# Patient Record
Sex: FEMALE | Race: WHITE | NOT HISPANIC OR LATINO
[De-identification: names, ages, dates, MRNs, and addresses within clinical notes are randomized per-mention and may not be internally consistent; named-entity substitution may affect disease eponyms.]

---

## 2024-03-08 ENCOUNTER — APPOINTMENT (OUTPATIENT)
Age: 64
End: 2024-03-08
Payer: MEDICAID

## 2024-03-08 DIAGNOSIS — K14.8 OTHER DISEASES OF TONGUE: ICD-10-CM

## 2024-03-08 DIAGNOSIS — F20.9 SCHIZOPHRENIA, UNSPECIFIED: ICD-10-CM

## 2024-03-08 PROCEDURE — 99203 OFFICE O/P NEW LOW 30 MIN: CPT

## 2024-03-13 ENCOUNTER — OUTPATIENT (OUTPATIENT)
Dept: OUTPATIENT SERVICES | Facility: HOSPITAL | Age: 64
LOS: 1 days | End: 2024-03-13
Payer: MEDICAID

## 2024-03-13 ENCOUNTER — APPOINTMENT (OUTPATIENT)
Dept: CT IMAGING | Facility: IMAGING CENTER | Age: 64
End: 2024-03-13
Payer: MEDICAID

## 2024-03-13 DIAGNOSIS — K14.8 OTHER DISEASES OF TONGUE: ICD-10-CM

## 2024-03-13 PROCEDURE — 70491 CT SOFT TISSUE NECK W/DYE: CPT | Mod: 26

## 2024-03-13 PROCEDURE — 71260 CT THORAX DX C+: CPT

## 2024-03-13 PROCEDURE — 70491 CT SOFT TISSUE NECK W/DYE: CPT

## 2024-03-13 PROCEDURE — 71260 CT THORAX DX C+: CPT | Mod: 26

## 2024-03-19 ENCOUNTER — OUTPATIENT (OUTPATIENT)
Dept: OUTPATIENT SERVICES | Facility: HOSPITAL | Age: 64
LOS: 1 days | End: 2024-03-19

## 2024-03-19 VITALS
DIASTOLIC BLOOD PRESSURE: 100 MMHG | OXYGEN SATURATION: 98 % | TEMPERATURE: 98 F | HEIGHT: 64 IN | SYSTOLIC BLOOD PRESSURE: 140 MMHG | RESPIRATION RATE: 16 BRPM | WEIGHT: 132.06 LBS | HEART RATE: 106 BPM

## 2024-03-19 DIAGNOSIS — Z98.890 OTHER SPECIFIED POSTPROCEDURAL STATES: Chronic | ICD-10-CM

## 2024-03-19 DIAGNOSIS — C02.3 MALIGNANT NEOPLASM OF ANTERIOR TWO-THIRDS OF TONGUE, PART UNSPECIFIED: ICD-10-CM

## 2024-03-19 DIAGNOSIS — F20.9 SCHIZOPHRENIA, UNSPECIFIED: ICD-10-CM

## 2024-03-19 DIAGNOSIS — C02.9 MALIGNANT NEOPLASM OF TONGUE, UNSPECIFIED: ICD-10-CM

## 2024-03-19 LAB
ADD ON TEST-SPECIMEN IN LAB: SIGNIFICANT CHANGE UP
ALBUMIN SERPL ELPH-MCNC: 4.3 G/DL — SIGNIFICANT CHANGE UP (ref 3.3–5)
ALP SERPL-CCNC: 99 U/L — SIGNIFICANT CHANGE UP (ref 40–120)
ALT FLD-CCNC: 16 U/L — SIGNIFICANT CHANGE UP (ref 4–33)
ANION GAP SERPL CALC-SCNC: 11 MMOL/L — SIGNIFICANT CHANGE UP (ref 7–14)
AST SERPL-CCNC: 17 U/L — SIGNIFICANT CHANGE UP (ref 4–32)
BILIRUB DIRECT SERPL-MCNC: <0.2 MG/DL — SIGNIFICANT CHANGE UP (ref 0–0.3)
BILIRUB INDIRECT FLD-MCNC: >0.1 MG/DL — SIGNIFICANT CHANGE UP (ref 0–1)
BILIRUB SERPL-MCNC: 0.3 MG/DL — SIGNIFICANT CHANGE UP (ref 0.2–1.2)
BLD GP AB SCN SERPL QL: NEGATIVE — SIGNIFICANT CHANGE UP
BUN SERPL-MCNC: 14 MG/DL — SIGNIFICANT CHANGE UP (ref 7–23)
CALCIUM SERPL-MCNC: 9.4 MG/DL — SIGNIFICANT CHANGE UP (ref 8.4–10.5)
CHLORIDE SERPL-SCNC: 106 MMOL/L — SIGNIFICANT CHANGE UP (ref 98–107)
CO2 SERPL-SCNC: 24 MMOL/L — SIGNIFICANT CHANGE UP (ref 22–31)
CREAT SERPL-MCNC: 0.77 MG/DL — SIGNIFICANT CHANGE UP (ref 0.5–1.3)
EGFR: 86 ML/MIN/1.73M2 — SIGNIFICANT CHANGE UP
GLUCOSE SERPL-MCNC: 96 MG/DL — SIGNIFICANT CHANGE UP (ref 70–99)
HCT VFR BLD CALC: 36.5 % — SIGNIFICANT CHANGE UP (ref 34.5–45)
HGB BLD-MCNC: 12.3 G/DL — SIGNIFICANT CHANGE UP (ref 11.5–15.5)
MCHC RBC-ENTMCNC: 30.1 PG — SIGNIFICANT CHANGE UP (ref 27–34)
MCHC RBC-ENTMCNC: 33.7 GM/DL — SIGNIFICANT CHANGE UP (ref 32–36)
MCV RBC AUTO: 89.5 FL — SIGNIFICANT CHANGE UP (ref 80–100)
NRBC # BLD: 0 /100 WBCS — SIGNIFICANT CHANGE UP (ref 0–0)
NRBC # FLD: 0 K/UL — SIGNIFICANT CHANGE UP (ref 0–0)
PLATELET # BLD AUTO: 245 K/UL — SIGNIFICANT CHANGE UP (ref 150–400)
POTASSIUM SERPL-MCNC: 3.9 MMOL/L — SIGNIFICANT CHANGE UP (ref 3.5–5.3)
POTASSIUM SERPL-SCNC: 3.9 MMOL/L — SIGNIFICANT CHANGE UP (ref 3.5–5.3)
PROT SERPL-MCNC: 7.8 G/DL — SIGNIFICANT CHANGE UP (ref 6–8.3)
RBC # BLD: 4.08 M/UL — SIGNIFICANT CHANGE UP (ref 3.8–5.2)
RBC # FLD: 13.2 % — SIGNIFICANT CHANGE UP (ref 10.3–14.5)
RH IG SCN BLD-IMP: POSITIVE — SIGNIFICANT CHANGE UP
RH IG SCN BLD-IMP: POSITIVE — SIGNIFICANT CHANGE UP
SODIUM SERPL-SCNC: 141 MMOL/L — SIGNIFICANT CHANGE UP (ref 135–145)
WBC # BLD: 4.19 K/UL — SIGNIFICANT CHANGE UP (ref 3.8–10.5)
WBC # FLD AUTO: 4.19 K/UL — SIGNIFICANT CHANGE UP (ref 3.8–10.5)

## 2024-03-19 RX ORDER — SODIUM CHLORIDE 9 MG/ML
1000 INJECTION, SOLUTION INTRAVENOUS
Refills: 0 | Status: DISCONTINUED | OUTPATIENT
Start: 2024-03-21 | End: 2024-04-04

## 2024-03-19 NOTE — H&P PST ADULT - PSYCHIATRIC COMMENTS
hx depression / anxiety - schizophrenia - lives with brother - stable on meds Pt able to answer most questions but brother gave MH

## 2024-03-19 NOTE — H&P PST ADULT - HISTORY OF PRESENT ILLNESS
Pt is a 64 yr old female scheduled for Excision of Lesion of Tongue Right Ventrolateral , Endoscopy Procedures on the Larynx with Dr Lockett tentatively 3/21/24 - Pt lives with brother and has hx schizophrenia , depression and was lost to family for 5 yrs up until 2022 - pt c/o of pain 8/10 of tongue with eating / swallowing - but unsure when started - dental picked up on lesion during exam for dentures 11/23 - biopsy needed now - brother states patient does not always follow instructions - poor historian

## 2024-03-19 NOTE — H&P PST ADULT - FUNCTIONAL STATUS
METs DASI/4-10 METS METs DASI 6.6 - pt walks daily, stairs, light house chores, shopping and carrying bags/4-10 METS

## 2024-03-19 NOTE — H&P PST ADULT - NSICDXPASTMEDICALHX_GEN_ALL_CORE_FT
PAST MEDICAL HISTORY:  Tongue cancer      PAST MEDICAL HISTORY:  Anxiety and depression     Schizophrenia     Tongue cancer

## 2024-03-19 NOTE — H&P PST ADULT - COMMENTS
Pt denies loose teeth   Malllampati 4 Pt seen by PCP yesterday with slight tachycardia and elevated BP - brother to start to  take BP and report to PCP

## 2024-03-19 NOTE — H&P PST ADULT - PROBLEM SELECTOR PLAN 1
Pt scheduled for surgery and preop instructions including instructions for taking Famotidine on the day of surgery, given verbally and with use of  written materials, and patient confirming understanding of such instructions using  teach back method.  PCP evaluation and EKG in chart

## 2024-03-19 NOTE — H&P PST ADULT - ENMT COMMENTS
Pt c/o of pain 8/10 of right side of tongue for unknown time - pt lost to brother until 2022 and dental work noted lesion 11/23 - biopsy needed at this time Unable to visualize lesion right lateral tongue - pt has some reluctance to open mouth fully r/t pain

## 2024-03-20 ENCOUNTER — TRANSCRIPTION ENCOUNTER (OUTPATIENT)
Age: 64
End: 2024-03-20

## 2024-03-20 NOTE — ASU PATIENT PROFILE, ADULT - FALL HARM RISK - UNIVERSAL INTERVENTIONS
Bed in lowest position, wheels locked, appropriate side rails in place/Call bell, personal items and telephone in reach/Instruct patient to call for assistance before getting out of bed or chair/Non-slip footwear when patient is out of bed/[place] to call system/Physically safe environment - no spills, clutter or unnecessary equipment/Purposeful Proactive Rounding/Room/bathroom lighting operational, light cord in reach

## 2024-03-21 ENCOUNTER — TRANSCRIPTION ENCOUNTER (OUTPATIENT)
Age: 64
End: 2024-03-21

## 2024-03-21 ENCOUNTER — APPOINTMENT (OUTPATIENT)
Age: 64
End: 2024-03-21
Payer: MEDICAID

## 2024-03-21 ENCOUNTER — RESULT REVIEW (OUTPATIENT)
Age: 64
End: 2024-03-21

## 2024-03-21 ENCOUNTER — OUTPATIENT (OUTPATIENT)
Dept: OUTPATIENT SERVICES | Facility: HOSPITAL | Age: 64
LOS: 1 days | Discharge: HOME | End: 2024-03-21
Payer: MEDICAID

## 2024-03-21 VITALS
SYSTOLIC BLOOD PRESSURE: 126 MMHG | TEMPERATURE: 99 F | HEART RATE: 108 BPM | HEIGHT: 64 IN | DIASTOLIC BLOOD PRESSURE: 85 MMHG | WEIGHT: 134.04 LBS | RESPIRATION RATE: 17 BRPM | OXYGEN SATURATION: 98 %

## 2024-03-21 VITALS
OXYGEN SATURATION: 98 % | DIASTOLIC BLOOD PRESSURE: 79 MMHG | HEART RATE: 87 BPM | RESPIRATION RATE: 16 BRPM | SYSTOLIC BLOOD PRESSURE: 123 MMHG

## 2024-03-21 DIAGNOSIS — C02.3 MALIGNANT NEOPLASM OF ANTERIOR TWO-THIRDS OF TONGUE, PART UNSPECIFIED: ICD-10-CM

## 2024-03-21 DIAGNOSIS — Z98.890 OTHER SPECIFIED POSTPROCEDURAL STATES: Chronic | ICD-10-CM

## 2024-03-21 PROCEDURE — 99255 IP/OBS CONSLTJ NEW/EST HI 80: CPT

## 2024-03-21 PROCEDURE — 41105 BIOPSY OF TONGUE: CPT

## 2024-03-21 PROCEDURE — 88305 TISSUE EXAM BY PATHOLOGIST: CPT | Mod: 26

## 2024-03-21 RX ORDER — IBUPROFEN 200 MG
1 TABLET ORAL
Qty: 28 | Refills: 0
Start: 2024-03-21 | End: 2024-03-27

## 2024-03-21 RX ORDER — CHLORHEXIDINE GLUCONATE 213 G/1000ML
15 SOLUTION TOPICAL
Qty: 1 | Refills: 0
Start: 2024-03-21 | End: 2024-04-03

## 2024-03-21 RX ORDER — ACETAMINOPHEN 500 MG
1 TABLET ORAL
Qty: 28 | Refills: 0
Start: 2024-03-21 | End: 2024-03-27

## 2024-03-21 RX ORDER — APREPITANT 80 MG/1
40 CAPSULE ORAL ONCE
Refills: 0 | Status: COMPLETED | OUTPATIENT
Start: 2024-03-21 | End: 2024-03-21

## 2024-03-21 RX ORDER — OXYCODONE HYDROCHLORIDE 5 MG/1
5 TABLET ORAL
Qty: 60 | Refills: 0
Start: 2024-03-21 | End: 2024-03-23

## 2024-03-21 RX ORDER — CHLORHEXIDINE GLUCONATE 213 G/1000ML
15 SOLUTION TOPICAL ONCE
Refills: 0 | Status: COMPLETED | OUTPATIENT
Start: 2024-03-21 | End: 2024-03-21

## 2024-03-21 RX ORDER — ACETAMINOPHEN 500 MG
975 TABLET ORAL ONCE
Refills: 0 | Status: COMPLETED | OUTPATIENT
Start: 2024-03-21 | End: 2024-03-21

## 2024-03-21 RX ORDER — GABAPENTIN 400 MG/1
600 CAPSULE ORAL ONCE
Refills: 0 | Status: COMPLETED | OUTPATIENT
Start: 2024-03-21 | End: 2024-03-21

## 2024-03-21 RX ADMIN — CHLORHEXIDINE GLUCONATE 15 MILLILITER(S): 213 SOLUTION TOPICAL at 10:47

## 2024-03-21 RX ADMIN — Medication 975 MILLIGRAM(S): at 10:47

## 2024-03-21 RX ADMIN — APREPITANT 40 MILLIGRAM(S): 80 CAPSULE ORAL at 11:19

## 2024-03-21 RX ADMIN — GABAPENTIN 600 MILLIGRAM(S): 400 CAPSULE ORAL at 10:47

## 2024-03-21 NOTE — ASU DISCHARGE PLAN (ADULT/PEDIATRIC) - NURSING INSTRUCTIONS
Anesthesia precautions: For the next 12hrs or if taking pain medication,  DO NOT:  a)Drive a car, operate power tools or machinery, b)Drink alcohol, beer or wine , c)Make important personal or business decisions.

## 2024-03-21 NOTE — CONSULT NOTE ADULT - ASSESSMENT
pateint will require free flap reconstruction of hemiglossectomy defect. this was an intraoperative consult. will contact patient when she is discharged later today and will plan for her to return to the office for formal evaluation. case d/w tisha graf directly in the OR

## 2024-03-21 NOTE — ASU DISCHARGE PLAN (ADULT/PEDIATRIC) - SPECIFY DIET AND FLUID
Please follow a liquid diet followed by a soft diet as tolerated
Patient presents with left-sided chest pain that resolved prior to arrival.  Labs EKG x-ray done.  Troponin stable.  No provide recent cardiac evaluation.  Patient placed in obs for further cardiac testing.

## 2024-03-21 NOTE — ASU DISCHARGE PLAN (ADULT/PEDIATRIC) - FOLLOW UP APPOINTMENTS
may also call Recovery Room (PACU) 24/7 @ [phone]/[facility]

## 2024-03-21 NOTE — CONSULT NOTE ADULT - SUBJECTIVE AND OBJECTIVE BOX
called by dr. graf for intraoperative consultation. patinet with ulcerated mass on the ventral tongue lesion. he was planning on biopsy. looks suspicious for malignancy. patient will undergo staging with dr. graf and then likely require hemiglossectomy and neck dissection. plan for reconstruction would include free flap.    on exam: no abdominal or arm incisions. ulcerated several cm lesion on the tongue. no other neck incisions. patient

## 2024-03-21 NOTE — ASU DISCHARGE PLAN (ADULT/PEDIATRIC) - ASU DC SPECIAL INSTRUCTIONSFT
Please follow up with your surgeon for a follow up appointment next week. If you are not able to make your appointment, call [phone] to reschedule.

## 2024-03-21 NOTE — ASU DISCHARGE PLAN (ADULT/PEDIATRIC) - CARE PROVIDER_API CALL
Lux Lockett  Oral/Maxillofacial Surgery  [id number] [place] [id number]  Phone: [phone]  Fax: [phone]  Follow Up Time:

## 2024-03-25 LAB — SURGICAL PATHOLOGY STUDY: SIGNIFICANT CHANGE UP

## 2024-03-29 ENCOUNTER — APPOINTMENT (OUTPATIENT)
Age: 64
End: 2024-03-29
Payer: MEDICAID

## 2024-03-29 PROBLEM — F41.9 ANXIETY DISORDER, UNSPECIFIED: Chronic | Status: ACTIVE | Noted: 2024-03-19

## 2024-03-29 PROBLEM — C02.9 MALIGNANT NEOPLASM OF TONGUE, UNSPECIFIED: Chronic | Status: ACTIVE | Noted: 2024-03-19

## 2024-03-29 PROBLEM — F20.9 SCHIZOPHRENIA, UNSPECIFIED: Chronic | Status: ACTIVE | Noted: 2024-03-19

## 2024-03-29 PROCEDURE — 99024 POSTOP FOLLOW-UP VISIT: CPT

## 2024-03-29 NOTE — ASSESSMENT
[FreeTextEntry1] : OMFS FU  Patient name: Zamzam Burris YOB: 1960   The patient presents today accompanied by her brother who appears to be her caregiver for evaluation of a painful lesion that is present by patient report since November 2023.  The patient's cognitive state at today's visit is described by her brother as "not a good day" for the patient who has underlying psychiatric issues/schizophrenia   Medical history: Aside from the current chief complaint there is a longstanding history of schizophrenia with multiple hospitalizations for psychiatric treatment.  Otherwise medical history is unremarkable and other underlying medical conditions are denied. Medications: Bupropion, risperidone, Seroquel, vitamin B12 Allergies: Penicillin   3/29/24: sp EUA and biopsy tongue, squamous cell carcinoma with perineural invasion.  CT NECK/CHEST  Evaluation of the oral cavity and oropharynx is limited by dental streak artifact. No discrete enhancing mass can be identified in the oral cavity. Nonspecific mildly prominent left level 1, 2, and 3 nodes, cannot rule out metastatic disease.  Please see concurrent CT chest regarding partially visualized prominent upper mediastinal and axillary nodes and the right lung apex groundglass opacity.    Numerous bilateral axillary and subpectoral lymph nodes, some of which are mildly enlarged. Differential diagnostic considerations include metastatic disease given the history of cancer. Lymph nodes are amenable to percutaneous ultrasound-guided biopsy.  Nonspecific dominant right upper lobe apical groundglass opacity. Differential diagnostic considerations include but is not limited to focus of infection/inflammation or primary lung neoplasm. A 3 month follow-up noncontrast chest CT is recommended for complete evaluation.   Exam  neck soft, no cervical lymphadenopathy JEROME 30mm  ulceartive mass right tongue 3.5 by 2.0 cm, no extension into the FOM freely mobile tongue no leukoplakia/erythroplakia no additional hard/soft tissue lesion in the oral cavity/oropharynx  AP: 63 yo with [id number] squamous cell carcinoma of the right mobile tongue.  I have reviewed the etipathogenesis of oral cavity squamous cell carcinoma including traditional risk factors. I have discussed the NCCN guidelines for evaluation including clinical exam, radiographic staging. I have reviewed the biopsy report and outlined an evaluation strategy   The surgical approach will entail r The following elements of informed consent were reviewed during this visit.   Every surgical procedure involves a certain amount of risk and it is important that you understand these risks and the possible complications associated with them. In addition, every procedure has limitations. An individual's choice to undergo a surgical procedure is based on the comparison of the risk to potential benefit. Although the majority of patients do not experience these complications, you should discuss each of them with your surgeon to make sure you understand all possible consequences of the procedure.  Bleeding It is possible, though unusual, to experience a bleeding episode during or after surgery. Should post-operative bleeding occur, it may require emergency treatment to drain accumulated blood or blood transfusion. Intra-operative blood transfusion may also be required. Do not take any aspirin or anti-inflammatory medications for ten days before or after surgery, as this may increase the risk of bleeding. Non-prescription "herbs" and dietary supplements can increase the risk of surgical bleeding. If blood transfusions are necessary to treat blood loss, there is the risk of blood-related infections such as hepatitis and HIV (AIDS). Heparin medications that are used to prevent blood clots in veins can produce bleeding and decreased blood platelets.  Infection Infection is unusual after surgery. Should an infection occur, additional treatment including antibiotics, hospitalization, or additional surgery may be necessary.  Nerve injury Surgery in the oral cavity, face and neck is often close to sensory and motor nerves, It is uncommon to have long term sensory and or motor nerve deficits after surgery. In some cases post-operative nerve dysfunction is predictable, especially when removing teeth and or tumors of the mandible (lower jaw) Some of the effects of operating near nerves are numbness, altered sensation and in rare cases painful numbness (dysesthesia).   Scarring All surgery leaves scars, some more visible than others. Although good wound healing after a surgical procedure is expected, abnormal scars may occur within the skin and deeper tissues. Scars may be unattractive and of different color than the surrounding skin tone. Scar appearance may also vary within the same scar. Scars may be asymmetrical (appear different on the right and left side of the body). There is the possibility of visible marks in the skin from sutures. In some cases scars may require surgical revision or treatment.  Damage to Deeper Structures There is the potential for injury to deeper structures including, nerves, blood vessels, muscles during any surgical procedure. The potential for this to occur varies according to the type of procedure being performed. Injury to deeper structures may be temporary or permanent.  Delayed Healing Wound disruption or delayed wound healing is possible. Some areas of the skin may not heal normally and may take a long time to heal. Areas of skin tissue may die. This may require frequent dressing changes or further surgery to remove the non-healed tissue. Individuals who have decreased blood supply to tissue from past surgery or radiation therapy may be at increased risk for wound healing and poor surgical outcome. Smokers have a greater risk of skin loss and wound healing complications.  Cardiac and Pulmonary Complications Pulmonary complications may occur secondarily to both blood clots (pulmonary emboli), fat deposits (fat emboli) or partial collapse of the lungs after general anesthesia. Pulmonary emboli can be life-threatening or fatal in some circumstances. Inactivity and other conditions may increase the incidence of blood clots traveling to the lungs causing a major blood clot that may result in death. It is important to discuss with your physician any past history of swelling in your legs or blood clots that may contribute to this condition. Cardiac complications are a risk with any surgery and anesthesia, even in patients without symptoms. If you experience shortness of breath, chest pain, or unusual heart beats, seek medical attention immediately. Should any of these complications occur, you may require hospitalization and additional treatment.   Pain You will experience pain after your surgery. Pain of varying intensity and duration may occur and persist after surgery. Chronic pain may occur very infrequently from nerves becoming trapped in scar tissue or due to tissue stretching.    Summary of risks for major head and neck surgery The most common complications encountered during and after surgery. This list is intended to include most of the common complications, on occasion rare complications arise that may not be including in this list.  Bleeding, swelling, infection, failure to remove all diseased tissue, recurrence of disease, inability to diagnose disease, facial weakness, connection between nose and mouth, tooth loss, numbness, scarring, blood or fluid collection, asymmetric facial appearance, arm and shoulder weakness, diaphragm weakness, lymphatic fistula formation, difficulty with speech, swallowing or taste

## 2024-04-16 ENCOUNTER — NON-APPOINTMENT (OUTPATIENT)
Age: 64
End: 2024-04-16

## 2024-04-17 ENCOUNTER — NON-APPOINTMENT (OUTPATIENT)
Age: 64
End: 2024-04-17

## 2024-04-17 ENCOUNTER — APPOINTMENT (OUTPATIENT)
Dept: PLASTIC SURGERY | Facility: CLINIC | Age: 64
End: 2024-04-17
Payer: MEDICAID

## 2024-04-17 VITALS
HEART RATE: 81 BPM | TEMPERATURE: 98.2 F | OXYGEN SATURATION: 99 % | SYSTOLIC BLOOD PRESSURE: 153 MMHG | DIASTOLIC BLOOD PRESSURE: 99 MMHG

## 2024-04-17 PROCEDURE — 99244 OFF/OP CNSLTJ NEW/EST MOD 40: CPT

## 2024-04-22 NOTE — PHYSICAL EXAM
[de-identified] : ulcerated lesion on the right tounge extending to floor of mouth, no incisions on neck or arms. patient is right handed. unremarkable allens test.

## 2024-04-22 NOTE — ASSESSMENT
[FreeTextEntry1] : patient planned to undergo resection of orgal SCC. i spoke at substantial legnth with the brother and mother of this patinet. she is very schizophrenic and heavily medicated. she does not make any decisions on her own. they understand what will be involved. we will need likely trach as part of this procedure and will needpsych and medical clearance and optimization before OR. I will discuss case with dr. graf. Radial forearm flap wound be the first choice option.

## 2024-04-22 NOTE — HISTORY OF PRESENT ILLNESS
[FreeTextEntry1] : Zamzam Burris is a 64 year old female here today for a surgical consultation of recently diagnosed squamous cell carcinoma of the right tongue. The patient is accompanied to her appt by brother (Koffi) and mother (Cathie). The pt's brother states that Zamzam has been diagnosed with schizophrenia and is currently on Buproprion and Risparidone. The patient states she was getting a dental procedure (dentures placed) in Dec 2023 when her dentist noticed a tongue lesion. Her dentist then advised her to follow up with an oral surgeon which she did (Dr. Lockett) and he referred her here. The patient does not yet have a surgery date scheduled yet. The patient does report being allergic to Penicillin (reaction- major hives). The patient states that the tongue lesion is extremely painful.   per OMFS team - patient needs to have hemiglossectomy and possible floor of mouth resection, will require RFFF or other soft tissue free flap for coverage

## 2024-04-24 ENCOUNTER — TRANSCRIPTION ENCOUNTER (OUTPATIENT)
Age: 64
End: 2024-04-24

## 2024-04-29 ENCOUNTER — APPOINTMENT (OUTPATIENT)
Age: 64
End: 2024-04-29

## 2024-04-30 ENCOUNTER — APPOINTMENT (OUTPATIENT)
Age: 64
End: 2024-04-30

## 2024-05-01 ENCOUNTER — APPOINTMENT (OUTPATIENT)
Age: 64
End: 2024-05-01
Payer: MEDICAID

## 2024-05-01 PROCEDURE — 99213 OFFICE O/P EST LOW 20 MIN: CPT

## 2024-05-30 ENCOUNTER — OUTPATIENT (OUTPATIENT)
Dept: OUTPATIENT SERVICES | Facility: HOSPITAL | Age: 64
LOS: 1 days | End: 2024-05-30

## 2024-05-30 VITALS
SYSTOLIC BLOOD PRESSURE: 139 MMHG | WEIGHT: 130.07 LBS | RESPIRATION RATE: 16 BRPM | OXYGEN SATURATION: 97 % | TEMPERATURE: 98 F | HEIGHT: 64 IN | DIASTOLIC BLOOD PRESSURE: 98 MMHG | HEART RATE: 102 BPM

## 2024-05-30 DIAGNOSIS — Z98.890 OTHER SPECIFIED POSTPROCEDURAL STATES: Chronic | ICD-10-CM

## 2024-05-30 DIAGNOSIS — C02.9 MALIGNANT NEOPLASM OF TONGUE, UNSPECIFIED: ICD-10-CM

## 2024-05-30 DIAGNOSIS — C02.3 MALIGNANT NEOPLASM OF ANTERIOR TWO-THIRDS OF TONGUE, PART UNSPECIFIED: ICD-10-CM

## 2024-05-30 DIAGNOSIS — C02.9 MALIGNANT NEOPLASM OF TONGUE, UNSPECIFIED: Chronic | ICD-10-CM

## 2024-05-30 DIAGNOSIS — Z98.49 CATARACT EXTRACTION STATUS, UNSPECIFIED EYE: Chronic | ICD-10-CM

## 2024-05-30 LAB
ANION GAP SERPL CALC-SCNC: 15 MMOL/L — HIGH (ref 7–14)
BLD GP AB SCN SERPL QL: NEGATIVE — SIGNIFICANT CHANGE UP
BUN SERPL-MCNC: 16 MG/DL — SIGNIFICANT CHANGE UP (ref 7–23)
CALCIUM SERPL-MCNC: 9.4 MG/DL — SIGNIFICANT CHANGE UP (ref 8.4–10.5)
CHLORIDE SERPL-SCNC: 105 MMOL/L — SIGNIFICANT CHANGE UP (ref 98–107)
CO2 SERPL-SCNC: 22 MMOL/L — SIGNIFICANT CHANGE UP (ref 22–31)
CREAT SERPL-MCNC: 0.9 MG/DL — SIGNIFICANT CHANGE UP (ref 0.5–1.3)
EGFR: 71 ML/MIN/1.73M2 — SIGNIFICANT CHANGE UP
GLUCOSE SERPL-MCNC: 105 MG/DL — HIGH (ref 70–99)
HCT VFR BLD CALC: 37.2 % — SIGNIFICANT CHANGE UP (ref 34.5–45)
HGB BLD-MCNC: 12.3 G/DL — SIGNIFICANT CHANGE UP (ref 11.5–15.5)
MCHC RBC-ENTMCNC: 30.5 PG — SIGNIFICANT CHANGE UP (ref 27–34)
MCHC RBC-ENTMCNC: 33.1 GM/DL — SIGNIFICANT CHANGE UP (ref 32–36)
MCV RBC AUTO: 92.3 FL — SIGNIFICANT CHANGE UP (ref 80–100)
NRBC # BLD: 0 /100 WBCS — SIGNIFICANT CHANGE UP (ref 0–0)
NRBC # FLD: 0 K/UL — SIGNIFICANT CHANGE UP (ref 0–0)
PLATELET # BLD AUTO: 256 K/UL — SIGNIFICANT CHANGE UP (ref 150–400)
POTASSIUM SERPL-MCNC: 4.3 MMOL/L — SIGNIFICANT CHANGE UP (ref 3.5–5.3)
POTASSIUM SERPL-SCNC: 4.3 MMOL/L — SIGNIFICANT CHANGE UP (ref 3.5–5.3)
RBC # BLD: 4.03 M/UL — SIGNIFICANT CHANGE UP (ref 3.8–5.2)
RBC # FLD: 13.2 % — SIGNIFICANT CHANGE UP (ref 10.3–14.5)
RH IG SCN BLD-IMP: POSITIVE — SIGNIFICANT CHANGE UP
SODIUM SERPL-SCNC: 142 MMOL/L — SIGNIFICANT CHANGE UP (ref 135–145)
WBC # BLD: 5.66 K/UL — SIGNIFICANT CHANGE UP (ref 3.8–10.5)
WBC # FLD AUTO: 5.66 K/UL — SIGNIFICANT CHANGE UP (ref 3.8–10.5)

## 2024-05-30 RX ORDER — BUPROPION HYDROCHLORIDE 150 MG/1
1 TABLET, EXTENDED RELEASE ORAL
Refills: 0 | DISCHARGE

## 2024-05-30 RX ORDER — RISPERIDONE 4 MG/1
1 TABLET ORAL
Refills: 0 | DISCHARGE

## 2024-05-30 RX ORDER — SODIUM CHLORIDE 0.9 % (FLUSH) 0.9 %
3 SYRINGE (ML) INJECTION EVERY 8 HOURS
Refills: 0 | Status: DISCONTINUED | OUTPATIENT
Start: 2024-06-06 | End: 2024-07-10

## 2024-05-30 RX ORDER — BUPROPION HYDROCHLORIDE 150 MG/1
2 TABLET, EXTENDED RELEASE ORAL
Refills: 0 | DISCHARGE

## 2024-05-30 RX ORDER — DEXTROSE MONOHYDRATE AND SODIUM CHLORIDE 5; .3 G/100ML; G/100ML
1000 INJECTION, SOLUTION INTRAVENOUS
Refills: 0 | Status: DISCONTINUED | OUTPATIENT
Start: 2024-06-06 | End: 2024-06-07

## 2024-05-30 RX ORDER — QUETIAPINE FUMARATE 200 MG/1
1 TABLET, FILM COATED ORAL
Refills: 0 | DISCHARGE

## 2024-05-30 NOTE — H&P PST ADULT - NSICDXFAMILYHX_GEN_ALL_CORE_FT
FAMILY HISTORY:  Father  Still living? Unknown  FH: chronic renal disease, Age at diagnosis: Age Unknown    Mother  Still living? Unknown  FH: uterine cancer, Age at diagnosis: Age Unknown    Sibling  Still living? Unknown  FH: prostate cancer, Age at diagnosis: Age Unknown  FH: suicide, Age at diagnosis: Age Unknown

## 2024-05-30 NOTE — H&P PST ADULT - NSICDXPASTMEDICALHX_GEN_ALL_CORE_FT
PAST MEDICAL HISTORY:  Anxiety and depression     Other diseases of tongue     Schizophrenia     Tongue cancer

## 2024-05-30 NOTE — H&P PST ADULT - NSICDXPASTSURGICALHX_GEN_ALL_CORE_FT
PAST SURGICAL HISTORY:  Cancer determined by biopsy of tongue     H/O colonoscopy     H/O hand surgery     S/P cataract extraction

## 2024-05-30 NOTE — H&P PST ADULT - HISTORY OF PRESENT ILLNESS
65 y/o female with schizophrenia and depression presents to [facility] preop for glossectomy, selective neck dissection, tracheostomy, forearm free flap; Dr. Alvarez: reconstruction mouth and tongue with free skin or muscle flap from right or left forearm or leg, possible wound vac. As per brother, pt lost contact with her family from 2017 to 2022 due to her cognitive state related to schizophrenia. She underwent dental extractions in December 2023 when her dentist noted a tongue lesion. s/p tongue biopsy in March 2024 which confirmed squamous cell carcinoma of tongue.

## 2024-05-30 NOTE — H&P PST ADULT - ASSESSMENT
63 y/o female with schizophrenia and depression presents to [facility] preop for glossectomy, selective neck dissection, tracheostomy, forearm free flap; Dr. Alvarez: reconstruction mouth and tongue with free skin or muscle flap from right or left forearm or leg, possible wound vac. As per brother, pt lost contact with her family from 2017 to 2022 due to her cognitive state related to schizophrenia. She underwent dental extractions in December 2023 when her dentist noted a tongue lesion. s/p tongue biopsy in March 2024 which confirmed squamous cell carcinoma of tongue.

## 2024-05-30 NOTE — H&P PST ADULT - ENMT COMMENTS
preop dx of malignant neoplasm of anterior 2/3 tongue and other diseases of tongue. see HPI Unable to visualize tongue due to pain when opening mouth wide.

## 2024-05-30 NOTE — H&P PST ADULT - PROBLEM SELECTOR PLAN 1
preop for glossectomy, selective neck dissection, tracheostomy, forearm free flap; Dr. Alvarez: reconstruction mouth and tongue with free skin or muscle flap from right or left forearm or leg, possible wound vac on 6/6/24  preop instructions given, pt and brother Koffi verbalized understanding  GI prophylaxis and chlorhexidine wash provided  cbc, bmp, type pending   Pt instructed to take psych meds AM of surgery as prescribed

## 2024-06-05 NOTE — ASU PATIENT PROFILE, ADULT - FALL HARM RISK - FACTORS NURSING JUDGEMENT
[] : Resident [>50% of Time Spent on Counseling for ____] : Greater than 50% of the encounter time was spent on counseling for [unfilled] [Time Spent: ___ minutes] : I have spent [unfilled] minutes of face to face time with the patient No

## 2024-06-06 ENCOUNTER — RESULT REVIEW (OUTPATIENT)
Age: 64
End: 2024-06-06

## 2024-06-06 ENCOUNTER — APPOINTMENT (OUTPATIENT)
Age: 64
End: 2024-06-06
Payer: MEDICAID

## 2024-06-06 ENCOUNTER — APPOINTMENT (OUTPATIENT)
Dept: PLASTIC SURGERY | Facility: HOSPITAL | Age: 64
End: 2024-06-06
Payer: MEDICAID

## 2024-06-06 ENCOUNTER — INPATIENT (INPATIENT)
Facility: HOSPITAL | Age: 64
LOS: 33 days | Discharge: HOME | End: 2024-07-10
Attending: ORAL & MAXILLOFACIAL SURGERY | Admitting: ORAL & MAXILLOFACIAL SURGERY
Payer: MEDICAID

## 2024-06-06 VITALS
SYSTOLIC BLOOD PRESSURE: 127 MMHG | WEIGHT: 130.07 LBS | HEART RATE: 98 BPM | DIASTOLIC BLOOD PRESSURE: 92 MMHG | OXYGEN SATURATION: 100 % | RESPIRATION RATE: 18 BRPM | HEIGHT: 64 IN | TEMPERATURE: 98 F

## 2024-06-06 DIAGNOSIS — Z98.890 OTHER SPECIFIED POSTPROCEDURAL STATES: Chronic | ICD-10-CM

## 2024-06-06 DIAGNOSIS — C02.3 MALIGNANT NEOPLASM OF ANTERIOR TWO-THIRDS OF TONGUE, PART UNSPECIFIED: ICD-10-CM

## 2024-06-06 DIAGNOSIS — Z98.49 CATARACT EXTRACTION STATUS, UNSPECIFIED EYE: Chronic | ICD-10-CM

## 2024-06-06 DIAGNOSIS — C02.9 MALIGNANT NEOPLASM OF TONGUE, UNSPECIFIED: Chronic | ICD-10-CM

## 2024-06-06 LAB
A1C WITH ESTIMATED AVERAGE GLUCOSE RESULT: 4.9 % — SIGNIFICANT CHANGE UP (ref 4–5.6)
ANION GAP SERPL CALC-SCNC: 11 MMOL/L — SIGNIFICANT CHANGE UP (ref 7–14)
APTT BLD: 31.2 SEC — SIGNIFICANT CHANGE UP (ref 24.5–35.6)
BLOOD GAS ARTERIAL - LYTES,HGB,ICA,LACT RESULT: SIGNIFICANT CHANGE UP
BUN SERPL-MCNC: 12 MG/DL — SIGNIFICANT CHANGE UP (ref 7–23)
CALCIUM SERPL-MCNC: 8.3 MG/DL — LOW (ref 8.4–10.5)
CHLORIDE SERPL-SCNC: 105 MMOL/L — SIGNIFICANT CHANGE UP (ref 98–107)
CO2 SERPL-SCNC: 21 MMOL/L — LOW (ref 22–31)
CREAT SERPL-MCNC: 0.71 MG/DL — SIGNIFICANT CHANGE UP (ref 0.5–1.3)
EGFR: 95 ML/MIN/1.73M2 — SIGNIFICANT CHANGE UP
ESTIMATED AVERAGE GLUCOSE: 94 — SIGNIFICANT CHANGE UP
GLUCOSE SERPL-MCNC: 140 MG/DL — HIGH (ref 70–99)
HCT VFR BLD CALC: 30.6 % — LOW (ref 34.5–45)
HGB BLD-MCNC: 10.7 G/DL — LOW (ref 11.5–15.5)
INR BLD: 1.03 RATIO — SIGNIFICANT CHANGE UP (ref 0.85–1.18)
MAGNESIUM SERPL-MCNC: 1.5 MG/DL — LOW (ref 1.6–2.6)
MCHC RBC-ENTMCNC: 31.8 PG — SIGNIFICANT CHANGE UP (ref 27–34)
MCHC RBC-ENTMCNC: 35 GM/DL — SIGNIFICANT CHANGE UP (ref 32–36)
MCV RBC AUTO: 91.1 FL — SIGNIFICANT CHANGE UP (ref 80–100)
NRBC # BLD: 0 /100 WBCS — SIGNIFICANT CHANGE UP (ref 0–0)
NRBC # FLD: 0 K/UL — SIGNIFICANT CHANGE UP (ref 0–0)
PHOSPHATE SERPL-MCNC: 3.6 MG/DL — SIGNIFICANT CHANGE UP (ref 2.5–4.5)
PLATELET # BLD AUTO: 243 K/UL — SIGNIFICANT CHANGE UP (ref 150–400)
POTASSIUM SERPL-MCNC: 4.6 MMOL/L — SIGNIFICANT CHANGE UP (ref 3.5–5.3)
POTASSIUM SERPL-SCNC: 4.6 MMOL/L — SIGNIFICANT CHANGE UP (ref 3.5–5.3)
PROTHROM AB SERPL-ACNC: 11.6 SEC — SIGNIFICANT CHANGE UP (ref 9.5–13)
RBC # BLD: 3.36 M/UL — LOW (ref 3.8–5.2)
RBC # FLD: 13 % — SIGNIFICANT CHANGE UP (ref 10.3–14.5)
SODIUM SERPL-SCNC: 137 MMOL/L — SIGNIFICANT CHANGE UP (ref 135–145)
TSH SERPL-MCNC: 2.49 UIU/ML — SIGNIFICANT CHANGE UP (ref 0.27–4.2)
WBC # BLD: 14.55 K/UL — HIGH (ref 3.8–10.5)
WBC # FLD AUTO: 14.55 K/UL — HIGH (ref 3.8–10.5)

## 2024-06-06 PROCEDURE — 99254 IP/OBS CNSLTJ NEW/EST MOD 60: CPT

## 2024-06-06 PROCEDURE — 14301 TIS TRNFR ANY 30.1-60 SQ CM: CPT

## 2024-06-06 PROCEDURE — 88305 TISSUE EXAM BY PATHOLOGIST: CPT | Mod: 26

## 2024-06-06 PROCEDURE — 97606 NEG PRS WND THER DME>50 SQCM: CPT | Mod: 59

## 2024-06-06 PROCEDURE — 31610 TRACHEOSTOMY FENEST SKIN FLP: CPT

## 2024-06-06 PROCEDURE — 15005 WND PREP F/N/HF/G ADDL CM: CPT

## 2024-06-06 PROCEDURE — 14302 TIS TRNFR ADDL 30 SQ CM: CPT

## 2024-06-06 PROCEDURE — 40842 RECONSTRUCTION OF MOUTH: CPT

## 2024-06-06 PROCEDURE — 15004 WOUND PREP F/N/HF/G: CPT

## 2024-06-06 PROCEDURE — 29125 APPL SHORT ARM SPLINT STATIC: CPT | Mod: RT

## 2024-06-06 PROCEDURE — 15757 FREE SKIN FLAP MICROVASC: CPT

## 2024-06-06 PROCEDURE — 15100 SPLT AGRFT T/A/L 1ST 100SQCM: CPT

## 2024-06-06 PROCEDURE — ZZZZZ: CPT | Mod: 1L

## 2024-06-06 PROCEDURE — 38724 REMOVAL OF LYMPH NODES NECK: CPT

## 2024-06-06 PROCEDURE — 88307 TISSUE EXAM BY PATHOLOGIST: CPT | Mod: 26

## 2024-06-06 PROCEDURE — 88309 TISSUE EXAM BY PATHOLOGIST: CPT | Mod: 26

## 2024-06-06 PROCEDURE — 70360 X-RAY EXAM OF NECK: CPT | Mod: 26

## 2024-06-06 PROCEDURE — 73090 X-RAY EXAM OF FOREARM: CPT | Mod: 26,LT

## 2024-06-06 PROCEDURE — 41130 PARTIAL REMOVAL OF TONGUE: CPT

## 2024-06-06 PROCEDURE — 88331 PATH CONSLTJ SURG 1 BLK 1SPC: CPT | Mod: 26

## 2024-06-06 PROCEDURE — 71045 X-RAY EXAM CHEST 1 VIEW: CPT | Mod: 26

## 2024-06-06 DEVICE — CANNULA IMA 1MM BLUNT TIP: Type: IMPLANTABLE DEVICE | Site: RIGHT | Status: FUNCTIONAL

## 2024-06-06 DEVICE — COUPLER VESSEL ANASTOMOTIC 3MM: Type: IMPLANTABLE DEVICE | Site: RIGHT | Status: FUNCTIONAL

## 2024-06-06 DEVICE — CARTRIDGE MICROCLIP 30: Type: IMPLANTABLE DEVICE | Site: RIGHT | Status: FUNCTIONAL

## 2024-06-06 DEVICE — TUBE TRACH SZ 6 CUFF FLEX REUSE: Type: IMPLANTABLE DEVICE | Site: RIGHT | Status: FUNCTIONAL

## 2024-06-06 DEVICE — DOPPLER PROBE DISPOSABLE: Type: IMPLANTABLE DEVICE | Site: RIGHT | Status: FUNCTIONAL

## 2024-06-06 RX ORDER — BUPROPION HYDROCHLORIDE 150 MG/1
2 TABLET, EXTENDED RELEASE ORAL
Refills: 0 | DISCHARGE

## 2024-06-06 RX ORDER — PROPOFOL 10 MG/ML
40.11 INJECTION, EMULSION INTRAVENOUS
Qty: 1000 | Refills: 0 | Status: DISCONTINUED | OUTPATIENT
Start: 2024-06-06 | End: 2024-06-06

## 2024-06-06 RX ORDER — QUETIAPINE FUMARATE 200 MG/1
1 TABLET, FILM COATED ORAL
Refills: 0 | DISCHARGE

## 2024-06-06 RX ORDER — HYDROMORPHONE HCL 0.2 MG/ML
0.5 INJECTION, SOLUTION INTRAVENOUS EVERY 4 HOURS
Refills: 0 | Status: DISCONTINUED | OUTPATIENT
Start: 2024-06-06 | End: 2024-06-07

## 2024-06-06 RX ORDER — RISPERIDONE 4 MG/1
1 TABLET ORAL
Refills: 0 | DISCHARGE

## 2024-06-06 RX ORDER — DEXMEDETOMIDINE HYDROCHLORIDE 4 UG/ML
0.2 INJECTION, SOLUTION INTRAVENOUS
Qty: 400 | Refills: 0 | Status: DISCONTINUED | OUTPATIENT
Start: 2024-06-06 | End: 2024-06-06

## 2024-06-06 RX ORDER — IBUPROFEN 200 MG
2 TABLET ORAL
Refills: 0 | DISCHARGE

## 2024-06-06 RX ORDER — BUPROPION HYDROCHLORIDE 150 MG/1
300 TABLET, EXTENDED RELEASE ORAL
Refills: 0 | Status: DISCONTINUED | OUTPATIENT
Start: 2024-06-06 | End: 2024-06-26

## 2024-06-06 RX ORDER — BUPROPION HYDROCHLORIDE 150 MG/1
300 TABLET, EXTENDED RELEASE ORAL
Refills: 0 | Status: DISCONTINUED | OUTPATIENT
Start: 2024-06-06 | End: 2024-06-06

## 2024-06-06 RX ORDER — LABETALOL HYDROCHLORIDE 300 MG/1
10 TABLET ORAL ONCE
Refills: 0 | Status: COMPLETED | OUTPATIENT
Start: 2024-06-06 | End: 2024-06-06

## 2024-06-06 RX ORDER — RISPERIDONE 0.5 MG/1
1 TABLET ORAL
Refills: 0 | Status: DISCONTINUED | OUTPATIENT
Start: 2024-06-06 | End: 2024-06-26

## 2024-06-06 RX ORDER — ACETAMINOPHEN 325 MG
1000 TABLET ORAL EVERY 6 HOURS
Refills: 0 | Status: DISCONTINUED | OUTPATIENT
Start: 2024-06-07 | End: 2024-06-07

## 2024-06-06 RX ORDER — ENOXAPARIN SODIUM 100 MG/ML
40 INJECTION SUBCUTANEOUS EVERY 24 HOURS
Refills: 0 | Status: DISCONTINUED | OUTPATIENT
Start: 2024-06-07 | End: 2024-06-11

## 2024-06-06 RX ORDER — LANOLIN ALCOHOL/MO/W.PET/CERES
1 CREAM (GRAM) TOPICAL
Refills: 0 | DISCHARGE

## 2024-06-06 RX ORDER — RISPERIDONE 0.5 MG/1
3 TABLET ORAL AT BEDTIME
Refills: 0 | Status: DISCONTINUED | OUTPATIENT
Start: 2024-06-06 | End: 2024-06-26

## 2024-06-06 RX ORDER — HYDROMORPHONE HCL 0.2 MG/ML
0.5 INJECTION, SOLUTION INTRAVENOUS ONCE
Refills: 0 | Status: DISCONTINUED | OUTPATIENT
Start: 2024-06-06 | End: 2024-06-06

## 2024-06-06 RX ORDER — BUPROPION HYDROCHLORIDE 150 MG/1
150 TABLET, EXTENDED RELEASE ORAL AT BEDTIME
Refills: 0 | Status: DISCONTINUED | OUTPATIENT
Start: 2024-06-06 | End: 2024-06-26

## 2024-06-06 RX ORDER — PROPOFOL 10 MG/ML
40 INJECTION, EMULSION INTRAVENOUS
Qty: 1000 | Refills: 0 | Status: DISCONTINUED | OUTPATIENT
Start: 2024-06-06 | End: 2024-06-06

## 2024-06-06 RX ORDER — HYDROMORPHONE HCL 0.2 MG/ML
0.2 INJECTION, SOLUTION INTRAVENOUS EVERY 4 HOURS
Refills: 0 | Status: DISCONTINUED | OUTPATIENT
Start: 2024-06-06 | End: 2024-06-07

## 2024-06-06 RX ORDER — CLINDAMYCIN PHOSPHATE 150 MG/ML
900 INJECTION, SOLUTION INTRAVENOUS EVERY 8 HOURS
Refills: 0 | Status: DISCONTINUED | OUTPATIENT
Start: 2024-06-06 | End: 2024-06-07

## 2024-06-06 RX ORDER — BUPROPION HYDROCHLORIDE 150 MG/1
1 TABLET, EXTENDED RELEASE ORAL
Refills: 0 | DISCHARGE

## 2024-06-06 RX ORDER — HYDRALAZINE HYDROCHLORIDE 50 MG/1
10 TABLET ORAL ONCE
Refills: 0 | Status: COMPLETED | OUTPATIENT
Start: 2024-06-06 | End: 2024-06-06

## 2024-06-06 RX ORDER — POLYETHYLENE GLYCOL 3350 1 G/G
17 POWDER ORAL DAILY
Refills: 0 | Status: DISCONTINUED | OUTPATIENT
Start: 2024-06-06 | End: 2024-06-21

## 2024-06-06 RX ORDER — DEXMEDETOMIDINE HYDROCHLORIDE 4 UG/ML
0.2 INJECTION, SOLUTION INTRAVENOUS
Qty: 400 | Refills: 0 | Status: DISCONTINUED | OUTPATIENT
Start: 2024-06-06 | End: 2024-06-07

## 2024-06-06 RX ORDER — MAGNESIUM SULFATE 100 %
2 POWDER (GRAM) MISCELLANEOUS ONCE
Refills: 0 | Status: COMPLETED | OUTPATIENT
Start: 2024-06-06 | End: 2024-06-06

## 2024-06-06 RX ORDER — AMLODIPINE BESYLATE 2.5 MG/1
0 TABLET ORAL
Refills: 0 | DISCHARGE

## 2024-06-06 RX ORDER — ACETAMINOPHEN 500 MG
1 TABLET ORAL
Refills: 0 | DISCHARGE

## 2024-06-06 RX ORDER — SENNOSIDES 8.6 MG
10 TABLET ORAL DAILY
Refills: 0 | Status: DISCONTINUED | OUTPATIENT
Start: 2024-06-06 | End: 2024-06-21

## 2024-06-06 RX ORDER — PANTOPRAZOLE SODIUM 40 MG/10ML
40 INJECTION, POWDER, FOR SOLUTION INTRAVENOUS DAILY
Refills: 0 | Status: DISCONTINUED | OUTPATIENT
Start: 2024-06-06 | End: 2024-06-15

## 2024-06-06 RX ORDER — PREGABALIN 225 MG/1
1 CAPSULE ORAL
Refills: 0 | DISCHARGE

## 2024-06-06 RX ORDER — BUPROPION HYDROCHLORIDE 150 MG/1
150 TABLET, EXTENDED RELEASE ORAL AT BEDTIME
Refills: 0 | Status: DISCONTINUED | OUTPATIENT
Start: 2024-06-06 | End: 2024-06-06

## 2024-06-06 RX ADMIN — HYDROMORPHONE HCL 0.5 MILLIGRAM(S): 0.2 INJECTION, SOLUTION INTRAVENOUS at 23:30

## 2024-06-06 RX ADMIN — BUPROPION HYDROCHLORIDE 150 MILLIGRAM(S): 150 TABLET, EXTENDED RELEASE ORAL at 22:44

## 2024-06-06 RX ADMIN — Medication 25 GRAM(S): at 20:20

## 2024-06-06 RX ADMIN — Medication 15 MILLILITER(S): at 18:45

## 2024-06-06 RX ADMIN — RISPERIDONE 3 MILLIGRAM(S): 0.5 TABLET ORAL at 22:45

## 2024-06-06 RX ADMIN — HYDROMORPHONE HCL 0.5 MILLIGRAM(S): 0.2 INJECTION, SOLUTION INTRAVENOUS at 20:30

## 2024-06-06 RX ADMIN — DEXMEDETOMIDINE HYDROCHLORIDE 2.95 MICROGRAM(S)/KG/HR: 4 INJECTION, SOLUTION INTRAVENOUS at 18:14

## 2024-06-06 RX ADMIN — HYDRALAZINE HYDROCHLORIDE 10 MILLIGRAM(S): 50 TABLET ORAL at 21:20

## 2024-06-06 RX ADMIN — DEXTROSE MONOHYDRATE AND SODIUM CHLORIDE 75 MILLILITER(S): 5; .3 INJECTION, SOLUTION INTRAVENOUS at 23:34

## 2024-06-06 RX ADMIN — Medication 400 MILLIGRAM(S): at 23:24

## 2024-06-06 RX ADMIN — CLINDAMYCIN PHOSPHATE 100 MILLIGRAM(S): 150 INJECTION, SOLUTION INTRAVENOUS at 22:47

## 2024-06-06 RX ADMIN — Medication 100 MILLIGRAM(S): at 22:44

## 2024-06-06 RX ADMIN — DEXMEDETOMIDINE HYDROCHLORIDE 2.95 MICROGRAM(S)/KG/HR: 4 INJECTION, SOLUTION INTRAVENOUS at 20:20

## 2024-06-06 RX ADMIN — Medication 3 MILLILITER(S): at 22:00

## 2024-06-06 RX ADMIN — LABETALOL HYDROCHLORIDE 10 MILLIGRAM(S): 300 TABLET ORAL at 23:30

## 2024-06-06 RX ADMIN — DEXTROSE MONOHYDRATE AND SODIUM CHLORIDE 30 MILLILITER(S): 5; .3 INJECTION, SOLUTION INTRAVENOUS at 20:33

## 2024-06-06 RX ADMIN — PROPOFOL 14.2 MICROGRAM(S)/KG/MIN: 10 INJECTION, EMULSION INTRAVENOUS at 18:34

## 2024-06-06 RX ADMIN — HYDROMORPHONE HCL 0.5 MILLIGRAM(S): 0.2 INJECTION, SOLUTION INTRAVENOUS at 20:00

## 2024-06-06 RX ADMIN — HYDROMORPHONE HCL 0.5 MILLIGRAM(S): 0.2 INJECTION, SOLUTION INTRAVENOUS at 23:15

## 2024-06-06 RX ADMIN — DEXTROSE MONOHYDRATE AND SODIUM CHLORIDE 30 MILLILITER(S): 5; .3 INJECTION, SOLUTION INTRAVENOUS at 18:14

## 2024-06-06 NOTE — PATIENT PROFILE ADULT - NSPROEXTENSIONSOFSELF_GEN_A_NUR
unable to obtain information, patient trached full upper denture was left at home not a the bedside, reading glasses @ home/walker

## 2024-06-06 NOTE — ASU PREOP CHECKLIST - COMMENTS
Wellbutrin, Resperadone, Pepcid with sips of water Wellbutrin, Risperidone Amlodipine , Pepcid with sips of water Wellbutrin, Risperidone Amlodipine , Pepcid this am

## 2024-06-06 NOTE — PATIENT PROFILE ADULT - STATED REASON FOR ADMISSION
elective surgey on tongue elective surgery on tongue elective surgery on tongue  the tongue pain and swelling was persisting for months, but the dentist was the person who recognized the tongue lesion: as per Koffi Artis

## 2024-06-06 NOTE — PATIENT PROFILE ADULT - FALL HARM RISK - HARM RISK INTERVENTIONS
Assistance with ambulation/Assistance OOB with selected safe patient handling equipment/Communicate Risk of Fall with Harm to all staff/Discuss with provider need for PT consult/Monitor gait and stability/Provide patient with walking aids - walker, cane, crutches/Reinforce activity limits and safety measures with patient and family/Review medications for side effects contributing to fall risk/Sit up slowly, dangle for a short time, stand at bedside before walking/Tailored Fall Risk Interventions/Toileting schedule using arm’s reach rule for commode and bathroom/Use of alarms - bed, chair and/or voice tab/Visual Cue: Yellow wristband and red socks/Bed in lowest position, wheels locked, appropriate side rails in place/Call bell, personal items and telephone in reach/Instruct patient to call for assistance before getting out of bed or chair/Non-slip footwear when patient is out of bed/[place] to call system/Physically safe environment - no spills, clutter or unnecessary equipment/Purposeful Proactive Rounding/Room/bathroom lighting operational, light cord in reach

## 2024-06-06 NOTE — BH CONSULTATION LIAISON ASSESSMENT NOTE - SUMMARY
Patient is a 65 y/o female with schizophrenia and depression presents to [facility] preop for glossectomy, selective neck dissection, tracheostomy, forearm free flap.  Patient comes from home, lives with brother Osmany. Spoke with Osmany for collateral.  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 Began to threaten self harm. Went to inpatient psych on  at [facility] - stayed for about 2 weeks, given medication and discharged home.  Again she was hospitalized in 2017 at [facility], sent home. Within 1 month of discharge, patient went missing - was living in [place] as family was able to see credit card bills for the first few months.  Family now knows patient was psychiatrically hospitalized in 2018 at [place] and released without their knowledge ( was stopped [place] train tracks threatening to jump).  She was missing until 2022 when she again went to [place] when her "street friends" call 911 out of concern. Family was notified and since discharge, has now been living with brother.  Patient has an outpatient psychiatrist dimitry Lockett who she sees regularly. Brother is unaware of patient symptoms when off medication as he states he has not seen it. Uncertain if patient has hallucinations or paranoia. Is aware of her hx of depression and threats of self harm. Brother does note patient has never actually self harmed, states she is "too chicken". Osmany does report patient has been doing well and with mostly good days while on her current medication regime.    PLAN  -      Patient is a 63 y/o female with schizophrenia and depression presents to [facility] preop for glossectomy, selective neck dissection, tracheostomy, forearm free flap.  Patient comes from home, lives with brother Osmany. Spoke with Osmany for collateral.  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 Began to threaten self harm. Went to inpatient psych on  at [facility] - stayed for about 2 weeks, given medication and discharged home.  Again she was hospitalized in 2017 at [facility], sent home. Within 1 month of discharge, patient went missing - was living in [place] as family was able to see credit card bills for the first few months.  Family now knows patient was psychiatrically hospitalized in 2018 at [place] and released without their knowledge ( was stopped at train tracks threatening to jump).  She was missing until 2022 when she again went to [place] when her "street friends" call 911 out of concern. Family was notified and since discharge, has now been living with brother.  Patient has an outpatient psychiatrist dimitry Lockett who she sees regularly. Brother is unaware of patient symptoms when off medication as he states he has not seen it. Uncertain if patient has hallucinations or paranoia. Is aware of her hx of depression and threats of self harm. Brother does note patient has never actually self harmed, states she is "too chicken". Osmany does report patient has been doing well and with mostly good days while on her current medication regime.    PLAN   - currently sedated in ICU, defer level of observation to primary team  - EKG  -- antipsychotics can only be given if qtc < 500   -- if qtc < 500, can resume home psychotropic medications via NGT  -- Seroquel 100mg qhs , risperidone 1mg qam, 3mg qhs , Wellbutrin ( recommend confirming with pharmacy due to route of administration)  - Patient currently on propofol - CL will follow and can recommend PRNs as sedation reduced   - Ongoing involvement of collateral as needed     Patient is a 65 y/o female with schizophrenia and depression presents to [facility] preop for glossectomy, selective neck dissection, tracheostomy, forearm free flap.  Patient comes from home, lives with brother Osmany. Spoke with Osmany for collateral.  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 Began to threaten self harm. Went to inpatient psych on  at [facility] - stayed for about 2 weeks, given medication and discharged home.  Again she was hospitalized in 2017 at [facility], sent home. Within 1 month of discharge, patient went missing - was living in [place] as family was able to see credit card bills for the first few months.  Family now knows patient was psychiatrically hospitalized in 2018 at [place] and released without their knowledge ( was stopped at train tracks threatening to jump).  She was missing until 2022 when she again went to [place] when her "street friends" call 911 out of concern. Family was notified and since discharge, has now been living with brother.  Patient has an outpatient psychiatrist dimitry Lockett who she sees regularly. Brother is unaware of patient symptoms when off medication as he states he has not seen it. Uncertain if patient has hallucinations or paranoia. Is aware of her hx of depression and threats of self harm. Brother does note patient has never actually self harmed, states she is "too chicken". Osmany does report patient has been doing well and with mostly good days while on her current medication regime.    PLAN   - currently sedated in ICU, defer level of observation to primary team, ****monitor closely and increase level of observation if needed   - EKG  -- antipsychotics can only be given if qtc < 500   -- if qtc < 500, can resume home psychotropic medications via NGT  -- Seroquel 100mg qhs , risperidone 1mg qam, 3mg qhs , Wellbutrin ( recommend confirming with pharmacy due to route of administration)  - Patient currently on propofol - CL will follow and can recommend PRNs as sedation reduced   - Ongoing involvement of collateral as needed  - CL will continue to follow, do not hesitate to call #[phone] for any acute concerns or questions

## 2024-06-06 NOTE — CONSULT NOTE ADULT - SUBJECTIVE AND OBJECTIVE BOX
SICU Consultation Note  =====================================================  HPI: 64y female with PMHx of schizophrenia, depression, SCC of the tougue now s/p glossectomy, selective neck dissection, tracheostomy and forearm free flap. SICU consulted for q1 hour flap checks and hemodynamic monitoring.    Surgery Information  ***  Case Duration: 	EBL: 	IV Fluids: 	Blood Products: 	Urine Output:   Complications:          (30 May 2024 17:43)    Allergies: penicillins (Unknown)    PAST MEDICAL & SURGICAL HISTORY:  Tongue cancer      Schizophrenia      Anxiety and depression      Other diseases of tongue      H/O hand surgery      Cancer determined by biopsy of tongue      S/P cataract extraction      H/O colonoscopy        FAMILY HISTORY:  FH: uterine cancer (Mother)    FH: prostate cancer (Sibling)    FH: suicide (Sibling)    FH: chronic renal disease (Father)        SOCIAL HISTORY:     ADVANCE DIRECTIVES: Full Code       HOME MEDICATIONS:   --------------------------------------------------------------------------------------  Home Medications:  acetaminophen 500 mg oral tablet: 1 tab(s) orally 2 times a day (06 Jun 2024 06:47)  amLODIPine 5 mg oral tablet: 1 tab(s) orally (06 Jun 2024 06:47)  Balance of Nature Fruits - 3 cap(s) orally once a day AM:  (06 Jun 2024 06:47)  BuPROPion (Eqv-Wellbutrin SR) 150 mg/12 hours oral tablet, extended release: 2 tab(s) orally once a day AM (06 Jun 2024 06:47)  BuPROPion (Eqv-Wellbutrin SR) 150 mg/12 hours oral tablet, extended release: 1 tab(s) orally once a day PM (06 Jun 2024 06:47)  ibuprofen 200 mg oral tablet: 2 tab(s) orally once a day MIDDAY (06 Jun 2024 06:47)  melatonin 5 mg oral tablet: 1 tab(s) orally once a day PM (06 Jun 2024 06:47)  QUEtiapine 100 mg oral tablet: 1 tab(s) orally once a day PM (06 Jun 2024 06:47)  risperiDONE 1 mg oral tablet: 1 tab(s) orally once a day AM (06 Jun 2024 06:47)  risperiDONE 3 mg oral tablet: 1 tab(s) orally once a day PM (06 Jun 2024 06:47)  Ritual Essential Multivitamin for Women 50 + - 2 cap(s) orally once a day AM:  (06 Jun 2024 06:47)  Vitamin B12 1000 mcg oral tablet: 1 tab(s) orally once a day AM (06 Jun 2024 06:47)    --------------------------------------------------------------------------------------    CURRENT MEDICATIONS:   --------------------------------------------------------------------------------------  Neurologic Medications    Respiratory Medications    Cardiovascular Medications    Gastrointestinal Medications  lactated ringers. 1000 milliLiter(s) IV Continuous <Continuous>  sodium chloride 0.9% lock flush 3 milliLiter(s) IV Push every 8 hours    Genitourinary Medications    Hematologic/Oncologic Medications    Antimicrobial/Immunologic Medications    Endocrine/Metabolic Medications    Topical/Other Medications    --------------------------------------------------------------------------------------    VITAL SIGNS, INS/OUTS (last 24 hours):  --------------------------------------------------------------------------------------  ICU Vital Signs Last 24 Hrs  T(C): 36.6 (06 Jun 2024 06:18), Max: 36.6 (06 Jun 2024 06:18)  T(F): 97.9 (06 Jun 2024 06:18), Max: 97.9 (06 Jun 2024 06:18)  HR: 98 (06 Jun 2024 06:18) (98 - 98)  BP: 127/92 (06 Jun 2024 06:18) (127/92 - 127/92)  BP(mean): --  ABP: --  ABP(mean): --  RR: 18 (06 Jun 2024 06:18) (18 - 18)  SpO2: 100% (06 Jun 2024 06:18) (100% - 100%)      --------------------------------------------------------------------------------------    EXAM  NEUROLOGY  RASS:   	GCS:    Exam: Normal, NAD, alert, oriented x 3, no focal deficits.    HEENT  Exam: Normocephalic, atraumatic.  EOMI    RESPIRATORY  Exam: Lungs clear to auscultation, Normal expansion/effort.  ***  Mechanical Ventilation:     CARDIOVASCULAR  Exam: S1, S2.  Regular rate and rhythm.  Peripheral edema  ***    GI/NUTRITION  Exam: Abdomen soft, Non-tender, Non-distended.  ***  Wound:   ***  Current Diet:  NPO ***    VASCULAR  Exam: Extremities warm, pink, well-perfused.  ***    MUSCULOSKELETAL  Exam: All extremities moving spontaneously without limitations.  ***    SKIN:  Exam: Good skin turgor, no skin breakdown.  ***    METABOLIC/FLUIDS/ELECTROLYTES  lactated ringers. 1000 milliLiter(s) IV Continuous <Continuous>  sodium chloride 0.9% lock flush 3 milliLiter(s) IV Push every 8 hours      HEMATOLOGIC  [x] DVT Prophylaxis:   Transfusions:	[] PRBC	[] Platelets		[] FFP	[] Cryoprecipitate    INFECTIOUS DISEASE  Antimicrobials/Immunologic Medications:    Day #  	of    ***    Tubes/Lines/Drains  ***  [x] Peripheral IV  [] Central Venous Line     	[] R	[] L	[] IJ	[] Fem	[] SC	Date Placed:   [] Arterial Line		[] R	[] L	[] Fem	[] Rad	[] Ax	Date Placed:   [] PICC:         	[] Midline		[] Mediport  [] Urinary Catheter		Date Placed:     LABS  --------------------------------------------------------------------------------------  ((Insert SICU Labs here))***  --------------------------------------------------------------------------------------       SICU Consultation Note  ====================================================65 y/o female with schizophrenia and depression presents to [facility] preop for glossectomy, selective neck dissection, tracheostomy, forearm free flap; Dr. Alvarez: reconstruction mouth and tongue with free skin or muscle flap from right or left forearm or leg, possible wound vac. As per brother, pt lost contact with her family from 2017 to 2022 due to her cognitive state related to schizophrenia. She underwent dental extractions in December 2023 when her dentist noted a tongue lesion. s/p tongue biopsy in March 2024 which confirmed squamous cell carcinoma of tongue.     Surgery Information    Case Duration: 	EBL: 300	IV Fluids: 4.5L 	Blood Products: NA	Urine Output: 600CC       (30 May 2024 17:43)    Allergies: penicillins (Unknown)    PAST MEDICAL & SURGICAL HISTORY:  Tongue cancer      Schizophrenia      Anxiety and depression      Other diseases of tongue      H/O hand surgery      Cancer determined by biopsy of tongue      S/P cataract extraction      H/O colonoscopy        FAMILY HISTORY:  FH: uterine cancer (Mother)    FH: prostate cancer (Sibling)    FH: suicide (Sibling)    FH: chronic renal disease (Father)        SOCIAL HISTORY:     ADVANCE DIRECTIVES: Full Code       HOME MEDICATIONS:   --------------------------------------------------------------------------------------  Home Medications:  acetaminophen 500 mg oral tablet: 1 tab(s) orally 2 times a day (06 Jun 2024 06:47)  amLODIPine 5 mg oral tablet: 1 tab(s) orally (06 Jun 2024 06:47)  Balance of Nature Fruits - 3 cap(s) orally once a day AM:  (06 Jun 2024 06:47)  BuPROPion (Eqv-Wellbutrin SR) 150 mg/12 hours oral tablet, extended release: 2 tab(s) orally once a day AM (06 Jun 2024 06:47)  BuPROPion (Eqv-Wellbutrin SR) 150 mg/12 hours oral tablet, extended release: 1 tab(s) orally once a day PM (06 Jun 2024 06:47)  ibuprofen 200 mg oral tablet: 2 tab(s) orally once a day MIDDAY (06 Jun 2024 06:47)  melatonin 5 mg oral tablet: 1 tab(s) orally once a day PM (06 Jun 2024 06:47)  QUEtiapine 100 mg oral tablet: 1 tab(s) orally once a day PM (06 Jun 2024 06:47)  risperiDONE 1 mg oral tablet: 1 tab(s) orally once a day AM (06 Jun 2024 06:47)  risperiDONE 3 mg oral tablet: 1 tab(s) orally once a day PM (06 Jun 2024 06:47)  Ritual Essential Multivitamin for Women 50 + - 2 cap(s) orally once a day AM:  (06 Jun 2024 06:47)  Vitamin B12 1000 mcg oral tablet: 1 tab(s) orally once a day AM (06 Jun 2024 06:47)    --------------------------------------------------------------------------------------    CURRENT MEDICATIONS:   --------------------------------------------------------------------------------------  Neurologic Medications    Respiratory Medications    Cardiovascular Medications    Gastrointestinal Medications  lactated ringers. 1000 milliLiter(s) IV Continuous <Continuous>  sodium chloride 0.9% lock flush 3 milliLiter(s) IV Push every 8 hours    Genitourinary Medications    Hematologic/Oncologic Medications    Antimicrobial/Immunologic Medications    Endocrine/Metabolic Medications    Topical/Other Medications    --------------------------------------------------------------------------------------    VITAL SIGNS, INS/OUTS (last 24 hours):  --------------------------------------------------------------------------------------  ICU Vital Signs Last 24 Hrs  T(C): 36.6 (06 Jun 2024 06:18), Max: 36.6 (06 Jun 2024 06:18)  T(F): 97.9 (06 Jun 2024 06:18), Max: 97.9 (06 Jun 2024 06:18)  HR: 98 (06 Jun 2024 06:18) (98 - 98)  BP: 127/92 (06 Jun 2024 06:18) (127/92 - 127/92)  BP(mean): --  ABP: --  ABP(mean): --  RR: 18 (06 Jun 2024 06:18) (18 - 18)  SpO2: 100% (06 Jun 2024 06:18) (100% - 100%)      --------------------------------------------------------------------------------------    EXAM  NEUROLOGY  Exam: Normal, NAD, alert, oriented x 3, no focal deficits. Patient s/p Sebastián non-reversed.    HEENT  Exam: Normocephalic, atraumatic.  EOMI    RESPIRATORY  Exam: Lungs clear to auscultation, Normal expansion/effort.   Mechanical Ventilation:  15/450/5/50%    CARDIOVASCULAR  Exam: S1, S2.  Regular rate and rhythm.     GI/NUTRITION  Exam: Abdomen soft, Non-tender, Non-distended.   Current Diet:  NPO     VASCULAR  Exam: Extremities warm, pink, well-perfused. Rt. radial forearm flap    SKIN:  Exam: Good skin turgor, no skin breakdown.    METABOLIC/FLUIDS/ELECTROLYTES  lactated ringers. 1000 milliLiter(s) IV Continuous <Continuous>  sodium chloride 0.9% lock flush 3 milliLiter(s) IV Push every 8 hours    INFECTIOUS DISEASE  Antimicrobials/Immunologic Medications: Clindamycin     Tubes/Lines/Drains  ***  [x] Peripheral IV  [] Central Venous Line     	[] R	[] L	[] IJ	[] Fem	[] SC	Date Placed:   [] Arterial Line		[] R	[] L	[] Fem	[] Rad	[] Ax	Date Placed:   [] PICC:         	[] Midline		[] Mediport  [] Urinary Catheter		Date Placed:     LABS  --------------------------------------------------------------------------------------

## 2024-06-06 NOTE — CONSULT NOTE ADULT - ASSESSMENT
ASSESSMENT:  64y Female     PLAN:  ***  Neurologic:   Respiratory:   Cardiovascular:   Gastrointestinal/Nutrition:   Renal/Genitourinary:   Hematologic:   Infectious Disease:   Tubes/Lines/Drains:   Endocrine:   Disposition:  65 y/o female with schizophrenia and depression presents to [facility] T preop for glossectomy, selective neck dissection, tracheostomy, forearm free flap; Dr. Alvarez: reconstruction mouth and tongue with free skin or muscle flap from right or left forearm or leg, possible wound vac. As per brother, pt lost contact with her family from 2017 to 2022 due to her cognitive state related to schizophrenia. She underwent dental extractions in December 2023 when her dentist noted a tongue lesion. s/p tongue biopsy in March 2024 which confirmed squamous cell carcinoma of tongue.       PLAN:     Neurologic:   - AOx4 at baseline   - Patient received Sebastián at 4:30 PM not reversed   - Pain management IV Tylenol and Dilaudid  - Sedation: Precedex  - Hx of Schizophrenia  - Psych consult pending      Respiratory:   - SaO2 goal > 92%  - 14/450/5/50%    Cardiovascular:   - MAP goal > 65  - Trend lactate levels    Gastrointestinal/Nutrition:   - NPO  - NGT     Renal/Genitourinary:   - Monitor UOP  - Baptiste  - IVF LR @ 30cc/Hr    Hematologic:   - Monitor H&H  - Chemical DVT PPx: Lovenox   - Mechanical DVT PPx    Infectious Disease:   - Monitor for signs of infection  - Clindamycin (allergy to penicillin)    Tubes/Lines/Drains:   - PIVs  - Baptiste    Endocrine:   - Monitor blood glucose levels   - No Steroids for psych history    Disposition: SICU

## 2024-06-06 NOTE — PATIENT PROFILE ADULT - NSPRESCRALCFREQ_GEN_A_NUR
unable to obtain information, patient trached heavy drinker prior to 2017 (1 bottle of wine a night)/Never

## 2024-06-06 NOTE — BH CONSULTATION LIAISON ASSESSMENT NOTE - CURRENT MEDICATION
MEDICATIONS  (STANDING):  buPROPion . 150 milliGRAM(s) Oral at bedtime  buPROPion . 300 milliGRAM(s) Oral <User Schedule>  chlorhexidine 0.12% Liquid 15 milliLiter(s) Swish and Spit two times a day  chlorhexidine 0.12% Liquid 15 milliLiter(s) Oral Mucosa every 12 hours  chlorhexidine 2% Cloths 1 Application(s) Topical daily  clindamycin IVPB 900 milliGRAM(s) IV Intermittent every 8 hours  dexMEDEtomidine Infusion 0.2 MICROgram(s)/kG/Hr (2.95 mL/Hr) IV Continuous <Continuous>  lactated ringers. 1000 milliLiter(s) (30 mL/Hr) IV Continuous <Continuous>  pantoprazole   Suspension 40 milliGRAM(s) Oral daily  polyethylene glycol 3350 17 Gram(s) Oral daily  QUEtiapine 100 milliGRAM(s) Oral at bedtime  risperiDONE   Solution 3 milliGRAM(s) Oral at bedtime  risperiDONE   Solution 1 milliGRAM(s) Oral <User Schedule>  senna Syrup 10 milliLiter(s) Oral daily  sodium chloride 0.9% lock flush 3 milliLiter(s) IV Push every 8 hours    MEDICATIONS  (PRN):  HYDROmorphone  Injectable 0.5 milliGRAM(s) IV Push every 4 hours PRN Severe Pain (7 - 10)  HYDROmorphone  Injectable 0.2 milliGRAM(s) IV Push every 4 hours PRN Moderate Pain (4 - 6)

## 2024-06-06 NOTE — BH CONSULTATION LIAISON ASSESSMENT NOTE - NSICDXPASTMEDICALHX_GEN_ALL_CORE_FT
Soft, nontender PAST MEDICAL HISTORY:  Anxiety and depression     Other diseases of tongue     Schizophrenia     Tongue cancer

## 2024-06-06 NOTE — PATIENT PROFILE ADULT - FUNCTIONAL ASSESSMENT - BASIC MOBILITY 6.
3-calculated by average/Not able to assess (calculate score using [facility] averaging method)  3-calculated by average/Not able to assess (calculate score using [facility] averaging method)

## 2024-06-06 NOTE — BH CONSULTATION LIAISON ASSESSMENT NOTE - RISK ASSESSMENT
risk: hx threatening self harm, brother + SA, schizophrenia  protective: living with family, in treatment, on medications, help seeking

## 2024-06-06 NOTE — BH CONSULTATION LIAISON ASSESSMENT NOTE - ATTENDING COMMENTS
Chart reviewed, pt. seen/evaluated on 6/7, I agree with above assessment/plan, pt.'s brother and mother present at bedside (pt. gave consent for them to be present during an interview), pt. somewhat drowsy but able to engage briefly, answered some questions via nodding, grossly oriented, denies avh, denies si and hi. Interview overall limited at this time. Care coordinated with pt.'s family at bedside. Plan as above, will follow Chart reviewed, pt. seen/evaluated on 6/7, I agree with above assessment/plan, pt.'s brother and mother present at bedside (pt. gave consent for them to be present during an interview), pt. somewhat drowsy but able to engage briefly, answered some questions via nodding, grossly oriented, denies avh, denies si and hi, able to follow simple commands (squeeze my fingers).  Interview overall limited at this time. Care coordinated with pt.'s family at bedside. Plan as above, will follow

## 2024-06-06 NOTE — PATIENT PROFILE ADULT - NSPROGENPREVTRANSF_GEN_A_NUR
unable to obtain information, patient trached unable to obtain information, patient trached/no history of blood product transfusion

## 2024-06-06 NOTE — BH CONSULTATION LIAISON ASSESSMENT NOTE - NSBHCHARTREVIEWVS_PSY_A_CORE FT
Vital Signs Last 24 Hrs  T(C): 37.3 (06 Jun 2024 17:40), Max: 37.3 (06 Jun 2024 17:40)  T(F): 99.1 (06 Jun 2024 17:40), Max: 99.1 (06 Jun 2024 17:40)  HR: 68 (06 Jun 2024 18:15) (64 - 98)  BP: 127/92 (06 Jun 2024 06:18) (127/92 - 127/92)  BP(mean): --  RR: 14 (06 Jun 2024 18:15) (14 - 18)  SpO2: 100% (06 Jun 2024 18:15) (100% - 100%)    Parameters below as of 06 Jun 2024 17:40  Patient On (Oxygen Delivery Method): ventilator    O2 Concentration (%): 50

## 2024-06-06 NOTE — BH CONSULTATION LIAISON ASSESSMENT NOTE - HPI (INCLUDE ILLNESS QUALITY, SEVERITY, DURATION, TIMING, CONTEXT, MODIFYING FACTORS, ASSOCIATED SIGNS AND SYMPTOMS)
Patient is a 65 y/o female with schizophrenia and depression presents to [facility] preop for glossectomy, selective neck dissection, tracheostomy, forearm free flap.  Patient comes from home, lives with brother Osmany. Spoke with Osmany for collateral.  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 Began to threaten self harm. Went to inpatient psych on  at [facility] - stayed for about 2 weeks, given medication and discharged home.  Again she was hospitalized in 2017 at [facility], sent home. Within 1 month of discharge, patient went missing - was living in [place] as family was able to see credit card bills for the first few months.  Family now knows patient was psychiatrically hospitalized in 2018 at [place] and released without their knowledge ( was stopped at train tracks threatening to jump).  She was missing until 2022 when she again went to [place] when her "street friends" call 911 out of concern. Family was notified and since discharge, has now been living with brother.  Patient has an outpatient psychiatrist dimitry Lockett who she sees regularly. Brother is unaware of patient symptoms when off medication as he states he has not seen it. Uncertain if patient has hallucinations or paranoia. Is aware of her hx of depression and threats of self harm. Brother does note patient has never actually self harmed, states she is "too chicken". Osmany does report patient has been doing well and with mostly good days while on her current medication regime.    Patient was seen and assessed at bedside. Trached, lethargic, s/p operation. unable to be interviewed at this time

## 2024-06-07 LAB
ANION GAP SERPL CALC-SCNC: 12 MMOL/L — SIGNIFICANT CHANGE UP (ref 7–14)
BUN SERPL-MCNC: 13 MG/DL — SIGNIFICANT CHANGE UP (ref 7–23)
CALCIUM SERPL-MCNC: 8.3 MG/DL — LOW (ref 8.4–10.5)
CHLORIDE SERPL-SCNC: 102 MMOL/L — SIGNIFICANT CHANGE UP (ref 98–107)
CO2 SERPL-SCNC: 21 MMOL/L — LOW (ref 22–31)
CREAT SERPL-MCNC: 0.67 MG/DL — SIGNIFICANT CHANGE UP (ref 0.5–1.3)
EGFR: 98 ML/MIN/1.73M2 — SIGNIFICANT CHANGE UP
GLUCOSE SERPL-MCNC: 114 MG/DL — HIGH (ref 70–99)
HCT VFR BLD CALC: 31.8 % — LOW (ref 34.5–45)
HGB BLD-MCNC: 10.9 G/DL — LOW (ref 11.5–15.5)
MAGNESIUM SERPL-MCNC: 2.3 MG/DL — SIGNIFICANT CHANGE UP (ref 1.6–2.6)
MCHC RBC-ENTMCNC: 31.5 PG — SIGNIFICANT CHANGE UP (ref 27–34)
MCHC RBC-ENTMCNC: 34.3 GM/DL — SIGNIFICANT CHANGE UP (ref 32–36)
MCV RBC AUTO: 91.9 FL — SIGNIFICANT CHANGE UP (ref 80–100)
MRSA PCR RESULT.: SIGNIFICANT CHANGE UP
NRBC # BLD: 0 /100 WBCS — SIGNIFICANT CHANGE UP (ref 0–0)
NRBC # FLD: 0 K/UL — SIGNIFICANT CHANGE UP (ref 0–0)
PHOSPHATE SERPL-MCNC: 3.6 MG/DL — SIGNIFICANT CHANGE UP (ref 2.5–4.5)
PLATELET # BLD AUTO: 237 K/UL — SIGNIFICANT CHANGE UP (ref 150–400)
POTASSIUM SERPL-MCNC: 4 MMOL/L — SIGNIFICANT CHANGE UP (ref 3.5–5.3)
POTASSIUM SERPL-SCNC: 4 MMOL/L — SIGNIFICANT CHANGE UP (ref 3.5–5.3)
RBC # BLD: 3.46 M/UL — LOW (ref 3.8–5.2)
RBC # FLD: 13.2 % — SIGNIFICANT CHANGE UP (ref 10.3–14.5)
S AUREUS DNA NOSE QL NAA+PROBE: SIGNIFICANT CHANGE UP
SODIUM SERPL-SCNC: 135 MMOL/L — SIGNIFICANT CHANGE UP (ref 135–145)
WBC # BLD: 14.56 K/UL — HIGH (ref 3.8–10.5)
WBC # FLD AUTO: 14.56 K/UL — HIGH (ref 3.8–10.5)

## 2024-06-07 PROCEDURE — 93010 ELECTROCARDIOGRAM REPORT: CPT

## 2024-06-07 PROCEDURE — 99233 SBSQ HOSP IP/OBS HIGH 50: CPT

## 2024-06-07 PROCEDURE — 90792 PSYCH DIAG EVAL W/MED SRVCS: CPT

## 2024-06-07 RX ORDER — METRONIDAZOLE 500 MG/1
TABLET ORAL
Refills: 0 | Status: DISCONTINUED | OUTPATIENT
Start: 2024-06-07 | End: 2024-06-09

## 2024-06-07 RX ORDER — METRONIDAZOLE 500 MG/1
500 TABLET ORAL ONCE
Refills: 0 | Status: COMPLETED | OUTPATIENT
Start: 2024-06-07 | End: 2024-06-07

## 2024-06-07 RX ORDER — VANCOMYCIN HYDROCHLORIDE 50 MG/ML
750 KIT ORAL ONCE
Refills: 0 | Status: COMPLETED | OUTPATIENT
Start: 2024-06-07 | End: 2024-06-08

## 2024-06-07 RX ORDER — SODIUM CHLORIDE 0.9 % (FLUSH) 0.9 %
4 SYRINGE (ML) INJECTION EVERY 12 HOURS
Refills: 0 | Status: DISCONTINUED | OUTPATIENT
Start: 2024-06-07 | End: 2024-06-12

## 2024-06-07 RX ORDER — METRONIDAZOLE 500 MG/1
500 TABLET ORAL EVERY 8 HOURS
Refills: 0 | Status: DISCONTINUED | OUTPATIENT
Start: 2024-06-07 | End: 2024-06-09

## 2024-06-07 RX ORDER — GENTAMICIN SULFATE 40 MG/ML
320 VIAL (ML) INJECTION ONCE
Refills: 0 | Status: COMPLETED | OUTPATIENT
Start: 2024-06-07 | End: 2024-06-07

## 2024-06-07 RX ORDER — ACETAMINOPHEN 325 MG
975 TABLET ORAL EVERY 6 HOURS
Refills: 0 | Status: DISCONTINUED | OUTPATIENT
Start: 2024-06-07 | End: 2024-06-26

## 2024-06-07 RX ORDER — HYDRALAZINE HYDROCHLORIDE 50 MG/1
5 TABLET ORAL ONCE
Refills: 0 | Status: COMPLETED | OUTPATIENT
Start: 2024-06-07 | End: 2024-06-07

## 2024-06-07 RX ORDER — OXYCODONE HYDROCHLORIDE 100 MG/5ML
2.5 SOLUTION ORAL EVERY 4 HOURS
Refills: 0 | Status: DISCONTINUED | OUTPATIENT
Start: 2024-06-07 | End: 2024-06-07

## 2024-06-07 RX ORDER — ALBUTEROL 90 MCG
1.25 AEROSOL REFILL (GRAM) INHALATION EVERY 6 HOURS
Refills: 0 | Status: DISCONTINUED | OUTPATIENT
Start: 2024-06-07 | End: 2024-06-07

## 2024-06-07 RX ORDER — LABETALOL HYDROCHLORIDE 300 MG/1
10 TABLET ORAL ONCE
Refills: 0 | Status: COMPLETED | OUTPATIENT
Start: 2024-06-07 | End: 2024-06-07

## 2024-06-07 RX ORDER — OXYCODONE HYDROCHLORIDE 100 MG/5ML
5 SOLUTION ORAL EVERY 4 HOURS
Refills: 0 | Status: DISCONTINUED | OUTPATIENT
Start: 2024-06-07 | End: 2024-06-12

## 2024-06-07 RX ORDER — HYDROMORPHONE HCL 0.2 MG/ML
0.5 INJECTION, SOLUTION INTRAVENOUS EVERY 4 HOURS
Refills: 0 | Status: DISCONTINUED | OUTPATIENT
Start: 2024-06-07 | End: 2024-06-07

## 2024-06-07 RX ORDER — AMLODIPINE BESYLATE 2.5 MG/1
5 TABLET ORAL DAILY
Refills: 0 | Status: DISCONTINUED | OUTPATIENT
Start: 2024-06-07 | End: 2024-06-08

## 2024-06-07 RX ORDER — HYDROMORPHONE HCL 0.2 MG/ML
1 INJECTION, SOLUTION INTRAVENOUS EVERY 4 HOURS
Refills: 0 | Status: DISCONTINUED | OUTPATIENT
Start: 2024-06-07 | End: 2024-06-07

## 2024-06-07 RX ORDER — VANCOMYCIN HYDROCHLORIDE 50 MG/ML
750 KIT ORAL ONCE
Refills: 0 | Status: COMPLETED | OUTPATIENT
Start: 2024-06-07 | End: 2024-06-07

## 2024-06-07 RX ORDER — VANCOMYCIN HYDROCHLORIDE 50 MG/ML
KIT ORAL
Refills: 0 | Status: COMPLETED | OUTPATIENT
Start: 2024-06-08 | End: 2024-06-08

## 2024-06-07 RX ORDER — IPRATROPIUM BROMIDE AND ALBUTEROL SULFATE .5; 3 MG/3ML; MG/3ML
3 SOLUTION RESPIRATORY (INHALATION) EVERY 6 HOURS
Refills: 0 | Status: DISCONTINUED | OUTPATIENT
Start: 2024-06-07 | End: 2024-06-10

## 2024-06-07 RX ADMIN — HYDROMORPHONE HCL 1 MILLIGRAM(S): 0.2 INJECTION, SOLUTION INTRAVENOUS at 10:38

## 2024-06-07 RX ADMIN — HYDROMORPHONE HCL 1 MILLIGRAM(S): 0.2 INJECTION, SOLUTION INTRAVENOUS at 04:30

## 2024-06-07 RX ADMIN — BUPROPION HYDROCHLORIDE 300 MILLIGRAM(S): 150 TABLET, EXTENDED RELEASE ORAL at 06:42

## 2024-06-07 RX ADMIN — Medication 1000 MILLIGRAM(S): at 13:00

## 2024-06-07 RX ADMIN — Medication 3 MILLILITER(S): at 13:31

## 2024-06-07 RX ADMIN — BUPROPION HYDROCHLORIDE 150 MILLIGRAM(S): 150 TABLET, EXTENDED RELEASE ORAL at 22:30

## 2024-06-07 RX ADMIN — IPRATROPIUM BROMIDE AND ALBUTEROL SULFATE 3 MILLILITER(S): .5; 3 SOLUTION RESPIRATORY (INHALATION) at 11:10

## 2024-06-07 RX ADMIN — HYDRALAZINE HYDROCHLORIDE 5 MILLIGRAM(S): 50 TABLET ORAL at 04:15

## 2024-06-07 RX ADMIN — ENOXAPARIN SODIUM 40 MILLIGRAM(S): 100 INJECTION SUBCUTANEOUS at 10:38

## 2024-06-07 RX ADMIN — OXYCODONE HYDROCHLORIDE 5 MILLIGRAM(S): 100 SOLUTION ORAL at 14:36

## 2024-06-07 RX ADMIN — Medication 975 MILLIGRAM(S): at 17:30

## 2024-06-07 RX ADMIN — Medication 500 MILLIGRAM(S): at 12:27

## 2024-06-07 RX ADMIN — VANCOMYCIN HYDROCHLORIDE 750 MILLIGRAM(S): KIT at 12:29

## 2024-06-07 RX ADMIN — METRONIDAZOLE 100 MILLIGRAM(S): 500 TABLET ORAL at 22:25

## 2024-06-07 RX ADMIN — LABETALOL HYDROCHLORIDE 10 MILLIGRAM(S): 300 TABLET ORAL at 19:31

## 2024-06-07 RX ADMIN — METRONIDAZOLE 100 MILLIGRAM(S): 500 TABLET ORAL at 10:37

## 2024-06-07 RX ADMIN — CLINDAMYCIN PHOSPHATE 100 MILLIGRAM(S): 150 INJECTION, SOLUTION INTRAVENOUS at 06:41

## 2024-06-07 RX ADMIN — HYDRALAZINE HYDROCHLORIDE 5 MILLIGRAM(S): 50 TABLET ORAL at 22:20

## 2024-06-07 RX ADMIN — Medication 3 MILLILITER(S): at 06:29

## 2024-06-07 RX ADMIN — Medication 1 APPLICATION(S): at 12:28

## 2024-06-07 RX ADMIN — Medication 15 MILLILITER(S): at 18:16

## 2024-06-07 RX ADMIN — IPRATROPIUM BROMIDE AND ALBUTEROL SULFATE 3 MILLILITER(S): .5; 3 SOLUTION RESPIRATORY (INHALATION) at 22:27

## 2024-06-07 RX ADMIN — HYDROMORPHONE HCL 1 MILLIGRAM(S): 0.2 INJECTION, SOLUTION INTRAVENOUS at 05:00

## 2024-06-07 RX ADMIN — Medication 4 MILLILITER(S): at 11:10

## 2024-06-07 RX ADMIN — Medication 4 MILLILITER(S): at 22:27

## 2024-06-07 RX ADMIN — AMLODIPINE BESYLATE 5 MILLIGRAM(S): 2.5 TABLET ORAL at 06:42

## 2024-06-07 RX ADMIN — Medication 3 MILLILITER(S): at 22:21

## 2024-06-07 RX ADMIN — Medication 15 MILLILITER(S): at 06:41

## 2024-06-07 RX ADMIN — POLYETHYLENE GLYCOL 3350 17 GRAM(S): 1 POWDER ORAL at 12:27

## 2024-06-07 RX ADMIN — RISPERIDONE 1 MILLIGRAM(S): 0.5 TABLET ORAL at 07:00

## 2024-06-07 RX ADMIN — Medication 400 MILLIGRAM(S): at 06:41

## 2024-06-07 RX ADMIN — Medication 100 MILLIGRAM(S): at 22:27

## 2024-06-07 RX ADMIN — Medication 975 MILLIGRAM(S): at 18:00

## 2024-06-07 RX ADMIN — Medication 400 MILLIGRAM(S): at 12:10

## 2024-06-07 RX ADMIN — DEXTROSE MONOHYDRATE AND SODIUM CHLORIDE 75 MILLILITER(S): 5; .3 INJECTION, SOLUTION INTRAVENOUS at 08:13

## 2024-06-07 RX ADMIN — RISPERIDONE 3 MILLIGRAM(S): 0.5 TABLET ORAL at 22:48

## 2024-06-07 RX ADMIN — OXYCODONE HYDROCHLORIDE 5 MILLIGRAM(S): 100 SOLUTION ORAL at 15:06

## 2024-06-07 RX ADMIN — IPRATROPIUM BROMIDE AND ALBUTEROL SULFATE 3 MILLILITER(S): .5; 3 SOLUTION RESPIRATORY (INHALATION) at 16:06

## 2024-06-07 RX ADMIN — HYDROMORPHONE HCL 1 MILLIGRAM(S): 0.2 INJECTION, SOLUTION INTRAVENOUS at 11:00

## 2024-06-07 RX ADMIN — PANTOPRAZOLE SODIUM 40 MILLIGRAM(S): 40 INJECTION, POWDER, FOR SOLUTION INTRAVENOUS at 12:29

## 2024-06-07 RX ADMIN — Medication 10 MILLILITER(S): at 12:27

## 2024-06-07 NOTE — PHYSICAL THERAPY INITIAL EVALUATION ADULT - PERTINENT HX OF CURRENT PROBLEM, REHAB EVAL
64 year old female with schizophrenia and depression presents to PST preop for glossectomy, selective neck dissection, tracheostomy, forearm free flap; Dr. Alvarez: reconstruction mouth and tongue with free skin or muscle flap from right or left forearm or leg, possible wound vac. As per brother, pt lost contact with her family from 2017 to 2022 due to her cognitive state related to schizophrenia. She underwent dental extractions in December 2023 when her dentist noted a tongue lesion. status post tongue biopsy in March 2024 which confirmed squamous cell carcinoma of tongue.

## 2024-06-07 NOTE — PROGRESS NOTE ADULT - SUBJECTIVE AND OBJECTIVE BOX
SICU Daily Progress Note  =====================================================  INTERVAL EVENTS:   - venous oozing from trach, minimal blood seen during inline suctioning, OMFS aware  - weaned to trach collar  - 1x push hydral 10mg, 1x push labetalol 10mg for hypertension       Allergies: penicillins (Unknown)      MEDICATIONS:   --------------------------------------------------------------------------------------  Neurologic Medications  acetaminophen   IVPB .. 1000 milliGRAM(s) IV Intermittent every 6 hours  buPROPion . 150 milliGRAM(s) Oral at bedtime  buPROPion . 300 milliGRAM(s) Oral <User Schedule>  dexMEDEtomidine Infusion 0.2 MICROgram(s)/kG/Hr IV Continuous <Continuous>  HYDROmorphone  Injectable 0.2 milliGRAM(s) IV Push every 4 hours PRN Moderate Pain (4 - 6)  HYDROmorphone  Injectable 0.5 milliGRAM(s) IV Push every 4 hours PRN Severe Pain (7 - 10)  QUEtiapine 100 milliGRAM(s) Oral at bedtime  risperiDONE   Solution 3 milliGRAM(s) Oral at bedtime  risperiDONE   Solution 1 milliGRAM(s) Oral <User Schedule>    Respiratory Medications    Cardiovascular Medications    Gastrointestinal Medications  lactated ringers. 1000 milliLiter(s) IV Continuous <Continuous>  pantoprazole   Suspension 40 milliGRAM(s) Oral daily  polyethylene glycol 3350 17 Gram(s) Oral daily  senna Syrup 10 milliLiter(s) Oral daily  sodium chloride 0.9% lock flush 3 milliLiter(s) IV Push every 8 hours    Genitourinary Medications    Hematologic/Oncologic Medications  enoxaparin Injectable 40 milliGRAM(s) SubCutaneous every 24 hours    Antimicrobial/Immunologic Medications  clindamycin IVPB 900 milliGRAM(s) IV Intermittent every 8 hours    Endocrine/Metabolic Medications    Topical/Other Medications  chlorhexidine 0.12% Liquid 15 milliLiter(s) Swish and Spit two times a day  chlorhexidine 0.12% Liquid 15 milliLiter(s) Oral Mucosa every 12 hours  chlorhexidine 2% Cloths 1 Application(s) Topical daily    --------------------------------------------------------------------------------------    VITAL SIGNS, INS/OUTS (last 24 hours):  --------------------------------------------------------------------------------------    ICU Vital Signs Last 24 Hrs  penicillins (Unknown)      penicillins (Unknown)      I/Os    06-06-24 @ 07:01  -  06-07-24 @ 01:39  --------------------------------------------------------  IN: 254.8 mL / OUT: 270 mL / NET: -15.2 mL      --------------------------------------------------------------------------------------    EXAM  NEUROLOGY  Exam: Flat effect, mental status improved    HEENT  Exam: Normocephalic, atraumatic, EOMI.     RESPIRATORY  Exam: Lungs clear to auscultation, Normal expansion/effort on trach collar. Oozing from trach site noted    CARDIOVASCULAR  Exam: S1, S2.  Regular rate and rhythm.     GI/NUTRITION  Exam: Abdomen soft, Non-tender, Non-distended.     VASCULAR  Exam: Extremities warm, pink, well-perfused.    MUSCULOSKELETAL  Exam: All extremities moving spontaneously without limitations.    SKIN  Exam: Good skin turgor, no skin breakdown.       Tubes/Lines/Drains    [x] Peripheral IV  [ ] Central Venous Line     	[] R	[] L	[] IJ	[] Fem	[] SC	  [] Arterial Line		[] R	[] L	[] Fem	[] Rad	[] Ax	  [] PICC		[] Midline		[] Mediport  [] Urinary Catheter		  [x] Necessity of urinary, arterial, and venous catheters discussed    LABS  --------------------------------------------------------------------------------------                        10.7   14.55 )-----------( 243      ( 06 Jun 2024 18:30 )             30.6     06-06    137  |  105  |  12  ----------------------------<  140<H>  4.6   |  21<L>  |  0.71    Ca    8.3<L>      06 Jun 2024 18:30  Phos  3.6     06-06  Mg     1.50     06-06      PT/INR - ( 06 Jun 2024 18:30 )   PT: 11.6 sec;   INR: 1.03 ratio         PTT - ( 06 Jun 2024 18:30 )  PTT:31.2 sec  Urinalysis Basic - ( 06 Jun 2024 18:30 )    Color: x / Appearance: x / SG: x / pH: x  Gluc: 140 mg/dL / Ketone: x  / Bili: x / Urobili: x   Blood: x / Protein: x / Nitrite: x   Leuk Esterase: x / RBC: x / WBC x   Sq Epi: x / Non Sq Epi: x / Bacteria: x      penicillins (Unknown)    penicillins (Unknown)    --------------------------------------------------------------------------------------    IMAGING STUDIES:

## 2024-06-07 NOTE — PROGRESS NOTE ADULT - SUBJECTIVE AND OBJECTIVE BOX
64F POD1 s/p R partial glossectomy in the setting of SCCA, R SND I-IV, L RFFF, L thigh STSG, tracheostomy.       Interval Events:    - venous oozing from trach  - 1x push hydral 10mg, 1x push labetalol 10mg for hypertension       Vital Signs Last 24 Hrs  T(C): 37.1 (07 Jun 2024 04:00), Max: 37.7 (07 Jun 2024 00:00)  T(F): 98.7 (07 Jun 2024 04:00), Max: 99.9 (07 Jun 2024 00:00)  HR: 110 (07 Jun 2024 06:00) (55 - 110)  BP: 154/74 (07 Jun 2024 06:00) (120/80 - 154/74)  BP(mean): 98 (07 Jun 2024 06:00) (92 - 122)  RR: 17 (07 Jun 2024 06:00) (10 - 18)  SpO2: 95% (07 Jun 2024 06:00) (95% - 100%)    Parameters below as of 07 Jun 2024 04:00  Patient On (Oxygen Delivery Method): tracheostomy collar  O2 Flow (L/min): 6  O2 Concentration (%): 28      I&O's Detail    06 Jun 2024 07:01  -  07 Jun 2024 07:00  --------------------------------------------------------  IN:    Dexmedetomidine: 13.2 mL    IV PiggyBack: 350 mL    Lactated Ringers: 750 mL    Propofol: 14.2 mL    Propofol: 31.8 mL  Total IN: 1159.2 mL    OUT:    Bulb (mL): 22 mL    Indwelling Catheter - Urethral (mL): 975 mL    VAC (Vacuum Assisted Closure) System (mL): 25 mL  Total OUT: 1022 mL    Total NET: 137.2 mL            Medications:    MEDICATIONS  (STANDING):  acetaminophen   IVPB .. 1000 milliGRAM(s) IV Intermittent every 6 hours  albuterol/ipratropium for Nebulization 3 milliLiter(s) Nebulizer every 6 hours  amLODIPine   Tablet 5 milliGRAM(s) Oral daily  buPROPion . 300 milliGRAM(s) Oral <User Schedule>  buPROPion . 150 milliGRAM(s) Oral at bedtime  chlorhexidine 0.12% Liquid 15 milliLiter(s) Oral Mucosa every 12 hours  chlorhexidine 0.12% Liquid 15 milliLiter(s) Swish and Spit two times a day  chlorhexidine 2% Cloths 1 Application(s) Topical daily  clindamycin IVPB 900 milliGRAM(s) IV Intermittent every 8 hours  enoxaparin Injectable 40 milliGRAM(s) SubCutaneous every 24 hours  lactated ringers. 1000 milliLiter(s) (75 mL/Hr) IV Continuous <Continuous>  pantoprazole   Suspension 40 milliGRAM(s) Oral daily  polyethylene glycol 3350 17 Gram(s) Oral daily  QUEtiapine 100 milliGRAM(s) Oral at bedtime  risperiDONE   Solution 1 milliGRAM(s) Oral <User Schedule>  risperiDONE   Solution 3 milliGRAM(s) Oral at bedtime  senna Syrup 10 milliLiter(s) Oral daily  sodium chloride 0.9% lock flush 3 milliLiter(s) IV Push every 8 hours  sodium chloride 3%  Inhalation 4 milliLiter(s) Inhalation every 12 hours    MEDICATIONS  (PRN):  HYDROmorphone  Injectable 0.5 milliGRAM(s) IV Push every 4 hours PRN Moderate Pain (4 - 6)  HYDROmorphone  Injectable 1 milliGRAM(s) IV Push every 4 hours PRN Severe Pain (7 - 10)        Labs:                          10.9   14.56 )-----------( 237      ( 07 Jun 2024 00:45 )             31.8       06-07    135  |  102  |  13  ----------------------------<  114<H>  4.0   |  21<L>  |  0.67    Ca    8.3<L>      07 Jun 2024 00:45  Phos  3.6     06-07  Mg     2.30     06-07      PHYSICAL EXAM:  GEN: NAD  NEURO: alert & oriented x 4/4  HEENT: face symmetric, oropharynx moist without exudates, CN VII/XI/XII intact, neck incision c/d/i, SUZANNE in place, flap good color, turgor, warm  CVS: regular rate and rhythm  Pulm: normal respiratory excursions, not tachypneic, no labored breathing  Abd: non-distended  Ext: moving all four extremities, no peripheral edema noted

## 2024-06-07 NOTE — PHYSICAL THERAPY INITIAL EVALUATION ADULT - DID THE PATIENT HAVE SURGERY?
Creation, free flap, forearm, radial aspect, following right partial glossectomy and right neck dissection, reconstruction with left radial forearm free flap, donor site reconstruction with split thickness skin graft./yes

## 2024-06-07 NOTE — PROGRESS NOTE ADULT - SUBJECTIVE AND OBJECTIVE BOX
OTOLARYNGOLOGY (ENT) PROGRESS NOTE    PATIENT: LELA LOWERY  MRN: [id number]  : 60  [id number]  DATE OF SERVICE:  24  			         ID:LELA LOWERY is a 64yFemale s/p R partial gloss, R SND I-IV, L RFFF, L thigh STSG, trach . Pt doing well, trach cuff deflated this am.      ALLERGIES:  penicillins (Unknown)      MEDICATIONS:  Antiinfectives:   clindamycin IVPB 900 milliGRAM(s) IV Intermittent every 8 hours    IV fluids:  lactated ringers. 1000 milliLiter(s) IV Continuous <Continuous>  sodium chloride 0.9% lock flush 3 milliLiter(s) IV Push every 8 hours    Hematologic/Anticoagulation:  enoxaparin Injectable 40 milliGRAM(s) SubCutaneous every 24 hours    Pain medications/Neuro:  acetaminophen   IVPB .. 1000 milliGRAM(s) IV Intermittent every 6 hours  buPROPion . 300 milliGRAM(s) Oral <User Schedule>  buPROPion . 150 milliGRAM(s) Oral at bedtime  HYDROmorphone  Injectable 0.5 milliGRAM(s) IV Push every 4 hours PRN  HYDROmorphone  Injectable 1 milliGRAM(s) IV Push every 4 hours PRN  QUEtiapine 100 milliGRAM(s) Oral at bedtime  risperiDONE   Solution 3 milliGRAM(s) Oral at bedtime  risperiDONE   Solution 1 milliGRAM(s) Oral <User Schedule>    Endocrine Medications:     All other standing medications:   albuterol/ipratropium for Nebulization 3 milliLiter(s) Nebulizer every 6 hours  amLODIPine   Tablet 5 milliGRAM(s) Oral daily  chlorhexidine 0.12% Liquid 15 milliLiter(s) Oral Mucosa every 12 hours  chlorhexidine 0.12% Liquid 15 milliLiter(s) Swish and Spit two times a day  chlorhexidine 2% Cloths 1 Application(s) Topical daily  pantoprazole   Suspension 40 milliGRAM(s) Oral daily  polyethylene glycol 3350 17 Gram(s) Oral daily  senna Syrup 10 milliLiter(s) Oral daily  sodium chloride 3%  Inhalation 4 milliLiter(s) Inhalation every 12 hours    All other PRN medications:    Vital Signs Last 24 Hrs  T(C): 37.1 (2024 04:00), Max: 37.7 (2024 00:00)  T(F): 98.7 (2024 04:00), Max: 99.9 (2024 00:00)  HR: 110 (2024 06:00) (55 - 110)  BP: 154/74 (2024 06:00) (120/80 - 154/74)  BP(mean): 98 (2024 06:00) (92 - 122)  RR: 17 (2024 06:00) (10 - 18)  SpO2: 95% (2024 06:00) (95% - 100%)    Parameters below as of 2024 04:00  Patient On (Oxygen Delivery Method): tracheostomy collar  O2 Flow (L/min): 6  O2 Concentration (%): 28       @ 07:01  -   @ 07:00  --------------------------------------------------------  IN:    Dexmedetomidine: 13.2 mL    IV PiggyBack: 350 mL    Lactated Ringers: 750 mL    Propofol: 14.2 mL    Propofol: 31.8 mL  Total IN: 1159.2 mL    OUT:    Bulb (mL): 22 mL    Indwelling Catheter - Urethral (mL): 975 mL    VAC (Vacuum Assisted Closure) System (mL): 25 mL  Total OUT: 1022 mL    Total NET: 137.2 mL          24 @ 07:01  -  24 @ 07:00  --------------------------------------------------------  IN:  Total IN: 0 mL    OUT:    Bulb (mL): 22 mL    VAC (Vacuum Assisted Closure) System (mL): 25 mL  Total OUT: 47 mL    Total NET: -47 mL          Mode: standby    PHYSICAL EXAM:  GEN: appears stated age, NAD  NEURO: alert & oriented x 4/4  HEENT: face symmetric, oropharynx moist without exudates, CN VII/XI/XII intact, neck incision c/d/i, SUZANNE in place, flap good color, turgor, warm  CVS: regular rate and rhythm  Pulm: normal respiratory excursions, not tachypneic, no labored breathing  Abd: non-distended  Ext: moving all four extremities, no peripheral edema noted       LABS                       10.9   14.56 )-----------( 237      ( 2024 00:45 )             31.8        135  |  102  |  13  ----------------------------<  114<H>  4.0   |  21<L>  |  0.67    Ca    8.3<L>      2024 00:45  Phos  3.6       Mg     2.30                Coagulation Studies-   PT/INR - ( 2024 18:30 )   PT: 11.6 sec;   INR: 1.03 ratio         PTT - ( 2024 18:30 )  PTT:31.2 sec  Urinalysis Basic - ( 2024 00:45 )    Color: x / Appearance: x / SG: x / pH: x  Gluc: 114 mg/dL / Ketone: x  / Bili: x / Urobili: x   Blood: x / Protein: x / Nitrite: x   Leuk Esterase: x / RBC: x / WBC x   Sq Epi: x / Non Sq Epi: x / Bacteria: x      Endocrine Panel-  Calcium: 8.3 mg/dL ( @ 00:45)  Calcium: 8.3 mg/dL ( @ 18:30)        Assessment and Plan:  LELA LOWERY is a 64yFemale s/p R partial gloss, R SND, L RFFF, L thigh STSG and trach on  doing well this am.    PLAN:  - ERAS  - OOBTC  - Remainder per OMFS/PRS

## 2024-06-07 NOTE — PROGRESS NOTE ADULT - ASSESSMENT
ASSESSMENT: 63 y/o female with schizophrenia and depression presents to [facility] T preop for glossectomy, selective neck dissection, tracheostomy, forearm free flap; Dr. Alvarez: reconstruction mouth and tongue with free skin or muscle flap from right or left forearm or leg, possible wound vac. As per brother, pt lost contact with her family from 2017 to 2022 due to her cognitive state related to schizophrenia. She underwent dental extractions in December 2023 when her dentist noted a tongue lesion. s/p tongue biopsy in March 2024 which confirmed squamous cell carcinoma of tongue.       PLAN:   Neurologic:   - AOx4 at baseline  - flat effect noted  - weaned off propofol, precedex  - Pain management IV Tylenol and Dilaudid  - Hx of Schizophrenia, psych consult pending      Respiratory:   - SaO2 goal > 92%  - trach collar  - monitor oozing from trach    Cardiovascular:   - MAP goal > 65  - Trend lactate levels    Gastrointestinal/Nutrition:   - NPO  - NGT     Renal/Genitourinary:   - Monitor UOP  - Baptiste  - IVF LR @ 75cc/Hr    Hematologic:   - Monitor H&H  - Chemical DVT PPx: Lovenox   - Mechanical DVT PPx    Infectious Disease:   - Monitor for signs of infection  - Clindamycin for 24h post-op (allergy to penicillin)    Tubes/Lines/Drains:   - PIVs  - Baptiste    Endocrine:   - Monitor blood glucose levels   - No Steroids for psych history    Disposition: SICU

## 2024-06-07 NOTE — PROVIDER CONTACT NOTE (OTHER) - ASSESSMENT
Patient A&Ox3, confusion with time, trached but able to read lips/responses. Patient is febrile 101.4 rectally  and tachycardic 116 on post op day 1. Denies chest pain or SOB.

## 2024-06-07 NOTE — PROGRESS NOTE ADULT - ASSESSMENT
A/P: 64y F s/p right hemiglossectomy with right neck dissection and reconstruction with L RFFF and skin graft reconstruction of flap donor site.     - recovering appropriately   - appreciate psychiatry evaluation and recommendations   - continue to follow ERAS protocol  - continue q1 flap checks   - please avoid pressure over right side of neck  - BP systolic goal = under 140    Rayo Partida MD, PhD  Plastic Surgery Resident, PGY5  Available on [employer] Teams    PRS Team Pagers:  [facility]: [phone]  [facility]: [phone]  [facility]: [id number]  [facility]: [phone]

## 2024-06-07 NOTE — PROGRESS NOTE ADULT - ASSESSMENT
A/P:  64F POD1 s/p R partial glossectomy in the setting of SCCA, R SND I-IV, L RFFF, L thigh STSG, tracheostomy. Pt is progressing well.     Plan:  -Per ID and ERAS Protocol, if penicillin allergy, pt should be placed on Vanc, Gentomycin, Flagyl  -Nutrition consult --> Start TFs  -OOBTC  -Remove Baptiste, A-line  -Monitor SUZANNE output        Lokesh Cunningham, NILESH  Oral and Maxillofacial Surgery  [facility] Pager [phone]  Available on Microsoft Teams A/P:  64F POD1 s/p R partial glossectomy in the setting of SCCA, R SND I-IV, L RFFF, L thigh STSG, tracheostomy. Pt is progressing well.     Plan:  -Per ERAS Protocol, if penicillin allergy, pt should be placed on Vanc, Gentomycin, Flagyl  -Nutrition consult --> Start TFs  -OOBTC  -Remove Baptiste, A-line  -Monitor SUZANNE output        Lokesh Cunningham, NILESH  Oral and Maxillofacial Surgery  [facility] Pager [phone]  Available on Microsoft Teams

## 2024-06-07 NOTE — PHYSICAL THERAPY INITIAL EVALUATION ADULT - MD ORDER
Primary Surgeon	Rayo Alvarez (Attending)  First Assist	Resident/Fellow  First Assist Name	Rayo Partida (Resident)

## 2024-06-07 NOTE — PROGRESS NOTE ADULT - SUBJECTIVE AND OBJECTIVE BOX
ANESTHESIA POSTOP CHECK    64y Female POSTOP DAY 1    Vital Signs Last 24 Hrs  T(C): 37.9 (07 Jun 2024 12:00), Max: 38.6 (07 Jun 2024 08:00)  T(F): 100.3 (07 Jun 2024 12:00), Max: 101.4 (07 Jun 2024 08:00)  HR: 101 (07 Jun 2024 15:00) (55 - 127)  BP: 141/86 (07 Jun 2024 14:00) (120/80 - 154/74)  BP(mean): 102 (07 Jun 2024 14:00) (92 - 122)  RR: 16 (07 Jun 2024 15:00) (10 - 19)  SpO2: 99% (07 Jun 2024 15:00) (95% - 100%)    Parameters below as of 07 Jun 2024 12:00  Patient On (Oxygen Delivery Method): tracheostomy collar  O2 Flow (L/min): 6  O2 Concentration (%): 28  I&O's Summary    06 Jun 2024 07:01  -  07 Jun 2024 07:00  --------------------------------------------------------  IN: 1159.2 mL / OUT: 1022 mL / NET: 137.2 mL    07 Jun 2024 07:01  -  07 Jun 2024 16:03  --------------------------------------------------------  IN: 1500 mL / OUT: 1305 mL / NET: 195 mL        [X ] NO APPARENT ANESTHESIA COMPLICATIONS      Comments:

## 2024-06-07 NOTE — PROVIDER CONTACT NOTE (OTHER) - BACKGROUND
64F pmh schizophrenia, anxiety, depression, malignant neoplasm of tongue, s/p glossectomy, SND, L forearm free flap, reconstruction of mouth and tongue from L forearm muscle and thigh skin graft, trac

## 2024-06-07 NOTE — PROGRESS NOTE ADULT - SUBJECTIVE AND OBJECTIVE BOX
Plastic Surgery Progress Note    Subjective:     Patient seen and examined at bedside. Patient remains on trach. No acute overnight events.     OBJECTIVE:     T(C): 37.1 (06-07-24 @ 04:00), Max: 37.7 (06-07-24 @ 00:00)  HR: 110 (06-07-24 @ 06:00) (55 - 110)  BP: 154/74 (06-07-24 @ 06:00) (120/80 - 154/74)  RR: 17 (06-07-24 @ 06:00) (10 - 18)  SpO2: 95% (06-07-24 @ 06:00) (95% - 100%)  Wt(kg): --    I&O's Detail    06 Jun 2024 07:01  -  07 Jun 2024 07:00  --------------------------------------------------------  IN:    Dexmedetomidine: 13.2 mL    IV PiggyBack: 350 mL    Lactated Ringers: 750 mL    Propofol: 14.2 mL    Propofol: 31.8 mL  Total IN: 1159.2 mL    OUT:    Bulb (mL): 22 mL    Indwelling Catheter - Urethral (mL): 975 mL    VAC (Vacuum Assisted Closure) System (mL): 25 mL  Total OUT: 1022 mL    Total NET: 137.2 mL          PHYSICAL EXAM:    GENERAL: NAD, lying in bed comfortably  HEAD:  Atraumatic, Normocephalic  ENT: Moist mucous membranes, right tongue flap WWP, appropriate capillary refill, strong arterial cook doppler signal, no evidence of collections or venous congestion in flap, neck is soft without collections, SUZANNE SS and low output.   CHEST/LUNG: Unlabored respirations  NERVOUS SYSTEM:  Alert, awake, cooperative, follows commands   Ext: L forearm skin graft vac holding suction, forearm soft with dressing in place. L thigh skin graft donor site appropriate.     MEDICATIONS  (STANDING):  acetaminophen   IVPB .. 1000 milliGRAM(s) IV Intermittent every 6 hours  albuterol/ipratropium for Nebulization 3 milliLiter(s) Nebulizer every 6 hours  amLODIPine   Tablet 5 milliGRAM(s) Oral daily  buPROPion . 150 milliGRAM(s) Oral at bedtime  buPROPion . 300 milliGRAM(s) Oral <User Schedule>  chlorhexidine 0.12% Liquid 15 milliLiter(s) Oral Mucosa every 12 hours  chlorhexidine 0.12% Liquid 15 milliLiter(s) Swish and Spit two times a day  chlorhexidine 2% Cloths 1 Application(s) Topical daily  clindamycin IVPB 900 milliGRAM(s) IV Intermittent every 8 hours  enoxaparin Injectable 40 milliGRAM(s) SubCutaneous every 24 hours  lactated ringers. 1000 milliLiter(s) (75 mL/Hr) IV Continuous <Continuous>  pantoprazole   Suspension 40 milliGRAM(s) Oral daily  polyethylene glycol 3350 17 Gram(s) Oral daily  QUEtiapine 100 milliGRAM(s) Oral at bedtime  risperiDONE   Solution 3 milliGRAM(s) Oral at bedtime  risperiDONE   Solution 1 milliGRAM(s) Oral <User Schedule>  senna Syrup 10 milliLiter(s) Oral daily  sodium chloride 0.9% lock flush 3 milliLiter(s) IV Push every 8 hours  sodium chloride 3%  Inhalation 4 milliLiter(s) Inhalation every 12 hours    MEDICATIONS  (PRN):  HYDROmorphone  Injectable 0.5 milliGRAM(s) IV Push every 4 hours PRN Moderate Pain (4 - 6)  HYDROmorphone  Injectable 1 milliGRAM(s) IV Push every 4 hours PRN Severe Pain (7 - 10)      LABS:                          10.9   14.56 )-----------( 237      ( 07 Jun 2024 00:45 )             31.8     06-07    135  |  102  |  13  ----------------------------<  114<H>  4.0   |  21<L>  |  0.67    Ca    8.3<L>      07 Jun 2024 00:45  Phos  3.6     06-07  Mg     2.30     06-07      PT/INR - ( 06 Jun 2024 18:30 )   PT: 11.6 sec;   INR: 1.03 ratio         PTT - ( 06 Jun 2024 18:30 )  PTT:31.2 sec

## 2024-06-07 NOTE — PHYSICAL THERAPY INITIAL EVALUATION ADULT - GAIT DEVIATIONS NOTED, PT EVAL
+ episode of right leg buckling when transferring to bedside chair/decreased makayla/decreased step length/decreased weight-shifting ability

## 2024-06-07 NOTE — PHYSICAL THERAPY INITIAL EVALUATION ADULT - ADDITIONAL COMMENTS
Patient lives with brother in apartment, + elevator access. Patient was independent in all ADL prior to admission. Patient was not using an assistive device prior to admission.

## 2024-06-08 LAB
ANION GAP SERPL CALC-SCNC: 10 MMOL/L — SIGNIFICANT CHANGE UP (ref 7–14)
BUN SERPL-MCNC: 11 MG/DL — SIGNIFICANT CHANGE UP (ref 7–23)
CALCIUM SERPL-MCNC: 8.2 MG/DL — LOW (ref 8.4–10.5)
CHLORIDE SERPL-SCNC: 105 MMOL/L — SIGNIFICANT CHANGE UP (ref 98–107)
CO2 SERPL-SCNC: 22 MMOL/L — SIGNIFICANT CHANGE UP (ref 22–31)
CREAT SERPL-MCNC: 0.58 MG/DL — SIGNIFICANT CHANGE UP (ref 0.5–1.3)
EGFR: 101 ML/MIN/1.73M2 — SIGNIFICANT CHANGE UP
GLUCOSE SERPL-MCNC: 109 MG/DL — HIGH (ref 70–99)
HCT VFR BLD CALC: 31.9 % — LOW (ref 34.5–45)
HGB BLD-MCNC: 10.7 G/DL — LOW (ref 11.5–15.5)
MAGNESIUM SERPL-MCNC: 1.8 MG/DL — SIGNIFICANT CHANGE UP (ref 1.6–2.6)
MCHC RBC-ENTMCNC: 30.4 PG — SIGNIFICANT CHANGE UP (ref 27–34)
MCHC RBC-ENTMCNC: 33.5 GM/DL — SIGNIFICANT CHANGE UP (ref 32–36)
MCV RBC AUTO: 90.6 FL — SIGNIFICANT CHANGE UP (ref 80–100)
NRBC # BLD: 0 /100 WBCS — SIGNIFICANT CHANGE UP (ref 0–0)
NRBC # FLD: 0 K/UL — SIGNIFICANT CHANGE UP (ref 0–0)
PHOSPHATE SERPL-MCNC: 2.1 MG/DL — LOW (ref 2.5–4.5)
PLATELET # BLD AUTO: 223 K/UL — SIGNIFICANT CHANGE UP (ref 150–400)
POTASSIUM SERPL-MCNC: 3.5 MMOL/L — SIGNIFICANT CHANGE UP (ref 3.5–5.3)
POTASSIUM SERPL-SCNC: 3.5 MMOL/L — SIGNIFICANT CHANGE UP (ref 3.5–5.3)
RBC # BLD: 3.52 M/UL — LOW (ref 3.8–5.2)
RBC # FLD: 13.1 % — SIGNIFICANT CHANGE UP (ref 10.3–14.5)
SODIUM SERPL-SCNC: 137 MMOL/L — SIGNIFICANT CHANGE UP (ref 135–145)
WBC # BLD: 11.63 K/UL — HIGH (ref 3.8–10.5)
WBC # FLD AUTO: 11.63 K/UL — HIGH (ref 3.8–10.5)

## 2024-06-08 PROCEDURE — 99233 SBSQ HOSP IP/OBS HIGH 50: CPT

## 2024-06-08 PROCEDURE — 71045 X-RAY EXAM CHEST 1 VIEW: CPT | Mod: 26

## 2024-06-08 RX ORDER — MAGNESIUM SULFATE 100 %
2 POWDER (GRAM) MISCELLANEOUS ONCE
Refills: 0 | Status: COMPLETED | OUTPATIENT
Start: 2024-06-08 | End: 2024-06-08

## 2024-06-08 RX ORDER — METOPROLOL TARTRATE 50 MG
5 TABLET ORAL ONCE
Refills: 0 | Status: COMPLETED | OUTPATIENT
Start: 2024-06-08 | End: 2024-06-08

## 2024-06-08 RX ORDER — SOD PHOS DI, MONO/K PHOS MONO 250 MG
1 TABLET ORAL ONCE
Refills: 0 | Status: COMPLETED | OUTPATIENT
Start: 2024-06-08 | End: 2024-06-08

## 2024-06-08 RX ORDER — POTASSIUM CHLORIDE 600 MG/1
40 TABLET, FILM COATED, EXTENDED RELEASE ORAL ONCE
Refills: 0 | Status: COMPLETED | OUTPATIENT
Start: 2024-06-08 | End: 2024-06-08

## 2024-06-08 RX ORDER — AMLODIPINE BESYLATE 2.5 MG/1
10 TABLET ORAL DAILY
Refills: 0 | Status: DISCONTINUED | OUTPATIENT
Start: 2024-06-08 | End: 2024-06-26

## 2024-06-08 RX ADMIN — METRONIDAZOLE 100 MILLIGRAM(S): 500 TABLET ORAL at 06:02

## 2024-06-08 RX ADMIN — IPRATROPIUM BROMIDE AND ALBUTEROL SULFATE 3 MILLILITER(S): .5; 3 SOLUTION RESPIRATORY (INHALATION) at 07:23

## 2024-06-08 RX ADMIN — VANCOMYCIN HYDROCHLORIDE 250 MILLIGRAM(S): KIT at 00:43

## 2024-06-08 RX ADMIN — Medication 1 APPLICATION(S): at 12:13

## 2024-06-08 RX ADMIN — Medication 5 MILLIGRAM(S): at 12:09

## 2024-06-08 RX ADMIN — Medication 975 MILLIGRAM(S): at 18:31

## 2024-06-08 RX ADMIN — BUPROPION HYDROCHLORIDE 150 MILLIGRAM(S): 150 TABLET, EXTENDED RELEASE ORAL at 21:39

## 2024-06-08 RX ADMIN — Medication 4 MILLILITER(S): at 21:39

## 2024-06-08 RX ADMIN — ENOXAPARIN SODIUM 40 MILLIGRAM(S): 100 INJECTION SUBCUTANEOUS at 12:11

## 2024-06-08 RX ADMIN — OXYCODONE HYDROCHLORIDE 5 MILLIGRAM(S): 100 SOLUTION ORAL at 14:00

## 2024-06-08 RX ADMIN — OXYCODONE HYDROCHLORIDE 5 MILLIGRAM(S): 100 SOLUTION ORAL at 13:12

## 2024-06-08 RX ADMIN — Medication 15 MILLILITER(S): at 18:31

## 2024-06-08 RX ADMIN — PANTOPRAZOLE SODIUM 40 MILLIGRAM(S): 40 INJECTION, POWDER, FOR SOLUTION INTRAVENOUS at 12:12

## 2024-06-08 RX ADMIN — POTASSIUM CHLORIDE 40 MILLIEQUIVALENT(S): 600 TABLET, FILM COATED, EXTENDED RELEASE ORAL at 03:55

## 2024-06-08 RX ADMIN — Medication 975 MILLIGRAM(S): at 12:12

## 2024-06-08 RX ADMIN — BUPROPION HYDROCHLORIDE 300 MILLIGRAM(S): 150 TABLET, EXTENDED RELEASE ORAL at 06:03

## 2024-06-08 RX ADMIN — Medication 975 MILLIGRAM(S): at 00:41

## 2024-06-08 RX ADMIN — Medication 4 MILLILITER(S): at 07:23

## 2024-06-08 RX ADMIN — Medication 100 MILLIGRAM(S): at 21:39

## 2024-06-08 RX ADMIN — OXYCODONE HYDROCHLORIDE 5 MILLIGRAM(S): 100 SOLUTION ORAL at 08:30

## 2024-06-08 RX ADMIN — OXYCODONE HYDROCHLORIDE 5 MILLIGRAM(S): 100 SOLUTION ORAL at 09:00

## 2024-06-08 RX ADMIN — Medication 975 MILLIGRAM(S): at 19:00

## 2024-06-08 RX ADMIN — AMLODIPINE BESYLATE 10 MILLIGRAM(S): 2.5 TABLET ORAL at 06:06

## 2024-06-08 RX ADMIN — Medication 25 GRAM(S): at 03:59

## 2024-06-08 RX ADMIN — Medication 975 MILLIGRAM(S): at 13:00

## 2024-06-08 RX ADMIN — IPRATROPIUM BROMIDE AND ALBUTEROL SULFATE 3 MILLILITER(S): .5; 3 SOLUTION RESPIRATORY (INHALATION) at 21:39

## 2024-06-08 RX ADMIN — METRONIDAZOLE 100 MILLIGRAM(S): 500 TABLET ORAL at 21:38

## 2024-06-08 RX ADMIN — OXYCODONE HYDROCHLORIDE 5 MILLIGRAM(S): 100 SOLUTION ORAL at 19:01

## 2024-06-08 RX ADMIN — Medication 3 MILLILITER(S): at 05:25

## 2024-06-08 RX ADMIN — Medication 975 MILLIGRAM(S): at 06:03

## 2024-06-08 RX ADMIN — POLYETHYLENE GLYCOL 3350 17 GRAM(S): 1 POWDER ORAL at 12:12

## 2024-06-08 RX ADMIN — Medication 975 MILLIGRAM(S): at 23:39

## 2024-06-08 RX ADMIN — Medication 15 MILLILITER(S): at 06:02

## 2024-06-08 RX ADMIN — METRONIDAZOLE 100 MILLIGRAM(S): 500 TABLET ORAL at 13:12

## 2024-06-08 RX ADMIN — IPRATROPIUM BROMIDE AND ALBUTEROL SULFATE 3 MILLILITER(S): .5; 3 SOLUTION RESPIRATORY (INHALATION) at 04:55

## 2024-06-08 RX ADMIN — Medication 975 MILLIGRAM(S): at 23:54

## 2024-06-08 RX ADMIN — Medication 3 MILLILITER(S): at 13:45

## 2024-06-08 RX ADMIN — Medication 3 MILLILITER(S): at 21:35

## 2024-06-08 RX ADMIN — IPRATROPIUM BROMIDE AND ALBUTEROL SULFATE 3 MILLILITER(S): .5; 3 SOLUTION RESPIRATORY (INHALATION) at 15:11

## 2024-06-08 RX ADMIN — Medication 10 MILLILITER(S): at 12:11

## 2024-06-08 RX ADMIN — Medication 1 PACKET(S): at 03:55

## 2024-06-08 RX ADMIN — RISPERIDONE 3 MILLIGRAM(S): 0.5 TABLET ORAL at 21:38

## 2024-06-08 RX ADMIN — RISPERIDONE 1 MILLIGRAM(S): 0.5 TABLET ORAL at 06:02

## 2024-06-08 RX ADMIN — OXYCODONE HYDROCHLORIDE 5 MILLIGRAM(S): 100 SOLUTION ORAL at 18:30

## 2024-06-08 NOTE — DIETITIAN INITIAL EVALUATION ADULT - OTHER CALCULATIONS
Dosing weight (6/6) 130 lbs / 59 kg.  Weight history, per HIE: (4/12) 132 lbs, (3/19) 132 lbs.  Ideal Body Weight: 120 lbs / 54.5 kg +/-10%

## 2024-06-08 NOTE — PROGRESS NOTE ADULT - SUBJECTIVE AND OBJECTIVE BOX
64F w/ PMH of depression and schizophrenia s/p R partial glossectomy in the setting of SCCA, R SND I-IV, L RFFF, L thigh STSG, tracheostomy (6/6/24).     INTERVAL EVENTS:  -cuff down, tolerating trach collar at 30%  -HTN s/p hydralazine 5mg, 1x labetalol 10mg, resolved  -D/C paez, A-line, TOV passed  -Diet: TF Jevity at goal rate 38cc/hr  -afebrile, NAEON,   -PT: rehab facility    ICU Vital Signs Last 24 Hrs  T(C): 37.3 (08 Jun 2024 00:00), Max: 37.9 (07 Jun 2024 12:00)  T(F): 99.1 (08 Jun 2024 00:00), Max: 100.3 (07 Jun 2024 12:00)  HR: 117 (08 Jun 2024 07:23) (78 - 129)  BP: 147/95 (08 Jun 2024 06:00) (123/77 - 152/92)  BP(mean): 110 (08 Jun 2024 06:00) (90 - 114)  ABP: 134/64 (08 Jun 2024 02:00) (126/69 - 167/90)  ABP(mean): 64 (08 Jun 2024 02:00) (48 - 124)  RR: 19 (08 Jun 2024 06:00) (12 - 24)  SpO2: 98% (08 Jun 2024 07:23) (97% - 100%)    O2 Parameters below as of 08 Jun 2024 07:23  Patient On (Oxygen Delivery Method): tracheostomy collar        I&O's Detail    07 Jun 2024 07:01  -  08 Jun 2024 07:00  --------------------------------------------------------  IN:    Enteral Tube Flush: 550 mL    IV PiggyBack: 1200 mL    Jevity 1.5: 594 mL    Lactated Ringers: 600 mL  Total IN: 2944 mL    OUT:    Bulb (mL): 11.5 mL    Indwelling Catheter - Urethral (mL): 1300 mL    VAC (Vacuum Assisted Closure) System (mL): 0 mL    Voided (mL): 1800 mL  Total OUT: 3111.5 mL    Total NET: -167.5 mL           MEDICATIONS  (STANDING):  acetaminophen   Oral Liquid .. 975 milliGRAM(s) Oral every 6 hours  albuterol/ipratropium for Nebulization 3 milliLiter(s) Nebulizer every 6 hours  amLODIPine   Tablet 10 milliGRAM(s) Oral daily  buPROPion . 300 milliGRAM(s) Oral <User Schedule>  buPROPion . 150 milliGRAM(s) Oral at bedtime  chlorhexidine 0.12% Liquid 15 milliLiter(s) Swish and Spit two times a day  chlorhexidine 2% Cloths 1 Application(s) Topical daily  enoxaparin Injectable 40 milliGRAM(s) SubCutaneous every 24 hours  metroNIDAZOLE  IVPB      metroNIDAZOLE  IVPB 500 milliGRAM(s) IV Intermittent every 8 hours  pantoprazole   Suspension 40 milliGRAM(s) Oral daily  polyethylene glycol 3350 17 Gram(s) Oral daily  QUEtiapine 100 milliGRAM(s) Oral at bedtime  risperiDONE   Solution 1 milliGRAM(s) Oral <User Schedule>  risperiDONE   Solution 3 milliGRAM(s) Oral at bedtime  senna Syrup 10 milliLiter(s) Oral daily  sodium chloride 0.9% lock flush 3 milliLiter(s) IV Push every 8 hours  sodium chloride 3%  Inhalation 4 milliLiter(s) Inhalation every 12 hours    MEDICATIONS  (PRN):  oxyCODONE    Solution 2.5 milliGRAM(s) Oral every 4 hours PRN Moderate Pain (4 - 6)  oxyCODONE    Solution 5 milliGRAM(s) Oral every 4 hours PRN Severe Pain (7 - 10)    PHYSICAL EXAM:  GEN: NAD  NEURO: alert & oriented x 4/4  HEENT: face symmetric, oropharynx moist without exudates, CN VII/XI/XII intact, neck incision c/d/i, R neck SUZANNE in place w/ sso, flap good color, turgor, warm, cook sound strong regular, trach cuff down   CVS: regular rate and rhythm  Pulm: normal respiratory excursions, not tachypneic, no labored breathing  Abd: soft, non-distended  Ext: 1x L arm SUZANNE w/ sso moving all four extremities, no peripheral edema noted                            10.7   11.63 )-----------( 223      ( 08 Jun 2024 00:40 )             31.9   06-08    137  |  105  |  11  ----------------------------<  109<H>  3.5   |  22  |  0.58    Ca    8.2<L>      08 Jun 2024 00:40  Phos  2.1     06-08  Mg     1.80     06-08

## 2024-06-08 NOTE — PROGRESS NOTE ADULT - ASSESSMENT
ASSESSMENT: 65 y/o female with schizophrenia and depression presents to [facility] T preop for glossectomy, selective neck dissection, tracheostomy, forearm free flap; Dr. Alvarez: reconstruction mouth and tongue with free skin or muscle flap from right or left forearm or leg, possible wound vac. As per brother, pt lost contact with her family from 2017 to 2022 due to her cognitive state related to schizophrenia. She underwent dental extractions in December 2023 when her dentist noted a tongue lesion. s/p tongue biopsy in March 2024 which confirmed squamous cell carcinoma of tongue.       PLAN:   Neurologic:   - AOx4 at baseline  - flat effect noted  - weaned off propofol, precedex  - Pain management IV Tylenol and Dilaudid  - Hx of Schizophrenia, psych recs appreciated      Respiratory:   - SaO2 goal > 92%  - trach collar  - monitor trach site for bleeding     Cardiovascular:   - MAP goal > 65  - Trend lactate levels    Gastrointestinal/Nutrition:   - NPO with tube feeds     Renal/Genitourinary:   - Monitor UOP  - Baptiste out, passed ToV  - IVL    Hematologic:   - Monitor H&H  - Chemical DVT PPx: Lovenox   - Mechanical DVT PPx    Infectious Disease:   - Monitor for signs of infection  - Clindamycin for 24h post-op (allergy to penicillin)    Tubes/Lines/Drains:   - PIVs  - Baptiste    Endocrine:   - Monitor blood glucose levels   - No Steroids for psych history    Disposition: SICU

## 2024-06-08 NOTE — DIETITIAN INITIAL EVALUATION ADULT - ADD RECOMMEND
1) Recommend Jevity 1.5 via NGT @ goal rate 47 mL/hr x24 hrs to provide 1128 mL formula, 1692 kcal, 72 gm protein, 857 mL free water daily (meets 28.7 kcal/kg, 1.2 gm protein/kg @ dosing weight 59 kg). Additional provision of free water per medical discretion.  2) Obtain weekly weights.   3) Monitor electrolytes (K+, Mg, P) and replete to within desired limits as clinically indicated.

## 2024-06-08 NOTE — DIETITIAN INITIAL EVALUATION ADULT - OTHER INFO
Per chart, pt is 64 year old female PMH schizophrenia, depression, squamous cell carcinoma of tongues s/p R partial glossectomy now s/p R SND I-IV, L RFFF, L thigh STSG, tracheostomy (6/6). OMFS, Plastic Surgery and Psychiatry following. Full code.    Pt visibly resting in bed, unable to participate in discussion; comprehensive chart review completed. NKFA. Pt was initiated on EN yesterday, Jevity 1.5 visibly running via NGT at goal rate 38 mL/hr during time of visit. Current nutrition prescription provides 912 mL formula, 1368 kcal, 58.2 gm protein, 693 mL free water daily. No BM or flatus per flowsheets, confirmed with RN. Ordered for Miralax 17 gm qD and senna syrup 10 mL qD. Labs notable for hypophosphatemia, repleted.

## 2024-06-08 NOTE — PROGRESS NOTE ADULT - ASSESSMENT
64y F s/p right hemiglossectomy with right neck dissection and reconstruction with L RFFF and skin graft reconstruction of flap donor site.     - recovering appropriately   - appreciate psychiatry evaluation and recommendations   - continue to follow ERAS protocol  -  q4 flap checks   - please avoid pressure over right side of neck  - BP systolic goal = under 140  - fever curve/abx

## 2024-06-08 NOTE — PROGRESS NOTE ADULT - SUBJECTIVE AND OBJECTIVE BOX
Plastic Surgery Progress Note (pg [facility]: [id number], [facility]: [phone])    SUBJECTIVE  The patient was seen and examined. No acute events overnight.    OBJECTIVE  ___________________________________________________  VITAL SIGNS / I&O's   Vital Signs Last 24 Hrs  T(C): 37.3 (08 Jun 2024 00:00), Max: 37.9 (07 Jun 2024 12:00)  T(F): 99.1 (08 Jun 2024 00:00), Max: 100.3 (07 Jun 2024 12:00)  HR: 117 (08 Jun 2024 07:23) (78 - 129)  BP: 147/95 (08 Jun 2024 06:00) (123/77 - 152/92)  BP(mean): 110 (08 Jun 2024 06:00) (90 - 114)  RR: 19 (08 Jun 2024 06:00) (12 - 24)  SpO2: 98% (08 Jun 2024 07:23) (97% - 100%)    Parameters below as of 08 Jun 2024 07:23  Patient On (Oxygen Delivery Method): tracheostomy collar          07 Jun 2024 07:01  -  08 Jun 2024 07:00  --------------------------------------------------------  IN:    Enteral Tube Flush: 550 mL    IV PiggyBack: 1200 mL    Jevity 1.5: 594 mL    Lactated Ringers: 600 mL  Total IN: 2944 mL    OUT:    Bulb (mL): 11.5 mL    Indwelling Catheter - Urethral (mL): 1300 mL    VAC (Vacuum Assisted Closure) System (mL): 0 mL    Voided (mL): 1800 mL  Total OUT: 3111.5 mL    Total NET: -167.5 mL        ___________________________________________________  PHYSICAL EXAM    -- CONSTITUTIONAL: NAD, lying in bed  -- NEURO: Awake, alert  -- HEENT:  s, right tongue flap WWP, appropriate capillary refill, strong arterial cook doppler signal, no evidence of collections or venous congestion in flap, neck is soft without collections, SUZANNE SS and low output.   LUE: vac in place, holding suction, arm soft, no collections    ___________________________________________________  LABS                        10.7   11.63 )-----------( 223      ( 08 Jun 2024 00:40 )             31.9     08 Jun 2024 00:40    137    |  105    |  11     ----------------------------<  109    3.5     |  22     |  0.58     Ca    8.2        08 Jun 2024 00:40  Phos  2.1       08 Jun 2024 00:40  Mg     1.80      08 Jun 2024 00:40      PT/INR - ( 06 Jun 2024 18:30 )   PT: 11.6 sec;   INR: 1.03 ratio         PTT - ( 06 Jun 2024 18:30 )  PTT:31.2 sec  CAPILLARY BLOOD GLUCOSE            Urinalysis Basic - ( 08 Jun 2024 00:40 )    Color: x / Appearance: x / SG: x / pH: x  Gluc: 109 mg/dL / Ketone: x  / Bili: x / Urobili: x   Blood: x / Protein: x / Nitrite: x   Leuk Esterase: x / RBC: x / WBC x   Sq Epi: x / Non Sq Epi: x / Bacteria: x      ___________________________________________________  MICRO  Recent Cultures:    ___________________________________________________  MEDICATIONS  (STANDING):  acetaminophen   Oral Liquid .. 975 milliGRAM(s) Oral every 6 hours  albuterol/ipratropium for Nebulization 3 milliLiter(s) Nebulizer every 6 hours  amLODIPine   Tablet 10 milliGRAM(s) Oral daily  buPROPion . 300 milliGRAM(s) Oral <User Schedule>  buPROPion . 150 milliGRAM(s) Oral at bedtime  chlorhexidine 0.12% Liquid 15 milliLiter(s) Swish and Spit two times a day  chlorhexidine 2% Cloths 1 Application(s) Topical daily  enoxaparin Injectable 40 milliGRAM(s) SubCutaneous every 24 hours  metroNIDAZOLE  IVPB      metroNIDAZOLE  IVPB 500 milliGRAM(s) IV Intermittent every 8 hours  pantoprazole   Suspension 40 milliGRAM(s) Oral daily  polyethylene glycol 3350 17 Gram(s) Oral daily  QUEtiapine 100 milliGRAM(s) Oral at bedtime  risperiDONE   Solution 1 milliGRAM(s) Oral <User Schedule>  risperiDONE   Solution 3 milliGRAM(s) Oral at bedtime  senna Syrup 10 milliLiter(s) Oral daily  sodium chloride 0.9% lock flush 3 milliLiter(s) IV Push every 8 hours  sodium chloride 3%  Inhalation 4 milliLiter(s) Inhalation every 12 hours    MEDICATIONS  (PRN):  oxyCODONE    Solution 2.5 milliGRAM(s) Oral every 4 hours PRN Moderate Pain (4 - 6)  oxyCODONE    Solution 5 milliGRAM(s) Oral every 4 hours PRN Severe Pain (7 - 10)

## 2024-06-08 NOTE — PROGRESS NOTE ADULT - SUBJECTIVE AND OBJECTIVE BOX
SICU Daily Progress Note  =====================================================  INTERVAL EVENTS:  - tolerating trach collar 30%  - Multiple pushes hydralazine 5mg, 1x push labetalol 10mg for hypertension       HPI: 63 y/o female with schizophrenia and depression presents to [facility] T preop for glossectomy, selective neck dissection, tracheostomy, forearm free flap; Dr. Alvarez: reconstruction mouth and tongue with free skin or muscle flap from right or left forearm or leg, possible wound vac. As per brother, pt lost contact with her family from 2017 to 2022 due to her cognitive state related to schizophrenia. She underwent dental extractions in December 2023 when her dentist noted a tongue lesion. s/p tongue biopsy in March 2024 which confirmed squamous cell carcinoma of tongue.     Surgery Information    Case Duration: 	EBL: 300	IV Fluids: 4.5L 	Blood Products: NA	Urine Output: 600CC      PAST MEDICAL & SURGICAL HISTORY:  Tongue cancer  Schizophrenia  Anxiety and depression  H/O hand surgery  Cancer determined by biopsy of tongue  S/P cataract extraction  H/O colonoscopy    FAMILY HISTORY:  FH: uterine cancer (Mother)  FH: prostate cancer (Sibling)  FH: suicide (Sibling)  FH: chronic renal disease (Father)    SOCIAL HISTORY:     ADVANCE DIRECTIVES: Full Code       MEDICATIONS:   Neurologic Medications:  acetaminophen   Oral Liquid .. 975 milliGRAM(s) Oral every 6 hours  buPROPion . 300 milliGRAM(s) Oral <User Schedule>  buPROPion . 150 milliGRAM(s) Oral at bedtime  oxyCODONE    Solution 5 milliGRAM(s) Oral every 4 hours PRN  oxyCODONE    Solution 2.5 milliGRAM(s) Oral every 4 hours PRN  QUEtiapine 100 milliGRAM(s) Oral at bedtime  risperiDONE   Solution 1 milliGRAM(s) Oral <User Schedule>  risperiDONE   Solution 3 milliGRAM(s) Oral at bedtime    Respiratory Medications:  albuterol/ipratropium for Nebulization 3 milliLiter(s) Nebulizer every 6 hours  sodium chloride 3%  Inhalation 4 milliLiter(s) Inhalation every 12 hours    Cardiovascular Medications:  amLODIPine   Tablet 5 milliGRAM(s) Oral daily    Gastrointestinal Medications:  pantoprazole   Suspension 40 milliGRAM(s) Oral daily  polyethylene glycol 3350 17 Gram(s) Oral daily  senna Syrup 10 milliLiter(s) Oral daily  sodium chloride 0.9% lock flush 3 milliLiter(s) IV Push every 8 hours    Genitourinary Medications:    Hematologic/Oncologic Medications:  enoxaparin Injectable 40 milliGRAM(s) SubCutaneous every 24 hours    Antimicrobials/Immunologic Medications:  metroNIDAZOLE  IVPB      metroNIDAZOLE  IVPB 500 milliGRAM(s) IV Intermittent every 8 hours    Endocrine/Metabolic Medications:    Topical/Other Medications:  chlorhexidine 0.12% Liquid 15 milliLiter(s) Swish and Spit two times a day  chlorhexidine 2% Cloths 1 Application(s) Topical daily      Allergies: penicillins (Unknown)    --------------------------------------------------------------------------------------    VITAL SIGNS, INS/OUTS (last 24 hours):      --------------------------------------------------------------------------------------    I/Os        --------------------------------------------------------------------------------------    EXAM  NEUROLOGY  Exam: Flat effect, mental status improved    HEENT  Exam: Normocephalic, atraumatic, EOMI.     RESPIRATORY  Exam: Lungs clear to auscultation, Normal expansion/effort on trach collar. Oozing from trach site noted    CARDIOVASCULAR  Exam: S1, S2.  Regular rate and rhythm.     GI/NUTRITION  Exam: Abdomen soft, Non-tender, Non-distended.     VASCULAR  Exam: Extremities warm, pink, well-perfused.    MUSCULOSKELETAL  Exam: All extremities moving spontaneously without limitations.    SKIN  Exam: Good skin turgor, no skin breakdown.       Tubes/Lines/Drains    [x] Peripheral IV  [ ] Central Venous Line     	[] R	[] L	[] IJ	[] Fem	[] SC	  [] Arterial Line		[] R	[] L	[] Fem	[] Rad	[] Ax	  [] PICC		[] Midline		[] Mediport  [] Urinary Catheter		  [x] Necessity of urinary, arterial, and venous catheters discussed    LABS  --------------------------------------------------------------------------------------                     --------------------------------------------------------------------------------------    IMAGING STUDIES:        SICU Daily Progress Note  =====================================================  INTERVAL EVENTS:  - tolerating trach collar 30%  - Multiple pushes hydralazine 5mg, 1x push labetalol 10mg for hypertension   - Q4 checks at 17:00      HPI: 65 y/o female with schizophrenia and depression presents to [facility] T preop for glossectomy, selective neck dissection, tracheostomy, forearm free flap; Dr. Alvarez: reconstruction mouth and tongue with free skin or muscle flap from right or left forearm or leg, possible wound vac. As per brother, pt lost contact with her family from 2017 to 2022 due to her cognitive state related to schizophrenia. She underwent dental extractions in December 2023 when her dentist noted a tongue lesion. s/p tongue biopsy in March 2024 which confirmed squamous cell carcinoma of tongue.     Surgery Information    Case Duration: 	EBL: 300	IV Fluids: 4.5L 	Blood Products: NA	Urine Output: 600CC      PAST MEDICAL & SURGICAL HISTORY:  Tongue cancer  Schizophrenia  Anxiety and depression  H/O hand surgery  Cancer determined by biopsy of tongue  S/P cataract extraction  H/O colonoscopy    FAMILY HISTORY:  FH: uterine cancer (Mother)  FH: prostate cancer (Sibling)  FH: suicide (Sibling)  FH: chronic renal disease (Father)    SOCIAL HISTORY:     ADVANCE DIRECTIVES: Full Code       MEDICATIONS:   Neurologic Medications:  acetaminophen   Oral Liquid .. 975 milliGRAM(s) Oral every 6 hours  buPROPion . 300 milliGRAM(s) Oral <User Schedule>  buPROPion . 150 milliGRAM(s) Oral at bedtime  oxyCODONE    Solution 5 milliGRAM(s) Oral every 4 hours PRN  oxyCODONE    Solution 2.5 milliGRAM(s) Oral every 4 hours PRN  QUEtiapine 100 milliGRAM(s) Oral at bedtime  risperiDONE   Solution 1 milliGRAM(s) Oral <User Schedule>  risperiDONE   Solution 3 milliGRAM(s) Oral at bedtime    Respiratory Medications:  albuterol/ipratropium for Nebulization 3 milliLiter(s) Nebulizer every 6 hours  sodium chloride 3%  Inhalation 4 milliLiter(s) Inhalation every 12 hours    Cardiovascular Medications:  amLODIPine   Tablet 5 milliGRAM(s) Oral daily    Gastrointestinal Medications:  pantoprazole   Suspension 40 milliGRAM(s) Oral daily  polyethylene glycol 3350 17 Gram(s) Oral daily  senna Syrup 10 milliLiter(s) Oral daily  sodium chloride 0.9% lock flush 3 milliLiter(s) IV Push every 8 hours    Genitourinary Medications:    Hematologic/Oncologic Medications:  enoxaparin Injectable 40 milliGRAM(s) SubCutaneous every 24 hours    Antimicrobials/Immunologic Medications:  metroNIDAZOLE  IVPB      metroNIDAZOLE  IVPB 500 milliGRAM(s) IV Intermittent every 8 hours    Endocrine/Metabolic Medications:    Topical/Other Medications:  chlorhexidine 0.12% Liquid 15 milliLiter(s) Swish and Spit two times a day  chlorhexidine 2% Cloths 1 Application(s) Topical daily      Allergies: penicillins (Unknown)    --------------------------------------------------------------------------------------    VITAL SIGNS, INS/OUTS (last 24 hours):      --------------------------------------------------------------------------------------    I/Os        --------------------------------------------------------------------------------------    EXAM  NEUROLOGY  Exam: Flat effect, mental status improved    HEENT  Exam: Normocephalic, atraumatic, EOMI.     RESPIRATORY  Exam: Lungs clear to auscultation, Normal expansion/effort on trach collar. Oozing from trach site noted    CARDIOVASCULAR  Exam: S1, S2.  Regular rate and rhythm.     GI/NUTRITION  Exam: Abdomen soft, Non-tender, Non-distended.     VASCULAR  Exam: Extremities warm, pink, well-perfused.    MUSCULOSKELETAL  Exam: All extremities moving spontaneously without limitations.    SKIN  Exam: Good skin turgor, no skin breakdown.       Tubes/Lines/Drains    [x] Peripheral IV  [ ] Central Venous Line     	[] R	[] L	[] IJ	[] Fem	[] SC	  [] Arterial Line		[] R	[] L	[] Fem	[] Rad	[] Ax	  [] PICC		[] Midline		[] Mediport  [] Urinary Catheter		  [x] Necessity of urinary, arterial, and venous catheters discussed    LABS  --------------------------------------------------------------------------------------                     --------------------------------------------------------------------------------------    IMAGING STUDIES:

## 2024-06-08 NOTE — PROGRESS NOTE ADULT - ASSESSMENT
64F w/ PMH of depression and schizophrenia s/p R partial glossectomy in the setting of SCCA, R SND I-IV, L RFFF, L thigh STSG, tracheostomy (6/6/24). D/C Baptiste, A-line, TOV passed, cuff down, tolerating trach collar at 30%, on TF diet, PT rehab facility (6/7/24). Pt progressing well w/o acute events.    Plan:  -ERAS Day 2  -Q2h flap check   -finish IV Abx today (per ERAS, 24hr Abx; 72h for ORN, infected hardware, poor diabetic control, OR duration >12 hours, grossly contaminated/infected)  -multimodal pain control  -PT: rehab facility  -CM/SW consult for dispo planning  -OOBTC  -monitor SUZANNE output  -rest of care per SICU    OMFS  #[id number]  Available on Teams

## 2024-06-09 LAB
ANION GAP SERPL CALC-SCNC: 8 MMOL/L — SIGNIFICANT CHANGE UP (ref 7–14)
APPEARANCE UR: CLEAR — SIGNIFICANT CHANGE UP
BACTERIA # UR AUTO: ABNORMAL /HPF
BILIRUB UR-MCNC: NEGATIVE — SIGNIFICANT CHANGE UP
BUN SERPL-MCNC: 10 MG/DL — SIGNIFICANT CHANGE UP (ref 7–23)
CALCIUM SERPL-MCNC: 8.3 MG/DL — LOW (ref 8.4–10.5)
CAST: 0 /LPF — SIGNIFICANT CHANGE UP (ref 0–4)
CHLORIDE SERPL-SCNC: 104 MMOL/L — SIGNIFICANT CHANGE UP (ref 98–107)
CO2 SERPL-SCNC: 24 MMOL/L — SIGNIFICANT CHANGE UP (ref 22–31)
COLOR SPEC: YELLOW — SIGNIFICANT CHANGE UP
CREAT SERPL-MCNC: 0.56 MG/DL — SIGNIFICANT CHANGE UP (ref 0.5–1.3)
DIFF PNL FLD: ABNORMAL
EGFR: 102 ML/MIN/1.73M2 — SIGNIFICANT CHANGE UP
GLUCOSE BLDC GLUCOMTR-MCNC: 91 MG/DL — SIGNIFICANT CHANGE UP (ref 70–99)
GLUCOSE SERPL-MCNC: 114 MG/DL — HIGH (ref 70–99)
GLUCOSE UR QL: NEGATIVE MG/DL — SIGNIFICANT CHANGE UP
GRAM STN FLD: ABNORMAL
HCT VFR BLD CALC: 28.6 % — LOW (ref 34.5–45)
HGB BLD-MCNC: 9.9 G/DL — LOW (ref 11.5–15.5)
KETONES UR-MCNC: NEGATIVE MG/DL — SIGNIFICANT CHANGE UP
LEUKOCYTE ESTERASE UR-ACNC: ABNORMAL
MAGNESIUM SERPL-MCNC: 1.9 MG/DL — SIGNIFICANT CHANGE UP (ref 1.6–2.6)
MCHC RBC-ENTMCNC: 30.8 PG — SIGNIFICANT CHANGE UP (ref 27–34)
MCHC RBC-ENTMCNC: 34.6 GM/DL — SIGNIFICANT CHANGE UP (ref 32–36)
MCV RBC AUTO: 89.1 FL — SIGNIFICANT CHANGE UP (ref 80–100)
NITRITE UR-MCNC: NEGATIVE — SIGNIFICANT CHANGE UP
NRBC # BLD: 0 /100 WBCS — SIGNIFICANT CHANGE UP (ref 0–0)
NRBC # FLD: 0 K/UL — SIGNIFICANT CHANGE UP (ref 0–0)
PH UR: 7 — SIGNIFICANT CHANGE UP (ref 5–8)
PHOSPHATE SERPL-MCNC: 1.9 MG/DL — LOW (ref 2.5–4.5)
PLATELET # BLD AUTO: 215 K/UL — SIGNIFICANT CHANGE UP (ref 150–400)
POTASSIUM SERPL-MCNC: 3.3 MMOL/L — LOW (ref 3.5–5.3)
POTASSIUM SERPL-SCNC: 3.3 MMOL/L — LOW (ref 3.5–5.3)
PROT UR-MCNC: 30 MG/DL
RBC # BLD: 3.21 M/UL — LOW (ref 3.8–5.2)
RBC # FLD: 13.4 % — SIGNIFICANT CHANGE UP (ref 10.3–14.5)
RBC CASTS # UR COMP ASSIST: 20 /HPF — HIGH (ref 0–4)
SODIUM SERPL-SCNC: 136 MMOL/L — SIGNIFICANT CHANGE UP (ref 135–145)
SP GR SPEC: 1.03 — SIGNIFICANT CHANGE UP (ref 1–1.03)
SPECIMEN SOURCE: SIGNIFICANT CHANGE UP
SQUAMOUS # UR AUTO: 2 /HPF — SIGNIFICANT CHANGE UP (ref 0–5)
UROBILINOGEN FLD QL: 1 MG/DL — SIGNIFICANT CHANGE UP (ref 0.2–1)
WBC # BLD: 10.95 K/UL — HIGH (ref 3.8–10.5)
WBC # FLD AUTO: 10.95 K/UL — HIGH (ref 3.8–10.5)
WBC UR QL: 16 /HPF — HIGH (ref 0–5)

## 2024-06-09 PROCEDURE — 93010 ELECTROCARDIOGRAM REPORT: CPT

## 2024-06-09 RX ORDER — MAGNESIUM SULFATE 100 %
1 POWDER (GRAM) MISCELLANEOUS ONCE
Refills: 0 | Status: COMPLETED | OUTPATIENT
Start: 2024-06-09 | End: 2024-06-09

## 2024-06-09 RX ORDER — POTASSIUM CHLORIDE 600 MG/1
40 TABLET, FILM COATED, EXTENDED RELEASE ORAL ONCE
Refills: 0 | Status: COMPLETED | OUTPATIENT
Start: 2024-06-09 | End: 2024-06-09

## 2024-06-09 RX ORDER — SULFAMETHOXAZOLE AND TRIMETHOPRIM 800; 160 MG/1; MG/1
1 TABLET ORAL
Refills: 0 | Status: COMPLETED | OUTPATIENT
Start: 2024-06-09 | End: 2024-06-12

## 2024-06-09 RX ORDER — SOD PHOS DI, MONO/K PHOS MONO 250 MG
2 TABLET ORAL ONCE
Refills: 0 | Status: COMPLETED | OUTPATIENT
Start: 2024-06-09 | End: 2024-06-09

## 2024-06-09 RX ORDER — DEXTROSE MONOHYDRATE AND SODIUM CHLORIDE 5; .3 G/100ML; G/100ML
1000 INJECTION, SOLUTION INTRAVENOUS ONCE
Refills: 0 | Status: COMPLETED | OUTPATIENT
Start: 2024-06-09 | End: 2024-06-09

## 2024-06-09 RX ADMIN — Medication 4 MILLILITER(S): at 08:01

## 2024-06-09 RX ADMIN — POLYETHYLENE GLYCOL 3350 17 GRAM(S): 1 POWDER ORAL at 12:33

## 2024-06-09 RX ADMIN — OXYCODONE HYDROCHLORIDE 5 MILLIGRAM(S): 100 SOLUTION ORAL at 03:30

## 2024-06-09 RX ADMIN — Medication 15 MILLILITER(S): at 18:05

## 2024-06-09 RX ADMIN — BUPROPION HYDROCHLORIDE 300 MILLIGRAM(S): 150 TABLET, EXTENDED RELEASE ORAL at 06:42

## 2024-06-09 RX ADMIN — Medication 15 MILLILITER(S): at 05:29

## 2024-06-09 RX ADMIN — Medication 975 MILLIGRAM(S): at 18:32

## 2024-06-09 RX ADMIN — SULFAMETHOXAZOLE AND TRIMETHOPRIM 1 TABLET(S): 800; 160 TABLET ORAL at 18:06

## 2024-06-09 RX ADMIN — RISPERIDONE 3 MILLIGRAM(S): 0.5 TABLET ORAL at 22:28

## 2024-06-09 RX ADMIN — Medication 1 APPLICATION(S): at 12:34

## 2024-06-09 RX ADMIN — Medication 100 GRAM(S): at 03:15

## 2024-06-09 RX ADMIN — PANTOPRAZOLE SODIUM 40 MILLIGRAM(S): 40 INJECTION, POWDER, FOR SOLUTION INTRAVENOUS at 12:32

## 2024-06-09 RX ADMIN — BUPROPION HYDROCHLORIDE 150 MILLIGRAM(S): 150 TABLET, EXTENDED RELEASE ORAL at 22:29

## 2024-06-09 RX ADMIN — IPRATROPIUM BROMIDE AND ALBUTEROL SULFATE 3 MILLILITER(S): .5; 3 SOLUTION RESPIRATORY (INHALATION) at 03:52

## 2024-06-09 RX ADMIN — IPRATROPIUM BROMIDE AND ALBUTEROL SULFATE 3 MILLILITER(S): .5; 3 SOLUTION RESPIRATORY (INHALATION) at 08:01

## 2024-06-09 RX ADMIN — OXYCODONE HYDROCHLORIDE 5 MILLIGRAM(S): 100 SOLUTION ORAL at 08:15

## 2024-06-09 RX ADMIN — Medication 4 MILLILITER(S): at 21:51

## 2024-06-09 RX ADMIN — ENOXAPARIN SODIUM 40 MILLIGRAM(S): 100 INJECTION SUBCUTANEOUS at 12:32

## 2024-06-09 RX ADMIN — Medication 3 MILLILITER(S): at 22:28

## 2024-06-09 RX ADMIN — DEXTROSE MONOHYDRATE AND SODIUM CHLORIDE 1000 MILLILITER(S): 5; .3 INJECTION, SOLUTION INTRAVENOUS at 19:09

## 2024-06-09 RX ADMIN — OXYCODONE HYDROCHLORIDE 5 MILLIGRAM(S): 100 SOLUTION ORAL at 03:15

## 2024-06-09 RX ADMIN — METRONIDAZOLE 100 MILLIGRAM(S): 500 TABLET ORAL at 05:28

## 2024-06-09 RX ADMIN — Medication 100 MILLIGRAM(S): at 22:29

## 2024-06-09 RX ADMIN — IPRATROPIUM BROMIDE AND ALBUTEROL SULFATE 3 MILLILITER(S): .5; 3 SOLUTION RESPIRATORY (INHALATION) at 21:50

## 2024-06-09 RX ADMIN — Medication 975 MILLIGRAM(S): at 23:49

## 2024-06-09 RX ADMIN — Medication 975 MILLIGRAM(S): at 18:05

## 2024-06-09 RX ADMIN — Medication 2 PACKET(S): at 03:15

## 2024-06-09 RX ADMIN — Medication 975 MILLIGRAM(S): at 05:44

## 2024-06-09 RX ADMIN — IPRATROPIUM BROMIDE AND ALBUTEROL SULFATE 3 MILLILITER(S): .5; 3 SOLUTION RESPIRATORY (INHALATION) at 15:41

## 2024-06-09 RX ADMIN — Medication 975 MILLIGRAM(S): at 05:29

## 2024-06-09 RX ADMIN — POTASSIUM CHLORIDE 40 MILLIEQUIVALENT(S): 600 TABLET, FILM COATED, EXTENDED RELEASE ORAL at 06:41

## 2024-06-09 RX ADMIN — AMLODIPINE BESYLATE 10 MILLIGRAM(S): 2.5 TABLET ORAL at 05:29

## 2024-06-09 RX ADMIN — Medication 975 MILLIGRAM(S): at 12:33

## 2024-06-09 RX ADMIN — OXYCODONE HYDROCHLORIDE 5 MILLIGRAM(S): 100 SOLUTION ORAL at 07:16

## 2024-06-09 RX ADMIN — RISPERIDONE 1 MILLIGRAM(S): 0.5 TABLET ORAL at 06:42

## 2024-06-09 RX ADMIN — Medication 3 MILLILITER(S): at 14:03

## 2024-06-09 RX ADMIN — Medication 975 MILLIGRAM(S): at 12:50

## 2024-06-09 NOTE — PROGRESS NOTE ADULT - SUBJECTIVE AND OBJECTIVE BOX
SICU Daily Progress Note  =====================================================  Interval/Overnight Events:    - Lopressor 5 ivp x1   - febrile 100.5 -- cultures sent  - q4 checks -- listed      Allergies: penicillins (Unknown)      MEDICATIONS:   --------------------------------------------------------------------------------------  Neurologic Medications  acetaminophen   Oral Liquid .. 975 milliGRAM(s) Oral every 6 hours  buPROPion . 300 milliGRAM(s) Oral <User Schedule>  buPROPion . 150 milliGRAM(s) Oral at bedtime  oxyCODONE    Solution 2.5 milliGRAM(s) Oral every 4 hours PRN Moderate Pain (4 - 6)  oxyCODONE    Solution 5 milliGRAM(s) Oral every 4 hours PRN Severe Pain (7 - 10)  QUEtiapine 100 milliGRAM(s) Oral at bedtime  risperiDONE   Solution 1 milliGRAM(s) Oral <User Schedule>  risperiDONE   Solution 3 milliGRAM(s) Oral at bedtime    Respiratory Medications  albuterol/ipratropium for Nebulization 3 milliLiter(s) Nebulizer every 6 hours  sodium chloride 3%  Inhalation 4 milliLiter(s) Inhalation every 12 hours    Cardiovascular Medications  amLODIPine   Tablet 10 milliGRAM(s) Oral daily    Gastrointestinal Medications  pantoprazole   Suspension 40 milliGRAM(s) Oral daily  polyethylene glycol 3350 17 Gram(s) Oral daily  senna Syrup 10 milliLiter(s) Oral daily  sodium chloride 0.9% lock flush 3 milliLiter(s) IV Push every 8 hours    Genitourinary Medications    Hematologic/Oncologic Medications  enoxaparin Injectable 40 milliGRAM(s) SubCutaneous every 24 hours    Antimicrobial/Immunologic Medications  metroNIDAZOLE  IVPB 500 milliGRAM(s) IV Intermittent every 8 hours  metroNIDAZOLE  IVPB        Endocrine/Metabolic Medications    Topical/Other Medications  chlorhexidine 0.12% Liquid 15 milliLiter(s) Swish and Spit two times a day  chlorhexidine 2% Cloths 1 Application(s) Topical daily    --------------------------------------------------------------------------------------    VITAL SIGNS, INS/OUTS (last 24 hours):  --------------------------------------------------------------------------------------  T(C): 37.1 (06-08-24 @ 20:00), Max: 38.1 (06-08-24 @ 16:00)  HR: 110 (06-08-24 @ 21:39) (92 - 130)  BP: 153/79 (06-08-24 @ 20:00) (111/83 - 176/76)  BP(mean): 97 (06-08-24 @ 20:00) (88 - 116)  ABP: 134/64 (06-08-24 @ 02:00) (134/64 - 135/64)  ABP(mean): 64 (06-08-24 @ 02:00) (64 - 87)  RR: 18 (06-08-24 @ 20:00) (12 - 25)  SpO2: 99% (06-08-24 @ 21:39) (97% - 100%)  CVP(mm Hg): --  CI: --  CAPILLARY BLOOD GLUCOSE       N/A    06-07 @ 07:01  -  06-08 @ 07:00  --------------------------------------------------------  IN:    Enteral Tube Flush: 550 mL    IV PiggyBack: 1200 mL    Jevity 1.5: 594 mL    Lactated Ringers: 600 mL  Total IN: 2944 mL    OUT:    Bulb (mL): 11.5 mL    Indwelling Catheter - Urethral (mL): 1300 mL    VAC (Vacuum Assisted Closure) System (mL): 0 mL    Voided (mL): 1800 mL  Total OUT: 3111.5 mL    Total NET: -167.5 mL      06-08 @ 07:01  -  06-09 @ 00:06  --------------------------------------------------------  IN:    Enteral Tube Flush: 380 mL    IV PiggyBack: 200 mL    Jevity 1.5: 570 mL  Total IN: 1150 mL    OUT:    Bulb (mL): 4 mL    VAC (Vacuum Assisted Closure) System (mL): 0 mL    Voided (mL): 700 mL  Total OUT: 704 mL    Total NET: 446 mL    --------------------------------------------------------------------------------------    EXAM  NEUROLOGY  Exam: Flat effect, mental status improved  HEENT  Exam: Normocephalic, atraumatic, EOMI.   RESPIRATORY  Exam: Lungs clear to auscultation, Normal expansion/effort on trach collar.   CARDIOVASCULAR  Exam: S1, S2.  Regular rate and rhythm.   GI/NUTRITION  Exam: Abdomen soft, Non-tender, Non-distended.   VASCULAR  Exam: Extremities warm, pink, well-perfused.  MUSCULOSKELETAL  Exam: All extremities moving spontaneously without limitations.  SKIN  Exam: Good skin turgor, no skin breakdown.         METABOLIC/FLUIDS/ELECTROLYTES  sodium chloride 0.9% lock flush 3 milliLiter(s) IV Push every 8 hours      HEMATOLOGIC  [x] VTE Prophylaxis: enoxaparin Injectable 40 milliGRAM(s) SubCutaneous every 24 hours    Transfusions:	[] PRBC	[] Platelets		[] FFP	[] Cryoprecipitate    INFECTIOUS DISEASE  Antimicrobials/Immunologic Medications:  metroNIDAZOLE  IVPB 500 milliGRAM(s) IV Intermittent every 8 hours  metroNIDAZOLE  IVPB          LABS  --------------------------------------------------------------------------------------  CBC (06-08 @ 00:40)                              10.7<L>                         11.63<H>  )----------------(  223        --    % Neutrophils, --    % Lymphocytes, ANC: --                                  31.9<L>                BMP (06-08 @ 00:40)             137     |  105     |  11    		Ca++ --      Ca 8.2<L>             ---------------------------------( 109<H>		Mg 1.80               3.5     |  22      |  0.58  			Ph 2.1<L>  --------------------------------------------------------------------------------------    OTHER LABORATORY:     IMAGING STUDIES:   CXR:

## 2024-06-09 NOTE — PROGRESS NOTE ADULT - ASSESSMENT
64F w/ PMH of depression and schizophrenia s/p R partial glossectomy in the setting of SCCA, R SND I-IV, L RFFF, L thigh STSG, tracheostomy (6/6/24). D/C Baptiste, A-line, TOV passed, cuff down, tolerating trach collar at 30%, on TF diet, PT rehab facility (6/7/24). Pt progressing well w/o acute events.    Plan:  -ERAS Day 3  - bedside dysphagia swallow study today, trial clears  -multimodal pain control  -PT: rehab facility  -CM/SW consult for dispo planning  -OOBTC  -monitor SUZANNE output  -rest of care per SICU    OMFS  #[id number]  Available on Teams  64F w/ PMH of depression and schizophrenia s/p R partial glossectomy in the setting of SCCA, R SND I-IV, L RFFF, L thigh STSG, tracheostomy (6/6/24). D/C Baptiste, A-line, TOV passed, cuff down, tolerating trach collar at 30%, on TF diet, PT rehab facility (6/7/24). Pt progressing well w/o acute events.    Plan:  -ERAS Day 3  - bedside dysphagia swallow study today, trial clears  - stop flagyl  -multimodal pain control  -PT: rehab facility  -CM/SW consult for dispo planning  -OOBTC  -monitor SUZANNE output  -rest of care per SICU    OMFS  #[id number]  Available on Teams

## 2024-06-09 NOTE — PROGRESS NOTE ADULT - ASSESSMENT
64y F s/p right hemiglossectomy with right neck dissection and reconstruction with L RFFF and skin graft reconstruction of flap donor site.     - recovering appropriately   - monitor intraoral suture line  - appreciate psychiatry evaluation and recommendations   - continue to follow ERAS protocol  -  q4 flap checks   - please avoid pressure over right side of neck  - BP systolic goal = under 140  - fever curve/abx

## 2024-06-09 NOTE — PROGRESS NOTE ADULT - SUBJECTIVE AND OBJECTIVE BOX
Plastic Surgery Progress Note (pg [facility]: [id number], [facility]: [phone])    SUBJECTIVE  The patient was seen and examined. one time fever, tachy    OBJECTIVE  ___________________________________________________  VITAL SIGNS / I&O's   Vital Signs Last 24 Hrs  T(C): 36.3 (2024 07:15), Max: 38.1 (2024 16:00)  T(F): 97.3 (2024 07:15), Max: 100.5 (2024 16:00)  HR: 92 (2024 08:15) (92 - 130)  BP: 141/70 (2024 08:15) (122/77 - 176/76)  BP(mean): 89 (2024 08:15) (88 - 116)  RR: 15 (:15) (12 - 25)  SpO2: 98% (2024 08:15) (96% - 100%)    Parameters below as of 2024 08:15  Patient On (Oxygen Delivery Method): tracheostomy collar  O2 Flow (L/min): 6  O2 Concentration (%): 28      2024 07:01  -  2024 07:00  --------------------------------------------------------  IN:    Enteral Tube Flush: 530 mL    IV PiggyBack: 300 mL    Jevity 1.5: 912 mL  Total IN: 1742 mL    OUT:    Bulb (mL): 9 mL    VAC (Vacuum Assisted Closure) System (mL): 0 mL    Voided (mL): 1050 mL  Total OUT: 1059 mL    Total NET: 683 mL      2024 07:01  -  2024 09:44  --------------------------------------------------------  IN:    Enteral Tube Flush: 60 mL    Jevity 1.5: 38 mL  Total IN: 98 mL    OUT:    Bulb (mL): 0 mL    VAC (Vacuum Assisted Closure) System (mL): 0 mL    Voided (mL): 100 mL  Total OUT: 100 mL    Total NET: -2 mL        ___________________________________________________  PHYSICAL EXAM    -- CONSTITUTIONAL: NAD, lying in bed  -- NEURO: Awake, alert  -- HEENT:  right tongue flap WWP, appropriate capillary refill, strong arterial cook doppler signal, anterior suture line mucosa dusky mildly,   no evidence of collections or venous congestion in flap, neck is soft without collections, SUZANNE SS and low output.   LUE: vac in place, holding suction, arm soft, no collections    ___________________________________________________  LABS                        9.9    10.95 )-----------( 215      ( 2024 00:55 )             28.6     2024 00:55    136    |  104    |  10     ----------------------------<  114    3.3     |  24     |  0.56     Ca    8.3        2024 00:55  Phos  1.9       2024 00:55  Mg     1.90      2024 00:55        CAPILLARY BLOOD GLUCOSE      POCT Blood Glucose.: 91 mg/dL (2024 08:35)        Urinalysis Basic - ( 2024 04:53 )    Color: Yellow / Appearance: Clear / S.028 / pH: x  Gluc: x / Ketone: Negative mg/dL  / Bili: Negative / Urobili: 1.0 mg/dL   Blood: x / Protein: 30 mg/dL / Nitrite: Negative   Leuk Esterase: Small / RBC: 20 /HPF / WBC 16 /HPF   Sq Epi: x / Non Sq Epi: 2 /HPF / Bacteria: Occasional /HPF      ___________________________________________________  MICRO  Recent Cultures:  Specimen Source: .Sputum Sputum,  @ 18:55; Results --; Gram Stain:   Moderate polymorphonuclear leukocytes per low power field  No Squamous epithelial cells per low power field  Rare Gram Positive Rods seen per oil power field  Rare Gram Negative Rods seen per oil power field  Rare Gram positive cocci in pairs seen per oil power field<!>; Organism: --    ___________________________________________________  MEDICATIONS  (STANDING):  acetaminophen   Oral Liquid .. 975 milliGRAM(s) Oral every 6 hours  albuterol/ipratropium for Nebulization 3 milliLiter(s) Nebulizer every 6 hours  amLODIPine   Tablet 10 milliGRAM(s) Oral daily  buPROPion . 300 milliGRAM(s) Oral <User Schedule>  buPROPion . 150 milliGRAM(s) Oral at bedtime  chlorhexidine 0.12% Liquid 15 milliLiter(s) Swish and Spit two times a day  chlorhexidine 2% Cloths 1 Application(s) Topical daily  enoxaparin Injectable 40 milliGRAM(s) SubCutaneous every 24 hours  metroNIDAZOLE  IVPB 500 milliGRAM(s) IV Intermittent every 8 hours  metroNIDAZOLE  IVPB      pantoprazole   Suspension 40 milliGRAM(s) Oral daily  polyethylene glycol 3350 17 Gram(s) Oral daily  QUEtiapine 100 milliGRAM(s) Oral at bedtime  risperiDONE   Solution 1 milliGRAM(s) Oral <User Schedule>  risperiDONE   Solution 3 milliGRAM(s) Oral at bedtime  senna Syrup 10 milliLiter(s) Oral daily  sodium chloride 0.9% lock flush 3 milliLiter(s) IV Push every 8 hours  sodium chloride 3%  Inhalation 4 milliLiter(s) Inhalation every 12 hours  trimethoprim  160 mG/sulfamethoxazole 800 mG 1 Tablet(s) Oral two times a day    MEDICATIONS  (PRN):  oxyCODONE    Solution 2.5 milliGRAM(s) Oral every 4 hours PRN Moderate Pain (4 - 6)  oxyCODONE    Solution 5 milliGRAM(s) Oral every 4 hours PRN Severe Pain (7 - 10)

## 2024-06-09 NOTE — PROGRESS NOTE ADULT - SUBJECTIVE AND OBJECTIVE BOX
LELA LOWERY  MRN-[id number]  [facility]    24  64F w/ PMH of depression and schizophrenia s/p R partial glossectomy in the setting of SCCA, R SND I-IV, L RFFF, L thigh STSG, tracheostomy (24).     INTERVAL EVENTS:  -cuff down, tolerating trach collar at 30%  -HTN resolved  - fever workup started, now afebrile   -Diet: TF Jevity at goal rate 38cc/hr  -PT: rehab facility    PHYSICAL EXAM:  GEN: NAD  NEURO: alert & oriented x   HEENT: face symmetric, oropharynx moist without exudates, CN VII/XI/XII intact, neck incision c/d/i, R neck SUZANNE in place w/ sso, flap good color, turgor, warm, cook sound strong regular, trach cuff down   CVS: regular rate and rhythm  Pulm: normal respiratory excursions, not tachypneic, no labored breathing  Abd: soft, non-distended  Ext: 1x L arm SUZANNE w/ sso moving all four extremities, no peripheral edema noted      Vitals:    Vital Signs Last 24 Hrs  T(C): 36.8 (2024 04:00), Max: 38.1 (2024 16:00)  T(F): 98.3 (2024 04:00), Max: 100.5 (2024 16:00)  HR: 101 (2024 08:01) (94 - 130)  BP: 138/74 (2024 04:00) (122/77 - 176/76)  BP(mean): 91 (2024 04:00) (88 - 116)  RR: 14 (2024 04:00) (12 - 25)  SpO2: 97% (2024 08:01) (96% - 100%)    Parameters below as of 2024 08:01  Patient On (Oxygen Delivery Method): tracheostomy collar        I&O's Detail    2024 07:01  -  2024 07:00  --------------------------------------------------------  IN:    Enteral Tube Flush: 530 mL    IV PiggyBack: 300 mL    Jevity 1.5: 874 mL  Total IN: 1704 mL    OUT:    Bulb (mL): 9 mL    VAC (Vacuum Assisted Closure) System (mL): 0 mL    Voided (mL): 1050 mL  Total OUT: 1059 mL    Total NET: 645 mL            Medications:    MEDICATIONS  (STANDING):  acetaminophen   Oral Liquid .. 975 milliGRAM(s) Oral every 6 hours  albuterol/ipratropium for Nebulization 3 milliLiter(s) Nebulizer every 6 hours  amLODIPine   Tablet 10 milliGRAM(s) Oral daily  buPROPion . 300 milliGRAM(s) Oral <User Schedule>  buPROPion . 150 milliGRAM(s) Oral at bedtime  chlorhexidine 0.12% Liquid 15 milliLiter(s) Swish and Spit two times a day  chlorhexidine 2% Cloths 1 Application(s) Topical daily  enoxaparin Injectable 40 milliGRAM(s) SubCutaneous every 24 hours  metroNIDAZOLE  IVPB 500 milliGRAM(s) IV Intermittent every 8 hours  metroNIDAZOLE  IVPB      pantoprazole   Suspension 40 milliGRAM(s) Oral daily  polyethylene glycol 3350 17 Gram(s) Oral daily  QUEtiapine 100 milliGRAM(s) Oral at bedtime  risperiDONE   Solution 1 milliGRAM(s) Oral <User Schedule>  risperiDONE   Solution 3 milliGRAM(s) Oral at bedtime  senna Syrup 10 milliLiter(s) Oral daily  sodium chloride 0.9% lock flush 3 milliLiter(s) IV Push every 8 hours  sodium chloride 3%  Inhalation 4 milliLiter(s) Inhalation every 12 hours  trimethoprim  160 mG/sulfamethoxazole 800 mG 1 Tablet(s) Oral two times a day    MEDICATIONS  (PRN):  oxyCODONE    Solution 2.5 milliGRAM(s) Oral every 4 hours PRN Moderate Pain (4 - 6)  oxyCODONE    Solution 5 milliGRAM(s) Oral every 4 hours PRN Severe Pain (7 - 10)        Labs:                          9.9    10.95 )-----------( 215      ( 2024 00:55 )             28.6       -    136  |  104  |  10  ----------------------------<  114<H>  3.3<L>   |  24  |  0.56    Ca    8.3<L>      2024 00:55  Phos  1.9       Mg     1.90             BMI: BMI (kg/m2): 22.3 (24 @ 06:18)  HbA1c: A1C with Estimated Average Glucose Result: 4.9 % (24 @ 18:30)    Glucose: POCT Blood Glucose.: 91 mg/dL (24 @ 08:35)    BP: 138/74 (24 @ 04:00) (111/83 - 176/76)Vital Signs Last 24 Hrs  T(C): 36.8 (24 @ 04:00), Max: 38.1 (24 @ 16:00)  T(F): 98.3 (24 @ 04:00), Max: 100.5 (24 @ 16:00)  HR: 101 (24 @ 08:01) (94 - 130)  BP: 138/74 (24 @ 04:00) (122/77 - 176/76)  BP(mean): 91 (24 @ 04:00) (88 - 116)  RR: 14 (24 @ 04:00) (12 - 25)  SpO2: 97% (24 @ 08:01) (96% - 100%)      Lipid Panel:         CAPILLARY BLOOD GLUCOSE      POCT Blood Glucose.: 91 mg/dL (2024 08:35)        Culture - Sputum (collected 2024 18:55)  Source: .Sputum Sputum  Gram Stain (2024 03:14):    Moderate polymorphonuclear leukocytes per low power field    No Squamous epithelial cells per low power field    Rare Gram Positive Rods seen per oil power field    Rare Gram Negative Rods seen per oil power field    Rare Gram positive cocci in pairs seen per oil power field        Urinalysis Basic - ( 2024 04:53 )    Color: Yellow / Appearance: Clear / S.028 / pH: x  Gluc: x / Ketone: Negative mg/dL  / Bili: Negative / Urobili: 1.0 mg/dL   Blood: x / Protein: 30 mg/dL / Nitrite: Negative   Leuk Esterase: Small / RBC: 20 /HPF / WBC 16 /HPF   Sq Epi: x / Non Sq Epi: 2 /HPF / Bacteria: Occasional /HPF

## 2024-06-09 NOTE — PROGRESS NOTE ADULT - ASSESSMENT
65 y/o female with schizophrenia and depression presents to [facility] preop for glossectomy, selective neck dissection, tracheostomy, forearm free flap; Dr. Alvarez: reconstruction mouth and tongue with free skin or muscle flap from right or left forearm or leg, possible wound vac. As per brother, pt lost contact with her family from 2017 to 2022 due to her cognitive state related to schizophrenia. She underwent dental extractions in December 2023 when her dentist noted a tongue lesion. s/p tongue biopsy in March 2024 which confirmed squamous cell carcinoma of tongue.     PLAN:   Neurologic:   - AOx4 at baseline  - flat effect   - no sedation at this time  - Pain control: Tylenol, oxy prn  - psych meds started: Risperidone, Seroquel Wellbutrin     Respiratory:   - SaO2 goal > 92%  - trach collar 7.5     Cardiovascular:   - MAP goal > 65  - No pressors at this time    Gastrointestinal/Nutrition:   - NGT w TFs -- Jevity @38    Renal/Genitourinary:   - Monitor UOP    Hematologic:   - Monitor H&H  - DVT ppx: Lovenox, SCDs    Infectious Disease:   - Monitor WBC/fever  - ABX: Flagyl  - f/u cx's sent on 6/8    Tubes/Lines/Drains:   - PIVs    Endocrine:   - Monitor blood glucose levels     Disposition: SICU/Listed

## 2024-06-10 ENCOUNTER — TRANSCRIPTION ENCOUNTER (OUTPATIENT)
Age: 64
End: 2024-06-10

## 2024-06-10 DIAGNOSIS — R00.0 TACHYCARDIA, UNSPECIFIED: ICD-10-CM

## 2024-06-10 DIAGNOSIS — J98.11 ATELECTASIS: ICD-10-CM

## 2024-06-10 DIAGNOSIS — C02.9 MALIGNANT NEOPLASM OF TONGUE, UNSPECIFIED: ICD-10-CM

## 2024-06-10 DIAGNOSIS — F20.9 SCHIZOPHRENIA, UNSPECIFIED: ICD-10-CM

## 2024-06-10 DIAGNOSIS — D62 ACUTE POSTHEMORRHAGIC ANEMIA: ICD-10-CM

## 2024-06-10 LAB
ADD ON TEST-SPECIMEN IN LAB: SIGNIFICANT CHANGE UP
ANION GAP SERPL CALC-SCNC: 12 MMOL/L — SIGNIFICANT CHANGE UP (ref 7–14)
BASOPHILS # BLD AUTO: 0 K/UL — SIGNIFICANT CHANGE UP (ref 0–0.2)
BASOPHILS NFR BLD AUTO: 0 % — SIGNIFICANT CHANGE UP (ref 0–2)
BUN SERPL-MCNC: 13 MG/DL — SIGNIFICANT CHANGE UP (ref 7–23)
CALCIUM SERPL-MCNC: 8.4 MG/DL — SIGNIFICANT CHANGE UP (ref 8.4–10.5)
CHLORIDE SERPL-SCNC: 105 MMOL/L — SIGNIFICANT CHANGE UP (ref 98–107)
CO2 SERPL-SCNC: 22 MMOL/L — SIGNIFICANT CHANGE UP (ref 22–31)
CREAT SERPL-MCNC: 0.58 MG/DL — SIGNIFICANT CHANGE UP (ref 0.5–1.3)
CULTURE RESULTS: ABNORMAL
EGFR: 101 ML/MIN/1.73M2 — SIGNIFICANT CHANGE UP
EOSINOPHIL # BLD AUTO: 0.01 K/UL — SIGNIFICANT CHANGE UP (ref 0–0.5)
EOSINOPHIL NFR BLD AUTO: 0.1 % — SIGNIFICANT CHANGE UP (ref 0–6)
GLUCOSE SERPL-MCNC: 89 MG/DL — SIGNIFICANT CHANGE UP (ref 70–99)
HCT VFR BLD CALC: 28.8 % — LOW (ref 34.5–45)
HGB BLD-MCNC: 9.9 G/DL — LOW (ref 11.5–15.5)
IANC: 8.22 K/UL — HIGH (ref 1.8–7.4)
IMM GRANULOCYTES NFR BLD AUTO: 0.5 % — SIGNIFICANT CHANGE UP (ref 0–0.9)
LYMPHOCYTES # BLD AUTO: 0.71 K/UL — LOW (ref 1–3.3)
LYMPHOCYTES # BLD AUTO: 7.1 % — LOW (ref 13–44)
MAGNESIUM SERPL-MCNC: 1.9 MG/DL — SIGNIFICANT CHANGE UP (ref 1.6–2.6)
MCHC RBC-ENTMCNC: 31.2 PG — SIGNIFICANT CHANGE UP (ref 27–34)
MCHC RBC-ENTMCNC: 34.4 GM/DL — SIGNIFICANT CHANGE UP (ref 32–36)
MCV RBC AUTO: 90.9 FL — SIGNIFICANT CHANGE UP (ref 80–100)
MONOCYTES # BLD AUTO: 0.98 K/UL — HIGH (ref 0–0.9)
MONOCYTES NFR BLD AUTO: 9.8 % — SIGNIFICANT CHANGE UP (ref 2–14)
NEUTROPHILS # BLD AUTO: 8.22 K/UL — HIGH (ref 1.8–7.4)
NEUTROPHILS NFR BLD AUTO: 82.5 % — HIGH (ref 43–77)
NRBC # BLD: 0 /100 WBCS — SIGNIFICANT CHANGE UP (ref 0–0)
NRBC # FLD: 0 K/UL — SIGNIFICANT CHANGE UP (ref 0–0)
PHOSPHATE SERPL-MCNC: 2.7 MG/DL — SIGNIFICANT CHANGE UP (ref 2.5–4.5)
PLATELET # BLD AUTO: 285 K/UL — SIGNIFICANT CHANGE UP (ref 150–400)
POTASSIUM SERPL-MCNC: 4 MMOL/L — SIGNIFICANT CHANGE UP (ref 3.5–5.3)
POTASSIUM SERPL-SCNC: 4 MMOL/L — SIGNIFICANT CHANGE UP (ref 3.5–5.3)
RBC # BLD: 3.17 M/UL — LOW (ref 3.8–5.2)
RBC # FLD: 13.2 % — SIGNIFICANT CHANGE UP (ref 10.3–14.5)
SODIUM SERPL-SCNC: 139 MMOL/L — SIGNIFICANT CHANGE UP (ref 135–145)
SPECIMEN SOURCE: SIGNIFICANT CHANGE UP
TSH SERPL-MCNC: 2.06 UIU/ML — SIGNIFICANT CHANGE UP (ref 0.27–4.2)
TSH SERPL-MCNC: 2.53 UIU/ML — SIGNIFICANT CHANGE UP (ref 0.27–4.2)
WBC # BLD: 9.97 K/UL — SIGNIFICANT CHANGE UP (ref 3.8–10.5)
WBC # FLD AUTO: 9.97 K/UL — SIGNIFICANT CHANGE UP (ref 3.8–10.5)

## 2024-06-10 PROCEDURE — 99223 1ST HOSP IP/OBS HIGH 75: CPT

## 2024-06-10 PROCEDURE — 93970 EXTREMITY STUDY: CPT | Mod: 26

## 2024-06-10 PROCEDURE — 71275 CT ANGIOGRAPHY CHEST: CPT | Mod: 26

## 2024-06-10 PROCEDURE — 99231 SBSQ HOSP IP/OBS SF/LOW 25: CPT

## 2024-06-10 RX ORDER — LEVALBUTEROL HYDROCHLORIDE 1.25 MG/3ML
0.63 SOLUTION RESPIRATORY (INHALATION) EVERY 6 HOURS
Refills: 0 | Status: DISCONTINUED | OUTPATIENT
Start: 2024-06-10 | End: 2024-06-12

## 2024-06-10 RX ORDER — METOPROLOL TARTRATE 50 MG
5 TABLET ORAL ONCE
Refills: 0 | Status: COMPLETED | OUTPATIENT
Start: 2024-06-10 | End: 2024-06-10

## 2024-06-10 RX ADMIN — Medication 975 MILLIGRAM(S): at 17:19

## 2024-06-10 RX ADMIN — Medication 15 MILLILITER(S): at 06:58

## 2024-06-10 RX ADMIN — Medication 4 MILLILITER(S): at 22:01

## 2024-06-10 RX ADMIN — Medication 975 MILLIGRAM(S): at 06:46

## 2024-06-10 RX ADMIN — Medication 975 MILLIGRAM(S): at 11:59

## 2024-06-10 RX ADMIN — Medication 1 APPLICATION(S): at 12:02

## 2024-06-10 RX ADMIN — Medication 3 MILLILITER(S): at 13:42

## 2024-06-10 RX ADMIN — Medication 15 MILLILITER(S): at 12:01

## 2024-06-10 RX ADMIN — Medication 3 MILLILITER(S): at 21:50

## 2024-06-10 RX ADMIN — IPRATROPIUM BROMIDE AND ALBUTEROL SULFATE 3 MILLILITER(S): .5; 3 SOLUTION RESPIRATORY (INHALATION) at 16:22

## 2024-06-10 RX ADMIN — POLYETHYLENE GLYCOL 3350 17 GRAM(S): 1 POWDER ORAL at 12:00

## 2024-06-10 RX ADMIN — BUPROPION HYDROCHLORIDE 150 MILLIGRAM(S): 150 TABLET, EXTENDED RELEASE ORAL at 21:52

## 2024-06-10 RX ADMIN — Medication 975 MILLIGRAM(S): at 17:49

## 2024-06-10 RX ADMIN — Medication 5 MILLIGRAM(S): at 00:27

## 2024-06-10 RX ADMIN — IPRATROPIUM BROMIDE AND ALBUTEROL SULFATE 3 MILLILITER(S): .5; 3 SOLUTION RESPIRATORY (INHALATION) at 03:51

## 2024-06-10 RX ADMIN — Medication 975 MILLIGRAM(S): at 12:30

## 2024-06-10 RX ADMIN — OXYCODONE HYDROCHLORIDE 5 MILLIGRAM(S): 100 SOLUTION ORAL at 23:05

## 2024-06-10 RX ADMIN — Medication 100 MILLIGRAM(S): at 21:51

## 2024-06-10 RX ADMIN — ENOXAPARIN SODIUM 40 MILLIGRAM(S): 100 INJECTION SUBCUTANEOUS at 11:59

## 2024-06-10 RX ADMIN — SULFAMETHOXAZOLE AND TRIMETHOPRIM 1 TABLET(S): 800; 160 TABLET ORAL at 17:19

## 2024-06-10 RX ADMIN — Medication 975 MILLIGRAM(S): at 22:36

## 2024-06-10 RX ADMIN — BUPROPION HYDROCHLORIDE 300 MILLIGRAM(S): 150 TABLET, EXTENDED RELEASE ORAL at 06:46

## 2024-06-10 RX ADMIN — RISPERIDONE 3 MILLIGRAM(S): 0.5 TABLET ORAL at 21:50

## 2024-06-10 RX ADMIN — PANTOPRAZOLE SODIUM 40 MILLIGRAM(S): 40 INJECTION, POWDER, FOR SOLUTION INTRAVENOUS at 12:01

## 2024-06-10 RX ADMIN — SULFAMETHOXAZOLE AND TRIMETHOPRIM 1 TABLET(S): 800; 160 TABLET ORAL at 06:46

## 2024-06-10 RX ADMIN — Medication 10 MILLILITER(S): at 12:01

## 2024-06-10 RX ADMIN — Medication 15 MILLILITER(S): at 17:18

## 2024-06-10 RX ADMIN — OXYCODONE HYDROCHLORIDE 5 MILLIGRAM(S): 100 SOLUTION ORAL at 04:33

## 2024-06-10 RX ADMIN — Medication 3 MILLILITER(S): at 06:47

## 2024-06-10 RX ADMIN — RISPERIDONE 1 MILLIGRAM(S): 0.5 TABLET ORAL at 06:47

## 2024-06-10 RX ADMIN — OXYCODONE HYDROCHLORIDE 5 MILLIGRAM(S): 100 SOLUTION ORAL at 04:03

## 2024-06-10 RX ADMIN — OXYCODONE HYDROCHLORIDE 5 MILLIGRAM(S): 100 SOLUTION ORAL at 22:35

## 2024-06-10 RX ADMIN — AMLODIPINE BESYLATE 10 MILLIGRAM(S): 2.5 TABLET ORAL at 06:46

## 2024-06-10 NOTE — BH CONSULTATION LIAISON PROGRESS NOTE - NSBHCHARTREVIEWVS_PSY_A_CORE FT
Vital Signs Last 24 Hrs  T(C): 37.6 (08 Jun 2024 09:00), Max: 37.6 (08 Jun 2024 09:00)  T(F): 99.7 (08 Jun 2024 09:00), Max: 99.7 (08 Jun 2024 09:00)  HR: 107 (08 Jun 2024 10:00) (78 - 129)  BP: 124/80 (08 Jun 2024 10:00) (111/83 - 152/92)  BP(mean): 92 (08 Jun 2024 10:00) (88 - 114)  RR: 16 (08 Jun 2024 10:00) (12 - 25)  SpO2: 99% (08 Jun 2024 10:00) (97% - 100%)    Parameters below as of 08 Jun 2024 10:00    O2 Flow (L/min): 6  O2 Concentration (%): 28
Vital Signs Last 24 Hrs  T(C): 36.8 (10 José Miguel 2024 10:00), Max: 37.2 (10 José Miguel 2024 06:45)  T(F): 98.2 (10 José Miguel 2024 10:00), Max: 99 (10 José Miguel 2024 06:45)  HR: 132 (10 José Miguel 2024 10:00) (101 - 135)  BP: 134/81 (10 José Miguel 2024 10:00) (112/70 - 144/90)  BP(mean): --  RR: 18 (10 José Miguel 2024 10:00) (16 - 18)  SpO2: 96% (10 José Miguel 2024 10:00) (95% - 98%)    Parameters below as of 10 José Miguel 2024 10:00  Patient On (Oxygen Delivery Method): tracheostomy collar

## 2024-06-10 NOTE — PROGRESS NOTE ADULT - PROBLEM SELECTOR PLAN 1
Patient has had persistent sinus tachycardia to the 130s range since surgery on 6/6  - CT angio chest 6/10 negative for PE  - Patient currently denies having pain  - Can check TTE  - Could also be due to albuterol component of Duonebs, recommend trying Xopenex instead if pt needs to remain on bronchodilators

## 2024-06-10 NOTE — BH CONSULTATION LIAISON PROGRESS NOTE - NSBHFUPINTERVALHXFT_PSY_A_CORE
Chart reviewed, pt seen. On interview, pt seen in bed, awake, trached. Pt is calm. Pt attempts to make eye contact by moving head and tracking movements. Pt able to communicate through blinking. 
patient was seen and assessed. No prns required. staff reporting no behavioral issues.  patient remains unable to speak, but is communicating by shaking yes and no  Patient aware she is back on medication, in agreement. no Si or HI. denies Ah and Vh. Appreciates support.  Mild cogwheeling noted on physical exam.

## 2024-06-10 NOTE — BH CONSULTATION LIAISON PROGRESS NOTE - NSBHCONSULTFOLLOWAFTERCARE_PSY_A_CORE FT
dispo pending  can f/u with providers vs [facility] [phone] (M-F 9am-7pm)

## 2024-06-10 NOTE — PROGRESS NOTE ADULT - PROBLEM SELECTOR PLAN 4
Behavioral health team following  - C/w Seroquel, Wellbutrin, risperidone  - In good behavioral control

## 2024-06-10 NOTE — PROGRESS NOTE ADULT - PROBLEM SELECTOR PLAN 3
CT chest with findings of "Complete atelectasis of the right middle lobe and near complete atelectasis of the right lower lobe"  - c/w inhaled NaCl, Duonebs/Xopenex PRN, chest PT, incentive spirometer  - OOB to chair CT chest with findings of "Complete atelectasis of the right middle lobe and near complete atelectasis of the right lower lobe"  - c/w inhaled NaCl, Duonebs/Xopenex PRN, chest PT, Aerobika for airway clearance  - OOB to chair

## 2024-06-10 NOTE — PROGRESS NOTE ADULT - SUBJECTIVE AND OBJECTIVE BOX
64F w/ PMH of depression and schizophrenia s/p R partial glossectomy in the setting of SCCA, R SND I-IV, L RFFF, L thigh STSG, tracheostomy (6/6/24).       Interval Events:   -Downgraded to floor  -Started on Bactrim for UTI  -Tachy to 120-130s o/n, pt asymptomatic, EKG ordered showing sinus tachycardia, 1L bolus given without resolution. 1 AM pt persistently tachy, medicine consulted, administered 1x dose of Lopressor and CTA/ LE duplex ordered      Vital Signs Last 24 Hrs  T(C): 37.1 (10 José Miguel 2024 02:08), Max: 37.1 (09 Jun 2024 22:00)  T(F): 98.8 (10 José Miguel 2024 02:08), Max: 98.8 (09 Jun 2024 22:00)  HR: 101 (10 José Miguel 2024 02:08) (92 - 135)  BP: 112/70 (10 José Miguel 2024 02:08) (112/70 - 162/87)  BP(mean): 89 (09 Jun 2024 08:15) (89 - 106)  RR: 17 (10 José Miguel 2024 02:08) (15 - 19)  SpO2: 98% (10 José Miguel 2024 02:08) (95% - 99%)    Parameters below as of 10 José Miguel 2024 02:08  Patient On (Oxygen Delivery Method): room air        I&O's Detail    08 Jun 2024 07:01  -  09 Jun 2024 07:00  --------------------------------------------------------  IN:    Enteral Tube Flush: 530 mL    IV PiggyBack: 300 mL    Jevity 1.5: 912 mL  Total IN: 1742 mL    OUT:    Bulb (mL): 9 mL    VAC (Vacuum Assisted Closure) System (mL): 0 mL    Voided (mL): 1050 mL  Total OUT: 1059 mL    Total NET: 683 mL      09 Jun 2024 07:01  -  10 José Miguel 2024 06:21  --------------------------------------------------------  IN:    Enteral Tube Flush: 810 mL    Jevity 1.5: 608 mL    Lactated Ringers Bolus: 1000 mL  Total IN: 2418 mL    OUT:    Bulb (mL): 7.5 mL    Oral Fluid: 0 mL    VAC (Vacuum Assisted Closure) System (mL): 0 mL    Voided (mL): 2200 mL  Total OUT: 2207.5 mL    Total NET: 210.5 mL      Medications:    MEDICATIONS  (STANDING):  acetaminophen   Oral Liquid .. 975 milliGRAM(s) Oral every 6 hours  albuterol/ipratropium for Nebulization 3 milliLiter(s) Nebulizer every 6 hours  amLODIPine   Tablet 10 milliGRAM(s) Oral daily  buPROPion . 300 milliGRAM(s) Oral <User Schedule>  buPROPion . 150 milliGRAM(s) Oral at bedtime  chlorhexidine 0.12% Liquid 15 milliLiter(s) Swish and Spit two times a day  chlorhexidine 2% Cloths 1 Application(s) Topical daily  enoxaparin Injectable 40 milliGRAM(s) SubCutaneous every 24 hours  pantoprazole   Suspension 40 milliGRAM(s) Oral daily  polyethylene glycol 3350 17 Gram(s) Oral daily  QUEtiapine 100 milliGRAM(s) Oral at bedtime  risperiDONE   Solution 1 milliGRAM(s) Oral <User Schedule>  risperiDONE   Solution 3 milliGRAM(s) Oral at bedtime  senna Syrup 10 milliLiter(s) Oral daily  sodium chloride 0.9% lock flush 3 milliLiter(s) IV Push every 8 hours  sodium chloride 3%  Inhalation 4 milliLiter(s) Inhalation every 12 hours  trimethoprim  160 mG/sulfamethoxazole 800 mG 1 Tablet(s) Oral two times a day      MEDICATIONS  (PRN):  oxyCODONE    Solution 2.5 milliGRAM(s) Oral every 4 hours PRN Moderate Pain (4 - 6)  oxyCODONE    Solution 5 milliGRAM(s) Oral every 4 hours PRN Severe Pain (7 - 10)        Labs:                          9.9    10.95 )-----------( 215      ( 09 Jun 2024 00:55 )             28.6       06-09    136  |  104  |  10  ----------------------------<  114<H>  3.3<L>   |  24  |  0.56    Ca    8.3<L>      09 Jun 2024 00:55  Phos  1.9     06-09  Mg     1.90     06-09      PHYSICAL EXAM:  GEN: NAD  NEURO: alert & oriented x 4/4  HEENT: face symmetric, oropharynx moist without exudates, CN VII/XI/XII intact, neck incision c/d/i,  flap good color, turgor, warm, cook sound strong regular, trach cuff down   CVS: regular rate and rhythm  Pulm: normal respiratory excursions, not tachypneic, no labored breathing  Abd: soft, non-distended  Ext: 1x L arm SUZANNE w/ sso moving all four extremities, no peripheral edema noted

## 2024-06-10 NOTE — PROGRESS NOTE ADULT - PROBLEM SELECTOR PLAN 2
S/p glossectomy, neck dissection, tracheostomy, forearm free flap, L thigh split thickness skin graft on 6/6  - wound care and drain management per OMFS/PRS  - pain control with oxycodone PRN

## 2024-06-10 NOTE — CONSULT NOTE ADULT - SUBJECTIVE AND OBJECTIVE BOX
63 y/o female with schizophrenia and depression presents s/p glossectomy, selective neck dissection, tracheostomy, forearm free flap; s/p reconstruction mouth and tongue with  L Radial Forearm Free Flap and skin graft reconstruction of flap donor site. Developed sudden  onset sinus tachycardia 133 PO day #4. Pt afebrile currently, normotensive, normal wbc on prior morning labs, on bactrim for UTI. Denies cp, sob. Is not toxic appearing, motions to NGT inserting  into nose when asked if she has pain    Review of Systems:   CONSTITUTIONAL: No fever  EYES: No eye pain, visual disturbances, or discharge  ENMT:  No difficulty hearing  RESPIRATORY:  No shortness of breath  CARDIOVASCULAR: No chest pain  GASTROINTESTINAL: No abdominal pain  NEUROLOGICAL: No headaches  MUSCULOSKELETAL: No back pain     MEDICATIONS  (STANDING):  acetaminophen   Oral Liquid .. 975 milliGRAM(s) Oral every 6 hours  albuterol/ipratropium for Nebulization 3 milliLiter(s) Nebulizer every 6 hours  amLODIPine   Tablet 10 milliGRAM(s) Oral daily  buPROPion . 300 milliGRAM(s) Oral <User Schedule>  buPROPion . 150 milliGRAM(s) Oral at bedtime                         chlorhexidine 0.12% Liquid 15 milliLiter(s) Swish and Spit two times a day  chlorhexidine 2% Cloths 1 Application(s) Topical daily  enoxaparin Injectable 40 milliGRAM(s) SubCutaneous every 24 hours  pantoprazole   Suspension 40 milliGRAM(s) Oral daily  polyethylene glycol 3350 17 Gram(s) Oral daily  QUEtiapine 100 milliGRAM(s) Oral at bedtime  risperiDONE   Solution 1 milliGRAM(s) Oral <User Schedule>  risperiDONE   Solution 3 milliGRAM(s) Oral at bedtime  senna Syrup 10 milliLiter(s) Oral daily  sodium chloride 0.9% lock flush 3 milliLiter(s) IV Push every 8 hours  sodium chloride 3%  Inhalation 4 milliLiter(s) Inhalation every 12 hours  trimethoprim  160 mG/sulfamethoxazole 800 mG 1 Tablet(s) Oral two times a day    MEDICATIONS  (PRN):  oxyCODONE    Solution 2.5 milliGRAM(s) Oral every 4 hours PRN Moderate Pain (4 - 6)  oxyCODONE    Solution 5 milliGRAM(s) Oral every 4 hours PRN Severe Pain (7 - 10)      PHYSICAL EXAM:      Constitutional: NAD, well-groomed, well-developed  HEENT:  EOMI, Normal Hearing  Neck: No LAD, No JVD  Back: Normal spine flexure, No CVA tenderness  Respiratory: CTAB  Cardiovascular: tachy  Gastrointestinal: BS+, soft, NT/ND  Extremities: No peripheral edema  Vascular: 2+ peripheral pulses  Neurological: alert and oriented          9.9    10.95 )-----------( 215      ( 2024 00:55 )             28.6     06-09    136  |  104  |  10  ----------------------------<  114<H>  3.3<L>   |  24  |  0.56    Ca    8.3<L>      2024 00:55  Phos  1.9       Mg     1.90           CAPILLARY BLOOD GLUCOSE      POCT Blood Glucose.: 91 mg/dL (2024 08:35)      Urinalysis Basic - ( 2024 04:53 )    Color: Yellow / Appearance: Clear / S.028 / pH: x  Gluc: x / Ketone: Negative mg/dL  / Bili: Negative / Urobili: 1.0 mg/dL   Blood: x / Protein: 30 mg/dL / Nitrite: Negative   Leuk Esterase: Small / RBC: 20 /HPF / WBC 16 /HPF   Sq Epi: x / Non Sq Epi: 2 /HPF / Bacteria: Occasional /HPF      ICU Vital Signs Last 24 Hrs  T(C): 37.1 (10 José Miguel 2024 02:08), Max: 37.1 (2024 22:00)  T(F): 98.8 (10 José Miguel 2024 02:08), Max: 98.8 (2024 22:00)  HR: 101 (10 José Miguel 2024 02:08) (92 - 135)  BP: 112/70 (10 José Miguel 2024 02:08) (112/70 - 162/87)  BP(mean): 89 (2024 08:15) (89 - 106)  ABP: --  ABP(mean): --  RR: 17 (10 José Miguel 2024 02:08) (15 - 19)  SpO2: 98% (10 José Miguel 2024 02:08) (95% - 99%)    O2 Parameters below as of 10 José Miguel 2024 02:08  Patient On (Oxygen Delivery Method): room air

## 2024-06-10 NOTE — PROGRESS NOTE ADULT - SUBJECTIVE AND OBJECTIVE BOX
Plastic Surgery Progress Note (pg [facility]: [id number], [facility]: [phone])    SUBJECTIVE  The patient was seen and examined. Tachycardic overnight.     OBJECTIVE  ___________________________________________________  VITAL SIGNS / I&O's   Vital Signs Last 24 Hrs  T(C): 37.1 (10 José Miguel 2024 02:08), Max: 37.1 (09 Jun 2024 22:00)  T(F): 98.8 (10 José Miguel 2024 02:08), Max: 98.8 (09 Jun 2024 22:00)  HR: 101 (10 José Miguel 2024 02:08) (92 - 135)  BP: 112/70 (10 José Miguel 2024 02:08) (112/70 - 144/90)  BP(mean): 89 (09 Jun 2024 08:15) (89 - 89)  ABP: --  ABP(mean): --  RR: 17 (10 José Miguel 2024 02:08) (15 - 18)  SpO2: 98% (10 José Miguel 2024 02:08) (95% - 98%)    O2 Parameters below as of 10 José Miguel 2024 02:08  Patient On (Oxygen Delivery Method): room air      I&O's Detail    09 Jun 2024 07:01  -  10 José Miguel 2024 07:00  --------------------------------------------------------  IN:    Enteral Tube Flush: 810 mL    Jevity 1.5: 608 mL    Lactated Ringers Bolus: 1000 mL  Total IN: 2418 mL    OUT:    Bulb (mL): 7.5 mL    Oral Fluid: 0 mL    VAC (Vacuum Assisted Closure) System (mL): 0 mL    Voided (mL): 2200 mL  Total OUT: 2207.5 mL    Total NET: 210.5 mL      ___________________________________________________  PHYSICAL EXAM    -- CONSTITUTIONAL: NAD, lying in bed  -- NEURO: Awake, alert  -- HEENT:  right tongue flap WWP, appropriate capillary refill, strong arterial cook doppler signal, anterior suture line mucosa mucosalized;     no evidence of collections or venous congestion in flap, neck is soft without collections, SUZANNE SS and low output.   LUE: vac in place, holding suction, arm soft, no collections  LLE: STSG donor site with dressing in place and cdi    ___________________________________________________  LABS                                   9.9    9.97  )-----------( 285      ( 10 José Miguel 2024 04:53 )             28.8     06-09    136  |  104  |  10  ----------------------------<  114<H>  3.3<L>   |  24  |  0.56    Ca    8.3<L>      09 Jun 2024 00:55  Phos  1.9     06-09  Mg     1.90     06-09      ___________________________________________________  MICRO  Recent Cultures:  Specimen Source: .Sputum Sputum, 06-08 @ 18:55; Results --; Gram Stain:   Moderate polymorphonuclear leukocytes per low power field  No Squamous epithelial cells per low power field  Rare Gram Positive Rods seen per oil power field  Rare Gram Negative Rods seen per oil power field  Rare Gram positive cocci in pairs seen per oil power field<!>; Organism: --    ___________________________________________________  MEDICATIONS  (STANDING):  acetaminophen   Oral Liquid .. 975 milliGRAM(s) Oral every 6 hours  albuterol/ipratropium for Nebulization 3 milliLiter(s) Nebulizer every 6 hours  amLODIPine   Tablet 10 milliGRAM(s) Oral daily  buPROPion . 300 milliGRAM(s) Oral <User Schedule>  buPROPion . 150 milliGRAM(s) Oral at bedtime  chlorhexidine 0.12% Liquid 15 milliLiter(s) Swish and Spit two times a day  chlorhexidine 2% Cloths 1 Application(s) Topical daily  enoxaparin Injectable 40 milliGRAM(s) SubCutaneous every 24 hours  metroNIDAZOLE  IVPB 500 milliGRAM(s) IV Intermittent every 8 hours  metroNIDAZOLE  IVPB      pantoprazole   Suspension 40 milliGRAM(s) Oral daily  polyethylene glycol 3350 17 Gram(s) Oral daily  QUEtiapine 100 milliGRAM(s) Oral at bedtime  risperiDONE   Solution 1 milliGRAM(s) Oral <User Schedule>  risperiDONE   Solution 3 milliGRAM(s) Oral at bedtime  senna Syrup 10 milliLiter(s) Oral daily  sodium chloride 0.9% lock flush 3 milliLiter(s) IV Push every 8 hours  sodium chloride 3%  Inhalation 4 milliLiter(s) Inhalation every 12 hours  trimethoprim  160 mG/sulfamethoxazole 800 mG 1 Tablet(s) Oral two times a day    MEDICATIONS  (PRN):  oxyCODONE    Solution 2.5 milliGRAM(s) Oral every 4 hours PRN Moderate Pain (4 - 6)  oxyCODONE    Solution 5 milliGRAM(s) Oral every 4 hours PRN Severe Pain (7 - 10)   Plastic Surgery Progress Note (pg [facility]: [id number], [facility]: [phone])    SUBJECTIVE  The patient was seen and examined. Tachycardic overnight.     OBJECTIVE  ___________________________________________________  VITAL SIGNS / I&O's   Vital Signs Last 24 Hrs  T(C): 37.1 (10 José Miguel 2024 02:08), Max: 37.1 (09 Jun 2024 22:00)  T(F): 98.8 (10 José Miguel 2024 02:08), Max: 98.8 (09 Jun 2024 22:00)  HR: 101 (10 José Miguel 2024 02:08) (92 - 135)  BP: 112/70 (10 José Miguel 2024 02:08) (112/70 - 144/90)  BP(mean): 89 (09 Jun 2024 08:15) (89 - 89)  ABP: --  ABP(mean): --  RR: 17 (10 José Miguel 2024 02:08) (15 - 18)  SpO2: 98% (10 José Miguel 2024 02:08) (95% - 98%)    O2 Parameters below as of 10 José Miguel 2024 02:08  Patient On (Oxygen Delivery Method): room air      I&O's Detail    09 Jun 2024 07:01  -  10 José Miguel 2024 07:00  --------------------------------------------------------  IN:    Enteral Tube Flush: 810 mL    Jevity 1.5: 608 mL    Lactated Ringers Bolus: 1000 mL  Total IN: 2418 mL    OUT:    Bulb (mL): 7.5 mL    Oral Fluid: 0 mL    VAC (Vacuum Assisted Closure) System (mL): 0 mL    Voided (mL): 2200 mL  Total OUT: 2207.5 mL    Total NET: 210.5 mL      ___________________________________________________  PHYSICAL EXAM    -- CONSTITUTIONAL: NAD, lying in bed  -- NEURO: Awake, alert  -- HEENT:  right tongue flap WWP, appropriate capillary refill, strong arterial cook doppler signal, anterior suture line mucosa mucosalized;     no evidence of collections or venous congestion in flap, neck is soft without collections, slight blanching erythema. SUZANNE SS and low output.   LUE: vac in place, holding suction, arm soft, no collections  LLE: STSG donor site with dressing in place and cdi    ___________________________________________________  LABS                                   9.9    9.97  )-----------( 285      ( 10 José Miguel 2024 04:53 )             28.8     06-09    136  |  104  |  10  ----------------------------<  114<H>  3.3<L>   |  24  |  0.56    Ca    8.3<L>      09 Jun 2024 00:55  Phos  1.9     06-09  Mg     1.90     06-09      ___________________________________________________  MICRO  Recent Cultures:  Specimen Source: .Sputum Sputum, 06-08 @ 18:55; Results --; Gram Stain:   Moderate polymorphonuclear leukocytes per low power field  No Squamous epithelial cells per low power field  Rare Gram Positive Rods seen per oil power field  Rare Gram Negative Rods seen per oil power field  Rare Gram positive cocci in pairs seen per oil power field<!>; Organism: --    ___________________________________________________  MEDICATIONS  (STANDING):  acetaminophen   Oral Liquid .. 975 milliGRAM(s) Oral every 6 hours  albuterol/ipratropium for Nebulization 3 milliLiter(s) Nebulizer every 6 hours  amLODIPine   Tablet 10 milliGRAM(s) Oral daily  buPROPion . 300 milliGRAM(s) Oral <User Schedule>  buPROPion . 150 milliGRAM(s) Oral at bedtime  chlorhexidine 0.12% Liquid 15 milliLiter(s) Swish and Spit two times a day  chlorhexidine 2% Cloths 1 Application(s) Topical daily  enoxaparin Injectable 40 milliGRAM(s) SubCutaneous every 24 hours  metroNIDAZOLE  IVPB 500 milliGRAM(s) IV Intermittent every 8 hours  metroNIDAZOLE  IVPB      pantoprazole   Suspension 40 milliGRAM(s) Oral daily  polyethylene glycol 3350 17 Gram(s) Oral daily  QUEtiapine 100 milliGRAM(s) Oral at bedtime  risperiDONE   Solution 1 milliGRAM(s) Oral <User Schedule>  risperiDONE   Solution 3 milliGRAM(s) Oral at bedtime  senna Syrup 10 milliLiter(s) Oral daily  sodium chloride 0.9% lock flush 3 milliLiter(s) IV Push every 8 hours  sodium chloride 3%  Inhalation 4 milliLiter(s) Inhalation every 12 hours  trimethoprim  160 mG/sulfamethoxazole 800 mG 1 Tablet(s) Oral two times a day    MEDICATIONS  (PRN):  oxyCODONE    Solution 2.5 milliGRAM(s) Oral every 4 hours PRN Moderate Pain (4 - 6)  oxyCODONE    Solution 5 milliGRAM(s) Oral every 4 hours PRN Severe Pain (7 - 10)

## 2024-06-10 NOTE — BH CONSULTATION LIAISON PROGRESS NOTE - CURRENT MEDICATION
MEDICATIONS  (STANDING):  acetaminophen   Oral Liquid .. 975 milliGRAM(s) Oral every 6 hours  albuterol/ipratropium for Nebulization 3 milliLiter(s) Nebulizer every 6 hours  amLODIPine   Tablet 10 milliGRAM(s) Oral daily  buPROPion . 300 milliGRAM(s) Oral <User Schedule>  buPROPion . 150 milliGRAM(s) Oral at bedtime  chlorhexidine 0.12% Liquid 15 milliLiter(s) Swish and Spit two times a day  chlorhexidine 2% Cloths 1 Application(s) Topical daily  enoxaparin Injectable 40 milliGRAM(s) SubCutaneous every 24 hours  metroNIDAZOLE  IVPB      metroNIDAZOLE  IVPB 500 milliGRAM(s) IV Intermittent every 8 hours  pantoprazole   Suspension 40 milliGRAM(s) Oral daily  polyethylene glycol 3350 17 Gram(s) Oral daily  QUEtiapine 100 milliGRAM(s) Oral at bedtime  risperiDONE   Solution 1 milliGRAM(s) Oral <User Schedule>  risperiDONE   Solution 3 milliGRAM(s) Oral at bedtime  senna Syrup 10 milliLiter(s) Oral daily  sodium chloride 0.9% lock flush 3 milliLiter(s) IV Push every 8 hours  sodium chloride 3%  Inhalation 4 milliLiter(s) Inhalation every 12 hours    MEDICATIONS  (PRN):  oxyCODONE    Solution 2.5 milliGRAM(s) Oral every 4 hours PRN Moderate Pain (4 - 6)  oxyCODONE    Solution 5 milliGRAM(s) Oral every 4 hours PRN Severe Pain (7 - 10)  
MEDICATIONS  (STANDING):  acetaminophen   Oral Liquid .. 975 milliGRAM(s) Oral every 6 hours  albuterol/ipratropium for Nebulization 3 milliLiter(s) Nebulizer every 6 hours  amLODIPine   Tablet 10 milliGRAM(s) Oral daily  buPROPion . 300 milliGRAM(s) Oral <User Schedule>  buPROPion . 150 milliGRAM(s) Oral at bedtime  chlorhexidine 0.12% Liquid 15 milliLiter(s) Swish and Spit <User Schedule>  chlorhexidine 2% Cloths 1 Application(s) Topical daily  enoxaparin Injectable 40 milliGRAM(s) SubCutaneous every 24 hours  pantoprazole   Suspension 40 milliGRAM(s) Oral daily  polyethylene glycol 3350 17 Gram(s) Oral daily  QUEtiapine 100 milliGRAM(s) Oral at bedtime  risperiDONE   Solution 1 milliGRAM(s) Oral <User Schedule>  risperiDONE   Solution 3 milliGRAM(s) Oral at bedtime  senna Syrup 10 milliLiter(s) Oral daily  sodium chloride 0.9% lock flush 3 milliLiter(s) IV Push every 8 hours  sodium chloride 3%  Inhalation 4 milliLiter(s) Inhalation every 12 hours  trimethoprim  160 mG/sulfamethoxazole 800 mG 1 Tablet(s) Oral two times a day    MEDICATIONS  (PRN):  oxyCODONE    Solution 2.5 milliGRAM(s) Oral every 4 hours PRN Moderate Pain (4 - 6)  oxyCODONE    Solution 5 milliGRAM(s) Oral every 4 hours PRN Severe Pain (7 - 10)

## 2024-06-10 NOTE — BH CONSULTATION LIAISON PROGRESS NOTE - NSBHCHARTREVIEWLAB_PSY_A_CORE FT
10.7   11.63 )-----------( 223      ( 08 Jun 2024 00:40 )             31.9   06-08    137  |  105  |  11  ----------------------------<  109<H>  3.5   |  22  |  0.58    Ca    8.2<L>      08 Jun 2024 00:40  Phos  2.1     06-08  Mg     1.80     06-08    
                      9.9    9.97  )-----------( 285      ( 10 José Miguel 2024 04:53 )             28.8   06-10    139  |  105  |  13  ----------------------------<  89  4.0   |  22  |  0.58    Ca    8.4      10 José Miguel 2024 04:53  Phos  2.7     06-10  Mg     1.90     06-10

## 2024-06-10 NOTE — BH CONSULTATION LIAISON PROGRESS NOTE - NSBHASSESSMENTFT_PSY_ALL_CORE
Patient is a 63 y/o female with schizophrenia and depression presents to PST preop for glossectomy, selective neck dissection, tracheostomy, forearm free flap.  Patient comes from home, lives with brother Osmany. Spoke with Osmany for collateral.  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 Began to threaten self harm. Went to inpatient psych on  at [facility] - stayed for about 2 weeks, given medication and discharged home.  Again she was hospitalized in 2017 at [facility], sent home. Within 1 month of discharge, patient went missing - was living in [place] as family was able to see credit card bills for the first few months.  Family now knows patient was psychiatrically hospitalized in 2018 at [place] and released without their knowledge ( was stopped at train tracks threatening to jump).  She was missing until 2022 when she again went to [place] when her "street friends" call 911 out of concern. Family was notified and since discharge, has now been living with brother.  Patient has an outpatient psychiatrist dimitry Lockett who she sees regularly. Brother is unaware of patient symptoms when off medication as he states he has not seen it. Uncertain if patient has hallucinations or paranoia. Is aware of her hx of depression and threats of self harm. Brother does note patient has never actually self harmed, states she is "too chicken". Osmany does report patient has been doing well and with mostly good days while on her current medication regime.    6/8: Pt trached, non-verbal, calm. Will continue to observe.     PLAN   - currently in ICU, defer level of observation to primary team, ****monitor closely and increase level of observation if needed   - EKG  -- antipsychotics can only be given if qtc < 500   -- if qtc < 500, can resume home psychotropic medications via NGT  -- Seroquel 100mg qhs , risperidone 1mg qam, 3mg qhs , Wellbutrin ( recommend confirming with pharmacy due to route of administration)  - Patient currently on propofol - CL will follow and can recommend PRNs as sedation reduced   - Ongoing involvement of collateral as needed  - CL will continue to follow, do not hesitate to call
Patient is a 63 y/o female with schizophrenia and depression presents to PST preop for glossectomy, selective neck dissection, tracheostomy, forearm free flap.  Patient comes from home, lives with brother Osmany. Spoke with Osmany for collateral.  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 Began to threaten self harm. Went to inpatient psych on  at [facility] - stayed for about 2 weeks, given medication and discharged home.  Again she was hospitalized in 2017 at [facility], sent home. Within 1 month of discharge, patient went missing - was living in [place] as family was able to see credit card bills for the first few months.  Family now knows patient was psychiatrically hospitalized in 2018 at [place] and released without their knowledge ( was stopped at train tracks threatening to jump).  She was missing until 2022 when she again went to [place] when her "street friends" call 911 out of concern. Family was notified and since discharge, has now been living with brother.  Patient has an outpatient psychiatrist dimitry Lockett who she sees regularly. Brother is unaware of patient symptoms when off medication as he states he has not seen it. Uncertain if patient has hallucinations or paranoia. Is aware of her hx of depression and threats of self harm. Brother does note patient has never actually self harmed, states she is "too chicken". Osmany does report patient has been doing well and with mostly good days while on her current medication regime.    6/8: Pt trached, non-verbal, calm. Will continue to observe.   6/10: shakes head yes and no to answers, calm, cooperative - denies SI and HI, denies psychosis.    PLAN   -  defer level of observation to primary team, *monitor closely and increase level of observation if needed   - EKG  -- antipsychotics can only be given if qtc < 500   -- if qtc < 500, can resume home psychotropic medications   -- Seroquel 100mg qhs , risperidone 1mg qam, 3mg qhs , Wellbutrin ( recommend confirming with pharmacy due to route of administration)  - PRN seroquel 25mg q6hrs for anxiety or agitation   - Ongoing involvement of collateral as needed  - CL will continue to follow, do not hesitate to call

## 2024-06-10 NOTE — CHART NOTE - NSCHARTNOTEFT_GEN_A_CORE
Source: [X] Patient       [x] EMR        [X] RN        [X] Family at bedside       [] Other:      Nutrition Follow Up Note. 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     Patient transferred from ICU floor, RD note on 6/8 reviewed. Patient was previously on NGT feeds of Jevity1.5cal at goal rate of 38cc/hr x 24hours + clear liquids 6/9-6/10. Diet advanced to full liquids today d/c'ed TF order by OMFS team. As per RN, patient unable to swallow full liquids as she assisted her during feeding time. Holding liquid in mouth. +Difficulty swallowing, pending swallow evaluation as per RN. Otherwise, no nausea/vomiting/diarrhea/constipation reported. RD paged OMFS but unable to reach at this time.       Diet Prescription: Diet, Full Liquid (06-10-24 @ 06:48)    Food Allergy: NKFA.    Pertinent Medications: MEDICATIONS  (STANDING):  acetaminophen   Oral Liquid .. 975 milliGRAM(s) Oral every 6 hours  albuterol/ipratropium for Nebulization 3 milliLiter(s) Nebulizer every 6 hours  amLODIPine   Tablet 10 milliGRAM(s) Oral daily  buPROPion . 150 milliGRAM(s) Oral at bedtime  buPROPion . 300 milliGRAM(s) Oral <User Schedule>  chlorhexidine 0.12% Liquid 15 milliLiter(s) Swish and Spit <User Schedule>  chlorhexidine 2% Cloths 1 Application(s) Topical daily  enoxaparin Injectable 40 milliGRAM(s) SubCutaneous every 24 hours  pantoprazole   Suspension 40 milliGRAM(s) Oral daily  polyethylene glycol 3350 17 Gram(s) Oral daily  QUEtiapine 100 milliGRAM(s) Oral at bedtime  risperiDONE   Solution 1 milliGRAM(s) Oral <User Schedule>  risperiDONE   Solution 3 milliGRAM(s) Oral at bedtime  senna Syrup 10 milliLiter(s) Oral daily  sodium chloride 0.9% lock flush 3 milliLiter(s) IV Push every 8 hours  sodium chloride 3%  Inhalation 4 milliLiter(s) Inhalation every 12 hours  trimethoprim  160 mG/sulfamethoxazole 800 mG 1 Tablet(s) Oral two times a day    MEDICATIONS  (PRN):  oxyCODONE    Solution 2.5 milliGRAM(s) Oral every 4 hours PRN Moderate Pain (4 - 6)  oxyCODONE    Solution 5 milliGRAM(s) Oral every 4 hours PRN Severe Pain (7 - 10)    Pertinent Labs: 06-10 Na139 mmol/L Glu 89 mg/dL K+ 4.0 mmol/L Cr  0.58 mg/dL BUN 13 mg/dL 06-10 Phos 2.7 mg/dL     CAPILLARY BLOOD GLUCOSE            Weight: Weight (kg):  65.2 kg 6/9  dosing- 59kg 6/6  Weight Assessment: ?Question accuracy of weight, RN to obtain new weight with zeroed bedscale for better accuracy.  Height: 162.6 cm  IBW: 120lbs+/-10%  BMI: 22.3 kg/m^2 (based on dosing)    Edema: no edema per nursing flowsheets  Pressure Injury: No pressure ulcers/DTI noted in flowsheets.       Estimated Needs:   [] No change since previous assessment, based on weight  lbs / kg    [X ] recalculated, with consideration for, based on weight dosing weight- 59kg  Estimated Energy Needs (kcal/kg):   30-35 = 1770-2065kcal/d  Estimated Protein Needs(g/kg):      1.2-1.5 = 71-89g pro/d      Previous Nutrition Diagnosis:    [X ] Increased Nutrient Needs   Nutrition Diagnosis is [X ] ongoing  [ ] resolved [ ] not applicable     New Nutrition Diagnosis: Inadequate protein energy intake, related to unable to tolerate po intake As evidenced by dysphagia    Nutrition Interventions:     1) Recommend bedside swallow evaluation by SLP to determine the appropriate food and beverage consistency.   2) If unable to pass swallow evaluation, consider alternate means of nutrition. Recommend Jevity1.5cal at goal rate of 57cc/hr [id number] provides total volume of 1368mL, 2052kcal, 87g pro and 1040mL free water. Additional fluid per MD discretion.  3) Monitor weights, labs, BM's, skin integrity, po intake.     RD remains available.       Helio Wright RD, MS, CDN pager [phone]  Also available on [employer] Teams

## 2024-06-10 NOTE — PROGRESS NOTE ADULT - ASSESSMENT
64y F s/p right hemiglossectomy with right neck dissection and reconstruction with L RFFF and skin graft reconstruction of flap donor site.     Plan:  - recovering appropriately   - monitor intraoral suture line  - appreciate psychiatry evaluation and recommendations   - continue to follow ERAS protocol  -  q4 flap checks   - please avoid pressure over right side of neck  - BP systolic goal = under 140  - fever curve/abx    [place]  Plastic Surgery  #[phone] [facility] pager  [phone] [facility] pager  Available on teams

## 2024-06-10 NOTE — BH CONSULTATION LIAISON PROGRESS NOTE - NSBHTIMEACTIVITIESPERFORMED_PSY_A_CORE
25 minutes spent on total encounter. The necessity of the time spent during the encounter on this date of service was due to:     I had a face to face encounter with this patient. I spent 25 total minutes on the bedside interview and examination, coordination of care, counseling, chart review, and documentation for this patient.

## 2024-06-10 NOTE — BH CONSULTATION LIAISON PROGRESS NOTE - MSE UNSTRUCTURED FT
s/p surgical procedure, trached, alert/awake, able to communicate with head and eye movements. 
s/p surgical procedure, trached, alert/awake, shaking yes and no to answers  mild cogwheeling  no SI or HI  no psychosis reported - denies AH and VH

## 2024-06-10 NOTE — CONSULT NOTE ADULT - PROBLEM SELECTOR RECOMMENDATION 9
-etiology includes suboptimally controlled pain, less likely sepsis given otherwise normal vitals and relatively well-appearing pt. given recent surgery, would consider  PE, and reasonable to order CTA chest. TSH added on to prior lab  -would ensure SICU aware given pt downgraded yesterday   d/w ENT overnight

## 2024-06-10 NOTE — PROGRESS NOTE ADULT - ASSESSMENT
A/P:  64F w/ PMH of depression and schizophrenia s/p R partial glossectomy in the setting of SCCA, R SND I-IV, L RFFF, L thigh STSG, tracheostomy (6/6/24). D/C Baptiste, A-line, TOV passed, cuff down, tolerating trach collar at 30%, on TF diet, PT rehab facility (6/7/24). Pt progressing well w/o acute events.    Plan:  - ERAS Day 4  - CLD w/ TFs  - Bactrim for UTI  - Multimodal pain control  - Monitor SUZANNE output  - Encourage ambulation/ PO intake   - CM/SW consult for dispo planning  - PT: rehab facility        OMFS  #[phone]  Available on Teams

## 2024-06-10 NOTE — PROGRESS NOTE ADULT - SUBJECTIVE AND OBJECTIVE BOX
Bee Del Castillo  [facility]  Pager #[phone]  Available on Microsoft Teams    Patient is a 64y old  Female who presents with a chief complaint of glossectomy, selective neck dissection, tracheostomy, forearm free flap (10 José Miguel 2024 03:44)      SUBJECTIVE / OVERNIGHT EVENTS: Patient seen and examined at bedside. Pt's brother and mother at bedside. Pt calm and cooperative with interview, remains nonverbal s/p trach. Denies palpitations or chest pain. Pt's mother thinks pt's tachycardia is due to "nerves."    ADDITIONAL REVIEW OF SYSTEMS:    MEDICATIONS  (STANDING):  acetaminophen   Oral Liquid .. 975 milliGRAM(s) Oral every 6 hours  albuterol/ipratropium for Nebulization 3 milliLiter(s) Nebulizer every 6 hours  amLODIPine   Tablet 10 milliGRAM(s) Oral daily  buPROPion . 150 milliGRAM(s) Oral at bedtime  buPROPion . 300 milliGRAM(s) Oral <User Schedule>  chlorhexidine 0.12% Liquid 15 milliLiter(s) Swish and Spit <User Schedule>  chlorhexidine 2% Cloths 1 Application(s) Topical daily  enoxaparin Injectable 40 milliGRAM(s) SubCutaneous every 24 hours  pantoprazole   Suspension 40 milliGRAM(s) Oral daily  polyethylene glycol 3350 17 Gram(s) Oral daily  QUEtiapine 100 milliGRAM(s) Oral at bedtime  risperiDONE   Solution 1 milliGRAM(s) Oral <User Schedule>  risperiDONE   Solution 3 milliGRAM(s) Oral at bedtime  senna Syrup 10 milliLiter(s) Oral daily  sodium chloride 0.9% lock flush 3 milliLiter(s) IV Push every 8 hours  sodium chloride 3%  Inhalation 4 milliLiter(s) Inhalation every 12 hours  trimethoprim  160 mG/sulfamethoxazole 800 mG 1 Tablet(s) Oral two times a day    MEDICATIONS  (PRN):  oxyCODONE    Solution 2.5 milliGRAM(s) Oral every 4 hours PRN Moderate Pain (4 - 6)  oxyCODONE    Solution 5 milliGRAM(s) Oral every 4 hours PRN Severe Pain (7 - 10)      CAPILLARY BLOOD GLUCOSE        I&O's Summary    09 Jun 2024 07:01  -  10 José Miguel 2024 07:00  --------------------------------------------------------  IN: 2720 mL / OUT: 2210 mL / NET: 510 mL    10 José Miguel 2024 07:01  -  10 José Miguel 2024 14:10  --------------------------------------------------------  IN: 100 mL / OUT: 302 mL / NET: -202 mL        PHYSICAL EXAM:    Vital Signs Last 24 Hrs  T(C): 36.7 (10 José Miguel 2024 13:43), Max: 37.2 (10 José Miguel 2024 06:45)  T(F): 98.1 (10 José Miguel 2024 13:43), Max: 99 (10 José Miguel 2024 06:45)  HR: 122 (10 José Miguel 2024 13:43) (101 - 135)  BP: 133/80 (10 José Miguel 2024 13:43) (112/70 - 144/90)  BP(mean): --  RR: 17 (10 José Miguel 2024 13:43) (16 - 18)  SpO2: 94% (10 José Miguel 2024 13:43) (94% - 98%)    Parameters below as of 10 José Miguel 2024 13:43  Patient On (Oxygen Delivery Method): tracheostomy collar    CONSTITUTIONAL: NAD, well-developed  EYES: Conjunctiva and sclera clear  NECK: On trach collar, NGT in place  RESPIRATORY: Normal respiratory effort; upper lung fields CTA  CARDIOVASCULAR: Tachycardic, regular rhythm, normal S1 and S2, no murmur/rub/gallop; No lower extremity edema  ABDOMEN: Soft, nontender to palpation, normoactive bowel sounds  PSYCH: Calm, nonverbal   SKIN: L forearm wrapped in ACE bandage; wound vac in place    LABS:                        9.9    9.97  )-----------( 285      ( 10 José Miguel 2024 04:53 )             28.8     06-10    139  |  105  |  13  ----------------------------<  89  4.0   |  22  |  0.58    Ca    8.4      10 José Miguel 2024 04:53  Phos  2.7     06-10  Mg     1.90     06-10      Urinalysis Basic - ( 10 José Miguel 2024 04:53 )    Color: x / Appearance: x / SG: x / pH: x  Gluc: 89 mg/dL / Ketone: x  / Bili: x / Urobili: x   Blood: x / Protein: x / Nitrite: x   Leuk Esterase: x / RBC: x / WBC x   Sq Epi: x / Non Sq Epi: x / Bacteria: x      Culture - Blood (collected 08 Jun 2024 19:41)  Source: .Blood Blood-Peripheral  Preliminary Report (10 José Miguel 2024 01:02):    No growth at 24 hours    Culture - Sputum (collected 08 Jun 2024 18:55)  Source: .Sputum Sputum  Gram Stain (09 Jun 2024 03:14):    Moderate polymorphonuclear leukocytes per low power field    No Squamous epithelial cells per low power field    Rare Gram Positive Rods seen per oil power field    Rare Gram Negative Rods seen per oil power field    Rare Gram positive cocci in pairs seen per oil power field  Preliminary Report (09 Jun 2024 17:38):    Normal Respiratory Jesika present    Culture - Blood (collected 08 Jun 2024 18:55)  Source: .Blood Blood-Venous  Preliminary Report (10 José Miguel 2024 01:02):    No growth at 24 hours      RADIOLOGY & ADDITIONAL TESTS:     < from: CT Angio Chest PE Protocol w/ IV Cont (06.10.24 @ 10:48) >  IMPRESSION:    No evidence of pulmonary embolism to the level of the mainstem, lobar and   segmental branches. Limited evaluation of the subsegmental branches   secondary to motion artifact.    Complete atelectasis of the right middle lobe and near complete   atelectasis of the right lower lobe new since March 13, 2024. Small   bilateral pleural effusions. Secretions in the bronchus intermedius with   near complete opacification of the distal bronchus intermedius with   subsequent    Peripherally oriented cluster of tree-in-bud/nodular opacities in the   dependent portions of the right upper lobe representing impacted distal   airways which may be due to mucus or may be of infectious etiology.    Previously described right upper lobe 1.4 cm groundglass opacity is   unchanged unchanged since 3/13/2024. No internal solid component.   Continued follow-up is recommended as differential diagnosis include but   is not limited to primary lung neoplasm.    Results Reviewed: Yes  Imaging Personally Reviewed:  Electrocardiogram Personally Reviewed:    COORDINATION OF CARE:  Care Discussed with Consultants/Other Providers [Y/N]:  Prior or Outpatient Records Reviewed [Y/N]:

## 2024-06-10 NOTE — PROGRESS NOTE ADULT - ASSESSMENT
64 year old female with a history of schizophrenia, depression, SCC of tongue s/p glossectomy, neck dissection, tracheostomy, forearm free flap, L thigh split thickness skin graft on 6/6. Medicine consulted for co-management.

## 2024-06-10 NOTE — BH CONSULTATION LIAISON PROGRESS NOTE - NSICDXBHPRIMARYDX_PSY_ALL_CORE
Schizoaffective disorder, depressive type   F25.1  
Schizoaffective disorder, depressive type   F25.1

## 2024-06-11 ENCOUNTER — RESULT REVIEW (OUTPATIENT)
Age: 64
End: 2024-06-11

## 2024-06-11 DIAGNOSIS — Z29.9 ENCOUNTER FOR PROPHYLACTIC MEASURES, UNSPECIFIED: ICD-10-CM

## 2024-06-11 PROBLEM — K14.8 OTHER DISEASES OF TONGUE: Chronic | Status: ACTIVE | Noted: 2024-05-30

## 2024-06-11 LAB
ANION GAP SERPL CALC-SCNC: 13 MMOL/L — SIGNIFICANT CHANGE UP (ref 7–14)
APTT BLD: 32.3 SEC — SIGNIFICANT CHANGE UP (ref 24.5–35.6)
BASE EXCESS BLDV CALC-SCNC: 0 MMOL/L — SIGNIFICANT CHANGE UP (ref -2–3)
BUN SERPL-MCNC: 16 MG/DL — SIGNIFICANT CHANGE UP (ref 7–23)
CA-I SERPL-SCNC: 1.23 MMOL/L — SIGNIFICANT CHANGE UP (ref 1.15–1.33)
CALCIUM SERPL-MCNC: 8.4 MG/DL — SIGNIFICANT CHANGE UP (ref 8.4–10.5)
CHLORIDE BLDV-SCNC: 105 MMOL/L — SIGNIFICANT CHANGE UP (ref 96–108)
CHLORIDE SERPL-SCNC: 103 MMOL/L — SIGNIFICANT CHANGE UP (ref 98–107)
CO2 BLDV-SCNC: 24.5 MMOL/L — SIGNIFICANT CHANGE UP (ref 22–26)
CO2 SERPL-SCNC: 22 MMOL/L — SIGNIFICANT CHANGE UP (ref 22–31)
CREAT SERPL-MCNC: 0.71 MG/DL — SIGNIFICANT CHANGE UP (ref 0.5–1.3)
EGFR: 95 ML/MIN/1.73M2 — SIGNIFICANT CHANGE UP
GAS PNL BLDV: 135 MMOL/L — LOW (ref 136–145)
GAS PNL BLDV: SIGNIFICANT CHANGE UP
GLUCOSE BLDV-MCNC: 101 MG/DL — HIGH (ref 70–99)
GLUCOSE SERPL-MCNC: 97 MG/DL — SIGNIFICANT CHANGE UP (ref 70–99)
HCO3 BLDV-SCNC: 24 MMOL/L — SIGNIFICANT CHANGE UP (ref 22–29)
HCT VFR BLD CALC: 24.9 % — LOW (ref 34.5–45)
HCT VFR BLDA CALC: 30 % — LOW (ref 34.5–46.5)
HGB BLD CALC-MCNC: 9.9 G/DL — LOW (ref 11.7–16.1)
HGB BLD-MCNC: 8.5 G/DL — LOW (ref 11.5–15.5)
LACTATE BLDV-MCNC: 0.7 MMOL/L — SIGNIFICANT CHANGE UP (ref 0.5–2)
MCHC RBC-ENTMCNC: 30.8 PG — SIGNIFICANT CHANGE UP (ref 27–34)
MCHC RBC-ENTMCNC: 34.1 GM/DL — SIGNIFICANT CHANGE UP (ref 32–36)
MCV RBC AUTO: 90.2 FL — SIGNIFICANT CHANGE UP (ref 80–100)
NRBC # BLD: 0 /100 WBCS — SIGNIFICANT CHANGE UP (ref 0–0)
NRBC # FLD: 0 K/UL — SIGNIFICANT CHANGE UP (ref 0–0)
PCO2 BLDV: 33 MMHG — LOW (ref 39–52)
PH BLDV: 7.46 — HIGH (ref 7.32–7.43)
PLATELET # BLD AUTO: 256 K/UL — SIGNIFICANT CHANGE UP (ref 150–400)
PO2 BLDV: 60 MMHG — HIGH (ref 25–45)
POTASSIUM BLDV-SCNC: 3.6 MMOL/L — SIGNIFICANT CHANGE UP (ref 3.5–5.1)
POTASSIUM SERPL-MCNC: 4.1 MMOL/L — SIGNIFICANT CHANGE UP (ref 3.5–5.3)
POTASSIUM SERPL-SCNC: 4.1 MMOL/L — SIGNIFICANT CHANGE UP (ref 3.5–5.3)
RBC # BLD: 2.76 M/UL — LOW (ref 3.8–5.2)
RBC # FLD: 13.6 % — SIGNIFICANT CHANGE UP (ref 10.3–14.5)
SAO2 % BLDV: 93.3 % — HIGH (ref 67–88)
SODIUM SERPL-SCNC: 138 MMOL/L — SIGNIFICANT CHANGE UP (ref 135–145)
UFH PPP CHRO-ACNC: 0.08 IU/ML — LOW (ref 0.3–0.7)
WBC # BLD: 5.89 K/UL — SIGNIFICANT CHANGE UP (ref 3.8–10.5)
WBC # FLD AUTO: 5.89 K/UL — SIGNIFICANT CHANGE UP (ref 3.8–10.5)

## 2024-06-11 PROCEDURE — 35231 REPAIR BLVSL VN GRF NECK: CPT | Mod: 78

## 2024-06-11 PROCEDURE — 14302 TIS TRNFR ADDL 30 SQ CM: CPT | Mod: 78

## 2024-06-11 PROCEDURE — 15004 WOUND PREP F/N/HF/G: CPT | Mod: 78

## 2024-06-11 PROCEDURE — 14301 TIS TRNFR ANY 30.1-60 SQ CM: CPT | Mod: 78

## 2024-06-11 PROCEDURE — 99232 SBSQ HOSP IP/OBS MODERATE 35: CPT

## 2024-06-11 PROCEDURE — 10180 I&D COMPLEX PO WOUND INFCTJ: CPT | Mod: 78

## 2024-06-11 PROCEDURE — 15005 WND PREP F/N/HF/G ADDL CM: CPT | Mod: 78

## 2024-06-11 PROCEDURE — 93306 TTE W/DOPPLER COMPLETE: CPT | Mod: 26

## 2024-06-11 PROCEDURE — 71045 X-RAY EXAM CHEST 1 VIEW: CPT | Mod: 26

## 2024-06-11 DEVICE — LIGATING CLIPS WECK HORIZON SMALL-WIDE (RED) 24: Type: IMPLANTABLE DEVICE | Status: FUNCTIONAL

## 2024-06-11 DEVICE — COUPLER VESSEL MICROVAS 1.5MM: Type: IMPLANTABLE DEVICE | Status: FUNCTIONAL

## 2024-06-11 DEVICE — CATH FOGARTY 2FR X 60CM: Type: IMPLANTABLE DEVICE | Status: FUNCTIONAL

## 2024-06-11 RX ORDER — HEPARIN SODIUM 50 [USP'U]/ML
1300 INJECTION, SOLUTION INTRAVENOUS
Qty: 25000 | Refills: 0 | Status: DISCONTINUED | OUTPATIENT
Start: 2024-06-11 | End: 2024-06-11

## 2024-06-11 RX ORDER — AMPICILLIN AND SULBACTAM 2; 1 G/20ML; G/20ML
1.5 INJECTION, POWDER, FOR SOLUTION INTRAMUSCULAR; INTRAVENOUS EVERY 6 HOURS
Refills: 0 | Status: DISCONTINUED | OUTPATIENT
Start: 2024-06-11 | End: 2024-06-15

## 2024-06-11 RX ORDER — DEXTROSE MONOHYDRATE AND SODIUM CHLORIDE 5; .3 G/100ML; G/100ML
1000 INJECTION, SOLUTION INTRAVENOUS
Refills: 0 | Status: DISCONTINUED | OUTPATIENT
Start: 2024-06-11 | End: 2024-06-12

## 2024-06-11 RX ORDER — HEPARIN SODIUM 50 [USP'U]/ML
1100 INJECTION, SOLUTION INTRAVENOUS
Qty: 25000 | Refills: 0 | Status: DISCONTINUED | OUTPATIENT
Start: 2024-06-11 | End: 2024-06-11

## 2024-06-11 RX ORDER — ONDANSETRON HYDROCHLORIDE 2 MG/ML
4 INJECTION INTRAMUSCULAR; INTRAVENOUS ONCE
Refills: 0 | Status: DISCONTINUED | OUTPATIENT
Start: 2024-06-11 | End: 2024-06-11

## 2024-06-11 RX ORDER — HYDROMORPHONE HCL 0.2 MG/ML
0.5 INJECTION, SOLUTION INTRAVENOUS
Refills: 0 | Status: DISCONTINUED | OUTPATIENT
Start: 2024-06-11 | End: 2024-06-11

## 2024-06-11 RX ORDER — ENOXAPARIN SODIUM 100 MG/ML
40 INJECTION SUBCUTANEOUS EVERY 24 HOURS
Refills: 0 | Status: DISCONTINUED | OUTPATIENT
Start: 2024-06-11 | End: 2024-06-24

## 2024-06-11 RX ORDER — DEXTROSE MONOHYDRATE AND SODIUM CHLORIDE 5; .3 G/100ML; G/100ML
1000 INJECTION, SOLUTION INTRAVENOUS
Refills: 0 | Status: DISCONTINUED | OUTPATIENT
Start: 2024-06-11 | End: 2024-06-11

## 2024-06-11 RX ADMIN — Medication 100 MILLIGRAM(S): at 22:41

## 2024-06-11 RX ADMIN — POLYETHYLENE GLYCOL 3350 17 GRAM(S): 1 POWDER ORAL at 11:34

## 2024-06-11 RX ADMIN — SULFAMETHOXAZOLE AND TRIMETHOPRIM 1 TABLET(S): 800; 160 TABLET ORAL at 11:33

## 2024-06-11 RX ADMIN — Medication 975 MILLIGRAM(S): at 18:00

## 2024-06-11 RX ADMIN — Medication 15 MILLILITER(S): at 18:59

## 2024-06-11 RX ADMIN — PANTOPRAZOLE SODIUM 40 MILLIGRAM(S): 40 INJECTION, POWDER, FOR SOLUTION INTRAVENOUS at 11:34

## 2024-06-11 RX ADMIN — Medication 10 MILLILITER(S): at 11:35

## 2024-06-11 RX ADMIN — HYDROMORPHONE HCL 0.5 MILLIGRAM(S): 0.2 INJECTION, SOLUTION INTRAVENOUS at 09:15

## 2024-06-11 RX ADMIN — Medication 975 MILLIGRAM(S): at 17:25

## 2024-06-11 RX ADMIN — AMPICILLIN AND SULBACTAM 100 GRAM(S): 2; 1 INJECTION, POWDER, FOR SOLUTION INTRAMUSCULAR; INTRAVENOUS at 19:24

## 2024-06-11 RX ADMIN — AMLODIPINE BESYLATE 10 MILLIGRAM(S): 2.5 TABLET ORAL at 11:33

## 2024-06-11 RX ADMIN — Medication 15 MILLILITER(S): at 11:33

## 2024-06-11 RX ADMIN — HEPARIN SODIUM 13 UNIT(S)/HR: 50 INJECTION, SOLUTION INTRAVENOUS at 17:30

## 2024-06-11 RX ADMIN — Medication 3 MILLILITER(S): at 14:41

## 2024-06-11 RX ADMIN — Medication 975 MILLIGRAM(S): at 12:05

## 2024-06-11 RX ADMIN — OXYCODONE HYDROCHLORIDE 5 MILLIGRAM(S): 100 SOLUTION ORAL at 19:25

## 2024-06-11 RX ADMIN — OXYCODONE HYDROCHLORIDE 5 MILLIGRAM(S): 100 SOLUTION ORAL at 15:15

## 2024-06-11 RX ADMIN — Medication 15 MILLILITER(S): at 22:42

## 2024-06-11 RX ADMIN — RISPERIDONE 1 MILLIGRAM(S): 0.5 TABLET ORAL at 11:33

## 2024-06-11 RX ADMIN — SULFAMETHOXAZOLE AND TRIMETHOPRIM 1 TABLET(S): 800; 160 TABLET ORAL at 17:25

## 2024-06-11 RX ADMIN — Medication 15 MILLILITER(S): at 16:34

## 2024-06-11 RX ADMIN — HYDROMORPHONE HCL 0.5 MILLIGRAM(S): 0.2 INJECTION, SOLUTION INTRAVENOUS at 09:00

## 2024-06-11 RX ADMIN — Medication 1 APPLICATION(S): at 11:34

## 2024-06-11 RX ADMIN — HEPARIN SODIUM 11 UNIT(S)/HR: 50 INJECTION, SOLUTION INTRAVENOUS at 08:52

## 2024-06-11 RX ADMIN — OXYCODONE HYDROCHLORIDE 5 MILLIGRAM(S): 100 SOLUTION ORAL at 14:45

## 2024-06-11 RX ADMIN — Medication 3 MILLILITER(S): at 10:00

## 2024-06-11 RX ADMIN — AMPICILLIN AND SULBACTAM 100 GRAM(S): 2; 1 INJECTION, POWDER, FOR SOLUTION INTRAMUSCULAR; INTRAVENOUS at 14:45

## 2024-06-11 RX ADMIN — OXYCODONE HYDROCHLORIDE 5 MILLIGRAM(S): 100 SOLUTION ORAL at 20:00

## 2024-06-11 RX ADMIN — Medication 975 MILLIGRAM(S): at 11:34

## 2024-06-11 RX ADMIN — BUPROPION HYDROCHLORIDE 300 MILLIGRAM(S): 150 TABLET, EXTENDED RELEASE ORAL at 11:33

## 2024-06-11 RX ADMIN — RISPERIDONE 3 MILLIGRAM(S): 0.5 TABLET ORAL at 22:42

## 2024-06-11 RX ADMIN — LEVALBUTEROL HYDROCHLORIDE 0.63 MILLIGRAM(S): 1.25 SOLUTION RESPIRATORY (INHALATION) at 17:17

## 2024-06-11 RX ADMIN — BUPROPION HYDROCHLORIDE 150 MILLIGRAM(S): 150 TABLET, EXTENDED RELEASE ORAL at 22:41

## 2024-06-11 RX ADMIN — Medication 4 MILLILITER(S): at 22:55

## 2024-06-11 NOTE — PROVIDER CONTACT NOTE (OTHER) - ASSESSMENT
Pt with large amount of trach secretions that are blood tinged, brown color. Pt denies chest pain or SOB. Provider came to bedside.

## 2024-06-11 NOTE — PROGRESS NOTE ADULT - PROBLEM SELECTOR PLAN 2
Exploration, vein 11-Jun-2024   Right neck exploration, significant clot found throughout cephalic vein, mechanical thrombectomy, tPA administration (non-systemic), revision of VC venous anastamosis  Right neck culture  SUZANNE x1 (from prior)  Possibly etiology of hypoxic resp failure , atelectasis  Peripherally oriented small tree-in-bud nodular opacities in the   dependent portions of the right upper lobe representing impacted distal   airways which may be due to mucus or endobronchial spread of infection.  Incentive spirometry, suction fervently   Pulm called, f/w recs

## 2024-06-11 NOTE — PROVIDER CONTACT NOTE (OTHER) - ASSESSMENT
Pt O2 saturation 85% on trach collar 6L 20%. Trach collar increased to 10L 40% with slight increase to O2 88-89%. Pt O2 saturation 85% on trach collar 6L 20%. Trach collar increased to 10L 40% with slight increase to O2 88-89%. Pt with large amount of trach secretions that are blood tinged, brown color. Pt denies chest pain or SOB. Pt O2 saturation 85% on trach collar 6L 28%. Trach collar increased to 10L 40% with slight increase to O2 88-89%. Pt with large amount of trach secretions that are blood tinged, brown color. Pt denies chest pain or SOB. Provider came to bedside. Pt scoped by provider. R tongue noticed to be black in color, other surgical sites clean and dry. Trach sutured in place.

## 2024-06-11 NOTE — PROGRESS NOTE ADULT - ASSESSMENT
63 y/o female with schizophrenia and depression s/p  glossectomy, selective neck dissection, tracheostomy, forearm free flap; Dr. Alvarez: reconstruction mouth and tongue with free skin or muscle flap from right or left forearm or leg, possible wound vac. As per brother, pt lost contact with her family from 2017 to 2022 due to her cognitive state related to schizophrenia. She underwent dental extractions in December 2023 when her dentist noted a tongue lesion. s/p tongue biopsy in March 2024 which confirmed squamous cell carcinoma of tongue.

## 2024-06-11 NOTE — PROGRESS NOTE ADULT - ASSESSMENT
A/P:  64F w/ PMH of depression and schizophrenia s/p R partial glossectomy in the setting of SCCA, R SND I-IV, L RFFF, L thigh STSG, tracheostomy (6/6/24). D/C Baptiste, A-line, TOV passed, cuff down, tolerating trach collar at 30%, on TF diet, PT rehab facility (6/7/24). Pt taken to OR emergently this AM for exploration of R neck.    Plan:  - ERAS Day 5  - Updated plan pending outcome of OR   - FLD w/ TFs  - Bactrim for UTI  - Multimodal pain control  - Encourage ambulation/ PO intake   - CM/SW consult for dispo planning  - PT: rehab facility

## 2024-06-11 NOTE — PROVIDER CONTACT NOTE (OTHER) - ACTION/TREATMENT ORDERED:
Provider notified. Provider stated to continue with heparin infusion since R neck site is not oozing. Provider notified. Provider stated to continue with heparin infusion at 13mL/hr since R neck site is not oozing.

## 2024-06-11 NOTE — PROVIDER CONTACT NOTE (OTHER) - ACTION/TREATMENT ORDERED:
Dr. Lockett at bedside, provider stated to discontinue heparin infusion since R neck site is oozing. Also, start pt on fluids of LR at 75ml/hr. Plastics notified.

## 2024-06-11 NOTE — PROVIDER CONTACT NOTE (OTHER) - BACKGROUND
pt s/p trach 6/6 s/p 6/11 R neck exploration and clot removal, revision of vena cava anastomosis, mechanical thrombectomy.

## 2024-06-11 NOTE — PROVIDER CONTACT NOTE (OTHER) - BACKGROUND
s/p 6/11 R neck exploration and clot removal, revision of vena cava anastomosis, mechanical thrombectomy.

## 2024-06-11 NOTE — CHART NOTE - NSCHARTNOTEFT_GEN_A_CORE
Pulmonary consult called overnight for RML atelectasis. However, patient urgently brought back to OR following this for flap congestion. Discussed with OMFS resident, team to hold off on pulmonary consult for now. Please re-consult as needed.       Kath Flores MD  Pulmonary and Critical Care Fellow

## 2024-06-11 NOTE — PROVIDER CONTACT NOTE (OTHER) - ACTION/TREATMENT ORDERED:
Provider notified. Provider states will come to bedside to assess. Provider notified. Provider states will order chest x-ray and discuss with medicine team.

## 2024-06-11 NOTE — PROGRESS NOTE ADULT - SUBJECTIVE AND OBJECTIVE BOX
64F w/ PMH of depression and schizophrenia s/p R partial glossectomy in the setting of SCCA, R SND I-IV, L RFFF, L thigh STSG, tracheostomy (6/6/24).       Interval Events:   -Per nutrition, started back on TFs w/ FLD, SLP eval ordered.   -Per medicine, transitioned to Xopenex from Duonebs due to this being possible source of tachycardia. JOSSY also ordered  -Duplex negative for DVT  -ENT paged early this AM due to pt desatting to 85. Pt evaluated bedside, noticed significant congestion of flap intraorally. Decision made to bring pt to OR emergently for exploration of neck. Pt is currently in OR.       Vital Signs Last 24 Hrs  T(C): 36.8 (11 Jun 2024 02:08), Max: 37.1 (10 José Miguel 2024 22:00)  T(F): 98.2 (11 Jun 2024 02:08), Max: 98.8 (10 José Miguel 2024 22:00)  HR: 120 (11 Jun 2024 02:08) (20 - 135)  BP: 115/81 (11 Jun 2024 02:08) (115/81 - 147/92)  BP(mean): --  RR: 17 (11 Jun 2024 02:08) (16 - 18)  SpO2: 93% (11 Jun 2024 02:08) (93% - 100%)    Parameters below as of 11 Jun 2024 02:08  Patient On (Oxygen Delivery Method): tracheostomy collar        I&O's Detail    10 José Miguel 2024 07:01  -  11 Jun 2024 07:00  --------------------------------------------------------  IN:    Enteral Tube Flush: 250 mL  Total IN: 250 mL    OUT:    Bulb (mL): 2 mL    VAC (Vacuum Assisted Closure) System (mL): 0 mL    Voided (mL): 750 mL  Total OUT: 752 mL    Total NET: -502 mL        Medications:    MEDICATIONS  (STANDING):  acetaminophen   Oral Liquid .. 975 milliGRAM(s) Oral every 6 hours  amLODIPine   Tablet 10 milliGRAM(s) Oral daily  buPROPion . 300 milliGRAM(s) Oral <User Schedule>  buPROPion . 150 milliGRAM(s) Oral at bedtime  chlorhexidine 0.12% Liquid 15 milliLiter(s) Swish and Spit <User Schedule>  chlorhexidine 2% Cloths 1 Application(s) Topical daily  enoxaparin Injectable 40 milliGRAM(s) SubCutaneous every 24 hours  levalbuterol Inhalation 0.63 milliGRAM(s) Inhalation every 6 hours  pantoprazole   Suspension 40 milliGRAM(s) Oral daily  polyethylene glycol 3350 17 Gram(s) Oral daily  QUEtiapine 100 milliGRAM(s) Oral at bedtime  risperiDONE   Solution 1 milliGRAM(s) Oral <User Schedule>  risperiDONE   Solution 3 milliGRAM(s) Oral at bedtime  senna Syrup 10 milliLiter(s) Oral daily  sodium chloride 0.9% lock flush 3 milliLiter(s) IV Push every 8 hours  sodium chloride 3%  Inhalation 4 milliLiter(s) Inhalation every 12 hours  trimethoprim  160 mG/sulfamethoxazole 800 mG 1 Tablet(s) Oral two times a day    MEDICATIONS  (PRN):  oxyCODONE    Solution 5 milliGRAM(s) Oral every 4 hours PRN Severe Pain (7 - 10)  oxyCODONE    Solution 2.5 milliGRAM(s) Oral every 4 hours PRN Moderate Pain (4 - 6)        Labs:                          9.9    9.97  )-----------( 285      ( 10 José Miguel 2024 04:53 )             28.8       06-10    139  |  105  |  13  ----------------------------<  89  4.0   |  22  |  0.58    Ca    8.4      10 José Miguel 2024 04:53  Phos  2.7     06-10  Mg     1.90     06-10

## 2024-06-11 NOTE — PROGRESS NOTE ADULT - SUBJECTIVE AND OBJECTIVE BOX
Medicine Progress Note    Patient is a 64y old  Female who presents with a chief complaint of glossectomy, selective neck dissection, tracheostomy, forearm free flap (10 José Miguel 2024 03:44)      SUBJECTIVE / OVERNIGHT EVENTS: patient was seen in PACU     ADDITIONAL REVIEW OF SYSTEMS:    MEDICATIONS  (STANDING):  acetaminophen   Oral Liquid .. 975 milliGRAM(s) Oral every 6 hours  amLODIPine   Tablet 10 milliGRAM(s) Oral daily  ampicillin/sulbactam  IVPB 1.5 Gram(s) IV Intermittent every 6 hours  buPROPion . 300 milliGRAM(s) Oral <User Schedule>  buPROPion . 150 milliGRAM(s) Oral at bedtime  chlorhexidine 0.12% Liquid 15 milliLiter(s) Swish and Spit <User Schedule>  chlorhexidine 2% Cloths 1 Application(s) Topical daily  heparin  Infusion 1100 Unit(s)/Hr (11 mL/Hr) IV Continuous <Continuous>  levalbuterol Inhalation 0.63 milliGRAM(s) Inhalation every 6 hours  pantoprazole   Suspension 40 milliGRAM(s) Oral daily  polyethylene glycol 3350 17 Gram(s) Oral daily  QUEtiapine 100 milliGRAM(s) Oral at bedtime  risperiDONE   Solution 1 milliGRAM(s) Oral <User Schedule>  risperiDONE   Solution 3 milliGRAM(s) Oral at bedtime  senna Syrup 10 milliLiter(s) Oral daily  sodium chloride 0.9% lock flush 3 milliLiter(s) IV Push every 8 hours  sodium chloride 3%  Inhalation 4 milliLiter(s) Inhalation every 12 hours  trimethoprim  160 mG/sulfamethoxazole 800 mG 1 Tablet(s) Oral two times a day    MEDICATIONS  (PRN):  oxyCODONE    Solution 5 milliGRAM(s) Oral every 4 hours PRN Severe Pain (7 - 10)  oxyCODONE    Solution 2.5 milliGRAM(s) Oral every 4 hours PRN Moderate Pain (4 - 6)    CAPILLARY BLOOD GLUCOSE        I&O's Summary    10 José Miguel 2024 07:01  -  11 Jun 2024 07:00  --------------------------------------------------------  IN: 250 mL / OUT: 752 mL / NET: -502 mL    11 Jun 2024 07:01  -  11 Jun 2024 12:44  --------------------------------------------------------  IN: 33 mL / OUT: 330 mL / NET: -297 mL        PHYSICAL EXAM:  Vital Signs Last 24 Hrs  T(C): 37.7 (11 Jun 2024 11:23), Max: 37.7 (11 Jun 2024 11:23)  T(F): 99.8 (11 Jun 2024 11:23), Max: 99.8 (11 Jun 2024 11:23)  HR: 118 (11 Jun 2024 11:23) (20 - 130)  BP: 140/87 (11 Jun 2024 11:23) (115/81 - 172/78)  BP(mean): 97 (11 Jun 2024 09:45) (85 - 104)  RR: 18 (11 Jun 2024 11:23) (12 - 20)  SpO2: 92% (11 Jun 2024 11:23) (92% - 100%)    Parameters below as of 11 Jun 2024 11:23  Patient On (Oxygen Delivery Method): tracheostomy collar  O2 Flow (L/min): 7  O2 Concentration (%): 30  CONSTITUTIONAL: NAD,   ENMT: NGT, part of tongue sutures, dark tissue , trach collar    RESPIRATORY: Normal respiratory effort; lungs are dim  to auscultation bilaterally  CARDIOVASCULAR: tachycardic rate and rhythm, normal S1 and S2, ; No lower extremity edema;  ABDOMEN: Nontender to palpation, hypoactive bowel sounds,   PSYCH: A+O non verbal   NEUROLOGY: grossly non focal   SKIN: No rashes;     LABS:                        8.5    5.89  )-----------( 256      ( 11 Jun 2024 09:10 )             24.9     06-11    138  |  103  |  16  ----------------------------<  97  4.1   |  22  |  0.71    Ca    8.4      11 Jun 2024 09:10  Phos  2.7     06-10  Mg     1.90     06-10            Urinalysis Basic - ( 11 Jun 2024 09:10 )    Color: x / Appearance: x / SG: x / pH: x  Gluc: 97 mg/dL / Ketone: x  / Bili: x / Urobili: x   Blood: x / Protein: x / Nitrite: x   Leuk Esterase: x / RBC: x / WBC x   Sq Epi: x / Non Sq Epi: x / Bacteria: x        Culture - Blood (collected 08 Jun 2024 19:41)  Source: .Blood Blood-Peripheral  Preliminary Report (11 Jun 2024 01:02):    No growth at 48 Hours    Culture - Sputum (collected 08 Jun 2024 18:55)  Source: .Sputum Sputum  Gram Stain (09 Jun 2024 03:14):    Moderate polymorphonuclear leukocytes per low power field    No Squamous epithelial cells per low power field    Rare Gram Positive Rods seen per oil power field    Rare Gram Negative Rods seen per oil power field    Rare Gram positive cocci in pairs seen per oil power field  Final Report (10 José Miguel 2024 16:49):    Normal Respiratory Jesika present    Culture - Blood (collected 08 Jun 2024 18:55)  Source: .Blood Blood-Venous  Preliminary Report (11 Jun 2024 01:02):    No growth at 48 Hours          RADIOLOGY & ADDITIONAL TESTS:  Imaging from Last 24 Hours:    Electrocardiogram/QTc Interval:    COORDINATION OF CARE:  Care Discussed with Consultants/Other Providers:

## 2024-06-11 NOTE — PROVIDER CONTACT NOTE (OTHER) - ASSESSMENT
Pt with large amount of trach secretions that are blood tinged, brown color. Pt denies chest pain or SOB. R side of tongue noted to be black, Doppler still positive.

## 2024-06-11 NOTE — PROGRESS NOTE ADULT - PROBLEM SELECTOR PLAN 1
Patient with low grade fever  Possibly pain  Cultures negative so far   Possibly slight dehydration   Monitor fever courbe, if >101 repeat cultures   Pain control   Consider gently hydration as NPO, LR @75ml/h   If persistent tach, f/w TTE  PE ruled out

## 2024-06-11 NOTE — PROVIDER CONTACT NOTE (OTHER) - ACTION/TREATMENT ORDERED:
Provider notified. No new orders/ interventions at this time. Provider states to notify them if HR is >140.

## 2024-06-11 NOTE — SWALLOW BEDSIDE ASSESSMENT ADULT - COMMENTS
OMFS 6/11, "64F w/ PMH of depression and schizophrenia s/p R partial glossectomy in the setting of SCCA, R SND I-IV, L RFFF, L thigh STSG, tracheostomy (6/6/24). Interval Events: -Per nutrition, started back on TFs w/ FLD, SLP eval ordered. -Per medicine, transitioned to Xopenex from Duonebs due to this being possible source of tachycardia. JOSSY also ordered -Duplex negative for DVT -ENT paged early this AM due to pt desatting to 85. Pt evaluated bedside, noticed significant congestion of flap intraorally. Decision made to bring pt to OR emergently for exploration of neck. Pt is currently in OR."    Order received for bedside swallow evaluation. Per discussion with OMFS, patient to be kept NPO for now. Order to be discontinued at this time. Please reconsult this service as patient becomes medically optimized/ as warranted.

## 2024-06-12 LAB
ANION GAP SERPL CALC-SCNC: 12 MMOL/L — SIGNIFICANT CHANGE UP (ref 7–14)
BUN SERPL-MCNC: 14 MG/DL — SIGNIFICANT CHANGE UP (ref 7–23)
CALCIUM SERPL-MCNC: 8.3 MG/DL — LOW (ref 8.4–10.5)
CHLORIDE SERPL-SCNC: 101 MMOL/L — SIGNIFICANT CHANGE UP (ref 98–107)
CO2 SERPL-SCNC: 22 MMOL/L — SIGNIFICANT CHANGE UP (ref 22–31)
CREAT SERPL-MCNC: 0.64 MG/DL — SIGNIFICANT CHANGE UP (ref 0.5–1.3)
EGFR: 99 ML/MIN/1.73M2 — SIGNIFICANT CHANGE UP
GLUCOSE SERPL-MCNC: 77 MG/DL — SIGNIFICANT CHANGE UP (ref 70–99)
HCT VFR BLD CALC: 24.8 % — LOW (ref 34.5–45)
HGB BLD-MCNC: 8.6 G/DL — LOW (ref 11.5–15.5)
MAGNESIUM SERPL-MCNC: 1.9 MG/DL — SIGNIFICANT CHANGE UP (ref 1.6–2.6)
MCHC RBC-ENTMCNC: 31 PG — SIGNIFICANT CHANGE UP (ref 27–34)
MCHC RBC-ENTMCNC: 34.7 GM/DL — SIGNIFICANT CHANGE UP (ref 32–36)
MCV RBC AUTO: 89.5 FL — SIGNIFICANT CHANGE UP (ref 80–100)
NRBC # BLD: 0 /100 WBCS — SIGNIFICANT CHANGE UP (ref 0–0)
NRBC # FLD: 0 K/UL — SIGNIFICANT CHANGE UP (ref 0–0)
PHOSPHATE SERPL-MCNC: 3.4 MG/DL — SIGNIFICANT CHANGE UP (ref 2.5–4.5)
PLATELET # BLD AUTO: 287 K/UL — SIGNIFICANT CHANGE UP (ref 150–400)
POTASSIUM SERPL-MCNC: 3.9 MMOL/L — SIGNIFICANT CHANGE UP (ref 3.5–5.3)
POTASSIUM SERPL-SCNC: 3.9 MMOL/L — SIGNIFICANT CHANGE UP (ref 3.5–5.3)
RBC # BLD: 2.77 M/UL — LOW (ref 3.8–5.2)
RBC # FLD: 13.5 % — SIGNIFICANT CHANGE UP (ref 10.3–14.5)
SODIUM SERPL-SCNC: 135 MMOL/L — SIGNIFICANT CHANGE UP (ref 135–145)
WBC # BLD: 5.59 K/UL — SIGNIFICANT CHANGE UP (ref 3.8–10.5)
WBC # FLD AUTO: 5.59 K/UL — SIGNIFICANT CHANGE UP (ref 3.8–10.5)

## 2024-06-12 PROCEDURE — 93010 ELECTROCARDIOGRAM REPORT: CPT

## 2024-06-12 PROCEDURE — 71045 X-RAY EXAM CHEST 1 VIEW: CPT | Mod: 26

## 2024-06-12 PROCEDURE — 99232 SBSQ HOSP IP/OBS MODERATE 35: CPT

## 2024-06-12 RX ORDER — IPRATROPIUM BROMIDE AND ALBUTEROL SULFATE .5; 3 MG/3ML; MG/3ML
3 SOLUTION RESPIRATORY (INHALATION) EVERY 6 HOURS
Refills: 0 | Status: DISCONTINUED | OUTPATIENT
Start: 2024-06-12 | End: 2024-07-10

## 2024-06-12 RX ORDER — ACETYLCYSTEINE 200 MG/ML
4 SOLUTION ORAL; RESPIRATORY (INHALATION) EVERY 6 HOURS
Refills: 0 | Status: DISCONTINUED | OUTPATIENT
Start: 2024-06-12 | End: 2024-07-10

## 2024-06-12 RX ORDER — METOPROLOL TARTRATE 50 MG
5 TABLET ORAL ONCE
Refills: 0 | Status: COMPLETED | OUTPATIENT
Start: 2024-06-12 | End: 2024-06-12

## 2024-06-12 RX ADMIN — RISPERIDONE 3 MILLIGRAM(S): 0.5 TABLET ORAL at 22:01

## 2024-06-12 RX ADMIN — POLYETHYLENE GLYCOL 3350 17 GRAM(S): 1 POWDER ORAL at 14:01

## 2024-06-12 RX ADMIN — ACETYLCYSTEINE 4 MILLILITER(S): 200 SOLUTION ORAL; RESPIRATORY (INHALATION) at 17:20

## 2024-06-12 RX ADMIN — Medication 975 MILLIGRAM(S): at 05:53

## 2024-06-12 RX ADMIN — Medication 5 MILLIGRAM(S): at 14:41

## 2024-06-12 RX ADMIN — Medication 15 MILLILITER(S): at 22:01

## 2024-06-12 RX ADMIN — OXYCODONE HYDROCHLORIDE 5 MILLIGRAM(S): 100 SOLUTION ORAL at 05:52

## 2024-06-12 RX ADMIN — ENOXAPARIN SODIUM 40 MILLIGRAM(S): 100 INJECTION SUBCUTANEOUS at 14:00

## 2024-06-12 RX ADMIN — BUPROPION HYDROCHLORIDE 300 MILLIGRAM(S): 150 TABLET, EXTENDED RELEASE ORAL at 07:51

## 2024-06-12 RX ADMIN — AMPICILLIN AND SULBACTAM 100 GRAM(S): 2; 1 INJECTION, POWDER, FOR SOLUTION INTRAMUSCULAR; INTRAVENOUS at 00:04

## 2024-06-12 RX ADMIN — Medication 975 MILLIGRAM(S): at 19:19

## 2024-06-12 RX ADMIN — Medication 3 MILLILITER(S): at 05:54

## 2024-06-12 RX ADMIN — Medication 1 APPLICATION(S): at 14:04

## 2024-06-12 RX ADMIN — Medication 975 MILLIGRAM(S): at 14:27

## 2024-06-12 RX ADMIN — AMPICILLIN AND SULBACTAM 100 GRAM(S): 2; 1 INJECTION, POWDER, FOR SOLUTION INTRAMUSCULAR; INTRAVENOUS at 19:18

## 2024-06-12 RX ADMIN — IPRATROPIUM BROMIDE AND ALBUTEROL SULFATE 3 MILLILITER(S): .5; 3 SOLUTION RESPIRATORY (INHALATION) at 21:39

## 2024-06-12 RX ADMIN — Medication 10 MILLILITER(S): at 14:01

## 2024-06-12 RX ADMIN — AMPICILLIN AND SULBACTAM 100 GRAM(S): 2; 1 INJECTION, POWDER, FOR SOLUTION INTRAMUSCULAR; INTRAVENOUS at 14:41

## 2024-06-12 RX ADMIN — Medication 975 MILLIGRAM(S): at 13:57

## 2024-06-12 RX ADMIN — Medication 3 MILLILITER(S): at 22:00

## 2024-06-12 RX ADMIN — SULFAMETHOXAZOLE AND TRIMETHOPRIM 1 TABLET(S): 800; 160 TABLET ORAL at 05:54

## 2024-06-12 RX ADMIN — LEVALBUTEROL HYDROCHLORIDE 0.63 MILLIGRAM(S): 1.25 SOLUTION RESPIRATORY (INHALATION) at 04:10

## 2024-06-12 RX ADMIN — Medication 975 MILLIGRAM(S): at 19:49

## 2024-06-12 RX ADMIN — AMLODIPINE BESYLATE 10 MILLIGRAM(S): 2.5 TABLET ORAL at 05:53

## 2024-06-12 RX ADMIN — BUPROPION HYDROCHLORIDE 150 MILLIGRAM(S): 150 TABLET, EXTENDED RELEASE ORAL at 22:02

## 2024-06-12 RX ADMIN — Medication 15 MILLILITER(S): at 15:46

## 2024-06-12 RX ADMIN — IPRATROPIUM BROMIDE AND ALBUTEROL SULFATE 3 MILLILITER(S): .5; 3 SOLUTION RESPIRATORY (INHALATION) at 17:16

## 2024-06-12 RX ADMIN — RISPERIDONE 1 MILLIGRAM(S): 0.5 TABLET ORAL at 08:48

## 2024-06-12 RX ADMIN — PANTOPRAZOLE SODIUM 40 MILLIGRAM(S): 40 INJECTION, POWDER, FOR SOLUTION INTRAVENOUS at 13:58

## 2024-06-12 RX ADMIN — Medication 15 MILLILITER(S): at 19:17

## 2024-06-12 RX ADMIN — AMPICILLIN AND SULBACTAM 100 GRAM(S): 2; 1 INJECTION, POWDER, FOR SOLUTION INTRAMUSCULAR; INTRAVENOUS at 05:53

## 2024-06-12 RX ADMIN — OXYCODONE HYDROCHLORIDE 5 MILLIGRAM(S): 100 SOLUTION ORAL at 06:50

## 2024-06-12 RX ADMIN — Medication 975 MILLIGRAM(S): at 00:05

## 2024-06-12 RX ADMIN — Medication 100 MILLIGRAM(S): at 22:02

## 2024-06-12 NOTE — PROGRESS NOTE ADULT - ASSESSMENT
64F w/ PMH of depression and schizophrenia s/p R partial glossectomy in the setting of SCCA, R SND I-IV, L RFFF, L thigh STSG, tracheostomy (6/6/24). Pts post-operative course c/b venous congestion of flap w/ RTOR on 6/11 for free flap salvage and revision of venous anastamosis.     Plan:  -Restart TFs  -Duonebs/ Mucomyst  -Chest PT  -OOBTC  -C/w IV Unasyn  -OR Friday w/ PRS for repeat free flap reconstruction  -PT: Rehab facility      Lokesh Cunningham DMD  Oral and Maxillofacial Surgery  [facility] Pager [phone]  Available on [employer] Teams

## 2024-06-12 NOTE — PROGRESS NOTE ADULT - SUBJECTIVE AND OBJECTIVE BOX
OTOLARYNGOLOGY (ENT) PROGRESS NOTE    PATIENT: LELA LOEWRY  MRN: [id number]  : 60  [id number]  DATE OF SERVICE:  24  			         ID:LELA LOWERY is a 64yFemale s/p R partial gloss, R SND I-IV, L RFFF, L thigh STSG, trach . Pt taken to OR yesterday due to congested flap.      ALLERGIES:  penicillins (Unknown)      MEDICATIONS:  Antiinfectives:   clindamycin IVPB 900 milliGRAM(s) IV Intermittent every 8 hours    IV fluids:  lactated ringers. 1000 milliLiter(s) IV Continuous <Continuous>  sodium chloride 0.9% lock flush 3 milliLiter(s) IV Push every 8 hours    Hematologic/Anticoagulation:  enoxaparin Injectable 40 milliGRAM(s) SubCutaneous every 24 hours    Pain medications/Neuro:  acetaminophen   IVPB .. 1000 milliGRAM(s) IV Intermittent every 6 hours  buPROPion . 300 milliGRAM(s) Oral <User Schedule>  buPROPion . 150 milliGRAM(s) Oral at bedtime  HYDROmorphone  Injectable 0.5 milliGRAM(s) IV Push every 4 hours PRN  HYDROmorphone  Injectable 1 milliGRAM(s) IV Push every 4 hours PRN  QUEtiapine 100 milliGRAM(s) Oral at bedtime  risperiDONE   Solution 3 milliGRAM(s) Oral at bedtime  risperiDONE   Solution 1 milliGRAM(s) Oral <User Schedule>    Endocrine Medications:     All other standing medications:   albuterol/ipratropium for Nebulization 3 milliLiter(s) Nebulizer every 6 hours  amLODIPine   Tablet 5 milliGRAM(s) Oral daily  chlorhexidine 0.12% Liquid 15 milliLiter(s) Oral Mucosa every 12 hours  chlorhexidine 0.12% Liquid 15 milliLiter(s) Swish and Spit two times a day  chlorhexidine 2% Cloths 1 Application(s) Topical daily  pantoprazole   Suspension 40 milliGRAM(s) Oral daily  polyethylene glycol 3350 17 Gram(s) Oral daily  senna Syrup 10 milliLiter(s) Oral daily  sodium chloride 3%  Inhalation 4 milliLiter(s) Inhalation every 12 hours    All other PRN medications:    Vital Signs Last 24 Hrs  T(C): 38.5 (2024 02:00), Max: 38.6 (2024 01:30)  T(F): 101.3 (2024 02:00), Max: 101.4 (2024 01:30)  HR: 126 (2024 02:00) (103 - 126)  BP: 128/79 (2024 02:00) (128/79 - 172/78)  BP(mean): 97 (2024 09:45) (85 - 104)  RR: 18 (2024 02:00) (12 - 20)  SpO2: 97% (2024 02:00) (92% - 100%)    Parameters below as of 2024 02:00  Patient On (Oxygen Delivery Method): tracheostomy collar              Mode: standby    PHYSICAL EXAM:  GEN: appears stated age, NAD  NEURO: alert & oriented x   HEENT: face symmetric, oropharynx moist without exudates, CN VII/XI/XII intact, neck incision c/d/i, SUZANNE in place, flap firm, purple, appears congested  CVS: regular rate and rhythm  Pulm: normal respiratory excursions, not tachypneic, no labored breathing  Abd: non-distended  Ext: moving all four extremities, no peripheral edema noted         LABS:                        8.6    5.59  )-----------( 287      ( 2024 03:37 )             24.8     06-12    135  |  101  |  14  ----------------------------<  77  3.9   |  22  |  0.64    Ca    8.3<L>      2024 03:37  Phos  3.4     06-12  Mg     1.90     06-12      PTT - ( 2024 12:25 )  PTT:32.3 sec        Assessment and Plan:  LELA LOWERY is a 64yFemale s/p R partial gloss, R SND, L RFFF, L thigh STSG and trach on  doing well this am.    PLAN:  - ERAS  - Remainder per OMFS/PRS

## 2024-06-12 NOTE — PROGRESS NOTE ADULT - SUBJECTIVE AND OBJECTIVE BOX
Plastic Surgery Progress Note (pg [facility]: [id number], [facility]: [phone])    SUBJECTIVE  NAEO. Febrile to 101.4 overnight. Pt comfortable in bed. Pain well controlled, no complaints.    OBJECTIVE  ___________________________________________________  VITAL SIGNS / I&O's   Vital Signs Last 24 Hrs  T(C): 37.6 (12 Jun 2024 06:00), Max: 38.6 (12 Jun 2024 01:30)  T(F): 99.7 (12 Jun 2024 06:00), Max: 101.4 (12 Jun 2024 01:30)  HR: 124 (12 Jun 2024 06:00) (113 - 126)  BP: 139/87 (12 Jun 2024 06:00) (128/79 - 172/78)  BP(mean): 97 (11 Jun 2024 09:45) (95 - 104)  RR: 18 (12 Jun 2024 06:00) (15 - 20)  SpO2: 94% (12 Jun 2024 06:00) (92% - 100%)    Parameters below as of 12 Jun 2024 06:00  Patient On (Oxygen Delivery Method): tracheostomy collar  O2 Flow (L/min): 7  O2 Concentration (%): 30      11 Jun 2024 07:01  -  12 Jun 2024 07:00  --------------------------------------------------------  IN:    Enteral Tube Flush: 240 mL    Heparin: 77 mL    Heparin: 39 mL    Lactated Ringers: 975 mL  Total IN: 1331 mL    OUT:    Bulb (mL): 15 mL    Indwelling Catheter - Urethral (mL): 1645 mL    Oral Fluid: 0 mL    VAC (Vacuum Assisted Closure) System (mL): 25 mL  Total OUT: 1685 mL    Total NET: -354 mL        ___________________________________________________  PHYSICAL EXAM  -- CONSTITUTIONAL: NAD, lying in bed  -- NEURO: Awake, alert  -- HEENT:  right tongue flap looking nonviable. slow blood return from pin prick. cook doppler with signal. SUAZNNE SS and low output.   LUE: vac in place, holding suction, arm soft, no collections  LLE: STSG donor site with dressing in place and cdi    ___________________________________________________  LABS                        8.6    5.59  )-----------( 287      ( 12 Jun 2024 03:37 )             24.8     12 Jun 2024 03:37    135    |  101    |  14     ----------------------------<  77     3.9     |  22     |  0.64     Ca    8.3        12 Jun 2024 03:37  Phos  3.4       12 Jun 2024 03:37  Mg     1.90      12 Jun 2024 03:37      PTT - ( 11 Jun 2024 12:25 )  PTT:32.3 sec  CAPILLARY BLOOD GLUCOSE            Urinalysis Basic - ( 12 Jun 2024 03:37 )    Color: x / Appearance: x / SG: x / pH: x  Gluc: 77 mg/dL / Ketone: x  / Bili: x / Urobili: x   Blood: x / Protein: x / Nitrite: x   Leuk Esterase: x / RBC: x / WBC x   Sq Epi: x / Non Sq Epi: x / Bacteria: x      ___________________________________________________  MICRO  Recent Cultures:  Specimen Source: .Blood Blood-Peripheral, 06-08 @ 19:41; Results   No growth at 72 Hours; Gram Stain: --; Organism: --  Specimen Source: .Blood Blood-Venous, 06-08 @ 18:55; Results   No growth at 72 Hours; Gram Stain:   Moderate polymorphonuclear leukocytes per low power field  No Squamous epithelial cells per low power field  Rare Gram Positive Rods seen per oil power field  Rare Gram Negative Rods seen per oil power field  Rare Gram positive cocci in pairs seen per oil power field<!>; Organism: --    ___________________________________________________  MEDICATIONS  (STANDING):  acetaminophen   Oral Liquid .. 975 milliGRAM(s) Oral every 6 hours  acetylcysteine 10%  Inhalation 4 milliLiter(s) Inhalation every 6 hours  albuterol/ipratropium for Nebulization 3 milliLiter(s) Nebulizer every 6 hours  amLODIPine   Tablet 10 milliGRAM(s) Oral daily  ampicillin/sulbactam  IVPB 1.5 Gram(s) IV Intermittent every 6 hours  buPROPion . 150 milliGRAM(s) Oral at bedtime  buPROPion . 300 milliGRAM(s) Oral <User Schedule>  chlorhexidine 0.12% Liquid 15 milliLiter(s) Swish and Spit <User Schedule>  chlorhexidine 2% Cloths 1 Application(s) Topical daily  enoxaparin Injectable 40 milliGRAM(s) SubCutaneous every 24 hours  pantoprazole   Suspension 40 milliGRAM(s) Oral daily  polyethylene glycol 3350 17 Gram(s) Oral daily  QUEtiapine 100 milliGRAM(s) Oral at bedtime  risperiDONE   Solution 1 milliGRAM(s) Oral <User Schedule>  risperiDONE   Solution 3 milliGRAM(s) Oral at bedtime  senna Syrup 10 milliLiter(s) Oral daily  sodium chloride 0.9% lock flush 3 milliLiter(s) IV Push every 8 hours    MEDICATIONS  (PRN):  oxyCODONE    Solution 5 milliGRAM(s) Oral every 4 hours PRN Severe Pain (7 - 10)  oxyCODONE    Solution 2.5 milliGRAM(s) Oral every 4 hours PRN Moderate Pain (4 - 6)

## 2024-06-12 NOTE — PROGRESS NOTE ADULT - ASSESSMENT
65 y/o female with schizophrenia and depression s/p  glossectomy, selective neck dissection, tracheostomy, forearm free flap; Dr. Alvarez: reconstruction mouth and tongue with free skin or muscle flap from right or left forearm or leg, possible wound vac. As per brother, pt lost contact with her family from 2017 to 2022 due to her cognitive state related to schizophrenia. She underwent dental extractions in December 2023 when her dentist noted a tongue lesion. s/p tongue biopsy in March 2024 which confirmed squamous cell carcinoma of tongue.

## 2024-06-12 NOTE — PROGRESS NOTE ADULT - ASSESSMENT
64y F s/p right hemiglossectomy with right neck dissection and reconstruction with L RFFF and skin graft reconstruction of flap donor site. Flap down and RTOR 6/11.     Plan:  - Tentative plan to RTOR for further reconstruction on Friday, Dr. Alvarez to speak to patient and family this afternoon  -  q4 flap checks with cook doppler  - please avoid pressure over right side of neck  - BP systolic goal = under 140  - fever curve/abx  - Appreciate care per primary  - Dispo: continue care on floor    Ladonna Catherine MD  Plastic and Reconstructive Surgery, PGY-1  MAGDALENA: [id number]  NS: [phone]

## 2024-06-12 NOTE — PROGRESS NOTE ADULT - SUBJECTIVE AND OBJECTIVE BOX
64F w/ PMH of depression and schizophrenia s/p R partial glossectomy in the setting of SCCA, R SND I-IV, L RFFF, L thigh STSG, tracheostomy (6/6/24). Pts post-operative course c/b venous congestion of flap w/ RTOR on 6/11 for free flap salvage and revision of venous anastamosis.       Interval Events:   -Oozing from neck incision, heparin dc'd  -Febrile to 101.4 --> Blood cultures, CXR, EKG (sinus tachycardia)  -TTE --> 60-65% EF w/ trace mitral/tricuspid regurgitation      Vital Signs Last 24 Hrs  T(C): 37.6 (12 Jun 2024 06:00), Max: 38.6 (12 Jun 2024 01:30)  T(F): 99.7 (12 Jun 2024 06:00), Max: 101.4 (12 Jun 2024 01:30)  HR: 124 (12 Jun 2024 06:00) (105 - 126)  BP: 139/87 (12 Jun 2024 06:00) (128/79 - 172/78)  BP(mean): 97 (11 Jun 2024 09:45) (87 - 104)  RR: 18 (12 Jun 2024 06:00) (12 - 20)  SpO2: 94% (12 Jun 2024 06:00) (92% - 100%)    Parameters below as of 12 Jun 2024 06:00  Patient On (Oxygen Delivery Method): tracheostomy collar  O2 Flow (L/min): 7  O2 Concentration (%): 30      I&O's Detail    11 Jun 2024 07:01  -  12 Jun 2024 07:00  --------------------------------------------------------  IN:    Enteral Tube Flush: 240 mL    Heparin: 77 mL    Heparin: 39 mL    Lactated Ringers: 975 mL  Total IN: 1331 mL    OUT:    Bulb (mL): 15 mL    Indwelling Catheter - Urethral (mL): 1645 mL    Oral Fluid: 0 mL    VAC (Vacuum Assisted Closure) System (mL): 25 mL  Total OUT: 1685 mL    Total NET: -354 mL      Medications:    MEDICATIONS  (STANDING):  acetaminophen   Oral Liquid .. 975 milliGRAM(s) Oral every 6 hours  acetylcysteine 10%  Inhalation 4 milliLiter(s) Inhalation every 6 hours  albuterol/ipratropium for Nebulization 3 milliLiter(s) Nebulizer every 6 hours  amLODIPine   Tablet 10 milliGRAM(s) Oral daily  ampicillin/sulbactam  IVPB 1.5 Gram(s) IV Intermittent every 6 hours  buPROPion . 150 milliGRAM(s) Oral at bedtime  buPROPion . 300 milliGRAM(s) Oral <User Schedule>  chlorhexidine 0.12% Liquid 15 milliLiter(s) Swish and Spit <User Schedule>  chlorhexidine 2% Cloths 1 Application(s) Topical daily  enoxaparin Injectable 40 milliGRAM(s) SubCutaneous every 24 hours  pantoprazole   Suspension 40 milliGRAM(s) Oral daily  polyethylene glycol 3350 17 Gram(s) Oral daily  QUEtiapine 100 milliGRAM(s) Oral at bedtime  risperiDONE   Solution 1 milliGRAM(s) Oral <User Schedule>  risperiDONE   Solution 3 milliGRAM(s) Oral at bedtime  senna Syrup 10 milliLiter(s) Oral daily  sodium chloride 0.9% lock flush 3 milliLiter(s) IV Push every 8 hours    MEDICATIONS  (PRN):  oxyCODONE    Solution 5 milliGRAM(s) Oral every 4 hours PRN Severe Pain (7 - 10)  oxyCODONE    Solution 2.5 milliGRAM(s) Oral every 4 hours PRN Moderate Pain (4 - 6)        Labs:                          8.6    5.59  )-----------( 287      ( 12 Jun 2024 03:37 )             24.8       06-12    135  |  101  |  14  ----------------------------<  77  3.9   |  22  |  0.64    Ca    8.3<L>      12 Jun 2024 03:37  Phos  3.4     06-12  Mg     1.90     06-12        PHYSICAL EXAM:  GEN: NAD  NEURO: alert & oriented x 4/4  HEENT: face symmetric, oropharynx moist without exudates, CN VII/XI/XII intact, neck incision c/d/i,  flap good color, turgor, warm, cook sound strong regular, trach cuff down   CVS: regular rate and rhythm  Pulm: normal respiratory excursions, not tachypneic, no labored breathing  Abd: soft, non-distended  Ext: 1x L arm SUZANNE w/ sso moving all four extremities, no peripheral edema noted

## 2024-06-12 NOTE — PROGRESS NOTE ADULT - PROBLEM SELECTOR PLAN 1
Patient with  fever>101  Possibly pain  Cultures wound E coli, started on Unasyn    Possibly slight dehydration   Monitor fever courbe, if >101 repeat cultures   Pain control   Consider gently hydration as NPO, LR @75ml/h   TTE reviewed, normal EF    PE ruled out

## 2024-06-12 NOTE — PROGRESS NOTE ADULT - SUBJECTIVE AND OBJECTIVE BOX
[facility] Division of Hospital Medicine  Wanda Gillis MD  Pager (M-F, 8A-5P): [phone]      Patient is a 64y old  Female who presents with a chief complaint of ENT /OMFS (11 Jun 2024 12:43)      SUBJECTIVE / OVERNIGHT EVENTS: s/p OR yesterday   ADDITIONAL REVIEW OF SYSTEMS: fever     MEDICATIONS  (STANDING):  acetaminophen   Oral Liquid .. 975 milliGRAM(s) Oral every 6 hours  acetylcysteine 10%  Inhalation 4 milliLiter(s) Inhalation every 6 hours  albuterol/ipratropium for Nebulization 3 milliLiter(s) Nebulizer every 6 hours  amLODIPine   Tablet 10 milliGRAM(s) Oral daily  ampicillin/sulbactam  IVPB 1.5 Gram(s) IV Intermittent every 6 hours  buPROPion . 150 milliGRAM(s) Oral at bedtime  buPROPion . 300 milliGRAM(s) Oral <User Schedule>  chlorhexidine 0.12% Liquid 15 milliLiter(s) Swish and Spit <User Schedule>  chlorhexidine 2% Cloths 1 Application(s) Topical daily  enoxaparin Injectable 40 milliGRAM(s) SubCutaneous every 24 hours  pantoprazole   Suspension 40 milliGRAM(s) Oral daily  polyethylene glycol 3350 17 Gram(s) Oral daily  QUEtiapine 100 milliGRAM(s) Oral at bedtime  risperiDONE   Solution 3 milliGRAM(s) Oral at bedtime  risperiDONE   Solution 1 milliGRAM(s) Oral <User Schedule>  senna Syrup 10 milliLiter(s) Oral daily  sodium chloride 0.9% lock flush 3 milliLiter(s) IV Push every 8 hours    MEDICATIONS  (PRN):  oxyCODONE    Solution 5 milliGRAM(s) Oral every 4 hours PRN Severe Pain (7 - 10)  oxyCODONE    Solution 2.5 milliGRAM(s) Oral every 4 hours PRN Moderate Pain (4 - 6)      CAPILLARY BLOOD GLUCOSE        I&O's Summary    11 Jun 2024 07:01  -  12 Jun 2024 07:00  --------------------------------------------------------  IN: 1331 mL / OUT: 1685 mL / NET: -354 mL    12 Jun 2024 07:01  -  12 Jun 2024 13:16  --------------------------------------------------------  IN: 0 mL / OUT: 460 mL / NET: -460 mL        PHYSICAL EXAM:  Vital Signs Last 24 Hrs  T(C): 37.3 (12 Jun 2024 09:30), Max: 38.6 (12 Jun 2024 01:30)  T(F): 99.1 (12 Jun 2024 09:30), Max: 101.4 (12 Jun 2024 01:30)  HR: 118 (12 Jun 2024 09:30) (113 - 126)  BP: 132/81 (12 Jun 2024 09:30) (128/79 - 151/88)  BP(mean): --  RR: 18 (12 Jun 2024 09:30) (18 - 20)  SpO2: 92% (12 Jun 2024 09:30) (92% - 99%)    Parameters below as of 12 Jun 2024 09:30  Patient On (Oxygen Delivery Method): tracheostomy collar      CONSTITUTIONAL: NAD,   EYES: EOMI; conjunctiva and sclera clear  ENMT: black partial tongue , sutures   NECK: Supple,  track collar, SUZANNE drain , NGT  RESPIRATORY: Normal respiratory effort; lungs are dim to auscultation bilaterally  CARDIOVASCULAR:t achy rate and rhythm, S1 and S2, No lower extremity edema;  ABDOMEN: Nontender to palpation, normoactive bowel sounds, no rebound/guarding;   MUSKULOSKELETAL:  no clubbing or cyanosis of digits; no joint swelling or tenderness to palpation  PSYCH: A, non verbal   NEUROLOGY: grossly non verbal   SKIN: No rashes;     LABS:                        8.6    5.59  )-----------( 287      ( 12 Jun 2024 03:37 )             24.8     06-12    135  |  101  |  14  ----------------------------<  77  3.9   |  22  |  0.64    Ca    8.3<L>      12 Jun 2024 03:37  Phos  3.4     06-12  Mg     1.90     06-12      PTT - ( 11 Jun 2024 12:25 )  PTT:32.3 sec      Urinalysis Basic - ( 12 Jun 2024 03:37 )    Color: x / Appearance: x / SG: x / pH: x  Gluc: 77 mg/dL / Ketone: x  / Bili: x / Urobili: x   Blood: x / Protein: x / Nitrite: x   Leuk Esterase: x / RBC: x / WBC x   Sq Epi: x / Non Sq Epi: x / Bacteria: x        Culture - Surgical Swab (collected 11 Jun 2024 05:30)  Source: .Surgical Swab RIGHT NECK WOUND  Preliminary Report (12 Jun 2024 11:44):    Escherichia coli        RADIOLOGY & ADDITIONAL TESTS:  Results Reviewed: yes  Imaging Personally Reviewed:  CXR:No focal consolidation.  Loss of volume in the right lung secondary to atelectasis.       Electrocardiogram Personally Reviewed: 1. Technically difficult image quality.   2. Left ventricular systolic function is normal with an ejection fraction visually estimated at 60 to 65 %. There are no regional wall motion abnormalities seen.   3. Normal right ventricular cavity size and probably normal systolic function.   4. Structurally normal mitral valve with normal leaflet excursion. There is calcification of the mitral valve annulus. There is trace mitral regurgitation.      COORDINATION OF CARE:  Care Discussed with Consultants/Other Providers [Y/N]:  Prior or Outpatient Records Reviewed [Y/N]:

## 2024-06-12 NOTE — PROVIDER CONTACT NOTE (OTHER) - BACKGROUND
pt s/p R partial glossectomy , r neck dissection w/ left radial forearm free flap 6/6. RTOR 6/11 r neck exploration.

## 2024-06-12 NOTE — PROGRESS NOTE ADULT - SUBJECTIVE AND OBJECTIVE BOX
ANESTHESIA POSTOP NOTE  64y Female POSTOP DAY 1  No complaints  Vital Signs Last 24 Hrs  T(C): 37.3 (12 Jun 2024 09:30), Max: 38.6 (12 Jun 2024 01:30)  T(F): 99.1 (12 Jun 2024 09:30), Max: 101.4 (12 Jun 2024 01:30)  HR: 118 (12 Jun 2024 09:30) (113 - 126)  BP: 132/81 (12 Jun 2024 09:30) (128/79 - 151/88)  BP(mean): --  RR: 18 (12 Jun 2024 09:30) (18 - 20)  SpO2: 92% (12 Jun 2024 09:30) (92% - 99%)    Parameters below as of 12 Jun 2024 09:30  Patient On (Oxygen Delivery Method): tracheostomy collar      I&O's Summary    11 Jun 2024 07:01  -  12 Jun 2024 07:00  --------------------------------------------------------  IN: 1331 mL / OUT: 1685 mL / NET: -354 mL    12 Jun 2024 07:01  -  12 Jun 2024 10:17  --------------------------------------------------------  IN: 0 mL / OUT: 0 mL / NET: 0 mL        [ X ] NO APPARENT ANESTHESIA COMPLICATIONS      Comments:

## 2024-06-12 NOTE — PROVIDER CONTACT NOTE (OTHER) - ASSESSMENT
P routine vitals taken axillary temp 101.3 . Pt denies any chills, or diaphoresis. Tylenol administered 0005. R tongue black, positive pulse on cook doppler. R cheek swollen and slightly hard on palpation. R neck still slowly oozing sanguinous drainage.

## 2024-06-13 ENCOUNTER — TRANSCRIPTION ENCOUNTER (OUTPATIENT)
Age: 64
End: 2024-06-13

## 2024-06-13 LAB
-  AMOXICILLIN/CLAVULANIC ACID: SIGNIFICANT CHANGE UP
-  AMPICILLIN/SULBACTAM: SIGNIFICANT CHANGE UP
-  AMPICILLIN: SIGNIFICANT CHANGE UP
-  AZTREONAM: SIGNIFICANT CHANGE UP
-  CEFAZOLIN: SIGNIFICANT CHANGE UP
-  CEFEPIME: SIGNIFICANT CHANGE UP
-  CEFOXITIN: SIGNIFICANT CHANGE UP
-  CEFTRIAXONE: SIGNIFICANT CHANGE UP
-  CIPROFLOXACIN: SIGNIFICANT CHANGE UP
-  ERTAPENEM: SIGNIFICANT CHANGE UP
-  GENTAMICIN: SIGNIFICANT CHANGE UP
-  IMIPENEM: SIGNIFICANT CHANGE UP
-  LEVOFLOXACIN: SIGNIFICANT CHANGE UP
-  MEROPENEM: SIGNIFICANT CHANGE UP
-  PIPERACILLIN/TAZOBACTAM: SIGNIFICANT CHANGE UP
-  TOBRAMYCIN: SIGNIFICANT CHANGE UP
-  TRIMETHOPRIM/SULFAMETHOXAZOLE: SIGNIFICANT CHANGE UP
ANION GAP SERPL CALC-SCNC: 10 MMOL/L — SIGNIFICANT CHANGE UP (ref 7–14)
BUN SERPL-MCNC: 14 MG/DL — SIGNIFICANT CHANGE UP (ref 7–23)
CALCIUM SERPL-MCNC: 8.3 MG/DL — LOW (ref 8.4–10.5)
CHLORIDE SERPL-SCNC: 105 MMOL/L — SIGNIFICANT CHANGE UP (ref 98–107)
CO2 SERPL-SCNC: 24 MMOL/L — SIGNIFICANT CHANGE UP (ref 22–31)
CREAT SERPL-MCNC: 0.56 MG/DL — SIGNIFICANT CHANGE UP (ref 0.5–1.3)
EGFR: 102 ML/MIN/1.73M2 — SIGNIFICANT CHANGE UP
GLUCOSE SERPL-MCNC: 126 MG/DL — HIGH (ref 70–99)
HCT VFR BLD CALC: 25.9 % — LOW (ref 34.5–45)
HGB BLD-MCNC: 9.1 G/DL — LOW (ref 11.5–15.5)
MAGNESIUM SERPL-MCNC: 1.9 MG/DL — SIGNIFICANT CHANGE UP (ref 1.6–2.6)
MCHC RBC-ENTMCNC: 31.3 PG — SIGNIFICANT CHANGE UP (ref 27–34)
MCHC RBC-ENTMCNC: 35.1 GM/DL — SIGNIFICANT CHANGE UP (ref 32–36)
MCV RBC AUTO: 89 FL — SIGNIFICANT CHANGE UP (ref 80–100)
METHOD TYPE: SIGNIFICANT CHANGE UP
NRBC # BLD: 0 /100 WBCS — SIGNIFICANT CHANGE UP (ref 0–0)
NRBC # FLD: 0 K/UL — SIGNIFICANT CHANGE UP (ref 0–0)
PHOSPHATE SERPL-MCNC: 2.5 MG/DL — SIGNIFICANT CHANGE UP (ref 2.5–4.5)
PLATELET # BLD AUTO: 367 K/UL — SIGNIFICANT CHANGE UP (ref 150–400)
POTASSIUM SERPL-MCNC: 3.4 MMOL/L — LOW (ref 3.5–5.3)
POTASSIUM SERPL-SCNC: 3.4 MMOL/L — LOW (ref 3.5–5.3)
RBC # BLD: 2.91 M/UL — LOW (ref 3.8–5.2)
RBC # FLD: 13.1 % — SIGNIFICANT CHANGE UP (ref 10.3–14.5)
SODIUM SERPL-SCNC: 139 MMOL/L — SIGNIFICANT CHANGE UP (ref 135–145)
WBC # BLD: 7.25 K/UL — SIGNIFICANT CHANGE UP (ref 3.8–10.5)
WBC # FLD AUTO: 7.25 K/UL — SIGNIFICANT CHANGE UP (ref 3.8–10.5)

## 2024-06-13 PROCEDURE — 99232 SBSQ HOSP IP/OBS MODERATE 35: CPT

## 2024-06-13 RX ORDER — POTASSIUM CHLORIDE 600 MG/1
10 TABLET, FILM COATED, EXTENDED RELEASE ORAL
Refills: 0 | Status: COMPLETED | OUTPATIENT
Start: 2024-06-13 | End: 2024-06-13

## 2024-06-13 RX ORDER — DEXTROSE MONOHYDRATE AND SODIUM CHLORIDE 5; .3 G/100ML; G/100ML
1000 INJECTION, SOLUTION INTRAVENOUS
Refills: 0 | Status: DISCONTINUED | OUTPATIENT
Start: 2024-06-13 | End: 2024-06-15

## 2024-06-13 RX ADMIN — POTASSIUM CHLORIDE 100 MILLIEQUIVALENT(S): 600 TABLET, FILM COATED, EXTENDED RELEASE ORAL at 12:15

## 2024-06-13 RX ADMIN — Medication 975 MILLIGRAM(S): at 20:02

## 2024-06-13 RX ADMIN — POLYETHYLENE GLYCOL 3350 17 GRAM(S): 1 POWDER ORAL at 12:16

## 2024-06-13 RX ADMIN — RISPERIDONE 1 MILLIGRAM(S): 0.5 TABLET ORAL at 06:25

## 2024-06-13 RX ADMIN — Medication 3 MILLILITER(S): at 06:25

## 2024-06-13 RX ADMIN — Medication 15 MILLILITER(S): at 06:25

## 2024-06-13 RX ADMIN — Medication 975 MILLIGRAM(S): at 00:02

## 2024-06-13 RX ADMIN — AMPICILLIN AND SULBACTAM 100 GRAM(S): 2; 1 INJECTION, POWDER, FOR SOLUTION INTRAMUSCULAR; INTRAVENOUS at 05:17

## 2024-06-13 RX ADMIN — AMLODIPINE BESYLATE 10 MILLIGRAM(S): 2.5 TABLET ORAL at 05:19

## 2024-06-13 RX ADMIN — BUPROPION HYDROCHLORIDE 300 MILLIGRAM(S): 150 TABLET, EXTENDED RELEASE ORAL at 06:25

## 2024-06-13 RX ADMIN — Medication 1 APPLICATION(S): at 13:42

## 2024-06-13 RX ADMIN — Medication 15 MILLILITER(S): at 15:06

## 2024-06-13 RX ADMIN — IPRATROPIUM BROMIDE AND ALBUTEROL SULFATE 3 MILLILITER(S): .5; 3 SOLUTION RESPIRATORY (INHALATION) at 20:24

## 2024-06-13 RX ADMIN — IPRATROPIUM BROMIDE AND ALBUTEROL SULFATE 3 MILLILITER(S): .5; 3 SOLUTION RESPIRATORY (INHALATION) at 03:07

## 2024-06-13 RX ADMIN — Medication 15 MILLILITER(S): at 12:15

## 2024-06-13 RX ADMIN — PANTOPRAZOLE SODIUM 40 MILLIGRAM(S): 40 INJECTION, POWDER, FOR SOLUTION INTRAVENOUS at 12:16

## 2024-06-13 RX ADMIN — BUPROPION HYDROCHLORIDE 150 MILLIGRAM(S): 150 TABLET, EXTENDED RELEASE ORAL at 22:01

## 2024-06-13 RX ADMIN — ACETYLCYSTEINE 4 MILLILITER(S): 200 SOLUTION ORAL; RESPIRATORY (INHALATION) at 10:21

## 2024-06-13 RX ADMIN — IPRATROPIUM BROMIDE AND ALBUTEROL SULFATE 3 MILLILITER(S): .5; 3 SOLUTION RESPIRATORY (INHALATION) at 10:21

## 2024-06-13 RX ADMIN — AMPICILLIN AND SULBACTAM 100 GRAM(S): 2; 1 INJECTION, POWDER, FOR SOLUTION INTRAMUSCULAR; INTRAVENOUS at 00:02

## 2024-06-13 RX ADMIN — ACETYLCYSTEINE 4 MILLILITER(S): 200 SOLUTION ORAL; RESPIRATORY (INHALATION) at 20:25

## 2024-06-13 RX ADMIN — Medication 15 MILLILITER(S): at 22:19

## 2024-06-13 RX ADMIN — AMPICILLIN AND SULBACTAM 100 GRAM(S): 2; 1 INJECTION, POWDER, FOR SOLUTION INTRAMUSCULAR; INTRAVENOUS at 22:01

## 2024-06-13 RX ADMIN — ACETYLCYSTEINE 4 MILLILITER(S): 200 SOLUTION ORAL; RESPIRATORY (INHALATION) at 15:53

## 2024-06-13 RX ADMIN — Medication 15 MILLILITER(S): at 19:32

## 2024-06-13 RX ADMIN — RISPERIDONE 3 MILLIGRAM(S): 0.5 TABLET ORAL at 22:02

## 2024-06-13 RX ADMIN — Medication 975 MILLIGRAM(S): at 12:15

## 2024-06-13 RX ADMIN — ACETYLCYSTEINE 4 MILLILITER(S): 200 SOLUTION ORAL; RESPIRATORY (INHALATION) at 03:08

## 2024-06-13 RX ADMIN — POTASSIUM CHLORIDE 100 MILLIEQUIVALENT(S): 600 TABLET, FILM COATED, EXTENDED RELEASE ORAL at 15:06

## 2024-06-13 RX ADMIN — Medication 100 MILLIGRAM(S): at 22:02

## 2024-06-13 RX ADMIN — POTASSIUM CHLORIDE 100 MILLIEQUIVALENT(S): 600 TABLET, FILM COATED, EXTENDED RELEASE ORAL at 13:42

## 2024-06-13 RX ADMIN — Medication 975 MILLIGRAM(S): at 05:17

## 2024-06-13 RX ADMIN — Medication 10 MILLILITER(S): at 12:16

## 2024-06-13 RX ADMIN — AMPICILLIN AND SULBACTAM 100 GRAM(S): 2; 1 INJECTION, POWDER, FOR SOLUTION INTRAMUSCULAR; INTRAVENOUS at 16:28

## 2024-06-13 RX ADMIN — Medication 975 MILLIGRAM(S): at 19:32

## 2024-06-13 RX ADMIN — Medication 3 MILLILITER(S): at 14:00

## 2024-06-13 RX ADMIN — ENOXAPARIN SODIUM 40 MILLIGRAM(S): 100 INJECTION SUBCUTANEOUS at 13:42

## 2024-06-13 RX ADMIN — IPRATROPIUM BROMIDE AND ALBUTEROL SULFATE 3 MILLILITER(S): .5; 3 SOLUTION RESPIRATORY (INHALATION) at 15:53

## 2024-06-13 RX ADMIN — Medication 975 MILLIGRAM(S): at 12:45

## 2024-06-13 NOTE — PROGRESS NOTE ADULT - PROBLEM SELECTOR PLAN 1
Patient with  fever>101, now 100  Possibly pain  Cultures wound E coli, started on Unasyn    Possibly slight dehydration   Monitor fever courbe, if >101 repeat cultures   Pain control   TTE reviewed, normal EF    PE ruled out

## 2024-06-13 NOTE — CHART NOTE - NSCHARTNOTEFT_GEN_A_CORE
Source: [X] Patient       [x] EMR        [X] RN        [] Family at bedside       [X] Other:    -If unable to interview patient: [X] Trach  [] Disoriented/confused/inappropriate to interview as per chart     Nutrition Follow Up Note. Per chart review,  64F w/ PMH of depression and schizophrenia s/p R partial glossectomy in the setting of SCCA, R SND I-IV, L RFFF, L thigh STSG, tracheostomy (6/6/24). Pts post-operative course c/b venous congestion of flap w/ RTOR on 6/11 for free flap salvage and revision of venous anastamosis.    S/p swallow evaluation 6/11- NPO for now per OMFS team. Patient was on Jevity1.5cal at goal rate of 38cc/hr [id number]. As per RN, patient is tolerating tube feeds well. No nausea/vomiting/diarrhea/constipation reported. Case discussed with OMFS team, recommended increase to goal rate of Jevity1.5cal at 57cc/hr [id number] provides total volume of 1368mL, 2052kcal, 87g pro and 1040mL free water to better meet estimated needs.           Diet Prescription: Diet, NPO with Tube Feed:   Tube Feeding Modality: Nasogastric  Jevity 1.5 [place]ar (JEVITY1.5RTH)  Total Volume for 24 Hours (mL): 1368  Continuous  Starting Tube Feed Rate {mL per Hour}: 20  Increase Tube Feed Rate by (mL): 20     Every 4 hours  Until Goal Tube Feed Rate (mL per Hour): 57  Tube Feed Duration (in Hours): 24  Tube Feed Start Time: 13:15 (06-13-24 @ 13:16)  Diet, NPO after Midnight:      NPO Start Date: 13-Jun-2024,   NPO Start Time: 23:59  Except Medications (06-13-24 @ 10:32)    Food Allergy: NKFA    Pertinent Medications: MEDICATIONS  (STANDING):  acetaminophen   Oral Liquid .. 975 milliGRAM(s) Oral every 6 hours  acetylcysteine 10%  Inhalation 4 milliLiter(s) Inhalation every 6 hours  albuterol/ipratropium for Nebulization 3 milliLiter(s) Nebulizer every 6 hours  amLODIPine   Tablet 10 milliGRAM(s) Oral daily  ampicillin/sulbactam  IVPB 1.5 Gram(s) IV Intermittent every 6 hours  buPROPion . 300 milliGRAM(s) Oral <User Schedule>  buPROPion . 150 milliGRAM(s) Oral at bedtime  chlorhexidine 0.12% Liquid 15 milliLiter(s) Swish and Spit <User Schedule>  chlorhexidine 2% Cloths 1 Application(s) Topical daily  enoxaparin Injectable 40 milliGRAM(s) SubCutaneous every 24 hours  lactated ringers. 1000 milliLiter(s) (100 mL/Hr) IV Continuous <Continuous>  pantoprazole   Suspension 40 milliGRAM(s) Oral daily  polyethylene glycol 3350 17 Gram(s) Oral daily  potassium chloride  10 mEq/100 mL IVPB 10 milliEquivalent(s) IV Intermittent every 1 hour  QUEtiapine 100 milliGRAM(s) Oral at bedtime  risperiDONE   Solution 1 milliGRAM(s) Oral <User Schedule>  risperiDONE   Solution 3 milliGRAM(s) Oral at bedtime  senna Syrup 10 milliLiter(s) Oral daily  sodium chloride 0.9% lock flush 3 milliLiter(s) IV Push every 8 hours    MEDICATIONS  (PRN):  oxyCODONE    Solution 2.5 milliGRAM(s) Oral every 4 hours PRN Moderate Pain (4 - 6)  oxyCODONE    Solution 5 milliGRAM(s) Oral every 4 hours PRN Severe Pain (7 - 10)    Pertinent Labs: 06-13 Na139 mmol/L Glu 126 mg/dL<H> K+ 3.4 mmol/L<L> Cr  0.56 mg/dL BUN 14 mg/dL 06-13 Phos 2.5 mg/dL            Weight (kg):  65.2 kg 6/9  dosing- 59kg 6/6  Weight Assessment: ?Question accuracy of weight, RN to obtain new weight with zeroed bedscale for better accuracy.  Height: 162.6 cm  IBW: 120lbs+/-10%  BMI: 22.3 kg/m^2 (based on dosing)      Edema: no edema per nursing flowsheets.   Pressure Injury: No pressure ulcers/DTI noted in flowsheets.         Estimated Needs:   [] No change since previous assessment, based on weight  lbs / kg    [X ] recalculated, with consideration for, based on weight dosing weight- 59kg  Estimated Energy Needs (kcal/kg):   30-35 = 1770-2065kcal/d  Estimated Protein Needs(g/kg):      1.2-1.5 = 71-89g pro/d      Previous Nutrition Diagnosis:    [X ] Increased Nutrient Needs   Nutrition Diagnosis is [X ] ongoing  [ ] resolved [ ] not applicable     [X]  Inadequate protein energy intake, related to unable to tolerate po intake As evidenced by dysphagia  Nutrition Diagnosis is [X ] ongoing  [ ] resolved [ ] not applicable       New Nutrition Diagnosis: [ X] not applicable     Education:  [ ] Provided  [ ] Provided on previous assessment by RD  [X ] Not applicable secondary to patient with NGT feeds  [ ] Pt refused     Nutrition Interventions:     1) Recommend Jevity1.5cal at goal rate of 57cc/hr [id number] provides total volume of 1368mL, 2052kcal, 87g pro and 1040mL free water. Additional fluid per MD discretion.- appreciate change of TF order  2) Monitor weights, labs, BM's, skin integrity, tolerance to EN.  3) Further adjustments to rate/volume/duration/free water provision of enteral feeds dependant on long term monitoring of patient's tolerance, weight trends and needs   4) RN to obtain new weight and weekly weights.      RD remains available      Helio Wright RD, MS, CDN pager [phone]  Also available on [employer] Teams

## 2024-06-13 NOTE — PROGRESS NOTE ADULT - SUBJECTIVE AND OBJECTIVE BOX
[facility] Division of Hospital Medicine  Wanda Gillis MD  Pager (M-F, 8A-5P): [phone]      Patient is a 64y old  Female who presents with a chief complaint of ENT (12 Jun 2024 13:13)      SUBJECTIVE / OVERNIGHT EVENTS: no events over night   ADDITIONAL REVIEW OF SYSTEMS: negative     MEDICATIONS  (STANDING):  acetaminophen   Oral Liquid .. 975 milliGRAM(s) Oral every 6 hours  acetylcysteine 10%  Inhalation 4 milliLiter(s) Inhalation every 6 hours  albuterol/ipratropium for Nebulization 3 milliLiter(s) Nebulizer every 6 hours  amLODIPine   Tablet 10 milliGRAM(s) Oral daily  ampicillin/sulbactam  IVPB 1.5 Gram(s) IV Intermittent every 6 hours  buPROPion . 300 milliGRAM(s) Oral <User Schedule>  buPROPion . 150 milliGRAM(s) Oral at bedtime  chlorhexidine 0.12% Liquid 15 milliLiter(s) Swish and Spit <User Schedule>  chlorhexidine 2% Cloths 1 Application(s) Topical daily  enoxaparin Injectable 40 milliGRAM(s) SubCutaneous every 24 hours  lactated ringers. 1000 milliLiter(s) (100 mL/Hr) IV Continuous <Continuous>  pantoprazole   Suspension 40 milliGRAM(s) Oral daily  polyethylene glycol 3350 17 Gram(s) Oral daily  potassium chloride  10 mEq/100 mL IVPB 10 milliEquivalent(s) IV Intermittent every 1 hour  QUEtiapine 100 milliGRAM(s) Oral at bedtime  risperiDONE   Solution 1 milliGRAM(s) Oral <User Schedule>  risperiDONE   Solution 3 milliGRAM(s) Oral at bedtime  senna Syrup 10 milliLiter(s) Oral daily  sodium chloride 0.9% lock flush 3 milliLiter(s) IV Push every 8 hours    MEDICATIONS  (PRN):  oxyCODONE    Solution 5 milliGRAM(s) Oral every 4 hours PRN Severe Pain (7 - 10)  oxyCODONE    Solution 2.5 milliGRAM(s) Oral every 4 hours PRN Moderate Pain (4 - 6)      CAPILLARY BLOOD GLUCOSE        I&O's Summary    12 Jun 2024 07:01  -  13 Jun 2024 07:00  --------------------------------------------------------  IN: 120 mL / OUT: 1216 mL / NET: -1096 mL    13 Jun 2024 07:01  -  13 Jun 2024 14:05  --------------------------------------------------------  IN: 0 mL / OUT: 300 mL / NET: -300 mL        PHYSICAL EXAM:  Vital Signs Last 24 Hrs  T(C): 36.8 (13 Jun 2024 10:00), Max: 38.1 (12 Jun 2024 14:40)  T(F): 98.3 (13 Jun 2024 10:00), Max: 100.6 (12 Jun 2024 14:40)  HR: 85 (13 Jun 2024 10:59) (85 - 128)  BP: 128/74 (13 Jun 2024 10:00) (116/79 - 150/92)  BP(mean): --  RR: 18 (13 Jun 2024 10:56) (16 - 19)  SpO2: 96% (13 Jun 2024 10:59) (94% - 100%)    Parameters below as of 13 Jun 2024 10:59  Patient On (Oxygen Delivery Method): tracheostomy collar      CONSTITUTIONAL: NAD,   EYES: EOMI; conjunctiva and sclera clear  ENMT: Moist oral mucosa,black area tongue , NGT  NECK: Supple, Trach collar, staples , SUZANNE drain    RESPIRATORY: Normal respiratory effort; lungs are dim to auscultation bilaterally  CARDIOVASCULAR: tachy  rate and rhythm, normal S1 and S2,  No lower extremity edema;   ABDOMEN: Nontender to palpation, normoactive bowel sounds, no rebound/guarding;   MUSKULOSKELETAL:  no clubbing or cyanosis of digits; no joint swelling or tenderness to palpation  PSYCH: A+O to person, non verbal , follows commands   NEUROLOGY: grossly non focal   SKIN: surgical wound     LABS:                        9.1    7.25  )-----------( 367      ( 13 Jun 2024 06:04 )             25.9     06-13    139  |  105  |  14  ----------------------------<  126<H>  3.4<L>   |  24  |  0.56    Ca    8.3<L>      13 Jun 2024 06:04  Phos  2.5     06-13  Mg     1.90     06-13            Urinalysis Basic - ( 13 Jun 2024 06:04 )    Color: x / Appearance: x / SG: x / pH: x  Gluc: 126 mg/dL / Ketone: x  / Bili: x / Urobili: x   Blood: x / Protein: x / Nitrite: x   Leuk Esterase: x / RBC: x / WBC x   Sq Epi: x / Non Sq Epi: x / Bacteria: x        Culture - Blood (collected 12 Jun 2024 04:58)  Source: .Blood Blood-Peripheral  Preliminary Report (13 Jun 2024 09:02):    No growth at 24 hours    Culture - Blood (collected 12 Jun 2024 03:37)  Source: .Blood Blood-Peripheral  Preliminary Report (13 Jun 2024 07:02):    No growth at 24 hours    Culture - Surgical Swab (collected 11 Jun 2024 05:30)  Source: .Surgical Swab RIGHT NECK WOUND  Preliminary Report (12 Jun 2024 11:44):    Escherichia coli  Organism: Escherichia coli (13 Jun 2024 08:03)  Organism: Escherichia coli (13 Jun 2024 08:03)        RADIOLOGY & ADDITIONAL TESTS:  Results Reviewed:   Imaging Personally Reviewed:  Electrocardiogram Personally Reviewed:    COORDINATION OF CARE:  Care Discussed with Consultants/Other Providers [Y/N]:  Prior or Outpatient Records Reviewed [Y/N]:

## 2024-06-13 NOTE — PROGRESS NOTE ADULT - ASSESSMENT
64F w/ PMH of depression and schizophrenia s/p R partial glossectomy in the setting of SCCA, R SND I-IV, L RFFF, L thigh STSG, tracheostomy (6/6/24). Pts post-operative course c/b venous congestion of flap w/ RTOR on 6/11 for free flap salvage and revision of venous anastamosis.     Plan:  -C/w TFs  -Duonebs/ Mucomyst  -Chest PT  -OOBTC  -C/w IV Unasyn  -OR Friday w/ PRS for repeat free flap reconstruction  -PT: Rehab facility      Lokesh Cunningham DMD  Oral and Maxillofacial Surgery  [facility] Pager [phone]  Available on Microsoft Teams

## 2024-06-13 NOTE — PROGRESS NOTE ADULT - SUBJECTIVE AND OBJECTIVE BOX
64F w/ PMH of depression and schizophrenia s/p R partial glossectomy in the setting of SCCA, R SND I-IV, L RFFF, L thigh STSG, tracheostomy (6/6/24). Pts post-operative course c/b venous congestion of flap w/ RTOR on 6/11 for free flap salvage and revision of venous anastamosis.       Interval Events: NAEON      Vital Signs Last 24 Hrs  T(C): 36.5 (13 Jun 2024 05:10), Max: 38.1 (12 Jun 2024 13:15)  T(F): 97.7 (13 Jun 2024 05:10), Max: 100.6 (12 Jun 2024 14:40)  HR: 107 (13 Jun 2024 05:10) (99 - 129)  BP: 149/97 (13 Jun 2024 05:10) (116/79 - 150/92)  BP(mean): --  RR: 16 (13 Jun 2024 05:10) (16 - 19)  SpO2: 99% (13 Jun 2024 05:10) (92% - 100%)    Parameters below as of 13 Jun 2024 05:10  Patient On (Oxygen Delivery Method): tracheostomy collar  O2 Flow (L/min): 7        I&O's Detail    11 Jun 2024 07:01  -  12 Jun 2024 07:00  --------------------------------------------------------  IN:    Enteral Tube Flush: 240 mL    Heparin: 77 mL    Heparin: 39 mL    Lactated Ringers: 975 mL  Total IN: 1331 mL    OUT:    Bulb (mL): 15 mL    Indwelling Catheter - Urethral (mL): 1645 mL    Oral Fluid: 0 mL    VAC (Vacuum Assisted Closure) System (mL): 25 mL  Total OUT: 1685 mL    Total NET: -354 mL      12 Jun 2024 07:01  -  13 Jun 2024 06:57  --------------------------------------------------------  IN:    Enteral Tube Flush: 120 mL  Total IN: 120 mL    OUT:    Bulb (mL): 16 mL    Indwelling Catheter - Urethral (mL): 1200 mL    Oral Fluid: 0 mL    VAC (Vacuum Assisted Closure) System (mL): 0 mL  Total OUT: 1216 mL    Total NET: -1096 mL          Medications:    MEDICATIONS  (STANDING):  acetaminophen   Oral Liquid .. 975 milliGRAM(s) Oral every 6 hours  acetylcysteine 10%  Inhalation 4 milliLiter(s) Inhalation every 6 hours  albuterol/ipratropium for Nebulization 3 milliLiter(s) Nebulizer every 6 hours  amLODIPine   Tablet 10 milliGRAM(s) Oral daily  ampicillin/sulbactam  IVPB 1.5 Gram(s) IV Intermittent every 6 hours  buPROPion . 300 milliGRAM(s) Oral <User Schedule>  buPROPion . 150 milliGRAM(s) Oral at bedtime  chlorhexidine 0.12% Liquid 15 milliLiter(s) Swish and Spit <User Schedule>  chlorhexidine 2% Cloths 1 Application(s) Topical daily  enoxaparin Injectable 40 milliGRAM(s) SubCutaneous every 24 hours  pantoprazole   Suspension 40 milliGRAM(s) Oral daily  polyethylene glycol 3350 17 Gram(s) Oral daily  QUEtiapine 100 milliGRAM(s) Oral at bedtime  risperiDONE   Solution 1 milliGRAM(s) Oral <User Schedule>  risperiDONE   Solution 3 milliGRAM(s) Oral at bedtime  senna Syrup 10 milliLiter(s) Oral daily  sodium chloride 0.9% lock flush 3 milliLiter(s) IV Push every 8 hours    MEDICATIONS  (PRN):  oxyCODONE    Solution 2.5 milliGRAM(s) Oral every 4 hours PRN Moderate Pain (4 - 6)  oxyCODONE    Solution 5 milliGRAM(s) Oral every 4 hours PRN Severe Pain (7 - 10)        Labs:                          9.1    7.25  )-----------( 367      ( 13 Jun 2024 06:04 )             25.9       06-12    135  |  101  |  14  ----------------------------<  77  3.9   |  22  |  0.64    Ca    8.3<L>      12 Jun 2024 03:37  Phos  3.4     06-12  Mg     1.90     06-12      PHYSICAL EXAM:  GEN: NAD  NEURO: alert & oriented x 4/4  HEENT: face symmetric, oropharynx moist without exudates, CN VII/XI/XII intact, neck incision c/d/i,  flap purple/black. No bleeding on pinprick  CVS: regular rate and rhythm  Pulm: normal respiratory excursions, not tachypneic, no labored breathing  Abd: soft, non-distended  Ext:  no peripheral edema noted

## 2024-06-13 NOTE — PROGRESS NOTE ADULT - PROBLEM SELECTOR PLAN 2
Exploration, vein 11-Jun-2024   Right neck exploration, significant clot found throughout cephalic vein, mechanical thrombectomy, tPA administration (non-systemic), revision of VC venous anastamosis  Right neck culture  SUZANNE x1 (from prior)  Possibly etiology of hypoxic resp failure , atelectasis  Peripherally oriented small tree-in-bud nodular opacities in the   dependent portions of the right upper lobe representing impacted distal   airways which may be due to mucus or endobronchial spread of infection.  Incentive spirometry, suction fervently ]  Repeat CXR : Marked elevation of the right hemidiaphragm secondary to atelectasis   noted on recent chest CT.  Continue suction and incentive spirometry , chest PT, Mucomyst ihh   Started on Heparin gtt, was stopped sec to bleeding, now on lovenox DVT px

## 2024-06-13 NOTE — PROGRESS NOTE ADULT - SUBJECTIVE AND OBJECTIVE BOX
Plastic Surgery Progress Note (pg [facility]: [id number], [facility]: [phone])    SUBJECTIVE  Pt comfortable in bed. Pain well controlled, no complaints.    OBJECTIVE  ___________________________________________________  VITAL SIGNS / I&O's   Vital Signs Last 24 Hrs  T(C): 36.9 (13 Jun 2024 02:00), Max: 38.1 (12 Jun 2024 13:15)  T(F): 98.4 (13 Jun 2024 02:00), Max: 100.6 (12 Jun 2024 14:40)  HR: 99 (13 Jun 2024 02:00) (99 - 129)  BP: 119/76 (13 Jun 2024 02:00) (116/79 - 150/92)  BP(mean): --  RR: 18 (13 Jun 2024 02:00) (18 - 19)  SpO2: 100% (13 Jun 2024 02:00) (92% - 100%)    Parameters below as of 13 Jun 2024 02:00  Patient On (Oxygen Delivery Method): tracheostomy collar          11 Jun 2024 07:01  -  12 Jun 2024 07:00  --------------------------------------------------------  IN:    Enteral Tube Flush: 240 mL    Heparin: 77 mL    Heparin: 39 mL    Lactated Ringers: 975 mL  Total IN: 1331 mL    OUT:    Bulb (mL): 15 mL    Indwelling Catheter - Urethral (mL): 1645 mL    Oral Fluid: 0 mL    VAC (Vacuum Assisted Closure) System (mL): 25 mL  Total OUT: 1685 mL    Total NET: -354 mL      12 Jun 2024 07:01  -  13 Jun 2024 06:13  --------------------------------------------------------  IN:    Enteral Tube Flush: 80 mL  Total IN: 80 mL    OUT:    Bulb (mL): 15 mL    Indwelling Catheter - Urethral (mL): 1000 mL    Oral Fluid: 0 mL    VAC (Vacuum Assisted Closure) System (mL): 0 mL  Total OUT: 1015 mL    Total NET: -935 mL        ___________________________________________________  PHYSICAL EXAM    General: NAD    ___________________________________________________  LABS                        8.6    5.59  )-----------( 287      ( 12 Jun 2024 03:37 )             24.8     12 Jun 2024 03:37    135    |  101    |  14     ----------------------------<  77     3.9     |  22     |  0.64     Ca    8.3        12 Jun 2024 03:37  Phos  3.4       12 Jun 2024 03:37  Mg     1.90      12 Jun 2024 03:37      PTT - ( 11 Jun 2024 12:25 )  PTT:32.3 sec  CAPILLARY BLOOD GLUCOSE            Urinalysis Basic - ( 12 Jun 2024 03:37 )    Color: x / Appearance: x / SG: x / pH: x  Gluc: 77 mg/dL / Ketone: x  / Bili: x / Urobili: x   Blood: x / Protein: x / Nitrite: x   Leuk Esterase: x / RBC: x / WBC x   Sq Epi: x / Non Sq Epi: x / Bacteria: x      ___________________________________________________  MICRO  Recent Cultures:  Specimen Source: .Surgical Swab RIGHT NECK WOUND, 06-11 @ 05:30; Results   Escherichia coli<!>; Gram Stain: --; Organism: --  Specimen Source: .Blood Blood-Peripheral, 06-08 @ 19:41; Results   No growth at 4 days; Gram Stain: --; Organism: --  Specimen Source: .Blood Blood-Venous, 06-08 @ 18:55; Results   No growth at 4 days; Gram Stain:   Moderate polymorphonuclear leukocytes per low power field  No Squamous epithelial cells per low power field  Rare Gram Positive Rods seen per oil power field  Rare Gram Negative Rods seen per oil power field  Rare Gram positive cocci in pairs seen per oil power field<!>; Organism: --    ___________________________________________________  MEDICATIONS  (STANDING):  acetaminophen   Oral Liquid .. 975 milliGRAM(s) Oral every 6 hours  acetylcysteine 10%  Inhalation 4 milliLiter(s) Inhalation every 6 hours  albuterol/ipratropium for Nebulization 3 milliLiter(s) Nebulizer every 6 hours  amLODIPine   Tablet 10 milliGRAM(s) Oral daily  ampicillin/sulbactam  IVPB 1.5 Gram(s) IV Intermittent every 6 hours  buPROPion . 300 milliGRAM(s) Oral <User Schedule>  buPROPion . 150 milliGRAM(s) Oral at bedtime  chlorhexidine 0.12% Liquid 15 milliLiter(s) Swish and Spit <User Schedule>  chlorhexidine 2% Cloths 1 Application(s) Topical daily  enoxaparin Injectable 40 milliGRAM(s) SubCutaneous every 24 hours  pantoprazole   Suspension 40 milliGRAM(s) Oral daily  polyethylene glycol 3350 17 Gram(s) Oral daily  QUEtiapine 100 milliGRAM(s) Oral at bedtime  risperiDONE   Solution 1 milliGRAM(s) Oral <User Schedule>  risperiDONE   Solution 3 milliGRAM(s) Oral at bedtime  senna Syrup 10 milliLiter(s) Oral daily  sodium chloride 0.9% lock flush 3 milliLiter(s) IV Push every 8 hours    MEDICATIONS  (PRN):  oxyCODONE    Solution 2.5 milliGRAM(s) Oral every 4 hours PRN Moderate Pain (4 - 6)  oxyCODONE    Solution 5 milliGRAM(s) Oral every 4 hours PRN Severe Pain (7 - 10)   Plastic Surgery Progress Note (pg [facility]: [id number], [facility]: [phone])    SUBJECTIVE  T 100.1 tachycardic overnight. Pt comfortable in bed.    OBJECTIVE  ___________________________________________________  VITAL SIGNS / I&O's   Vital Signs Last 24 Hrs  T(C): 36.9 (13 Jun 2024 02:00), Max: 38.1 (12 Jun 2024 13:15)  T(F): 98.4 (13 Jun 2024 02:00), Max: 100.6 (12 Jun 2024 14:40)  HR: 99 (13 Jun 2024 02:00) (99 - 129)  BP: 119/76 (13 Jun 2024 02:00) (116/79 - 150/92)  BP(mean): --  RR: 18 (13 Jun 2024 02:00) (18 - 19)  SpO2: 100% (13 Jun 2024 02:00) (92% - 100%)    Parameters below as of 13 Jun 2024 02:00  Patient On (Oxygen Delivery Method): tracheostomy collar          11 Jun 2024 07:01  -  12 Jun 2024 07:00  --------------------------------------------------------  IN:    Enteral Tube Flush: 240 mL    Heparin: 77 mL    Heparin: 39 mL    Lactated Ringers: 975 mL  Total IN: 1331 mL    OUT:    Bulb (mL): 15 mL    Indwelling Catheter - Urethral (mL): 1645 mL    Oral Fluid: 0 mL    VAC (Vacuum Assisted Closure) System (mL): 25 mL  Total OUT: 1685 mL    Total NET: -354 mL      12 Jun 2024 07:01  -  13 Jun 2024 06:13  --------------------------------------------------------  IN:    Enteral Tube Flush: 80 mL  Total IN: 80 mL    OUT:    Bulb (mL): 15 mL    Indwelling Catheter - Urethral (mL): 1000 mL    Oral Fluid: 0 mL    VAC (Vacuum Assisted Closure) System (mL): 0 mL  Total OUT: 1015 mL    Total NET: -935 mL        ___________________________________________________  PHYSICAL EXAM  -- CONSTITUTIONAL: NAD, lying in bed  -- NEURO: Awake, alert  -- HEENT:  right tongue flap looking nonviable. slow blood return from pin prick. cook doppler with signal. SUZANNE SS and low output.   LUE: vac in place, holding suction, arm soft, no collections  LLE: STSG donor site with dressing in place and cdi  ___________________________________________________  LABS                        8.6    5.59  )-----------( 287      ( 12 Jun 2024 03:37 )             24.8     12 Jun 2024 03:37    135    |  101    |  14     ----------------------------<  77     3.9     |  22     |  0.64     Ca    8.3        12 Jun 2024 03:37  Phos  3.4       12 Jun 2024 03:37  Mg     1.90      12 Jun 2024 03:37      PTT - ( 11 Jun 2024 12:25 )  PTT:32.3 sec  CAPILLARY BLOOD GLUCOSE            Urinalysis Basic - ( 12 Jun 2024 03:37 )    Color: x / Appearance: x / SG: x / pH: x  Gluc: 77 mg/dL / Ketone: x  / Bili: x / Urobili: x   Blood: x / Protein: x / Nitrite: x   Leuk Esterase: x / RBC: x / WBC x   Sq Epi: x / Non Sq Epi: x / Bacteria: x      ___________________________________________________  MICRO  Recent Cultures:  Specimen Source: .Surgical Swab RIGHT NECK WOUND, 06-11 @ 05:30; Results   Escherichia coli<!>; Gram Stain: --; Organism: --  Specimen Source: .Blood Blood-Peripheral, 06-08 @ 19:41; Results   No growth at 4 days; Gram Stain: --; Organism: --  Specimen Source: .Blood Blood-Venous, 06-08 @ 18:55; Results   No growth at 4 days; Gram Stain:   Moderate polymorphonuclear leukocytes per low power field  No Squamous epithelial cells per low power field  Rare Gram Positive Rods seen per oil power field  Rare Gram Negative Rods seen per oil power field  Rare Gram positive cocci in pairs seen per oil power field<!>; Organism: --    ___________________________________________________  MEDICATIONS  (STANDING):  acetaminophen   Oral Liquid .. 975 milliGRAM(s) Oral every 6 hours  acetylcysteine 10%  Inhalation 4 milliLiter(s) Inhalation every 6 hours  albuterol/ipratropium for Nebulization 3 milliLiter(s) Nebulizer every 6 hours  amLODIPine   Tablet 10 milliGRAM(s) Oral daily  ampicillin/sulbactam  IVPB 1.5 Gram(s) IV Intermittent every 6 hours  buPROPion . 300 milliGRAM(s) Oral <User Schedule>  buPROPion . 150 milliGRAM(s) Oral at bedtime  chlorhexidine 0.12% Liquid 15 milliLiter(s) Swish and Spit <User Schedule>  chlorhexidine 2% Cloths 1 Application(s) Topical daily  enoxaparin Injectable 40 milliGRAM(s) SubCutaneous every 24 hours  pantoprazole   Suspension 40 milliGRAM(s) Oral daily  polyethylene glycol 3350 17 Gram(s) Oral daily  QUEtiapine 100 milliGRAM(s) Oral at bedtime  risperiDONE   Solution 1 milliGRAM(s) Oral <User Schedule>  risperiDONE   Solution 3 milliGRAM(s) Oral at bedtime  senna Syrup 10 milliLiter(s) Oral daily  sodium chloride 0.9% lock flush 3 milliLiter(s) IV Push every 8 hours    MEDICATIONS  (PRN):  oxyCODONE    Solution 2.5 milliGRAM(s) Oral every 4 hours PRN Moderate Pain (4 - 6)  oxyCODONE    Solution 5 milliGRAM(s) Oral every 4 hours PRN Severe Pain (7 - 10)

## 2024-06-14 ENCOUNTER — APPOINTMENT (OUTPATIENT)
Dept: PLASTIC SURGERY | Facility: HOSPITAL | Age: 64
End: 2024-06-14
Payer: MEDICAID

## 2024-06-14 ENCOUNTER — RESULT REVIEW (OUTPATIENT)
Age: 64
End: 2024-06-14

## 2024-06-14 LAB
ANION GAP SERPL CALC-SCNC: 10 MMOL/L — SIGNIFICANT CHANGE UP (ref 7–14)
ANION GAP SERPL CALC-SCNC: 11 MMOL/L — SIGNIFICANT CHANGE UP (ref 7–14)
APTT BLD: 27.7 SEC — SIGNIFICANT CHANGE UP (ref 24.5–35.6)
BUN SERPL-MCNC: 12 MG/DL — SIGNIFICANT CHANGE UP (ref 7–23)
BUN SERPL-MCNC: 14 MG/DL — SIGNIFICANT CHANGE UP (ref 7–23)
CALCIUM SERPL-MCNC: 8.1 MG/DL — LOW (ref 8.4–10.5)
CALCIUM SERPL-MCNC: 8.6 MG/DL — SIGNIFICANT CHANGE UP (ref 8.4–10.5)
CHLORIDE SERPL-SCNC: 103 MMOL/L — SIGNIFICANT CHANGE UP (ref 98–107)
CHLORIDE SERPL-SCNC: 104 MMOL/L — SIGNIFICANT CHANGE UP (ref 98–107)
CO2 SERPL-SCNC: 22 MMOL/L — SIGNIFICANT CHANGE UP (ref 22–31)
CO2 SERPL-SCNC: 24 MMOL/L — SIGNIFICANT CHANGE UP (ref 22–31)
CREAT SERPL-MCNC: 0.46 MG/DL — LOW (ref 0.5–1.3)
CREAT SERPL-MCNC: 0.49 MG/DL — LOW (ref 0.5–1.3)
CULTURE RESULTS: SIGNIFICANT CHANGE UP
CULTURE RESULTS: SIGNIFICANT CHANGE UP
EGFR: 105 ML/MIN/1.73M2 — SIGNIFICANT CHANGE UP
EGFR: 107 ML/MIN/1.73M2 — SIGNIFICANT CHANGE UP
GAS PNL BLDA: SIGNIFICANT CHANGE UP
GAS PNL BLDA: SIGNIFICANT CHANGE UP
GLUCOSE SERPL-MCNC: 151 MG/DL — HIGH (ref 70–99)
GLUCOSE SERPL-MCNC: 96 MG/DL — SIGNIFICANT CHANGE UP (ref 70–99)
GRAM STN FLD: SIGNIFICANT CHANGE UP
HCT VFR BLD CALC: 27.1 % — LOW (ref 34.5–45)
HCT VFR BLD CALC: 29.2 % — LOW (ref 34.5–45)
HGB BLD-MCNC: 9 G/DL — LOW (ref 11.5–15.5)
HGB BLD-MCNC: 9.7 G/DL — LOW (ref 11.5–15.5)
INR BLD: 1.02 RATIO — SIGNIFICANT CHANGE UP (ref 0.85–1.18)
MAGNESIUM SERPL-MCNC: 1.7 MG/DL — SIGNIFICANT CHANGE UP (ref 1.6–2.6)
MCHC RBC-ENTMCNC: 29.1 PG — SIGNIFICANT CHANGE UP (ref 27–34)
MCHC RBC-ENTMCNC: 29.9 PG — SIGNIFICANT CHANGE UP (ref 27–34)
MCHC RBC-ENTMCNC: 33.2 GM/DL — SIGNIFICANT CHANGE UP (ref 32–36)
MCHC RBC-ENTMCNC: 33.2 GM/DL — SIGNIFICANT CHANGE UP (ref 32–36)
MCV RBC AUTO: 87.7 FL — SIGNIFICANT CHANGE UP (ref 80–100)
MCV RBC AUTO: 90.1 FL — SIGNIFICANT CHANGE UP (ref 80–100)
NRBC # BLD: 0 /100 WBCS — SIGNIFICANT CHANGE UP (ref 0–0)
NRBC # BLD: 0 /100 WBCS — SIGNIFICANT CHANGE UP (ref 0–0)
NRBC # FLD: 0 K/UL — SIGNIFICANT CHANGE UP (ref 0–0)
NRBC # FLD: 0 K/UL — SIGNIFICANT CHANGE UP (ref 0–0)
PHOSPHATE SERPL-MCNC: 4.5 MG/DL — SIGNIFICANT CHANGE UP (ref 2.5–4.5)
PLATELET # BLD AUTO: 446 K/UL — HIGH (ref 150–400)
PLATELET # BLD AUTO: 460 K/UL — HIGH (ref 150–400)
POTASSIUM SERPL-MCNC: 3.3 MMOL/L — LOW (ref 3.5–5.3)
POTASSIUM SERPL-MCNC: 4.7 MMOL/L — SIGNIFICANT CHANGE UP (ref 3.5–5.3)
POTASSIUM SERPL-SCNC: 3.3 MMOL/L — LOW (ref 3.5–5.3)
POTASSIUM SERPL-SCNC: 4.7 MMOL/L — SIGNIFICANT CHANGE UP (ref 3.5–5.3)
PROTHROM AB SERPL-ACNC: 11.5 SEC — SIGNIFICANT CHANGE UP (ref 9.5–13)
RBC # BLD: 3.09 M/UL — LOW (ref 3.8–5.2)
RBC # BLD: 3.24 M/UL — LOW (ref 3.8–5.2)
RBC # FLD: 13.1 % — SIGNIFICANT CHANGE UP (ref 10.3–14.5)
RBC # FLD: 16 % — HIGH (ref 10.3–14.5)
SODIUM SERPL-SCNC: 136 MMOL/L — SIGNIFICANT CHANGE UP (ref 135–145)
SODIUM SERPL-SCNC: 138 MMOL/L — SIGNIFICANT CHANGE UP (ref 135–145)
SPECIMEN SOURCE: SIGNIFICANT CHANGE UP
SURGICAL PATHOLOGY STUDY: SIGNIFICANT CHANGE UP
WBC # BLD: 11.48 K/UL — HIGH (ref 3.8–10.5)
WBC # BLD: 6.69 K/UL — SIGNIFICANT CHANGE UP (ref 3.8–10.5)
WBC # FLD AUTO: 11.48 K/UL — HIGH (ref 3.8–10.5)
WBC # FLD AUTO: 6.69 K/UL — SIGNIFICANT CHANGE UP (ref 3.8–10.5)

## 2024-06-14 PROCEDURE — 15757 FREE SKIN FLAP MICROVASC: CPT | Mod: 58

## 2024-06-14 PROCEDURE — 14301 TIS TRNFR ANY 30.1-60 SQ CM: CPT | Mod: 78

## 2024-06-14 PROCEDURE — 40842 RECONSTRUCTION OF MOUTH: CPT | Mod: 78

## 2024-06-14 PROCEDURE — 15004 WOUND PREP F/N/HF/G: CPT | Mod: 78

## 2024-06-14 PROCEDURE — 14302 TIS TRNFR ADDL 30 SQ CM: CPT | Mod: 78

## 2024-06-14 PROCEDURE — 71045 X-RAY EXAM CHEST 1 VIEW: CPT | Mod: 26

## 2024-06-14 PROCEDURE — 99232 SBSQ HOSP IP/OBS MODERATE 35: CPT | Mod: GC

## 2024-06-14 PROCEDURE — 15005 WND PREP F/N/HF/G ADDL CM: CPT | Mod: 78

## 2024-06-14 PROCEDURE — 88300 SURGICAL PATH GROSS: CPT | Mod: 26

## 2024-06-14 DEVICE — COUPLER VESSEL MICROVASC ANAST 2MM GRN: Type: IMPLANTABLE DEVICE | Status: FUNCTIONAL

## 2024-06-14 DEVICE — CLIP APPLIER COVIDIEN SURGICLIP 11.5" MEDIUM: Type: IMPLANTABLE DEVICE | Status: FUNCTIONAL

## 2024-06-14 DEVICE — LIGATING CLIPS WECK HORIZON MEDIUM (BLUE) 24: Type: IMPLANTABLE DEVICE | Status: FUNCTIONAL

## 2024-06-14 DEVICE — CATH FOGARTY 3FR X 80CM: Type: IMPLANTABLE DEVICE | Status: FUNCTIONAL

## 2024-06-14 DEVICE — COUPLER VESSEL ANASTOMOTIC 2.5MM: Type: IMPLANTABLE DEVICE | Status: FUNCTIONAL

## 2024-06-14 DEVICE — CLIP APPLIER COVIDIEN SURGICLIP III 9" SM: Type: IMPLANTABLE DEVICE | Status: FUNCTIONAL

## 2024-06-14 RX ORDER — HYDRALAZINE HYDROCHLORIDE 50 MG/1
5 TABLET ORAL ONCE
Refills: 0 | Status: COMPLETED | OUTPATIENT
Start: 2024-06-14 | End: 2024-06-14

## 2024-06-14 RX ORDER — MAGNESIUM SULFATE 100 %
2 POWDER (GRAM) MISCELLANEOUS ONCE
Refills: 0 | Status: COMPLETED | OUTPATIENT
Start: 2024-06-14 | End: 2024-06-14

## 2024-06-14 RX ADMIN — IPRATROPIUM BROMIDE AND ALBUTEROL SULFATE 3 MILLILITER(S): .5; 3 SOLUTION RESPIRATORY (INHALATION) at 22:22

## 2024-06-14 RX ADMIN — Medication 3 MILLILITER(S): at 22:38

## 2024-06-14 RX ADMIN — Medication 3 MILLILITER(S): at 05:48

## 2024-06-14 RX ADMIN — ACETYLCYSTEINE 4 MILLILITER(S): 200 SOLUTION ORAL; RESPIRATORY (INHALATION) at 03:24

## 2024-06-14 RX ADMIN — Medication 975 MILLIGRAM(S): at 23:17

## 2024-06-14 RX ADMIN — DEXTROSE MONOHYDRATE AND SODIUM CHLORIDE 100 MILLILITER(S): 5; .3 INJECTION, SOLUTION INTRAVENOUS at 20:27

## 2024-06-14 RX ADMIN — Medication 975 MILLIGRAM(S): at 05:48

## 2024-06-14 RX ADMIN — AMPICILLIN AND SULBACTAM 100 GRAM(S): 2; 1 INJECTION, POWDER, FOR SOLUTION INTRAMUSCULAR; INTRAVENOUS at 23:18

## 2024-06-14 RX ADMIN — BUPROPION HYDROCHLORIDE 150 MILLIGRAM(S): 150 TABLET, EXTENDED RELEASE ORAL at 21:59

## 2024-06-14 RX ADMIN — IPRATROPIUM BROMIDE AND ALBUTEROL SULFATE 3 MILLILITER(S): .5; 3 SOLUTION RESPIRATORY (INHALATION) at 03:25

## 2024-06-14 RX ADMIN — DEXTROSE MONOHYDRATE AND SODIUM CHLORIDE 100 MILLILITER(S): 5; .3 INJECTION, SOLUTION INTRAVENOUS at 18:04

## 2024-06-14 RX ADMIN — RISPERIDONE 3 MILLIGRAM(S): 0.5 TABLET ORAL at 22:21

## 2024-06-14 RX ADMIN — AMPICILLIN AND SULBACTAM 100 GRAM(S): 2; 1 INJECTION, POWDER, FOR SOLUTION INTRAMUSCULAR; INTRAVENOUS at 05:48

## 2024-06-14 RX ADMIN — HYDRALAZINE HYDROCHLORIDE 5 MILLIGRAM(S): 50 TABLET ORAL at 20:30

## 2024-06-14 RX ADMIN — Medication 15 MILLILITER(S): at 21:59

## 2024-06-14 RX ADMIN — DEXTROSE MONOHYDRATE AND SODIUM CHLORIDE 100 MILLILITER(S): 5; .3 INJECTION, SOLUTION INTRAVENOUS at 01:04

## 2024-06-14 RX ADMIN — AMPICILLIN AND SULBACTAM 100 GRAM(S): 2; 1 INJECTION, POWDER, FOR SOLUTION INTRAMUSCULAR; INTRAVENOUS at 18:04

## 2024-06-14 RX ADMIN — Medication 15 MILLILITER(S): at 18:04

## 2024-06-14 RX ADMIN — Medication 25 GRAM(S): at 20:27

## 2024-06-14 RX ADMIN — Medication 975 MILLIGRAM(S): at 01:05

## 2024-06-14 RX ADMIN — Medication 100 MILLIGRAM(S): at 22:00

## 2024-06-14 RX ADMIN — AMLODIPINE BESYLATE 10 MILLIGRAM(S): 2.5 TABLET ORAL at 05:49

## 2024-06-14 NOTE — PROGRESS NOTE ADULT - ASSESSMENT
A/P:  64F w/ PMH of depression and schizophrenia s/p R partial glossectomy in the setting of SCCA, R SND I-IV, L RFFF, L thigh STSG, tracheostomy (6/6/24). Pts post-operative course c/b venous congestion of flap w/ RTOR on 6/11 for free flap salvage and revision of venous anastamosis.     Plan:  -OR today w/ PRS for repeat free flap reconstruction  -Duonebs/ Mucomyst  -Chest PT  -OOBTC  -C/w IV Unasyn  -PT: Rehab facility      Lokesh Cunningham DMD  Oral and Maxillofacial Surgery  [facility] Pager [phone]  Available on [employer] Teams

## 2024-06-14 NOTE — PRE-OP CHECKLIST - 1.
keofed tube in right nare,trach shield with humidified o2@6 liters,staples noted in right side of neck, left thigh clear dressing noted,ace wrap noted on left wrist

## 2024-06-14 NOTE — CONSULT NOTE ADULT - SUBJECTIVE AND OBJECTIVE BOX
Glossectomy neck disccsection trach on 6/6 post op flap had a thrombosis s/p TPA s/p exploration of tongue free flap now s/p muscle flap revision with right thigh flap  Currently admission flap and neck wound infected with  ecoli  7.5 trach in place exchanged to 7.0 cuffed trach placed and sutured                                                                  =====================================================                                                             SICU Consultation Note                                                             =====================================================  Patient is a 64y old  Female who presents with a chief complaint of ENT (13 Jun 2024 14:04)    HPI: 65 y/o female with schizophrenia and depression s/p R partial glossectomy in the setting of SCCA, R SND I-IV, L RFFF, L thigh STSG, tracheostomy (6/6/24). Pts post-operative course c/b venous congestion of flap w/ RTOR on 6/11 for free flap salvage and revision of venous anastamosis. Flap now infected with E Coli also without flow patient returned to OR today s/p exploration of tongue free flap s/p muscle flap revision with right thigh flap, 7.5 trach exchanged to 7.0 cuffed trach placed and sutured, SICU consulted for flap checks    Surgery Information  ***  1 U of PRBC, 3 L Crystalloids 500 cc albumin Urine 300 urine  22 g Left AC, 18G midline LUE L femoral a line    HISTORY  64y Female  Allergies: penicillins (Unknown)    PAST MEDICAL & SURGICAL HISTORY:  Tongue cancer  Schizophrenia  Anxiety and depression  Other diseases of tongue  H/O hand surgery  Cancer determined by biopsy of tongue  S/P cataract extrction  H/O colonoscopy  FAMILY HISTORY:  FH: uterine cancer (Mother)  FH: prostate cancer (Sibling)  FH: suicide (Sibling)  FH: chronic renal disease (Father)    SOCIAL HISTORY:    ADVANCE DIRECTIVES: Presumed Full Code    REVIEW OF SYSTEMS:    General: Non-Contributory  Skin/Breast: Non-Contributory  Ophthalmologic: Non-Contributory  ENMT: Non-Contributory  Respiratory and Thorax: Non-Contributory  Cardiovascular: Non-Contributory  Gastrointestinal: Non-Contributory  Genitourinary: Non-Contributory  Musculoskeletal: Non-Contributory  Neurological: Non-Contributory  Psychiatric: Non-Contributory  Hematology/Lymphatics: Non-Contributory  Endocrine: Non-Contributory  Allergic/Immunologic: Non-Contributory  HOME MEDICATIONS:   CURRENT MEDICATIONS:   --------------------------------------------------------------------------------------  Neurologic Medications  acetaminophen   Oral Liquid .. 975 milliGRAM(s) Oral every 6 hours  buPROPion . 300 milliGRAM(s) Oral <User Schedule>  buPROPion . 150 milliGRAM(s) Oral at bedtime  oxyCODONE    Solution 2.5 milliGRAM(s) Oral every 4 hours PRN Moderate Pain (4 - 6)  oxyCODONE    Solution 5 milliGRAM(s) Oral every 4 hours PRN Severe Pain (7 - 10)  QUEtiapine 100 milliGRAM(s) Oral at bedtime  risperiDONE   Solution 1 milliGRAM(s) Oral <User Schedule>  risperiDONE   Solution 3 milliGRAM(s) Oral at bedtime  Respiratory Medications  acetylcysteine 10%  Inhalation 4 milliLiter(s) Inhalation every 6 hours  albuterol/ipratropium for Nebulization 3 milliLiter(s) Nebulizer every 6 hours  Cardiovascular Medications  amLODIPine   Tablet 10 milliGRAM(s) Oral daily  Gastrointestinal Medications  lactated ringers. 1000 milliLiter(s) IV Continuous <Continuous>  pantoprazole   Suspension 40 milliGRAM(s) Oral daily  polyethylene glycol 3350 17 Gram(s) Oral daily  senna Syrup 10 milliLiter(s) Oral daily  sodium chloride 0.9% lock flush 3 milliLiter(s) IV Push every 8 hours  Hematologic/Oncologic Medications  enoxaparin Injectable 40 milliGRAM(s) SubCutaneous every 24 hours  Antimicrobial/Immunologic Medications  ampicillin/sulbactam  IVPB 1.5 Gram(s) IV Intermittent every 6 hours  Topical/Other Medications  chlorhexidine 0.12% Liquid 15 milliLiter(s) Swish and Spit <User Schedule>  chlorhexidine 2% Cloths 1 Application(s) Topical daily    --------------------------------------------------------------------------------------  VITAL SIGNS, INS/OUTS (last 24 hours):  PENDING    EXAM  NEUROLOGY  Exam: Normal, NAD,  HEENT  Exam:  RESPIRATORY  Exam: Lungs clear to auscultation, Normal expansion/effort.    Mechanical Ventilation:   CARDIOVASCULAR  Exam: S1, S2.  Regular rate and rhythm.  GI/NUTRITION  Exam: Abdomen soft, Non-tender, Non-distended.  Current Diet:  NPO  VASCULAR  Exam: Extremities warm, pink, well-perfused.   MUSCULOSKELETAL  Exam: All extremities moving spontaneously without limitations.    SKIN:  Exam: Good skin turgor, no skin breakdown.    METABOLIC/FLUIDS/ELECTROLYTES  lactated ringers. 1000 milliLiter(s) IV Continuous <Continuous>  sodium chloride 0.9% lock flush 3 milliLiter(s) IV Push every 8 hours    HEMATOLOGIC  [x] DVT Prophylaxis: enoxaparin Injectable 40 milliGRAM(s) SubCutaneous every 24 hours    Transfusions:	[] PRBC	[] Platelets		[] FFP	[] Cryoprecipitate  INFECTIOUS DISEASE  Antimicrobials/Immunologic Medications:  ampicillin/sulbactam  IVPB 1.5 Gram(s) IV Intermittent every 6 hours    Day #  	of    ***  Tubes/Lines/Drains  ***  [x] Peripheral IV  [] Central Venous Line     	[] R	[] L	[] IJ	[] Fem	[] SC	Date Placed:   [] Arterial Line		[] R	[] L	[] Fem	[] Rad	[] Ax	Date Placed:   [] PICC:         	[] Midline		[] Mediport  [] Urinary Catheter		Date Placed:     LABS  PENDING       Glossectomy neck disccsection trach on 6/6 post op flap had a thrombosis s/p TPA s/p exploration of tongue free flap now s/p muscle flap revision with right thigh flap  Currently admission flap and neck wound infected with  ecoli  7.5 trach in place exchanged to 7.0 cuffed trach placed and sutured                                                                  =====================================================                                                             SICU Consultation Note                                                             =====================================================  Patient is a 64y old  Female who presents with a chief complaint of ENT (13 Jun 2024 14:04)    HPI: 65 y/o female with schizophrenia and depression s/p R partial glossectomy in the setting of SCCA, R SND I-IV, L RFFF, L thigh STSG, tracheostomy (6/6/24). Pts post-operative course c/b venous congestion of flap w/ RTOR on 6/11 for free flap salvage and revision of venous anastamosis. Flap now infected with E Coli also without flow patient returned to OR today s/p exploration of tongue free flap s/p muscle flap revision with right thigh flap, 7.5 trach exchanged to 7.0 cuffed trach placed and sutured, SICU consulted for flap checks    Surgery Information  ***  1 U of PRBC, 3 L Crystalloids 500 cc albumin Urine 300 urine  22 g Left AC, 18G midline LUE L femoral a line    HISTORY  64y Female  Allergies: penicillins (Unknown)    PAST MEDICAL & SURGICAL HISTORY:  Tongue cancer  Schizophrenia  Anxiety and depression  Other diseases of tongue  H/O hand surgery  Cancer determined by biopsy of tongue  S/P cataract extrction  H/O colonoscopy  FAMILY HISTORY:  FH: uterine cancer (Mother)  FH: prostate cancer (Sibling)  FH: suicide (Sibling)  FH: chronic renal disease (Father)    SOCIAL HISTORY:    ADVANCE DIRECTIVES: Presumed Full Code    REVIEW OF SYSTEMS:    General: Non-Contributory  Skin/Breast: Non-Contributory  Ophthalmologic: Non-Contributory  ENMT: Non-Contributory  Respiratory and Thorax: Non-Contributory  Cardiovascular: Non-Contributory  Gastrointestinal: Non-Contributory  Genitourinary: Non-Contributory  Musculoskeletal: Non-Contributory  Neurological: Non-Contributory  Psychiatric: Non-Contributory  Hematology/Lymphatics: Non-Contributory  Endocrine: Non-Contributory  Allergic/Immunologic: Non-Contributory  HOME MEDICATIONS:   CURRENT MEDICATIONS:   --------------------------------------------------------------------------------------  Neurologic Medications  acetaminophen   Oral Liquid .. 975 milliGRAM(s) Oral every 6 hours  buPROPion . 300 milliGRAM(s) Oral <User Schedule>  buPROPion . 150 milliGRAM(s) Oral at bedtime  oxyCODONE    Solution 2.5 milliGRAM(s) Oral every 4 hours PRN Moderate Pain (4 - 6)  oxyCODONE    Solution 5 milliGRAM(s) Oral every 4 hours PRN Severe Pain (7 - 10)  QUEtiapine 100 milliGRAM(s) Oral at bedtime  risperiDONE   Solution 1 milliGRAM(s) Oral <User Schedule>  risperiDONE   Solution 3 milliGRAM(s) Oral at bedtime  Respiratory Medications  acetylcysteine 10%  Inhalation 4 milliLiter(s) Inhalation every 6 hours  albuterol/ipratropium for Nebulization 3 milliLiter(s) Nebulizer every 6 hours  Cardiovascular Medications  amLODIPine   Tablet 10 milliGRAM(s) Oral daily  Gastrointestinal Medications  lactated ringers. 1000 milliLiter(s) IV Continuous <Continuous>  pantoprazole   Suspension 40 milliGRAM(s) Oral daily  polyethylene glycol 3350 17 Gram(s) Oral daily  senna Syrup 10 milliLiter(s) Oral daily  sodium chloride 0.9% lock flush 3 milliLiter(s) IV Push every 8 hours  Hematologic/Oncologic Medications  enoxaparin Injectable 40 milliGRAM(s) SubCutaneous every 24 hours  Antimicrobial/Immunologic Medications  ampicillin/sulbactam  IVPB 1.5 Gram(s) IV Intermittent every 6 hours  Topical/Other Medications  chlorhexidine 0.12% Liquid 15 milliLiter(s) Swish and Spit <User Schedule>  chlorhexidine 2% Cloths 1 Application(s) Topical daily    --------------------------------------------------------------------------------------  VITAL SIGNS, INS/OUTS (last 24 hours):  PENDING    EXAM  NEUROLOGY  Exam: Normal, NAD,  HEENT  Exam: strong doppler signal , + KO feeding tube, right neck staples in neck with penrose no erythema, right tongue flap white, native tongue pink  RESPIRATORY  Exam: Lungs clear to auscultation, Normal expansion/effort.    Mechanical Ventilation: trach collar  CARDIOVASCULAR  Exam: S1, S2.  Regular rate and rhythm.  GI/NUTRITION  Exam: Abdomen soft, Non-tender, Non-distended.  Current Diet:  NPO  VASCULAR  Exam: Extremities warm, pink, well-perfused.   MUSCULOSKELETAL  Exam: All extremities moving spontaneously without limitations.    SKIN:  Exam: Good skin turgor, no skin breakdown.    METABOLIC/FLUIDS/ELECTROLYTES  lactated ringers. 1000 milliLiter(s) IV Continuous <Continuous>  sodium chloride 0.9% lock flush 3 milliLiter(s) IV Push every 8 hours    HEMATOLOGIC  [x] DVT Prophylaxis: enoxaparin Injectable 40 milliGRAM(s) SubCutaneous every 24 hours    Transfusions:	[] PRBC	[] Platelets		[] FFP	[] Cryoprecipitate  INFECTIOUS DISEASE  Antimicrobials/Immunologic Medications:  ampicillin/sulbactam  IVPB 1.5 Gram(s) IV Intermittent every 6 hours    Day #  	of    ***  Tubes/Lines/Drains  ***  [x] Peripheral IV  [] Central Venous Line     	[] R	[] L	[] IJ	[] Fem	[] SC	Date Placed:   [] Arterial Line		[] R	[] L	[] Fem	[] Rad	[] Ax	Date Placed:   [] PICC:         	[] Midline		[] Mediport  [] Urinary Catheter		Date Placed:     LABS  PENDING       Glossectomy neck dissection trach on 6/6 post op flap had a thrombosis s/p TPA s/p exploration of tongue free flap now s/p muscle flap revision with right thigh flap  Currently admission flap and neck wound infected with  ecoli  7.5 trach in place exchanged to 7.0 cuffed trach placed and sutured                                                                  =====================================================                                                             SICU Consultation Note                                                             =====================================================  Patient is a 64y old  Female who presents with a chief complaint of ENT (13 Jun 2024 14:04)    HPI: 63 y/o female with schizophrenia and depression s/p R partial glossectomy in the setting of SCCA, R SND I-IV, L RFFF, L thigh STSG, tracheostomy (6/6/24). Pts post-operative course c/b venous congestion of flap w/ RTOR on 6/11 for free flap salvage and revision of venous anastamosis. Flap now infected with E Coli also without flow patient returned to OR today s/p exploration of tongue free flap s/p muscle flap revision with right thigh flap, 7.5 trach exchanged to 7.0 cuffed trach placed and sutured, SICU consulted for flap checks    Surgery Information  ***  1 U of PRBC, 3 L Crystalloids 500 cc albumin Urine 300 urine  22 g Left AC, 18G midline LUE L femoral a line    HISTORY  64y Female  Allergies: penicillins (Unknown)    PAST MEDICAL & SURGICAL HISTORY:  Tongue cancer  Schizophrenia  Anxiety and depression  Other diseases of tongue  H/O hand surgery  Cancer determined by biopsy of tongue  S/P cataract extrction  H/O colonoscopy  FAMILY HISTORY:  FH: uterine cancer (Mother)  FH: prostate cancer (Sibling)  FH: suicide (Sibling)  FH: chronic renal disease (Father)    SOCIAL HISTORY:    ADVANCE DIRECTIVES: Presumed Full Code    REVIEW OF SYSTEMS:    General: Non-Contributory  Skin/Breast: Non-Contributory  Ophthalmologic: Non-Contributory  ENMT: Non-Contributory  Respiratory and Thorax: Non-Contributory  Cardiovascular: Non-Contributory  Gastrointestinal: Non-Contributory  Genitourinary: Non-Contributory  Musculoskeletal: Non-Contributory  Neurological: Non-Contributory  Psychiatric: Non-Contributory  Hematology/Lymphatics: Non-Contributory  Endocrine: Non-Contributory  Allergic/Immunologic: Non-Contributory  HOME MEDICATIONS:   CURRENT MEDICATIONS:   --------------------------------------------------------------------------------------  Neurologic Medications  acetaminophen   Oral Liquid .. 975 milliGRAM(s) Oral every 6 hours  buPROPion . 300 milliGRAM(s) Oral <User Schedule>  buPROPion . 150 milliGRAM(s) Oral at bedtime  oxyCODONE    Solution 2.5 milliGRAM(s) Oral every 4 hours PRN Moderate Pain (4 - 6)  oxyCODONE    Solution 5 milliGRAM(s) Oral every 4 hours PRN Severe Pain (7 - 10)  QUEtiapine 100 milliGRAM(s) Oral at bedtime  risperiDONE   Solution 1 milliGRAM(s) Oral <User Schedule>  risperiDONE   Solution 3 milliGRAM(s) Oral at bedtime  Respiratory Medications  acetylcysteine 10%  Inhalation 4 milliLiter(s) Inhalation every 6 hours  albuterol/ipratropium for Nebulization 3 milliLiter(s) Nebulizer every 6 hours  Cardiovascular Medications  amLODIPine   Tablet 10 milliGRAM(s) Oral daily  Gastrointestinal Medications  lactated ringers. 1000 milliLiter(s) IV Continuous <Continuous>  pantoprazole   Suspension 40 milliGRAM(s) Oral daily  polyethylene glycol 3350 17 Gram(s) Oral daily  senna Syrup 10 milliLiter(s) Oral daily  sodium chloride 0.9% lock flush 3 milliLiter(s) IV Push every 8 hours  Hematologic/Oncologic Medications  enoxaparin Injectable 40 milliGRAM(s) SubCutaneous every 24 hours  Antimicrobial/Immunologic Medications  ampicillin/sulbactam  IVPB 1.5 Gram(s) IV Intermittent every 6 hours  Topical/Other Medications  chlorhexidine 0.12% Liquid 15 milliLiter(s) Swish and Spit <User Schedule>  chlorhexidine 2% Cloths 1 Application(s) Topical daily    --------------------------------------------------------------------------------------  VITAL SIGNS, INS/OUTS (last 24 hours):  PENDING    EXAM  NEUROLOGY  Exam: Normal, NAD,  HEENT  Exam: strong doppler signal , + KO feeding tube, right neck staples in neck with penrose no erythema, right tongue flap white, native tongue pink  RESPIRATORY  Exam: Lungs clear to auscultation, Normal expansion/effort.    Mechanical Ventilation: trach collar  CARDIOVASCULAR  Exam: S1, S2.  Regular rate and rhythm.  GI/NUTRITION  Exam: Abdomen soft, Non-tender, Non-distended.  Current Diet:  NPO  VASCULAR  Exam: Extremities warm, pink, well-perfused.   MUSCULOSKELETAL  Exam: All extremities moving spontaneously without limitations.    SKIN:  Exam: Good skin turgor, no skin breakdown.    METABOLIC/FLUIDS/ELECTROLYTES  lactated ringers. 1000 milliLiter(s) IV Continuous <Continuous>  sodium chloride 0.9% lock flush 3 milliLiter(s) IV Push every 8 hours    HEMATOLOGIC  [x] DVT Prophylaxis: enoxaparin Injectable 40 milliGRAM(s) SubCutaneous every 24 hours    Transfusions:	[] PRBC	[] Platelets		[] FFP	[] Cryoprecipitate  INFECTIOUS DISEASE  Antimicrobials/Immunologic Medications:  ampicillin/sulbactam  IVPB 1.5 Gram(s) IV Intermittent every 6 hours    Day #  	of    ***  Tubes/Lines/Drains  ***  [x] Peripheral IV  [] Central Venous Line     	[] R	[] L	[] IJ	[] Fem	[] SC	Date Placed:   [] Arterial Line		[] R	[] L	[] Fem	[] Rad	[] Ax	Date Placed:   [] PICC:         	[] Midline		[] Mediport  [] Urinary Catheter		Date Placed:     LABS  PENDING

## 2024-06-14 NOTE — CHART NOTE - NSCHARTNOTEFT_GEN_A_CORE
General Surgery Post op Check    Pt s/p right alt for revision hemiglossal recon. Pt seen and examined without complaints.     Vital Signs Last 24 Hrs  T(C): 36.7 (14 Jun 2024 20:00), Max: 37.4 (14 Jun 2024 17:40)  T(F): 98.1 (14 Jun 2024 20:00), Max: 99.4 (14 Jun 2024 17:40)  HR: 75 (14 Jun 2024 22:22) (68 - 123)  BP: 147/98 (14 Jun 2024 20:00) (134/87 - 155/89)  BP(mean): 113 (14 Jun 2024 20:00) (102 - 113)  RR: 15 (14 Jun 2024 20:00) (14 - 24)  SpO2: 94% (14 Jun 2024 22:22) (94% - 100%)    Parameters below as of 14 Jun 2024 22:22  Patient On (Oxygen Delivery Method): tracheostomy collar        I&O's Summary    13 Jun 2024 07:01  -  14 Jun 2024 07:00  --------------------------------------------------------  IN: 1770 mL / OUT: 1353 mL / NET: 417 mL    14 Jun 2024 07:01  -  14 Jun 2024 22:50  --------------------------------------------------------  IN: 480 mL / OUT: 160 mL / NET: 320 mL        Physical Exam  Gen: NAD, A&Ox3  HEENT: neck swollen but no palpale fluid collection. Flap soft, warm, good color. Cook signal strong  RLE thigh soft, SUZANNE drain SSO. Ace cdi      A/P: 64y Female s/p above    - Keep systolic <140  - q1h flap checks  - NPO  - Peridex mouthwas for at least 1 wk  - BID neck irrigation through penrose, done by PRS    Ladonna Catherine MD  Plastic and Reconstructive Surgery, PGY-1  [facility]: [id number]  [facility]: [phone]

## 2024-06-14 NOTE — PRE-OP CHECKLIST - SURGICAL CONSENT
Left message via home/cell phone number requesting that patient call me back regarding her lab results. Provided office phone number as well. done

## 2024-06-14 NOTE — CONSULT NOTE ADULT - ASSESSMENT
65 y/o female with schizophrenia and depression s/p R partial glossectomy in the setting of SCCA, R SND I-IV, L RFFF, L thigh STSG, tracheostomy (6/6/24). Pts post-operative course c/b venous congestion of flap w/ RTOR on 6/11 for free flap salvage and revision of venous anastamosis. Flap now infected with E Coli also without flow patient returned to OR today s/p exploration of tongue free flap s/p muscle flap revision with right thigh flap, 7.5 trach exchanged to 7.0 cuffed trach placed and sutured, SICU consulted for flap checks    PLAN:   Neurologic:   - AOx4 at baseline  - flat effect   - no sedation at this time  - Pain control: Tylenol, oxy prn  - psych meds started: Risperidone, Seroquel Wellbutrin     Respiratory:   - SaO2 goal > 92%  - trach collar 7.0 cuffed trach  - Mucomyst 4 q 6 hours    Cardiovascular:   - MAP goal > 65  - Continue amlodipine    Gastrointestinal/Nutrition:   - NPO, resume tube feeds  - Bowel regimine     Renal/Genitourinary:   - LR @ 100    Hematologic:   - Monitor H&H  - DVT ppx: Lovenox, SCDs    Infectious Disease:   - E Coli in flap, continue unasyn    Tubes/Lines/Drains:   - PIVs    Endocrine:   - Monitor blood glucose levels     Disposition: PACU  63 y/o female with schizophrenia and depression s/p R partial glossectomy in the setting of SCCA, R SND I-IV, L RFFF, L thigh STSG, tracheostomy (6/6/24). Pts post-operative course c/b venous congestion of flap w/ RTOR on 6/11 for free flap salvage and revision of venous anastamosis. Flap now infected with E Coli also without flow patient returned to OR today s/p exploration of tongue free flap s/p muscle flap revision with right thigh flap, 7.5 trach exchanged to 7.0 cuffed trach placed and sutured, SICU consulted for flap checks    PLAN:   Neurologic:   - AOx4 at baseline  - flat effect   - no sedation at this time  - Pain control: Tylenol, oxy prn  - psych meds started: Risperidone, Seroquel Wellbutrin  - q 1 hour flap checks     Respiratory:   - SaO2 goal > 92%  - trach collar 7.0 cuffed trach  - Mucomyst 4 q 6 hours    Cardiovascular:   - MAP goal > 65  - Continue amlodipine    Gastrointestinal/Nutrition:   - NPO, resume tube feeds in am  - Bowel regimen  - protonix per flap protocol     Renal/Genitourinary:   - LR @ 100    Hematologic:   - Monitor H&H  - DVT ppx: Lovenox, SCDs    Infectious Disease:   - E Coli in flap, continue unasyn    Tubes/Lines/Drains:   - PIVs    Endocrine:   - Monitor blood glucose levels     Disposition: PACU  65 y/o female with schizophrenia and depression s/p R partial glossectomy in the setting of SCCA, R SND I-IV, L RFFF, L thigh STSG, tracheostomy (6/6/24). Pts post-operative course c/b venous congestion of flap w/ RTOR on 6/11 for free flap salvage and revision of venous anastomosis Flap now infected with E Coli also without flow patient returned to OR today s/p exploration of tongue free flap s/p muscle flap revision with right thigh flap, 7.5 trach exchanged to 7.0 cuffed trach placed and sutured, SICU consulted for flap checks    PLAN:   Neurologic:   - AOx4 at baseline  - flat effect   - no sedation at this time  - Pain control: Tylenol, oxy prn  - psych meds started: Risperidone, Seroquel Wellbutrin  - q 1 hour flap checks     Respiratory:   - SaO2 goal > 92%  - trach collar 7.0 cuffed trach  - Mucomyst 4 q 6 hours    Cardiovascular:   - MAP goal > 65  - Continue amlodipine    Gastrointestinal/Nutrition:   - NPO, resume tube feeds in am  - Bowel regimen  - protonix per flap protocol     Renal/Genitourinary:   - LR @ 100    Hematologic:   - Monitor H&H  - DVT ppx: Lovenox, SCDs    Infectious Disease:   - E Coli in flap, continue unasyn    Tubes/Lines/Drains:   - PIVs    Endocrine:   - Monitor blood glucose levels     Disposition: PACU

## 2024-06-14 NOTE — PROGRESS NOTE ADULT - SUBJECTIVE AND OBJECTIVE BOX
64F w/ PMH of depression and schizophrenia s/p R partial glossectomy in the setting of SCCA, R SND I-IV, L RFFF, L thigh STSG, tracheostomy (6/6/24). Pts post-operative course c/b venous congestion of flap w/ RTOR on 6/11 for free flap salvage and revision of venous anastamosis.       Interval Events: NAEON. aVSS.       Vital Signs Last 24 Hrs  T(C): 36.8 (14 Jun 2024 06:04), Max: 36.9 (13 Jun 2024 18:00)  T(F): 98.3 (14 Jun 2024 06:04), Max: 98.5 (13 Jun 2024 18:00)  HR: 112 (14 Jun 2024 06:04) (85 - 116)  BP: 142/82 (14 Jun 2024 06:04) (128/74 - 142/82)  BP(mean): --  RR: 18 (14 Jun 2024 06:04) (18 - 19)  SpO2: 95% (14 Jun 2024 06:04) (94% - 100%)    Parameters below as of 14 Jun 2024 05:30  Patient On (Oxygen Delivery Method): tracheostomy collar  O2 Flow (L/min): 7  O2 Concentration (%): 30      I&O's Detail    13 Jun 2024 07:01  -  14 Jun 2024 07:00  --------------------------------------------------------  IN:    Enteral Tube Flush: 120 mL    Lactated Ringers: 1650 mL  Total IN: 1770 mL    OUT:    Bulb (mL): 3 mL    Indwelling Catheter - Urethral (mL): 1350 mL    Oral Fluid: 0 mL    VAC (Vacuum Assisted Closure) System (mL): 0 mL  Total OUT: 1353 mL    Total NET: 417 mL          Medications:    MEDICATIONS  (STANDING):  acetaminophen   Oral Liquid .. 975 milliGRAM(s) Oral every 6 hours  acetylcysteine 10%  Inhalation 4 milliLiter(s) Inhalation every 6 hours  albuterol/ipratropium for Nebulization 3 milliLiter(s) Nebulizer every 6 hours  amLODIPine   Tablet 10 milliGRAM(s) Oral daily  ampicillin/sulbactam  IVPB 1.5 Gram(s) IV Intermittent every 6 hours  buPROPion . 300 milliGRAM(s) Oral <User Schedule>  buPROPion . 150 milliGRAM(s) Oral at bedtime  chlorhexidine 0.12% Liquid 15 milliLiter(s) Swish and Spit <User Schedule>  chlorhexidine 2% Cloths 1 Application(s) Topical daily  enoxaparin Injectable 40 milliGRAM(s) SubCutaneous every 24 hours  lactated ringers. 1000 milliLiter(s) (100 mL/Hr) IV Continuous <Continuous>  pantoprazole   Suspension 40 milliGRAM(s) Oral daily  polyethylene glycol 3350 17 Gram(s) Oral daily  QUEtiapine 100 milliGRAM(s) Oral at bedtime  risperiDONE   Solution 1 milliGRAM(s) Oral <User Schedule>  risperiDONE   Solution 3 milliGRAM(s) Oral at bedtime  senna Syrup 10 milliLiter(s) Oral daily  sodium chloride 0.9% lock flush 3 milliLiter(s) IV Push every 8 hours    MEDICATIONS  (PRN):  oxyCODONE    Solution 2.5 milliGRAM(s) Oral every 4 hours PRN Moderate Pain (4 - 6)  oxyCODONE    Solution 5 milliGRAM(s) Oral every 4 hours PRN Severe Pain (7 - 10)        Labs:                          9.7    6.69  )-----------( 460      ( 14 Jun 2024 05:51 )             29.2       06-14    138  |  104  |  12  ----------------------------<  96  3.3<L>   |  24  |  0.49<L>    Ca    8.6      14 Jun 2024 05:51  Phos  2.5     06-13  Mg     1.90     06-13      PHYSICAL EXAM:  GEN: NAD  NEURO: alert & oriented x 4/4  HEENT: face symmetric, oropharynx moist without exudates, CN VII/XI/XII intact, neck incision c/d/i,  flap purple/black. No bleeding on pinprick  CVS: regular rate and rhythm  Pulm: normal respiratory excursions, not tachypneic, no labored breathing  Abd: soft, non-distended  Ext:  no peripheral edema noted

## 2024-06-15 LAB
ANION GAP SERPL CALC-SCNC: 12 MMOL/L — SIGNIFICANT CHANGE UP (ref 7–14)
BUN SERPL-MCNC: 13 MG/DL — SIGNIFICANT CHANGE UP (ref 7–23)
CALCIUM SERPL-MCNC: 8.3 MG/DL — LOW (ref 8.4–10.5)
CHLORIDE SERPL-SCNC: 102 MMOL/L — SIGNIFICANT CHANGE UP (ref 98–107)
CO2 SERPL-SCNC: 23 MMOL/L — SIGNIFICANT CHANGE UP (ref 22–31)
CREAT SERPL-MCNC: 0.6 MG/DL — SIGNIFICANT CHANGE UP (ref 0.5–1.3)
EGFR: 100 ML/MIN/1.73M2 — SIGNIFICANT CHANGE UP
GLUCOSE SERPL-MCNC: 95 MG/DL — SIGNIFICANT CHANGE UP (ref 70–99)
HCT VFR BLD CALC: 28 % — LOW (ref 34.5–45)
HGB BLD-MCNC: 9.7 G/DL — LOW (ref 11.5–15.5)
MAGNESIUM SERPL-MCNC: 2.3 MG/DL — SIGNIFICANT CHANGE UP (ref 1.6–2.6)
MCHC RBC-ENTMCNC: 30.1 PG — SIGNIFICANT CHANGE UP (ref 27–34)
MCHC RBC-ENTMCNC: 34.6 GM/DL — SIGNIFICANT CHANGE UP (ref 32–36)
MCV RBC AUTO: 87 FL — SIGNIFICANT CHANGE UP (ref 80–100)
NRBC # BLD: 0 /100 WBCS — SIGNIFICANT CHANGE UP (ref 0–0)
NRBC # FLD: 0 K/UL — SIGNIFICANT CHANGE UP (ref 0–0)
PHOSPHATE SERPL-MCNC: 3.7 MG/DL — SIGNIFICANT CHANGE UP (ref 2.5–4.5)
PLATELET # BLD AUTO: 454 K/UL — HIGH (ref 150–400)
POTASSIUM SERPL-MCNC: 4.2 MMOL/L — SIGNIFICANT CHANGE UP (ref 3.5–5.3)
POTASSIUM SERPL-SCNC: 4.2 MMOL/L — SIGNIFICANT CHANGE UP (ref 3.5–5.3)
RBC # BLD: 3.22 M/UL — LOW (ref 3.8–5.2)
RBC # FLD: 16.1 % — HIGH (ref 10.3–14.5)
SODIUM SERPL-SCNC: 137 MMOL/L — SIGNIFICANT CHANGE UP (ref 135–145)
WBC # BLD: 10.67 K/UL — HIGH (ref 3.8–10.5)
WBC # FLD AUTO: 10.67 K/UL — HIGH (ref 3.8–10.5)

## 2024-06-15 PROCEDURE — 99232 SBSQ HOSP IP/OBS MODERATE 35: CPT | Mod: GC

## 2024-06-15 PROCEDURE — 99254 IP/OBS CNSLTJ NEW/EST MOD 60: CPT | Mod: GC

## 2024-06-15 RX ORDER — HYDROMORPHONE HCL 0.2 MG/ML
0.2 INJECTION, SOLUTION INTRAVENOUS EVERY 4 HOURS
Refills: 0 | Status: DISCONTINUED | OUTPATIENT
Start: 2024-06-15 | End: 2024-06-15

## 2024-06-15 RX ORDER — OXYCODONE HYDROCHLORIDE 100 MG/5ML
10 SOLUTION ORAL EVERY 4 HOURS
Refills: 0 | Status: DISCONTINUED | OUTPATIENT
Start: 2024-06-15 | End: 2024-06-22

## 2024-06-15 RX ORDER — PANTOPRAZOLE SODIUM 40 MG/10ML
40 INJECTION, POWDER, FOR SOLUTION INTRAVENOUS DAILY
Refills: 0 | Status: DISCONTINUED | OUTPATIENT
Start: 2024-06-15 | End: 2024-07-06

## 2024-06-15 RX ORDER — AMPICILLIN AND SULBACTAM 2; 1 G/20ML; G/20ML
3 INJECTION, POWDER, FOR SOLUTION INTRAMUSCULAR; INTRAVENOUS EVERY 6 HOURS
Refills: 0 | Status: DISCONTINUED | OUTPATIENT
Start: 2024-06-15 | End: 2024-06-21

## 2024-06-15 RX ORDER — HYDROMORPHONE HCL 0.2 MG/ML
0.5 INJECTION, SOLUTION INTRAVENOUS EVERY 4 HOURS
Refills: 0 | Status: DISCONTINUED | OUTPATIENT
Start: 2024-06-15 | End: 2024-06-15

## 2024-06-15 RX ORDER — OXYCODONE HYDROCHLORIDE 100 MG/5ML
5 SOLUTION ORAL EVERY 4 HOURS
Refills: 0 | Status: DISCONTINUED | OUTPATIENT
Start: 2024-06-15 | End: 2024-06-22

## 2024-06-15 RX ADMIN — Medication 1 APPLICATION(S): at 12:47

## 2024-06-15 RX ADMIN — IPRATROPIUM BROMIDE AND ALBUTEROL SULFATE 3 MILLILITER(S): .5; 3 SOLUTION RESPIRATORY (INHALATION) at 04:15

## 2024-06-15 RX ADMIN — PANTOPRAZOLE SODIUM 40 MILLIGRAM(S): 40 INJECTION, POWDER, FOR SOLUTION INTRAVENOUS at 12:46

## 2024-06-15 RX ADMIN — Medication 15 MILLILITER(S): at 18:03

## 2024-06-15 RX ADMIN — IPRATROPIUM BROMIDE AND ALBUTEROL SULFATE 3 MILLILITER(S): .5; 3 SOLUTION RESPIRATORY (INHALATION) at 20:38

## 2024-06-15 RX ADMIN — Medication 15 MILLILITER(S): at 16:23

## 2024-06-15 RX ADMIN — AMPICILLIN AND SULBACTAM 200 GRAM(S): 2; 1 INJECTION, POWDER, FOR SOLUTION INTRAMUSCULAR; INTRAVENOUS at 21:33

## 2024-06-15 RX ADMIN — Medication 975 MILLIGRAM(S): at 13:30

## 2024-06-15 RX ADMIN — Medication 975 MILLIGRAM(S): at 06:00

## 2024-06-15 RX ADMIN — AMLODIPINE BESYLATE 10 MILLIGRAM(S): 2.5 TABLET ORAL at 19:28

## 2024-06-15 RX ADMIN — IPRATROPIUM BROMIDE AND ALBUTEROL SULFATE 3 MILLILITER(S): .5; 3 SOLUTION RESPIRATORY (INHALATION) at 15:22

## 2024-06-15 RX ADMIN — Medication 975 MILLIGRAM(S): at 23:54

## 2024-06-15 RX ADMIN — ACETYLCYSTEINE 4 MILLILITER(S): 200 SOLUTION ORAL; RESPIRATORY (INHALATION) at 04:15

## 2024-06-15 RX ADMIN — OXYCODONE HYDROCHLORIDE 10 MILLIGRAM(S): 100 SOLUTION ORAL at 18:04

## 2024-06-15 RX ADMIN — Medication 3 MILLILITER(S): at 14:02

## 2024-06-15 RX ADMIN — Medication 975 MILLIGRAM(S): at 18:00

## 2024-06-15 RX ADMIN — Medication 975 MILLIGRAM(S): at 19:07

## 2024-06-15 RX ADMIN — IPRATROPIUM BROMIDE AND ALBUTEROL SULFATE 3 MILLILITER(S): .5; 3 SOLUTION RESPIRATORY (INHALATION) at 10:07

## 2024-06-15 RX ADMIN — ACETYLCYSTEINE 4 MILLILITER(S): 200 SOLUTION ORAL; RESPIRATORY (INHALATION) at 20:38

## 2024-06-15 RX ADMIN — AMPICILLIN AND SULBACTAM 100 GRAM(S): 2; 1 INJECTION, POWDER, FOR SOLUTION INTRAMUSCULAR; INTRAVENOUS at 06:00

## 2024-06-15 RX ADMIN — Medication 15 MILLILITER(S): at 23:54

## 2024-06-15 RX ADMIN — Medication 15 MILLILITER(S): at 08:00

## 2024-06-15 RX ADMIN — RISPERIDONE 1 MILLIGRAM(S): 0.5 TABLET ORAL at 08:00

## 2024-06-15 RX ADMIN — OXYCODONE HYDROCHLORIDE 10 MILLIGRAM(S): 100 SOLUTION ORAL at 12:45

## 2024-06-15 RX ADMIN — ENOXAPARIN SODIUM 40 MILLIGRAM(S): 100 INJECTION SUBCUTANEOUS at 13:19

## 2024-06-15 RX ADMIN — Medication 975 MILLIGRAM(S): at 12:45

## 2024-06-15 RX ADMIN — Medication 10 MILLILITER(S): at 12:46

## 2024-06-15 RX ADMIN — ACETYLCYSTEINE 4 MILLILITER(S): 200 SOLUTION ORAL; RESPIRATORY (INHALATION) at 15:23

## 2024-06-15 RX ADMIN — OXYCODONE HYDROCHLORIDE 10 MILLIGRAM(S): 100 SOLUTION ORAL at 19:00

## 2024-06-15 RX ADMIN — POLYETHYLENE GLYCOL 3350 17 GRAM(S): 1 POWDER ORAL at 12:45

## 2024-06-15 RX ADMIN — Medication 15 MILLILITER(S): at 10:30

## 2024-06-15 RX ADMIN — AMPICILLIN AND SULBACTAM 100 GRAM(S): 2; 1 INJECTION, POWDER, FOR SOLUTION INTRAMUSCULAR; INTRAVENOUS at 12:47

## 2024-06-15 RX ADMIN — BUPROPION HYDROCHLORIDE 300 MILLIGRAM(S): 150 TABLET, EXTENDED RELEASE ORAL at 08:51

## 2024-06-15 RX ADMIN — Medication 3 MILLILITER(S): at 06:00

## 2024-06-15 RX ADMIN — OXYCODONE HYDROCHLORIDE 10 MILLIGRAM(S): 100 SOLUTION ORAL at 13:30

## 2024-06-15 RX ADMIN — Medication 15 MILLILITER(S): at 12:45

## 2024-06-15 NOTE — PROGRESS NOTE ADULT - ASSESSMENT
63 y/o female with schizophrenia and depression s/p R partial glossectomy in the setting of SCCA, R SND I-IV, L RFFF, L thigh STSG, tracheostomy (6/6/24). Pts post-operative course c/b venous congestion of flap w/ RTOR on 6/11 for free flap salvage and revision of venous anastamosis. Flap now infected with E Coli also without flow patient returned to OR today s/p exploration of tongue free flap s/p muscle flap revision with right thigh flap, 7.5 trach exchanged to 7.0 cuffed trach placed and sutured, SICU consulted for flap checks    PLAN:   Neurologic:   - AOx4 at baseline  - flat effect   - no sedation at this time  - Pain control: Tylenol, oxy prn  - psych meds started: Risperidone, Seroquel, Wellbutrin  - q 1 hour flap checks     Respiratory:   - SaO2 goal > 92%  - trach collar 7.0 cuffed trach  - Mucomyst 4 q 6 hours    Cardiovascular:   - MAP goal > 65  - Continue amlodipine    Gastrointestinal/Nutrition:   - NPO, on tube feeds  - miralax, senna   - protonix per flap protocol     Renal/Genitourinary:   - LR @ 100    Hematologic:   - Monitor H&H  - DVT ppx: Lovenox, SCDs    Infectious Disease:   - E Coli in flap, continue unasyn    Tubes/Lines/Drains:   - PIVs    Endocrine:   - Monitor blood glucose levels    63 y/o female with schizophrenia and depression s/p R partial glossectomy in the setting of SCCA, R SND I-IV, L RFFF, L thigh STSG, tracheostomy (6/6/24). Pts post-operative course c/b venous congestion of flap w/ RTOR on 6/11 for free flap salvage and revision of venous anastamosis. Flap now infected with E Coli also without flow patient returned to OR today s/p exploration of tongue free flap s/p muscle flap revision with right thigh flap, 7.5 trach exchanged to 7.0 cuffed trach placed and sutured, SICU consulted for flap checks        PLAN:   Neurologic:   - AOx4 at baseline  - flat effect   - Pain control: Tylenol, oxy prn  - psych meds started: Risperidone, Seroquel Wellbutrin  - q 1 hour flap checks     Respiratory:   - SaO2 goal > 92%  - trach collar 7.0 cuffed trach  - Mucomyst 4 q 6 hours    Cardiovascular:   - MAP goal > 65  - Continue amlodipine    Gastrointestinal/Nutrition:   - NPO with TFs  - Bowel regimen  - protonix per flap protocol     Renal/Genitourinary:   - IVL  - remove, paez, f/u TOV  - monitor electrolytes, replete prn    Hematologic:   - Monitor H&H  - DVT ppx: Lovenox, SCDs    Infectious Disease:   - E Coli in flap, continue unasyn  - Plastics recommending ID consult, believed prior flap went down 2/2 infection --> f/u recs    Endocrine:   - Monitor blood glucose levels     Disposition: SICU

## 2024-06-15 NOTE — PROGRESS NOTE ADULT - SUBJECTIVE AND OBJECTIVE BOX
Plastic Surgery Progress Note (pg [facility]: [id number], [facility]: [phone])    SUBJECTIVE  NAEO. AVSS. Pt comfortable in bed. Pain well controlled, no complaints.    OBJECTIVE  ___________________________________________________  VITAL SIGNS / I&O's   Vital Signs Last 24 Hrs  T(C): 36.8 (15 José Miguel 2024 04:00), Max: 37.4 (14 Jun 2024 17:40)  T(F): 98.2 (15 José Miguel 2024 04:00), Max: 99.4 (14 Jun 2024 17:40)  HR: 78 (15 José Miguel 2024 06:00) (68 - 111)  BP: 137/78 (15 José Miguel 2024 06:00) (122/80 - 151/93)  BP(mean): 95 (15 José Miguel 2024 06:00) (93 - 113)  RR: 6 (15 José Miguel 2024 06:00) (6 - 24)  SpO2: 94% (15 José Miguel 2024 06:00) (92% - 99%)    Parameters below as of 15 José Miguel 2024 04:15  Patient On (Oxygen Delivery Method): tracheostomy collar          14 Jun 2024 07:01  -  15 José Miguel 2024 07:00  --------------------------------------------------------  IN:    Enteral Tube Flush: 90 mL    IV PiggyBack: 250 mL    Lactated Ringers: 1400 mL  Total IN: 1740 mL    OUT:    Bulb (mL): 25 mL    Indwelling Catheter - Urethral (mL): 800 mL  Total OUT: 825 mL    Total NET: 915 mL        ___________________________________________________  PHYSICAL EXAM  Gen: NAD, A&Ox3  HEENT: neck swollen but no palpale fluid collection. Flap soft, warm, good color. Cook signal strong  RLE thigh soft, SUZANNE drain SSO. Ace cdi  ___________________________________________________  LABS                        9.7    10.67 )-----------( 454      ( 15 José Miguel 2024 01:25 )             28.0     15 José Miguel 2024 01:25    137    |  102    |  13     ----------------------------<  95     4.2     |  23     |  0.60     Ca    8.3        15 José Miguel 2024 01:25  Phos  3.7       15 José Miguel 2024 01:25  Mg     2.30      15 José Miguel 2024 01:25      PT/INR - ( 14 Jun 2024 05:51 )   PT: 11.5 sec;   INR: 1.02 ratio         PTT - ( 14 Jun 2024 05:51 )  PTT:27.7 sec  CAPILLARY BLOOD GLUCOSE            Urinalysis Basic - ( 15 José Miguel 2024 01:25 )    Color: x / Appearance: x / SG: x / pH: x  Gluc: 95 mg/dL / Ketone: x  / Bili: x / Urobili: x   Blood: x / Protein: x / Nitrite: x   Leuk Esterase: x / RBC: x / WBC x   Sq Epi: x / Non Sq Epi: x / Bacteria: x      ___________________________________________________  MICRO  Recent Cultures:  Specimen Source: .Smear RIGHT NECK CLT, 06-14 @ 12:07; Results   Testing in progress; Gram Stain:   Numerous polymorphonuclear leukocytes per low power field  No organisms seen per oil power field; Organism: --  Specimen Source: .Blood Blood-Peripheral, 06-12 @ 04:58; Results   No growth at 72 Hours; Gram Stain: --; Organism: --  Specimen Source: .Blood Blood-Peripheral, 06-12 @ 03:37; Results   No growth at 72 Hours; Gram Stain: --; Organism: --  Specimen Source: .Surgical Swab RIGHT NECK WOUND, 06-11 @ 05:30; Results   Escherichia coli<!>; Gram Stain: --; Organism: Escherichia coli<!>  Specimen Source: .Blood Blood-Peripheral, 06-08 @ 19:41; Results   No growth at 5 days; Gram Stain: --; Organism: --  Specimen Source: .Blood Blood-Venous, 06-08 @ 18:55; Results   No growth at 5 days; Gram Stain:   Moderate polymorphonuclear leukocytes per low power field  No Squamous epithelial cells per low power field  Rare Gram Positive Rods seen per oil power field  Rare Gram Negative Rods seen per oil power field  Rare Gram positive cocci in pairs seen per oil power field<!>; Organism: --    ___________________________________________________  MEDICATIONS  (STANDING):  acetaminophen   Oral Liquid .. 975 milliGRAM(s) Oral every 6 hours  acetylcysteine 10%  Inhalation 4 milliLiter(s) Inhalation every 6 hours  albuterol/ipratropium for Nebulization 3 milliLiter(s) Nebulizer every 6 hours  amLODIPine   Tablet 10 milliGRAM(s) Oral daily  ampicillin/sulbactam  IVPB 1.5 Gram(s) IV Intermittent every 6 hours  buPROPion . 300 milliGRAM(s) Oral <User Schedule>  buPROPion . 150 milliGRAM(s) Oral at bedtime  chlorhexidine 0.12% Liquid 15 milliLiter(s) Swish and Spit <User Schedule>  chlorhexidine 2% Cloths 1 Application(s) Topical daily  enoxaparin Injectable 40 milliGRAM(s) SubCutaneous every 24 hours  pantoprazole  Injectable 40 milliGRAM(s) IV Push daily  polyethylene glycol 3350 17 Gram(s) Oral daily  QUEtiapine 100 milliGRAM(s) Oral at bedtime  risperiDONE   Solution 1 milliGRAM(s) Oral <User Schedule>  risperiDONE   Solution 3 milliGRAM(s) Oral at bedtime  senna Syrup 10 milliLiter(s) Oral daily  sodium chloride 0.9% lock flush 3 milliLiter(s) IV Push every 8 hours    MEDICATIONS  (PRN):  oxyCODONE    Solution 5 milliGRAM(s) Oral every 4 hours PRN Moderate Pain (4 - 6)  oxyCODONE    Solution 10 milliGRAM(s) Oral every 4 hours PRN Severe Pain (7 - 10)

## 2024-06-15 NOTE — PROGRESS NOTE ADULT - ASSESSMENT
ASSESSMENT/PLAN:   LELA LOWERY is a 64yFemale s/p above    - Keep systolic <140  - q1h flap checks  - NPO with TF  - Peridex mouthwas for at least 1 wk  - BID neck irrigation through penrose, done by MICHELLE Catherine MD  Plastic and Reconstructive Surgery, PGY-1  MAGDALENA: [id number]  [facility]: [phone]

## 2024-06-15 NOTE — CONSULT NOTE ADULT - SUBJECTIVE AND OBJECTIVE BOX
Patient is a 64y old  Female who presents with a chief complaint of Glossectomy neck dissection trach on 6/6 post op flap had a thrombosis s/p TPA s/p exploration of tongue free flap now s/p muscle flap revision with right thigh flap     HPI:  63 y/o female with schizophrenia and depression presents to preop for glossectomy. Patient was found to have a tongue lesion in December 2023 which was biopsied in March 2024 which confirmed squamous cell carcinoma of tongue. Patients initial surgery was 6/6 for right partial glossectomy and right neck dissection and reconstruction with left radial forearm free flap which resulted in post op flap thrombosis due to this patient again went to the OR on 6/11 for exploration of the R neck and removal of significant clot along with administration of TPA and again on 6/14 for creation of free flap and skin graft and debridement of previously necrotic radial forearm flap. On admission patient was afebrile but on 6/8 became febrile to 100.5 and again on 6/12 became febrile to Tmax of 101.3 along with tachycardic. Lab work up was significant for WBC of 14.55 on admission which trended down to 5.89 but has now trended up to 10.67, PLTs 243 now 454. Imaging performed was a CTA chest which was negative for PE but showed almost complete atelectasis of the RLL and RML, tree in bud nodular opacities in the RUL and  1.4cm RUL ground glass, US of b/l LE negative for DVT, and TTE showing calcified mitral valve anulus and trace MR. Now cultures taken from OR on 6/11 are growing pansensitive E.coli.      REVIEW OF SYSTEMS  pending full examination    prior hospital charts reviewed [V]  primary team notes reviewed [V]  other consultant notes reviewed [V]    PAST MEDICAL & SURGICAL HISTORY:  Tongue cancer      Schizophrenia      Anxiety and depression      Other diseases of tongue      H/O hand surgery      Cancer determined by biopsy of tongue      S/P cataract extraction      H/O colonoscopy          SOCIAL HISTORY:  Denied smoking/vaping/alcohol/recreational drug use    FAMILY HISTORY:  FH: uterine cancer (Mother)    FH: prostate cancer (Sibling)    FH: suicide (Sibling)    FH: chronic renal disease (Father)        Allergies  penicillins (Unknown)        ANTIMICROBIALS:  ampicillin/sulbactam  IVPB 1.5 every 6 hours      ANTIMICROBIALS (past 90 days):  MEDICATIONS  (STANDING):  ampicillin/sulbactam  IVPB   100 mL/Hr IV Intermittent (06-14-24 @ 23:18)   100 mL/Hr IV Intermittent (06-14-24 @ 18:04)   100 mL/Hr IV Intermittent (06-14-24 @ 05:48)   100 mL/Hr IV Intermittent (06-13-24 @ 22:01)   100 mL/Hr IV Intermittent (06-13-24 @ 16:28)   100 mL/Hr IV Intermittent (06-13-24 @ 05:17)   100 mL/Hr IV Intermittent (06-13-24 @ 00:02)   100 mL/Hr IV Intermittent (06-12-24 @ 19:18)   100 mL/Hr IV Intermittent (06-12-24 @ 14:41)   100 mL/Hr IV Intermittent (06-12-24 @ 05:53)   100 mL/Hr IV Intermittent (06-12-24 @ 00:04)   100 mL/Hr IV Intermittent (06-11-24 @ 19:24)   100 mL/Hr IV Intermittent (06-11-24 @ 14:45)    clindamycin IVPB   100 mL/Hr IV Intermittent (06-07-24 @ 06:41)   100 mL/Hr IV Intermittent (06-06-24 @ 22:47)    gentamicin   IVPB   500 mL/Hr IV Intermittent (06-07-24 @ 12:27)    metroNIDAZOLE  IVPB   100 mL/Hr IV Intermittent (06-09-24 @ 05:28)   100 mL/Hr IV Intermittent (06-08-24 @ 21:38)   100 mL/Hr IV Intermittent (06-08-24 @ 13:12)   100 mL/Hr IV Intermittent (06-08-24 @ 06:02)   100 mL/Hr IV Intermittent (06-07-24 @ 22:25)    metroNIDAZOLE  IVPB   100 mL/Hr IV Intermittent (06-07-24 @ 10:37)    trimethoprim  160 mG/sulfamethoxazole 800 mG   1 Tablet(s) Oral (06-12-24 @ 05:54)   1 Tablet(s) Oral (06-11-24 @ 17:25)   1 Tablet(s) Oral (06-11-24 @ 11:33)   1 Tablet(s) Oral (06-10-24 @ 17:19)   1 Tablet(s) Oral (06-10-24 @ 06:46)   1 Tablet(s) Oral (06-09-24 @ 18:06)    vancomycin  IVPB   750 milliGRAM(s) IV Intermittent (06-07-24 @ 12:29)    vancomycin  IVPB   250 mL/Hr IV Intermittent (06-08-24 @ 00:43)        OTHER MEDS:   MEDICATIONS  (STANDING):  acetaminophen   Oral Liquid .. 975 every 6 hours  acetylcysteine 10%  Inhalation 4 every 6 hours  albuterol/ipratropium for Nebulization 3 every 6 hours  amLODIPine   Tablet 10 daily  buPROPion . 300 <User Schedule>  buPROPion . 150 at bedtime  enoxaparin Injectable 40 every 24 hours  oxyCODONE    Solution 5 every 4 hours PRN  oxyCODONE    Solution 10 every 4 hours PRN  pantoprazole  Injectable 40 daily  polyethylene glycol 3350 17 daily  QUEtiapine 100 at bedtime  risperiDONE   Solution 1 <User Schedule>  risperiDONE   Solution 3 at bedtime  senna Syrup 10 daily      VITALS:  Vital Signs Last 24 Hrs  T(F): 98.2 (06-15-24 @ 04:00), Max: 101.4 (06-12-24 @ 01:30)    Vital Signs Last 24 Hrs  HR: 78 (06-15-24 @ 06:00) (68 - 111)  BP: 137/78 (06-15-24 @ 06:00) (122/80 - 151/93)  RR: 6 (06-15-24 @ 06:00)  SpO2: 94% (06-15-24 @ 06:00) (92% - 99%)  Wt(kg): --    EXAM:  pending full examination      Labs:                        9.7    10.67 )-----------( 454      ( 15 José Miguel 2024 01:25 )             28.0     06-15    137  |  102  |  13  ----------------------------<  95  4.2   |  23  |  0.60    Ca    8.3<L>      15 José Miguel 2024 01:25  Phos  3.7     06-15  Mg     2.30     06-15        WBC Trend:  WBC Count: 10.67 (06-15-24 @ 01:25)  WBC Count: 11.48 (06-14-24 @ 17:56)  WBC Count: 6.69 (06-14-24 @ 05:51)  WBC Count: 7.25 (06-13-24 @ 06:04)      Auto Neutrophil #: 8.22 K/uL (06-10-24 @ 04:53)      Creatine Trend:  Creatinine: 0.60 (06-15)  Creatinine: 0.46 (06-14)  Creatinine: 0.49 (06-14)  Creatinine: 0.56 (06-13)      Liver Biochemical Testing Trend:  Alanine Aminotransferase (ALT/SGPT): 16 (03-19)  Aspartate Aminotransferase (AST/SGOT): 17 (03-19-24 @ 13:05)  Bilirubin Direct: <0.2 (03-19)  Bilirubin Total: 0.3 (03-19)    MICROBIOLOGY:    MRSA PCR Result.: NotDetec (06-07-24 @ 00:45)      Culture - Fungal, Other (collected 14 Jun 2024 12:07)  Source: .Other RIGHT NECK CLT  Preliminary Report:    Testing in progress    Culture - Blood (collected 12 Jun 2024 04:58)  Source: .Blood Blood-Peripheral  Preliminary Report:    No growth at 72 Hours    Culture - Blood (collected 12 Jun 2024 03:37)  Source: .Blood Blood-Peripheral  Preliminary Report:    No growth at 72 Hours    Culture - Surgical Swab (collected 11 Jun 2024 05:30)  Source: .Surgical Swab RIGHT NECK WOUND  Preliminary Report:    Escherichia coli  Organism: Escherichia coli  Organism: Escherichia coli    Sensitivities:      Method Type: MINESH      -  Amoxicillin/Clavulanic Acid: S <=8/4      -  Ampicillin: S <=8 These ampicillin results predict results for amoxicillin      -  Ampicillin/Sulbactam: S <=4/2      -  Aztreonam: S <=4      -  Cefazolin: S <=2      -  Cefepime: S <=2      -  Cefoxitin: S <=8      -  Ceftriaxone: S <=1      -  Ciprofloxacin: S <=0.25      -  Ertapenem: S <=0.5      -  Gentamicin: S <=2      -  Imipenem: S <=1      -  Levofloxacin: S <=0.5      -  Meropenem: S <=1      -  Piperacillin/Tazobactam: S <=8      -  Tobramycin: S <=2      -  Trimethoprim/Sulfamethoxazole: S <=0.5/9.5    Culture - Blood (collected 08 Jun 2024 19:41)  Source: .Blood Blood-Peripheral  Final Report:    No growth at 5 days    Culture - Sputum (collected 08 Jun 2024 18:55)  Source: .Sputum Sputum  Final Report:    Normal Respiratory Jesika present    Culture - Blood (collected 08 Jun 2024 18:55)  Source: .Blood Blood-Venous  Final Report:    No growth at 5 days    Blood Gas Arterial, Lactate: 0.7 (06-14 @ 12:48)  Blood Gas Arterial, Lactate: 0.7 (06-14 @ 09:57)    A1C with Estimated Average Glucose Result: 4.9 % (06-06-24 @ 18:30)    RADIOLOGY:  < from: Xray Chest 1 View- PORTABLE-Urgent (Xray Chest 1 View- PORTABLE-Urgent .) (06.12.24 @ 08:02) >  IMPRESSION:  No focal consolidation.  Loss of volume in the right lung secondary to atelectasis.      < from: US Duplex Venous Lower Ext Complete, Bilateral (06.10.24 @ 20:58) >  IMPRESSION:  No evidence of deep venous thrombosis in either lower extremity.      < from: CT Angio Chest PE Protocol w/ IV Cont (06.10.24 @ 10:48) >  IMPRESSION:    No evidence of pulmonary embolism to the level of the mainstem, lobar and   segmental branches. Limited evaluation of the subsegmental branches   secondary to motion artifact.    Complete atelectasis of the right middle lobe and near complete   atelectasis of the right lower lobe new since March 13, 2024. Small   bilateral pleural effusions. Secretions in the bronchus intermedius with   near complete opacification of the distal bronchus intermedius with   subsequent    Peripherally oriented cluster of tree-in-bud/nodular opacities in the   dependent portions of the right upper lobe representing impacted distal   airways which may be due to mucus or may be of infectious etiology.    Previously described right upper lobe 1.4 cm groundglass opacity is   unchanged unchanged since 3/13/2024. No internal solid component.   Continued follow-up is recommended as differential diagnosis include but   is not limited to primary lung neoplasm.    < from: Xray Chest 1 View- PORTABLE-Urgent (Xray Chest 1 View- PORTABLE-Urgent .) (06.08.24 @ 18:45) >  IMPRESSION:  Bibasilar atelectasis. Small left pleural effusion. Elevation of right   hemidiaphragm.      < from: Xray Neck Soft Tissue (06.06.24 @ 17:10) >  IMPRESSION:    Status post tracheostomy. There is an NG tube in place with the distal   tip not imaged surgical clips and staples are seen about the neck and   skull. No inadvertent radiopaque foreign body is seen.    There is a line overlying the right neck possibly related to a central   venous catheter with tip projecting towards the region of the left   brachiocephalic vein versus a surgical drain, correlate with clinical   history.      < from: Xray Forearm, Left (06.06.24 @ 17:09) >  IMPRESSION: No metallic surgical instrument.       Patient is a 64y old  Female who presents with a chief complaint of Glossectomy neck dissection trach on 6/6 post op flap had a thrombosis s/p TPA s/p exploration of tongue free flap now s/p muscle flap revision with right thigh flap     HPI:  65 y/o female with schizophrenia and depression presents to preop for glossectomy. Patient was found to have a tongue lesion in December 2023 which was biopsied in March 2024 which confirmed squamous cell carcinoma of tongue. Patients initial surgery was 6/6 for right partial glossectomy and right neck dissection and reconstruction with left radial forearm free flap which resulted in post op flap thrombosis due to this patient again went to the OR on 6/11 for exploration of the R neck and removal of significant clot along with administration of TPA and again on 6/14 for creation of free flap and skin graft and debridement of previously necrotic radial forearm flap. On admission patient was afebrile but on 6/8 became febrile to 100.5 and again on 6/12 became febrile to Tmax of 101.3 along with tachycardic. Lab work up was significant for WBC of 14.55 on admission which trended down to 5.89 but has now trended up to 10.67, PLTs 243 now 454. Imaging performed was a CTA chest which was negative for PE but showed almost complete atelectasis of the RLL and RML, tree in bud nodular opacities in the RUL and  1.4cm RUL ground glass, US of b/l LE negative for DVT, and TTE showing calcified mitral valve anulus and trace MR. Now cultures taken from OR on 6/11 are growing pansensitive E.coli. Unable to obtain further HPI patient unable to talk.       REVIEW OF SYSTEMS  unable to be obtained     prior hospital charts reviewed [V]  primary team notes reviewed [V]  other consultant notes reviewed [V]    PAST MEDICAL & SURGICAL HISTORY:  Tongue cancer      Schizophrenia      Anxiety and depression      Other diseases of tongue      H/O hand surgery      Cancer determined by biopsy of tongue      S/P cataract extraction      H/O colonoscopy          SOCIAL HISTORY:  unable to be obtained     FAMILY HISTORY:  FH: uterine cancer (Mother)    FH: prostate cancer (Sibling)    FH: suicide (Sibling)    FH: chronic renal disease (Father)        Allergies  penicillins (Unknown)        ANTIMICROBIALS:  ampicillin/sulbactam  IVPB 1.5 every 6 hours      ANTIMICROBIALS (past 90 days):  MEDICATIONS  (STANDING):  ampicillin/sulbactam  IVPB   100 mL/Hr IV Intermittent (06-14-24 @ 23:18)   100 mL/Hr IV Intermittent (06-14-24 @ 18:04)   100 mL/Hr IV Intermittent (06-14-24 @ 05:48)   100 mL/Hr IV Intermittent (06-13-24 @ 22:01)   100 mL/Hr IV Intermittent (06-13-24 @ 16:28)   100 mL/Hr IV Intermittent (06-13-24 @ 05:17)   100 mL/Hr IV Intermittent (06-13-24 @ 00:02)   100 mL/Hr IV Intermittent (06-12-24 @ 19:18)   100 mL/Hr IV Intermittent (06-12-24 @ 14:41)   100 mL/Hr IV Intermittent (06-12-24 @ 05:53)   100 mL/Hr IV Intermittent (06-12-24 @ 00:04)   100 mL/Hr IV Intermittent (06-11-24 @ 19:24)   100 mL/Hr IV Intermittent (06-11-24 @ 14:45)    clindamycin IVPB   100 mL/Hr IV Intermittent (06-07-24 @ 06:41)   100 mL/Hr IV Intermittent (06-06-24 @ 22:47)    gentamicin   IVPB   500 mL/Hr IV Intermittent (06-07-24 @ 12:27)    metroNIDAZOLE  IVPB   100 mL/Hr IV Intermittent (06-09-24 @ 05:28)   100 mL/Hr IV Intermittent (06-08-24 @ 21:38)   100 mL/Hr IV Intermittent (06-08-24 @ 13:12)   100 mL/Hr IV Intermittent (06-08-24 @ 06:02)   100 mL/Hr IV Intermittent (06-07-24 @ 22:25)    metroNIDAZOLE  IVPB   100 mL/Hr IV Intermittent (06-07-24 @ 10:37)    trimethoprim  160 mG/sulfamethoxazole 800 mG   1 Tablet(s) Oral (06-12-24 @ 05:54)   1 Tablet(s) Oral (06-11-24 @ 17:25)   1 Tablet(s) Oral (06-11-24 @ 11:33)   1 Tablet(s) Oral (06-10-24 @ 17:19)   1 Tablet(s) Oral (06-10-24 @ 06:46)   1 Tablet(s) Oral (06-09-24 @ 18:06)    vancomycin  IVPB   750 milliGRAM(s) IV Intermittent (06-07-24 @ 12:29)    vancomycin  IVPB   250 mL/Hr IV Intermittent (06-08-24 @ 00:43)        OTHER MEDS:   MEDICATIONS  (STANDING):  acetaminophen   Oral Liquid .. 975 every 6 hours  acetylcysteine 10%  Inhalation 4 every 6 hours  albuterol/ipratropium for Nebulization 3 every 6 hours  amLODIPine   Tablet 10 daily  buPROPion . 300 <User Schedule>  buPROPion . 150 at bedtime  enoxaparin Injectable 40 every 24 hours  oxyCODONE    Solution 5 every 4 hours PRN  oxyCODONE    Solution 10 every 4 hours PRN  pantoprazole  Injectable 40 daily  polyethylene glycol 3350 17 daily  QUEtiapine 100 at bedtime  risperiDONE   Solution 1 <User Schedule>  risperiDONE   Solution 3 at bedtime  senna Syrup 10 daily      VITALS:  Vital Signs Last 24 Hrs  T(F): 98.2 (06-15-24 @ 04:00), Max: 101.4 (06-12-24 @ 01:30)    Vital Signs Last 24 Hrs  HR: 78 (06-15-24 @ 06:00) (68 - 111)  BP: 137/78 (06-15-24 @ 06:00) (122/80 - 151/93)  RR: 6 (06-15-24 @ 06:00)  SpO2: 94% (06-15-24 @ 06:00) (92% - 99%)  Wt(kg): --    EXAM:  General: Patient appears comfortable  HEENT: NCAT, PERRL, anicteric sclera, mouth with packing and edema on R face  Neck: Surgical wound on R side with staple and drain present, minimal erythema, no purulence visible, trach   CV: +S1/S2, RRR, no M/R/G  Lungs: No respiratory distress, Rhonchi b/l though likely referred upper airway sounds due to trach  Abd:  BS4+, Soft, NTND, no guarding  : No suprapubic tenderness, paez  Neuro: difficult to assess  Ext: No cyanosis, no edema, L arm in bandage, L leg with rectangular area of pink skin like s/p flap harvest   Skin: No rash, no phlebitis, No erythema       Labs:                        9.7    10.67 )-----------( 454      ( 15 José Miguel 2024 01:25 )             28.0     06-15    137  |  102  |  13  ----------------------------<  95  4.2   |  23  |  0.60    Ca    8.3<L>      15 Jun 2024 01:25  Phos  3.7     06-15  Mg     2.30     06-15        WBC Trend:  WBC Count: 10.67 (06-15-24 @ 01:25)  WBC Count: 11.48 (06-14-24 @ 17:56)  WBC Count: 6.69 (06-14-24 @ 05:51)  WBC Count: 7.25 (06-13-24 @ 06:04)      Auto Neutrophil #: 8.22 K/uL (06-10-24 @ 04:53)      Creatine Trend:  Creatinine: 0.60 (06-15)  Creatinine: 0.46 (06-14)  Creatinine: 0.49 (06-14)  Creatinine: 0.56 (06-13)      Liver Biochemical Testing Trend:  Alanine Aminotransferase (ALT/SGPT): 16 (03-19)  Aspartate Aminotransferase (AST/SGOT): 17 (03-19-24 @ 13:05)  Bilirubin Direct: <0.2 (03-19)  Bilirubin Total: 0.3 (03-19)    MICROBIOLOGY:    MRSA PCR Result.: NotDetec (06-07-24 @ 00:45)      Culture - Fungal, Other (collected 14 Jun 2024 12:07)  Source: .Other RIGHT NECK CLT  Preliminary Report:    Testing in progress    Culture - Blood (collected 12 Jun 2024 04:58)  Source: .Blood Blood-Peripheral  Preliminary Report:    No growth at 72 Hours    Culture - Blood (collected 12 Jun 2024 03:37)  Source: .Blood Blood-Peripheral  Preliminary Report:    No growth at 72 Hours    Culture - Surgical Swab (collected 11 Jun 2024 05:30)  Source: .Surgical Swab RIGHT NECK WOUND  Preliminary Report:    Escherichia coli  Organism: Escherichia coli  Organism: Escherichia coli    Sensitivities:      Method Type: MINESH      -  Amoxicillin/Clavulanic Acid: S <=8/4      -  Ampicillin: S <=8 These ampicillin results predict results for amoxicillin      -  Ampicillin/Sulbactam: S <=4/2      -  Aztreonam: S <=4      -  Cefazolin: S <=2      -  Cefepime: S <=2      -  Cefoxitin: S <=8      -  Ceftriaxone: S <=1      -  Ciprofloxacin: S <=0.25      -  Ertapenem: S <=0.5      -  Gentamicin: S <=2      -  Imipenem: S <=1      -  Levofloxacin: S <=0.5      -  Meropenem: S <=1      -  Piperacillin/Tazobactam: S <=8      -  Tobramycin: S <=2      -  Trimethoprim/Sulfamethoxazole: S <=0.5/9.5    Culture - Blood (collected 08 Jun 2024 19:41)  Source: .Blood Blood-Peripheral  Final Report:    No growth at 5 days    Culture - Sputum (collected 08 Jun 2024 18:55)  Source: .Sputum Sputum  Final Report:    Normal Respiratory Jesika present    Culture - Blood (collected 08 Jun 2024 18:55)  Source: .Blood Blood-Venous  Final Report:    No growth at 5 days    Blood Gas Arterial, Lactate: 0.7 (06-14 @ 12:48)  Blood Gas Arterial, Lactate: 0.7 (06-14 @ 09:57)    A1C with Estimated Average Glucose Result: 4.9 % (06-06-24 @ 18:30)    RADIOLOGY:  < from: Xray Chest 1 View- PORTABLE-Urgent (Xray Chest 1 View- PORTABLE-Urgent .) (06.12.24 @ 08:02) >  IMPRESSION:  No focal consolidation.  Loss of volume in the right lung secondary to atelectasis.      < from: US Duplex Venous Lower Ext Complete, Bilateral (06.10.24 @ 20:58) >  IMPRESSION:  No evidence of deep venous thrombosis in either lower extremity.      < from: CT Angio Chest PE Protocol w/ IV Cont (06.10.24 @ 10:48) >  IMPRESSION:    No evidence of pulmonary embolism to the level of the mainstem, lobar and   segmental branches. Limited evaluation of the subsegmental branches   secondary to motion artifact.    Complete atelectasis of the right middle lobe and near complete   atelectasis of the right lower lobe new since March 13, 2024. Small   bilateral pleural effusions. Secretions in the bronchus intermedius with   near complete opacification of the distal bronchus intermedius with   subsequent    Peripherally oriented cluster of tree-in-bud/nodular opacities in the   dependent portions of the right upper lobe representing impacted distal   airways which may be due to mucus or may be of infectious etiology.    Previously described right upper lobe 1.4 cm groundglass opacity is   unchanged unchanged since 3/13/2024. No internal solid component.   Continued follow-up is recommended as differential diagnosis include but   is not limited to primary lung neoplasm.    < from: Xray Chest 1 View- PORTABLE-Urgent (Xray Chest 1 View- PORTABLE-Urgent .) (06.08.24 @ 18:45) >  IMPRESSION:  Bibasilar atelectasis. Small left pleural effusion. Elevation of right   hemidiaphragm.      < from: Xray Neck Soft Tissue (06.06.24 @ 17:10) >  IMPRESSION:    Status post tracheostomy. There is an NG tube in place with the distal   tip not imaged surgical clips and staples are seen about the neck and   skull. No inadvertent radiopaque foreign body is seen.    There is a line overlying the right neck possibly related to a central   venous catheter with tip projecting towards the region of the left   brachiocephalic vein versus a surgical drain, correlate with clinical   history.      < from: Xray Forearm, Left (06.06.24 @ 17:09) >  IMPRESSION: No metallic surgical instrument.

## 2024-06-15 NOTE — CONSULT NOTE ADULT - ATTENDING COMMENTS
s/p ALT flap for primary flap thrombosis   Now recovering well, flap checks q1 hour  Monitor perfusion   Con't home antipsychotics and meds  BERENICE for tube feeding to start ASAP  DVT prophylaxis  Con't abx coverage for potential flap infection   Pain control      Juan Cao MD  Acute and Critical Care Surgery    The Acute Care Surgery (B Team) Attending Group Practice:  Dr. Jessi Ashley, Dr. Papito Correa, Dr. Juan Cao,  Dr. Lux Del Castillo and Dr. Yamilka Edwards     Urgent issues - spectra [id number] or [id number]  Nonurgent issues - [phone]  Patient appointments or after hours - [phone]
63 yo woman with psych disorder, tongue cancer, s/p glossectomy partial 6/6 with flap reconstruction, trach  flap thrombosed and RTOR 6/11 and again 6/14 for revision   OR wound culture growing E coli   switched to unasyn 6/11 which she is tolerating despite h/o penicillin allergy     would continue Unasyn - increase dose - Unasyn 3 gm iv q 6 h     will follow
I agree with the detailed interval history, physical, and plan, which I have reviewed and edited where appropriate'; also agree with notes/assessment with my team on service.  I have personally examined the patient.  I was physically present for the key portions of the evaluation and management (E/M) service provided.  I reviewed all the pertinent data.  The patient is a critical care patient with life threatening hemodynamic and metabolic instability in SICU.  The SICU team has a constant risk benefit analyzes discussion and coordinating care with the primary team and all consultants.   The patient is in SICU with the chief complaint and diagnosis mentioned in the note.   The plan will be specified in the note.  63 y/o female with schizophrenia and depression; s/p glossectomy, free flap and tracheostomy.  SICU consult for HD monitoring, post op care.  EXAM  NEUROLOGY  Exam: no focal deficits  RESPIRATORY  Exam: Lungs clear   Mechanical Ventilation:  15/450/5/50%  CARDIOVASCULAR  Exam: RR  GI/NUTRITION  Exam: Abdomen soft  VASCULAR  Exam: Extremities warm    PLAN:   Neurologic:   - Tylenol and Dilaudid  - Precedex  Respiratory:   - SaO2 goal > 92%  - AC 14/450/5/50%  Cardiovascular:   - MAP goal > 65  Gastrointestinal/Nutrition:   - NPO  - NGT   Renal/Genitourinary:   - IVF LR @ 30cc/Hr  Hematologic:   - Lovenox   Infectious Disease:   - Clindamycin   Endocrine:   - Monitor glucose   Disposition: SICU
Patient Specific Otc Recommendations (Will Not Stick From Patient To Patient): Iam\\nViviscal \\nNutrafol
Detail Level: Zone

## 2024-06-15 NOTE — CONSULT NOTE ADULT - ASSESSMENT
Impression/Hospital Course:  65 y/o female with schizophrenia and depression presents to preop for glossectomy. Patient was found to have a tongue lesion in December 2023 which was biopsied in March 2024 which confirmed squamous cell carcinoma of tongue. Patients initial surgery was 6/6 for right partial glossectomy and right neck dissection and reconstruction with left radial forearm free flap which resulted in post op flap thrombosis due to this patient again went to the OR on 6/11 for exploration of the R neck and removal of significant clot along with administration of TPA and again on 6/14 for creation of free flap and skin graft and debridement of previously necrotic radial forearm flap. On admission patient was afebrile but on 6/8 became febrile to 100.5 and again on 6/12 became febrile to Tmax of 101.3 along with tachycardic. Lab work up was significant for WBC of 14.55 on admission which trended down to 5.89 but has now trended up to 10.67, PLTs 243 now 454. Imaging performed was a CTA chest which was negative for PE but showed almost complete atelectasis of the RLL and RML, tree in bud nodular opacities in the RUL and  1.4cm RUL ground glass, US of b/l LE negative for DVT, and TTE showing calcified mitral valve anulus and trace MR. Now cultures taken from OR on 6/11 are growing pansensitive E.coli.    Antimicrobials:  Clindamycin 6/6 - 6/7  vancomycin 6/7  metronidazole 6/7 - 6/9  gentamicin 6/7  trimethoprim-sulfamethoxazole 6/9  ampicillin/sulbactam 6/11 - current     Assessment:  *Squamous cell carcinoma of the tongue s/p glossectomy and neck dissection/reconstruction on 6/6 with flap thrombosis requiring revision on 6/11 with removal of clot and 6/14 for creation of new flap. Culture growing E.coli pansensitive  *Pyrexia on 6/8 and 6/12, unclear etiology 6/8 could be due to flap thrombosis and 6/12 could be post op fever tough both could be due to local infection with E.coli though Gentamicin would provide coverage and trimethoprim-sulfamethoxazole would cover as well  *Sepsis as evidenced by Leukocytosis (improved), pyrexia and tachycardia secondary to above   *Thrombocytosis  *Complete atelectasis of the RML and RLL increasing risk for post obstructive pneumonia   *Tree in bud nodularity in RUL unclear etiology though less likely infectious due to lack of respiratory symptoms   *1.4cm RUL ground glass unclear etiology though less likely infectious due to lack of respiratory symptoms   *Schizophrenia     Recommendations: PLEASE DEFER ALL CHANGES IN PLAN UNTIL SIGNED BY ATTENDING. All recommendations are tentative pending Attending Attestation.  - continue ampicillin/sulbactam as this will cover the ecoli and provide anaerobic coverage considering oral cavity and previous thrombosed ischemic flap, though patient with stated penicillin allergy, alternative regiment could be ceftriaxone if patient develops intolerance  - trend temperature, PLTs and WBC curve   - follow blood, surgical and fungal cultures (received by lab with results pending), adjust antimicrobial therapy based off of culture and sensitivity   - Atelectasis, RUL findings per primary team     Madi Herrera DO, PGY-4   Infectious Disease Fellow  [facility] Teams Preferred  After 5pm/weekends call [phone]  Impression/Hospital Course:  65 y/o female with schizophrenia and depression presents to preop for glossectomy. Patient was found to have a tongue lesion in December 2023 which was biopsied in March 2024 which confirmed squamous cell carcinoma of tongue. Patients initial surgery was 6/6 for right partial glossectomy and right neck dissection and reconstruction with left radial forearm free flap which resulted in post op flap thrombosis due to this patient again went to the OR on 6/11 for exploration of the R neck and removal of significant clot along with administration of TPA and again on 6/14 for creation of free flap and skin graft and debridement of previously necrotic radial forearm flap. On admission patient was afebrile but on 6/8 became febrile to 100.5 and again on 6/12 became febrile to Tmax of 101.3 along with tachycardic. Lab work up was significant for WBC of 14.55 on admission which trended down to 5.89 but has now trended up to 10.67, PLTs 243 now 454. Imaging performed was a CTA chest which was negative for PE but showed almost complete atelectasis of the RLL and RML, tree in bud nodular opacities in the RUL and  1.4cm RUL ground glass, US of b/l LE negative for DVT, and TTE showing calcified mitral valve anulus and trace MR. Now cultures taken from OR on 6/11 are growing pansensitive E.coli.    Antimicrobials:  Clindamycin 6/6 - 6/7  vancomycin 6/7  metronidazole 6/7 - 6/9  gentamicin 6/7  trimethoprim-sulfamethoxazole 6/9  ampicillin/sulbactam 6/11 - current     Assessment:  *Squamous cell carcinoma of the tongue s/p glossectomy and neck dissection/reconstruction on 6/6 with flap thrombosis requiring revision on 6/11 with removal of clot and 6/14 for creation of new flap. Culture growing E.coli pansensitive  *Pyrexia on 6/8 and 6/12, unclear etiology 6/8 could be due to flap thrombosis and 6/12 could be post op fever tough both could be due to local infection with E.coli though Gentamicin would provide coverage and trimethoprim-sulfamethoxazole would cover as well  *Sepsis as evidenced by Leukocytosis (improved), pyrexia and tachycardia secondary to above   *Thrombocytosis  *Complete atelectasis of the RML and RLL increasing risk for post obstructive pneumonia   *Tree in bud nodularity in RUL unclear etiology though less likely infectious due to lack of respiratory symptoms   *1.4cm RUL ground glass unclear etiology though less likely infectious due to lack of respiratory symptoms   *Schizophrenia     Recommendations:   - continue ampicillin/sulbactam as this will cover the ecoli and provide anaerobic coverage considering oral cavity and previous thrombosed ischemic flap, though patient with stated penicillin allergy, alternative regiment could be ceftriaxone if patient develops intolerance  - trend temperature, PLTs and WBC curve   - follow blood, surgical and fungal cultures (received by lab with results pending), adjust antimicrobial therapy based off of culture and sensitivity   - Atelectasis, RUL findings per primary team     Madi Herrera DO, PGY-4   Infectious Disease Fellow  [facility] Teams Preferred  After 5pm/weekends call [phone]

## 2024-06-15 NOTE — PROGRESS NOTE ADULT - ASSESSMENT
64F w/ PMH of depression and schizophrenia s/p R partial glossectomy in the setting of SCCA, R SND I-IV, L RFFF, L thigh STSG, tracheostomy (6/6/24). Pts post-operative course c/b venous congestion of flap w/ RTOR on 6/11/24 for free flap salvage and revision of venous anastamosis, RTOR on 6/14/24 for debridement of necrotic radial forearm flap and replacement with right ALT flap, with 7.5 trach exchanged to 7.0 cuffed trach.     Plan:  -ERAS POD1  -Cuff down  -Q1h flap checks  -IV unasyn  -BID neck irrigation via penrose by PRS  -NPO/TF  -D/C A-line, paez  -TOV  -OOBTC  -Rest of care per SICU    OMFS  #[id number]  Available on Teams

## 2024-06-15 NOTE — PROGRESS NOTE ADULT - SUBJECTIVE AND OBJECTIVE BOX
64F w/ PMH of depression and schizophrenia s/p R partial glossectomy in the setting of SCCA, R SND I-IV, L RFFF, L thigh STSG, tracheostomy (6/6/24). Pts post-operative course c/b venous congestion of flap w/ RTOR on 6/11/24 for free flap salvage and revision of venous anastamosis, RTOR on 6/14/24 for debridement of necrotic radial forearm flap and replacement with right ALT flap, with 7.5 trach exchanged to 7.0 cuffed trach.       INTERVAL EVENTS:  NAEON, AVSS    ICU Vital Signs Last 24 Hrs  T(C): 36.8 (15 José Miguel 2024 04:00), Max: 37.4 (14 Jun 2024 17:40)  T(F): 98.2 (15 José Miguel 2024 04:00), Max: 99.4 (14 Jun 2024 17:40)  HR: 78 (15 José Miguel 2024 06:00) (68 - 111)  BP: 137/78 (15 José Miguel 2024 06:00) (122/80 - 151/93)  BP(mean): 95 (15 José Miguel 2024 06:00) (93 - 113)  ABP: 159/80 (15 José Miguel 2024 03:00) (102/95 - 160/153)  ABP(mean): 110 (15 José Miguel 2024 03:00) (98 - 157)  RR: 6 (15 José Miguel 2024 06:00) (6 - 24)  SpO2: 94% (15 José Miguel 2024 06:00) (92% - 99%)    O2 Parameters below as of 15 José Miguel 2024 04:15  Patient On (Oxygen Delivery Method): tracheostomy collar    MEDICATIONS  (STANDING):  acetaminophen   Oral Liquid .. 975 milliGRAM(s) Oral every 6 hours  acetylcysteine 10%  Inhalation 4 milliLiter(s) Inhalation every 6 hours  albuterol/ipratropium for Nebulization 3 milliLiter(s) Nebulizer every 6 hours  amLODIPine   Tablet 10 milliGRAM(s) Oral daily  ampicillin/sulbactam  IVPB 1.5 Gram(s) IV Intermittent every 6 hours  buPROPion . 300 milliGRAM(s) Oral <User Schedule>  buPROPion . 150 milliGRAM(s) Oral at bedtime  chlorhexidine 0.12% Liquid 15 milliLiter(s) Swish and Spit <User Schedule>  chlorhexidine 2% Cloths 1 Application(s) Topical daily  enoxaparin Injectable 40 milliGRAM(s) SubCutaneous every 24 hours  pantoprazole  Injectable 40 milliGRAM(s) IV Push daily  polyethylene glycol 3350 17 Gram(s) Oral daily  QUEtiapine 100 milliGRAM(s) Oral at bedtime  risperiDONE   Solution 1 milliGRAM(s) Oral <User Schedule>  risperiDONE   Solution 3 milliGRAM(s) Oral at bedtime  senna Syrup 10 milliLiter(s) Oral daily  sodium chloride 0.9% lock flush 3 milliLiter(s) IV Push every 8 hours    MEDICATIONS  (PRN):  oxyCODONE    Solution 5 milliGRAM(s) Oral every 4 hours PRN Moderate Pain (4 - 6)  oxyCODONE    Solution 10 milliGRAM(s) Oral every 4 hours PRN Severe Pain (7 - 10)    PHYSICAL EXAM:  GEN: NAD  NEURO: alert & oriented x 4/4  HEENT: face symmetric, oropharynx moist without exudates, CN VII/XI/XII intact, neck incision c/d/i,  flap purple/black. No bleeding on pinprick, strong Cook, Flap WAP, skin paddle god color  CVS: regular rate and rhythm  Pulm: normal respiratory excursions, not tachypneic, no labored breathing  Abd: soft, non-distended  Ext:  no peripheral edema noted, NJ drain in place                          9.7    10.67 )-----------( 454      ( 15 José Miguel 2024 01:25 )             28.0   06-15    137  |  102  |  13  ----------------------------<  95  4.2   |  23  |  0.60    Ca    8.3<L>      15 José Miguel 2024 01:25  Phos  3.7     06-15  Mg     2.30     06-15

## 2024-06-15 NOTE — PROGRESS NOTE ADULT - SUBJECTIVE AND OBJECTIVE BOX
SICU Daily Progress Note  =====================================================  Interval/Overnight Events:         Allergies: penicillins (Unknown)      MEDICATIONS:   --------------------------------------------------------------------------------------  Neurologic Medications  acetaminophen   Oral Liquid .. 975 milliGRAM(s) Oral every 6 hours  buPROPion . 300 milliGRAM(s) Oral <User Schedule>  buPROPion . 150 milliGRAM(s) Oral at bedtime  oxyCODONE    Solution 5 milliGRAM(s) Oral every 4 hours PRN Severe Pain (7 - 10)  oxyCODONE    Solution 2.5 milliGRAM(s) Oral every 4 hours PRN Moderate Pain (4 - 6)  QUEtiapine 100 milliGRAM(s) Oral at bedtime  risperiDONE   Solution 1 milliGRAM(s) Oral <User Schedule>  risperiDONE   Solution 3 milliGRAM(s) Oral at bedtime    Respiratory Medications  acetylcysteine 10%  Inhalation 4 milliLiter(s) Inhalation every 6 hours  albuterol/ipratropium for Nebulization 3 milliLiter(s) Nebulizer every 6 hours    Cardiovascular Medications  amLODIPine   Tablet 10 milliGRAM(s) Oral daily    Gastrointestinal Medications  lactated ringers. 1000 milliLiter(s) IV Continuous <Continuous>  pantoprazole   Suspension 40 milliGRAM(s) Oral daily  polyethylene glycol 3350 17 Gram(s) Oral daily  senna Syrup 10 milliLiter(s) Oral daily  sodium chloride 0.9% lock flush 3 milliLiter(s) IV Push every 8 hours    Genitourinary Medications    Hematologic/Oncologic Medications  enoxaparin Injectable 40 milliGRAM(s) SubCutaneous every 24 hours    Antimicrobial/Immunologic Medications  ampicillin/sulbactam  IVPB 1.5 Gram(s) IV Intermittent every 6 hours    Endocrine/Metabolic Medications    Topical/Other Medications  chlorhexidine 0.12% Liquid 15 milliLiter(s) Swish and Spit <User Schedule>  chlorhexidine 2% Cloths 1 Application(s) Topical daily    --------------------------------------------------------------------------------------    VITAL SIGNS, INS/OUTS (last 24 hours):  --------------------------------------------------------------------------------------    ICU Vital Signs Last 24 Hrs  penicillins (Unknown)      penicillins (Unknown)      I/Os    06-13-24 @ 07:01 - 06-14-24 @ 07:00  --------------------------------------------------------  IN: 1770 mL / OUT: 1353 mL / NET: 417 mL    06-14-24 @ 07:01  -  06-15-24 @ 00:18  --------------------------------------------------------  IN: 480 mL / OUT: 160 mL / NET: 320 mL      --------------------------------------------------------------------------------------    EXAM  NEUROLOGY  Exam: Normal, NAD, alert, oriented x3, no focal deficits.    HEENT  Exam: Normocephalic, atraumatic, EOMI; neck incision clean, dry, and intact    RESPIRATORY  Exam: Lungs clear to auscultation, Normal expansion/effort. trach in place  Mechanical Ventilation:     CARDIOVASCULAR  Exam: S1, S2.  Regular rate and rhythm.     GI/NUTRITION  Exam: Abdomen soft, Non-tender, Non-distended.     VASCULAR  Exam: Extremities warm, pink, well-perfused.    MUSCULOSKELETAL  Exam: All extremities moving spontaneously without limitations.    SKIN  Exam: Good skin turgor, no skin breakdown.       Tubes/Lines/Drains  ***  [x] Peripheral IV  [] Central Venous Line     	[] R	[] L	[] IJ	[] Fem	[] SC	  [] Arterial Line		[] R	[] L	[] Fem	[] Rad	[] Ax	  [] PICC		[] Midline		[] Mediport  [] Urinary Catheter		  [x] Necessity of urinary, arterial, and venous catheters discussed    LABS  --------------------------------------------------------------------------------------                        9.0    11.48 )-----------( 446      ( 14 Jun 2024 17:56 )             27.1     06-14    136  |  103  |  14  ----------------------------<  151<H>  4.7   |  22  |  0.46<L>    Ca    8.1<L>      14 Jun 2024 17:56  Phos  4.5     06-14  Mg     1.70     06-14      PT/INR - ( 14 Jun 2024 05:51 )   PT: 11.5 sec;   INR: 1.02 ratio         PTT - ( 14 Jun 2024 05:51 )  PTT:27.7 sec  Urinalysis Basic - ( 14 Jun 2024 17:56 )    Color: x / Appearance: x / SG: x / pH: x  Gluc: 151 mg/dL / Ketone: x  / Bili: x / Urobili: x   Blood: x / Protein: x / Nitrite: x   Leuk Esterase: x / RBC: x / WBC x   Sq Epi: x / Non Sq Epi: x / Bacteria: x      penicillins (Unknown)    penicillins (Unknown)    --------------------------------------------------------------------------------------    IMAGING STUDIES:        SICU Daily Progress Note  =====================================================  Interval/Overnight Events:       - TOV  - start TFs, IVL  - q2hr flap checks in PM  - ID consult      PAST MEDICAL & SURGICAL HISTORY:  Tongue cancer  Schizophrenia  Anxiety and depression  Other diseases of tongue  H/O hand surgery  Cancer determined by biopsy of tongue  S/P cataract extraction  H/O colonoscopy      Allergies: penicillins (Unknown)      MEDICATIONS:   --------------------------------------------------------------------------------------  Neurologic Medications  acetaminophen   Oral Liquid .. 975 milliGRAM(s) Oral every 6 hours  buPROPion . 300 milliGRAM(s) Oral <User Schedule>  buPROPion . 150 milliGRAM(s) Oral at bedtime  oxyCODONE    Solution 5 milliGRAM(s) Oral every 4 hours PRN Severe Pain (7 - 10)  oxyCODONE    Solution 2.5 milliGRAM(s) Oral every 4 hours PRN Moderate Pain (4 - 6)  QUEtiapine 100 milliGRAM(s) Oral at bedtime  risperiDONE   Solution 1 milliGRAM(s) Oral <User Schedule>  risperiDONE   Solution 3 milliGRAM(s) Oral at bedtime    Respiratory Medications  acetylcysteine 10%  Inhalation 4 milliLiter(s) Inhalation every 6 hours  albuterol/ipratropium for Nebulization 3 milliLiter(s) Nebulizer every 6 hours    Cardiovascular Medications  amLODIPine   Tablet 10 milliGRAM(s) Oral daily    Gastrointestinal Medications  lactated ringers. 1000 milliLiter(s) IV Continuous <Continuous>  pantoprazole   Suspension 40 milliGRAM(s) Oral daily  polyethylene glycol 3350 17 Gram(s) Oral daily  senna Syrup 10 milliLiter(s) Oral daily  sodium chloride 0.9% lock flush 3 milliLiter(s) IV Push every 8 hours    Genitourinary Medications    Hematologic/Oncologic Medications  enoxaparin Injectable 40 milliGRAM(s) SubCutaneous every 24 hours    Antimicrobial/Immunologic Medications  ampicillin/sulbactam  IVPB 1.5 Gram(s) IV Intermittent every 6 hours    Endocrine/Metabolic Medications    Topical/Other Medications  chlorhexidine 0.12% Liquid 15 milliLiter(s) Swish and Spit <User Schedule>  chlorhexidine 2% Cloths 1 Application(s) Topical daily    --------------------------------------------------------------------------------------    VITAL SIGNS, INS/OUTS (last 24 hours):  ICU Vital Signs Last 24 Hrs  T(C): 36.8 (15 José Miguel 2024 04:00), Max: 37.4 (14 Jun 2024 17:40)  T(F): 98.2 (15 José Miguel 2024 04:00), Max: 99.4 (14 Jun 2024 17:40)  HR: 78 (15 José Miguel 2024 06:00) (68 - 111)  BP: 137/78 (15 José Miguel 2024 06:00) (122/80 - 151/93)  BP(mean): 95 (15 José Miguel 2024 06:00) (93 - 113)  ABP: 159/80 (15 José Miguel 2024 03:00) (102/95 - 160/153)  ABP(mean): 110 (15 José Miguel 2024 03:00) (98 - 157)  RR: 6 (15 José Miguel 2024 06:00) (6 - 24)  SpO2: 94% (15 José Miguel 2024 06:00) (92% - 99%)    O2 Parameters below as of 15 José Miguel 2024 04:15  Patient On (Oxygen Delivery Method): tracheostomy collar      --------------------------------------------------------------------------------------    I/Os    06-13-24 @ 07:01  -  06-14-24 @ 07:00  --------------------------------------------------------  IN: 1770 mL / OUT: 1353 mL / NET: 417 mL    06-14-24 @ 07:01  -  06-15-24 @ 00:18  --------------------------------------------------------  IN: 480 mL / OUT: 160 mL / NET: 320 mL      --------------------------------------------------------------------------------------    EXAM  NEUROLOGY  Exam: Normal, NAD, alert, oriented x3, no focal deficits.    HEENT  Exam; neck incision clean, dry, and intact. strong Cook, Flap WAP, skin paddle good color    RESPIRATORY  Exam: Normal expansion/effort. trach in place  Mechanical Ventilation:     CARDIOVASCULAR  Exam: S1, S2.  Regular rate and rhythm.     GI/NUTRITION  Exam: Abdomen soft, Non-tender, Non-distended.     VASCULAR  Exam: Extremities warm, pink, well-perfused.    SKIN  Exam: RLE thigh soft, JPx1 SS. ACE c/d/i without strikethrough        LABS  --------------------------------------------------------------------------------------                                   9.7    10.67 )-----------( 454      ( 15 José Miguel 2024 01:25 )             28.0     06-15    137  |  102  |  13  ----------------------------<  95  4.2   |  23  |  0.60    Ca    8.3<L>      15 Jun 2024 01:25  Phos  3.7     06-15  Mg     2.30     06-15

## 2024-06-16 LAB
ANION GAP SERPL CALC-SCNC: 8 MMOL/L — SIGNIFICANT CHANGE UP (ref 7–14)
BUN SERPL-MCNC: 14 MG/DL — SIGNIFICANT CHANGE UP (ref 7–23)
CALCIUM SERPL-MCNC: 8 MG/DL — LOW (ref 8.4–10.5)
CHLORIDE SERPL-SCNC: 106 MMOL/L — SIGNIFICANT CHANGE UP (ref 98–107)
CO2 SERPL-SCNC: 24 MMOL/L — SIGNIFICANT CHANGE UP (ref 22–31)
CREAT SERPL-MCNC: 0.5 MG/DL — SIGNIFICANT CHANGE UP (ref 0.5–1.3)
CULTURE RESULTS: ABNORMAL
EGFR: 105 ML/MIN/1.73M2 — SIGNIFICANT CHANGE UP
GLUCOSE SERPL-MCNC: 115 MG/DL — HIGH (ref 70–99)
HCT VFR BLD CALC: 27.4 % — LOW (ref 34.5–45)
HGB BLD-MCNC: 9.2 G/DL — LOW (ref 11.5–15.5)
MAGNESIUM SERPL-MCNC: 1.9 MG/DL — SIGNIFICANT CHANGE UP (ref 1.6–2.6)
MCHC RBC-ENTMCNC: 29.5 PG — SIGNIFICANT CHANGE UP (ref 27–34)
MCHC RBC-ENTMCNC: 33.6 GM/DL — SIGNIFICANT CHANGE UP (ref 32–36)
MCV RBC AUTO: 87.8 FL — SIGNIFICANT CHANGE UP (ref 80–100)
NRBC # BLD: 0 /100 WBCS — SIGNIFICANT CHANGE UP (ref 0–0)
NRBC # FLD: 0 K/UL — SIGNIFICANT CHANGE UP (ref 0–0)
ORGANISM # SPEC MICROSCOPIC CNT: ABNORMAL
ORGANISM # SPEC MICROSCOPIC CNT: ABNORMAL
PHOSPHATE SERPL-MCNC: 2.3 MG/DL — LOW (ref 2.5–4.5)
PLATELET # BLD AUTO: 440 K/UL — HIGH (ref 150–400)
POTASSIUM SERPL-MCNC: 3.1 MMOL/L — LOW (ref 3.5–5.3)
POTASSIUM SERPL-SCNC: 3.1 MMOL/L — LOW (ref 3.5–5.3)
RBC # BLD: 3.12 M/UL — LOW (ref 3.8–5.2)
RBC # FLD: 15.4 % — HIGH (ref 10.3–14.5)
SODIUM SERPL-SCNC: 138 MMOL/L — SIGNIFICANT CHANGE UP (ref 135–145)
SPECIMEN SOURCE: SIGNIFICANT CHANGE UP
WBC # BLD: 9.77 K/UL — SIGNIFICANT CHANGE UP (ref 3.8–10.5)
WBC # FLD AUTO: 9.77 K/UL — SIGNIFICANT CHANGE UP (ref 3.8–10.5)

## 2024-06-16 PROCEDURE — 99232 SBSQ HOSP IP/OBS MODERATE 35: CPT | Mod: GC

## 2024-06-16 RX ORDER — SOD PHOS DI, MONO/K PHOS MONO 250 MG
1 TABLET ORAL ONCE
Refills: 0 | Status: COMPLETED | OUTPATIENT
Start: 2024-06-16 | End: 2024-06-16

## 2024-06-16 RX ORDER — POTASSIUM CHLORIDE 600 MG/1
40 TABLET, FILM COATED, EXTENDED RELEASE ORAL ONCE
Refills: 0 | Status: COMPLETED | OUTPATIENT
Start: 2024-06-16 | End: 2024-06-16

## 2024-06-16 RX ADMIN — BUPROPION HYDROCHLORIDE 300 MILLIGRAM(S): 150 TABLET, EXTENDED RELEASE ORAL at 06:06

## 2024-06-16 RX ADMIN — Medication 15 MILLILITER(S): at 10:05

## 2024-06-16 RX ADMIN — AMPICILLIN AND SULBACTAM 200 GRAM(S): 2; 1 INJECTION, POWDER, FOR SOLUTION INTRAMUSCULAR; INTRAVENOUS at 22:08

## 2024-06-16 RX ADMIN — Medication 15 MILLILITER(S): at 22:11

## 2024-06-16 RX ADMIN — ACETYLCYSTEINE 4 MILLILITER(S): 200 SOLUTION ORAL; RESPIRATORY (INHALATION) at 21:09

## 2024-06-16 RX ADMIN — BUPROPION HYDROCHLORIDE 150 MILLIGRAM(S): 150 TABLET, EXTENDED RELEASE ORAL at 22:08

## 2024-06-16 RX ADMIN — OXYCODONE HYDROCHLORIDE 10 MILLIGRAM(S): 100 SOLUTION ORAL at 05:05

## 2024-06-16 RX ADMIN — ENOXAPARIN SODIUM 40 MILLIGRAM(S): 100 INJECTION SUBCUTANEOUS at 14:10

## 2024-06-16 RX ADMIN — Medication 1 PACKET(S): at 03:52

## 2024-06-16 RX ADMIN — Medication 975 MILLIGRAM(S): at 13:00

## 2024-06-16 RX ADMIN — RISPERIDONE 3 MILLIGRAM(S): 0.5 TABLET ORAL at 00:53

## 2024-06-16 RX ADMIN — Medication 975 MILLIGRAM(S): at 23:46

## 2024-06-16 RX ADMIN — Medication 975 MILLIGRAM(S): at 11:50

## 2024-06-16 RX ADMIN — PANTOPRAZOLE SODIUM 40 MILLIGRAM(S): 40 INJECTION, POWDER, FOR SOLUTION INTRAVENOUS at 11:49

## 2024-06-16 RX ADMIN — Medication 10 MILLILITER(S): at 22:09

## 2024-06-16 RX ADMIN — ACETYLCYSTEINE 4 MILLILITER(S): 200 SOLUTION ORAL; RESPIRATORY (INHALATION) at 15:11

## 2024-06-16 RX ADMIN — RISPERIDONE 3 MILLIGRAM(S): 0.5 TABLET ORAL at 22:11

## 2024-06-16 RX ADMIN — AMPICILLIN AND SULBACTAM 200 GRAM(S): 2; 1 INJECTION, POWDER, FOR SOLUTION INTRAMUSCULAR; INTRAVENOUS at 14:11

## 2024-06-16 RX ADMIN — Medication 15 MILLILITER(S): at 06:05

## 2024-06-16 RX ADMIN — Medication 100 MILLIGRAM(S): at 22:08

## 2024-06-16 RX ADMIN — ACETYLCYSTEINE 4 MILLILITER(S): 200 SOLUTION ORAL; RESPIRATORY (INHALATION) at 07:39

## 2024-06-16 RX ADMIN — Medication 3 MILLILITER(S): at 05:45

## 2024-06-16 RX ADMIN — Medication 15 MILLILITER(S): at 12:59

## 2024-06-16 RX ADMIN — Medication 975 MILLIGRAM(S): at 19:00

## 2024-06-16 RX ADMIN — Medication 15 MILLILITER(S): at 16:25

## 2024-06-16 RX ADMIN — IPRATROPIUM BROMIDE AND ALBUTEROL SULFATE 3 MILLILITER(S): .5; 3 SOLUTION RESPIRATORY (INHALATION) at 21:09

## 2024-06-16 RX ADMIN — RISPERIDONE 1 MILLIGRAM(S): 0.5 TABLET ORAL at 06:18

## 2024-06-16 RX ADMIN — Medication 3 MILLILITER(S): at 21:08

## 2024-06-16 RX ADMIN — POTASSIUM CHLORIDE 40 MILLIEQUIVALENT(S): 600 TABLET, FILM COATED, EXTENDED RELEASE ORAL at 03:52

## 2024-06-16 RX ADMIN — Medication 975 MILLIGRAM(S): at 05:42

## 2024-06-16 RX ADMIN — AMPICILLIN AND SULBACTAM 200 GRAM(S): 2; 1 INJECTION, POWDER, FOR SOLUTION INTRAMUSCULAR; INTRAVENOUS at 08:27

## 2024-06-16 RX ADMIN — AMPICILLIN AND SULBACTAM 200 GRAM(S): 2; 1 INJECTION, POWDER, FOR SOLUTION INTRAMUSCULAR; INTRAVENOUS at 02:17

## 2024-06-16 RX ADMIN — IPRATROPIUM BROMIDE AND ALBUTEROL SULFATE 3 MILLILITER(S): .5; 3 SOLUTION RESPIRATORY (INHALATION) at 07:40

## 2024-06-16 RX ADMIN — Medication 975 MILLIGRAM(S): at 18:00

## 2024-06-16 RX ADMIN — IPRATROPIUM BROMIDE AND ALBUTEROL SULFATE 3 MILLILITER(S): .5; 3 SOLUTION RESPIRATORY (INHALATION) at 15:11

## 2024-06-16 RX ADMIN — ACETYLCYSTEINE 4 MILLILITER(S): 200 SOLUTION ORAL; RESPIRATORY (INHALATION) at 03:36

## 2024-06-16 RX ADMIN — AMLODIPINE BESYLATE 10 MILLIGRAM(S): 2.5 TABLET ORAL at 05:41

## 2024-06-16 RX ADMIN — Medication 3 MILLILITER(S): at 15:30

## 2024-06-16 RX ADMIN — OXYCODONE HYDROCHLORIDE 10 MILLIGRAM(S): 100 SOLUTION ORAL at 04:34

## 2024-06-16 RX ADMIN — IPRATROPIUM BROMIDE AND ALBUTEROL SULFATE 3 MILLILITER(S): .5; 3 SOLUTION RESPIRATORY (INHALATION) at 03:36

## 2024-06-16 RX ADMIN — Medication 1 APPLICATION(S): at 12:24

## 2024-06-16 RX ADMIN — Medication 15 MILLILITER(S): at 18:38

## 2024-06-16 RX ADMIN — POLYETHYLENE GLYCOL 3350 17 GRAM(S): 1 POWDER ORAL at 11:50

## 2024-06-16 NOTE — PROGRESS NOTE ADULT - ASSESSMENT
ASSESSMENT/PLAN:   LELA LOWERY is a 64yFemale s/p right alt for revision hemiglossal recon 6/14    - Keep systolic <140  - q1h flap checks  - NPO with TF  - Peridex mouthwas for at least 1 wk  - BID neck irrigation through penrose, done by MICHELLE Catherine MD  Plastic and Reconstructive Surgery, PGY-1  MAGDALENA: [id number]  NS: [phone]

## 2024-06-16 NOTE — PROGRESS NOTE ADULT - ASSESSMENT
65 y/o female with schizophrenia and depression s/p R partial glossectomy in the setting of SCCA, R SND I-IV, L RFFF, L thigh STSG, tracheostomy (6/6/24). Pts post-operative course c/b venous congestion of flap w/ RTOR on 6/11 for free flap salvage and revision of venous anastamosis. Flap now infected with E Coli also without flow. RTOR 6/14 for exploration of tongue free flap, muscle flap revision with right thigh flap, 7.5 trach exchanged to 7.0 cuffed trach. SICU consulted for flap checks    PLAN:   Neurologic:   - AOx4 at baseline  - flat effect   - Pain control: Tylenol, oxy prn  - psych meds started: Risperidone, Seroquel Wellbutrin  - q2 hour flap checks     Respiratory:   - SaO2 goal > 92%  - trach collar 7.0 cuffed trach  - Mucomyst 4 q 6 hours    Cardiovascular:   - MAP goal > 65  - Continue amlodipine    Gastrointestinal/Nutrition:   - NPO with TFs  - Bowel regimen  - protonix per flap protocol     Renal/Genitourinary:   - Failed TOV, straight cath x 1 overnight 300 cc --> tov 6am  - monitor electrolytes, replete prn    Hematologic:   - Monitor H&H  - DVT ppx: Lovenox, SCDs    Infectious Disease:   - E Coli in flap, continue unasyn 3 gram q6 per ID recs    Endocrine:   - Monitor blood glucose levels     Disposition: SICU   65 y/o female with schizophrenia and depression s/p R partial glossectomy in the setting of SCCA, R SND I-IV, L RFFF, L thigh STSG, tracheostomy (6/6/24). Pts post-operative course c/b venous congestion of flap w/ RTOR on 6/11 for free flap salvage and revision of venous anastamosis. Flap now infected with E Coli also without flow. RTOR 6/14 for exploration of tongue free flap, muscle flap revision with right thigh flap, 7.5 trach exchanged to 7.0 cuffed trach. SICU consulted for flap checks    PLAN:   Neurologic:   - AOx4 at baseline  - flat effect   - Pain control: Tylenol, oxy prn  - psych meds started: Risperidone, Seroquel Wellbutrin  - q4 hour flap checks     Respiratory:   - SaO2 goal > 92%  - trach collar 7.0 cuffed trach  - Mucomyst 4 q 6 hours    Cardiovascular:   - MAP goal > 65  - Continue amlodipine  - intermittent     Gastrointestinal/Nutrition:   - NPO with TFs  - Bowel regimen  - protonix per flap protocol     Renal/Genitourinary:   - Failed TOV, straight cath x 1 overnight 300 cc --> tov 6am  - monitor electrolytes, replete prn    Hematologic:   - Monitor H&H  - DVT ppx: Lovenox, SCDs    Infectious Disease:   - E Coli in flap, continue unasyn 3 gram q6 per ID recs    Endocrine:   - Monitor blood glucose levels     Disposition: SICU

## 2024-06-16 NOTE — PROGRESS NOTE ADULT - SUBJECTIVE AND OBJECTIVE BOX
Plastic Surgery Progress Note (pg [facility]: [id number], [facility]: [phone])    SUBJECTIVE  Staight cathed overight for urinary retention. AVSS. Pt comfortable in bed.     OBJECTIVE  ___________________________________________________  VITAL SIGNS / I&O's   Vital Signs Last 24 Hrs  T(C): 37.1 (16 Jun 2024 04:00), Max: 37.8 (15 José Miguel 2024 12:00)  T(F): 98.7 (16 Jun 2024 04:00), Max: 100 (15 José Miguel 2024 12:00)  HR: 100 (16 Jun 2024 07:39) (76 - 122)  BP: 121/82 (16 Jun 2024 07:00) (107/67 - 160/94)  BP(mean): 93 (16 Jun 2024 07:00) (77 - 115)  RR: 22 (16 Jun 2024 07:00) (12 - 28)  SpO2: 99% (16 Jun 2024 07:00) (93% - 100%)    Parameters below as of 16 Jun 2024 04:16  Patient On (Oxygen Delivery Method): tracheostomy collar          15 José Miguel 2024 07:01  -  16 Jun 2024 07:00  --------------------------------------------------------  IN:    Enteral Tube Flush: 450 mL    IV PiggyBack: 250 mL    Jevity 1.5: 1038 mL  Total IN: 1738 mL    OUT:    Bulb (mL): 30 mL    Indwelling Catheter - Urethral (mL): 850 mL    Voided (mL): 400 mL  Total OUT: 1280 mL    Total NET: 458 mL        ___________________________________________________  PHYSICAL EXAM  Gen: NAD, A&Ox3  HEENT: neck swollen but no palpale fluid collection. Flap soft, warm, good color. Cook signal strong  RLE thigh soft, SUZANNE drain SSO. Ace cdi  ___________________________________________________  LABS                        9.2    9.77  )-----------( 440      ( 16 Jun 2024 02:00 )             27.4     16 Jun 2024 02:00    138    |  106    |  14     ----------------------------<  115    3.1     |  24     |  0.50     Ca    8.0        16 Jun 2024 02:00  Phos  2.3       16 Jun 2024 02:00  Mg     1.90      16 Jun 2024 02:00        CAPILLARY BLOOD GLUCOSE            Urinalysis Basic - ( 16 Jun 2024 02:00 )    Color: x / Appearance: x / SG: x / pH: x  Gluc: 115 mg/dL / Ketone: x  / Bili: x / Urobili: x   Blood: x / Protein: x / Nitrite: x   Leuk Esterase: x / RBC: x / WBC x   Sq Epi: x / Non Sq Epi: x / Bacteria: x      ___________________________________________________  MICRO  Recent Cultures:  Specimen Source: .Smear RIGHT NECK CLT, 06-14 @ 12:07; Results   Culture is being performed. Fungal cultures are held for 4 weeks.; Gram Stain:   Numerous polymorphonuclear leukocytes per low power field  No organisms seen per oil power field; Organism: --  Specimen Source: .Blood Blood-Peripheral, 06-12 @ 04:58; Results   No growth at 72 Hours; Gram Stain: --; Organism: --  Specimen Source: .Blood Blood-Peripheral, 06-12 @ 03:37; Results   No growth at 4 days; Gram Stain: --; Organism: --  Specimen Source: .Surgical Swab RIGHT NECK WOUND, 06-11 @ 05:30; Results   Escherichia coli<!>; Gram Stain: --; Organism: Escherichia coli<!>    ___________________________________________________  MEDICATIONS  (STANDING):  acetaminophen   Oral Liquid .. 975 milliGRAM(s) Oral every 6 hours  acetylcysteine 10%  Inhalation 4 milliLiter(s) Inhalation every 6 hours  albuterol/ipratropium for Nebulization 3 milliLiter(s) Nebulizer every 6 hours  amLODIPine   Tablet 10 milliGRAM(s) Oral daily  ampicillin/sulbactam  IVPB 3 Gram(s) IV Intermittent every 6 hours  buPROPion . 150 milliGRAM(s) Oral at bedtime  buPROPion . 300 milliGRAM(s) Oral <User Schedule>  chlorhexidine 0.12% Liquid 15 milliLiter(s) Swish and Spit <User Schedule>  chlorhexidine 2% Cloths 1 Application(s) Topical daily  enoxaparin Injectable 40 milliGRAM(s) SubCutaneous every 24 hours  pantoprazole  Injectable 40 milliGRAM(s) IV Push daily  polyethylene glycol 3350 17 Gram(s) Oral daily  QUEtiapine 100 milliGRAM(s) Oral at bedtime  risperiDONE   Solution 1 milliGRAM(s) Oral <User Schedule>  risperiDONE   Solution 3 milliGRAM(s) Oral at bedtime  senna Syrup 10 milliLiter(s) Oral daily  sodium chloride 0.9% lock flush 3 milliLiter(s) IV Push every 8 hours    MEDICATIONS  (PRN):  oxyCODONE    Solution 5 milliGRAM(s) Oral every 4 hours PRN Moderate Pain (4 - 6)  oxyCODONE    Solution 10 milliGRAM(s) Oral every 4 hours PRN Severe Pain (7 - 10)

## 2024-06-16 NOTE — PROGRESS NOTE ADULT - ASSESSMENT
64F w/ PMH of depression and schizophrenia s/p R partial glossectomy in the setting of SCCA, R SND I-IV, L RFFF, L thigh STSG, tracheostomy (6/6/24). Pts post-operative course c/b venous congestion of flap w/ RTOR on 6/11/24 for free flap salvage and revision of venous anastamosis, RTOR on 6/14/24 for debridement of necrotic radial forearm flap and replacement with right ALT flap, with 7.5 trach exchanged to 7.0 cuffed trach. Pt is progressing well.     Plan:  -ERAS Day 2  -Q2h flap checks  -OOBTC  -C/w IV unasyn  -BID neck irrigation via penrose by PRS  -NPO w/ TFs  -Rest of care per SICU      OMFS  #[id number]  Available on Teams

## 2024-06-16 NOTE — PROGRESS NOTE ADULT - SUBJECTIVE AND OBJECTIVE BOX
SICU Daily Progress Note  =====================================================  Interval/Overnight Events:       - Urinary retention sp straight cath 300 cc --> f/u TOV 6am    ALLERGIES:  penicillins (Unknown)      --------------------------------------------------------------------------------------    MEDICATIONS:    Neurologic Medications  acetaminophen   Oral Liquid .. 975 milliGRAM(s) Oral every 6 hours  buPROPion . 300 milliGRAM(s) Oral <User Schedule>  buPROPion . 150 milliGRAM(s) Oral at bedtime  oxyCODONE    Solution 10 milliGRAM(s) Oral every 4 hours PRN Severe Pain (7 - 10)  oxyCODONE    Solution 5 milliGRAM(s) Oral every 4 hours PRN Moderate Pain (4 - 6)  QUEtiapine 100 milliGRAM(s) Oral at bedtime  risperiDONE   Solution 1 milliGRAM(s) Oral <User Schedule>  risperiDONE   Solution 3 milliGRAM(s) Oral at bedtime    Respiratory Medications  acetylcysteine 10%  Inhalation 4 milliLiter(s) Inhalation every 6 hours  albuterol/ipratropium for Nebulization 3 milliLiter(s) Nebulizer every 6 hours    Cardiovascular Medications  amLODIPine   Tablet 10 milliGRAM(s) Oral daily    Gastrointestinal Medications  pantoprazole  Injectable 40 milliGRAM(s) IV Push daily  polyethylene glycol 3350 17 Gram(s) Oral daily  senna Syrup 10 milliLiter(s) Oral daily  sodium chloride 0.9% lock flush 3 milliLiter(s) IV Push every 8 hours    Genitourinary Medications    Hematologic/Oncologic Medications  enoxaparin Injectable 40 milliGRAM(s) SubCutaneous every 24 hours    Antimicrobial/Immunologic Medications  ampicillin/sulbactam  IVPB 3 Gram(s) IV Intermittent every 6 hours    Endocrine/Metabolic Medications    Topical/Other Medications  chlorhexidine 0.12% Liquid 15 milliLiter(s) Swish and Spit <User Schedule>  chlorhexidine 2% Cloths 1 Application(s) Topical daily    --------------------------------------------------------------------------------------    VITAL SIGNS:  T(C): 36.8 (06-16-24 @ 00:00), Max: 37.8 (06-15-24 @ 12:00)  HR: 101 (06-16-24 @ 00:00) (78 - 122)  BP: 138/88 (06-16-24 @ 00:00) (107/67 - 148/90)  RR: 21 (06-16-24 @ 00:00) (6 - 28)  SpO2: 98% (06-16-24 @ 00:00) (93% - 100%)  --------------------------------------------------------------------------------------    INS AND OUTS:    06-14-24 @ 07:01  -  06-15-24 @ 07:00  --------------------------------------------------------  IN: 1740 mL / OUT: 825 mL / NET: 915 mL    06-15-24 @ 07:01  -  06-16-24 @ 01:39  --------------------------------------------------------  IN: 1166 mL / OUT: 1065 mL / NET: 101 mL      --------------------------------------------------------------------------------------    EXAM    NEURO: NAD,   DONNY: NC/AT, b/l penrose to neck incision, cook doppler +  RESPIRATORY: nonlabored respirations, normal CW expansion, trach collar  CARDIO: RRR, S1S2  ABDOMEN: soft, nontender, nondistended, KO tube   EXTREMITIES: normal strength, no deformities    --------------------------------------------------------------------------------------    LABS                        9.7    10.67 )-----------( 454      ( 15 José Miguel 2024 01:25 )             28.0   06-15    137  |  102  |  13  ----------------------------<  95  4.2   |  23  |  0.60    Ca    8.3<L>      15 Jun 2024 01:25  Phos  3.7     06-15  Mg     2.30     06-15        -------------------------------------------------------------------------------------- SICU Daily Progress Note  =====================================================  Interval/Overnight Events:       - passed ToV  - q4 flap checks/vitals  - intermittently tachycardic  - listed to floor      ALLERGIES:  penicillins (Unknown)      --------------------------------------------------------------------------------------    MEDICATIONS:    Neurologic Medications  acetaminophen   Oral Liquid .. 975 milliGRAM(s) Oral every 6 hours  buPROPion . 300 milliGRAM(s) Oral <User Schedule>  buPROPion . 150 milliGRAM(s) Oral at bedtime  oxyCODONE    Solution 10 milliGRAM(s) Oral every 4 hours PRN Severe Pain (7 - 10)  oxyCODONE    Solution 5 milliGRAM(s) Oral every 4 hours PRN Moderate Pain (4 - 6)  QUEtiapine 100 milliGRAM(s) Oral at bedtime  risperiDONE   Solution 1 milliGRAM(s) Oral <User Schedule>  risperiDONE   Solution 3 milliGRAM(s) Oral at bedtime    Respiratory Medications  acetylcysteine 10%  Inhalation 4 milliLiter(s) Inhalation every 6 hours  albuterol/ipratropium for Nebulization 3 milliLiter(s) Nebulizer every 6 hours    Cardiovascular Medications  amLODIPine   Tablet 10 milliGRAM(s) Oral daily    Gastrointestinal Medications  pantoprazole  Injectable 40 milliGRAM(s) IV Push daily  polyethylene glycol 3350 17 Gram(s) Oral daily  senna Syrup 10 milliLiter(s) Oral daily  sodium chloride 0.9% lock flush 3 milliLiter(s) IV Push every 8 hours    Genitourinary Medications    Hematologic/Oncologic Medications  enoxaparin Injectable 40 milliGRAM(s) SubCutaneous every 24 hours    Antimicrobial/Immunologic Medications  ampicillin/sulbactam  IVPB 3 Gram(s) IV Intermittent every 6 hours    Endocrine/Metabolic Medications    Topical/Other Medications  chlorhexidine 0.12% Liquid 15 milliLiter(s) Swish and Spit <User Schedule>  chlorhexidine 2% Cloths 1 Application(s) Topical daily    --------------------------------------------------------------------------------------    VITAL SIGNS:  T(C): 36.8 (06-16-24 @ 00:00), Max: 37.8 (06-15-24 @ 12:00)  HR: 101 (06-16-24 @ 00:00) (78 - 122)  BP: 138/88 (06-16-24 @ 00:00) (107/67 - 148/90)  RR: 21 (06-16-24 @ 00:00) (6 - 28)  SpO2: 98% (06-16-24 @ 00:00) (93% - 100%)  --------------------------------------------------------------------------------------    INS AND OUTS:    06-14-24 @ 07:01  -  06-15-24 @ 07:00  --------------------------------------------------------  IN: 1740 mL / OUT: 825 mL / NET: 915 mL    06-15-24 @ 07:01  -  06-16-24 @ 01:39  --------------------------------------------------------  IN: 1166 mL / OUT: 1065 mL / NET: 101 mL      --------------------------------------------------------------------------------------    EXAM    NEURO: NAD,   HEENT: NC/AT, b/l penrose to neck incision, cook doppler +  RESPIRATORY: nonlabored respirations, normal CW expansion, trach collar  CARDIO: RRR, S1S2  ABDOMEN: soft, nontender, nondistended, KO tube   EXTREMITIES: normal strength, no deformities    --------------------------------------------------------------------------------------    LABS                        9.7    10.67 )-----------( 454      ( 15 José Miguel 2024 01:25 )             28.0   06-15    137  |  102  |  13  ----------------------------<  95  4.2   |  23  |  0.60    Ca    8.3<L>      15 Jun 2024 01:25  Phos  3.7     06-15  Mg     2.30     06-15        --------------------------------------------------------------------------------------

## 2024-06-16 NOTE — PROGRESS NOTE ADULT - SUBJECTIVE AND OBJECTIVE BOX
64F w/ PMH of depression and schizophrenia s/p R partial glossectomy in the setting of SCCA, R SND I-IV, L RFFF, L thigh STSG, tracheostomy (6/6/24). Pts post-operative course c/b venous congestion of flap w/ RTOR on 6/11/24 for free flap salvage and revision of venous anastamosis, RTOR on 6/14/24 for debridement of necrotic radial forearm flap and replacement with right ALT flap, with 7.5 trach exchanged to 7.0 cuffed trach.       Interval Events:   Urinary retention sp straight cath 300 cc --> f/u TOV 6am    Vital Signs Last 24 Hrs  T(C): 37.1 (16 Jun 2024 04:00), Max: 37.8 (15 José Miguel 2024 12:00)  T(F): 98.7 (16 Jun 2024 04:00), Max: 100 (15 José Miguel 2024 12:00)  HR: 86 (16 Jun 2024 06:00) (76 - 122)  BP: 122/75 (16 Jun 2024 06:00) (107/67 - 160/94)  BP(mean): 88 (16 Jun 2024 06:00) (77 - 115)  RR: 16 (16 Jun 2024 06:00) (12 - 28)  SpO2: 100% (16 Jun 2024 06:00) (93% - 100%)    Parameters below as of 16 Jun 2024 04:16  Patient On (Oxygen Delivery Method): tracheostomy collar        I&O's Detail    15 José Miguel 2024 07:01  -  16 Jun 2024 07:00  --------------------------------------------------------  IN:    Enteral Tube Flush: 450 mL    IV PiggyBack: 250 mL    Jevity 1.5: 1038 mL  Total IN: 1738 mL    OUT:    Bulb (mL): 30 mL    Indwelling Catheter - Urethral (mL): 850 mL    Voided (mL): 400 mL  Total OUT: 1280 mL    Total NET: 458 mL        Medications:    MEDICATIONS  (STANDING):  acetaminophen   Oral Liquid .. 975 milliGRAM(s) Oral every 6 hours  acetylcysteine 10%  Inhalation 4 milliLiter(s) Inhalation every 6 hours  albuterol/ipratropium for Nebulization 3 milliLiter(s) Nebulizer every 6 hours  amLODIPine   Tablet 10 milliGRAM(s) Oral daily  ampicillin/sulbactam  IVPB 3 Gram(s) IV Intermittent every 6 hours  buPROPion . 150 milliGRAM(s) Oral at bedtime  buPROPion . 300 milliGRAM(s) Oral <User Schedule>  chlorhexidine 0.12% Liquid 15 milliLiter(s) Swish and Spit <User Schedule>  chlorhexidine 2% Cloths 1 Application(s) Topical daily  enoxaparin Injectable 40 milliGRAM(s) SubCutaneous every 24 hours  pantoprazole  Injectable 40 milliGRAM(s) IV Push daily  polyethylene glycol 3350 17 Gram(s) Oral daily  QUEtiapine 100 milliGRAM(s) Oral at bedtime  risperiDONE   Solution 1 milliGRAM(s) Oral <User Schedule>  risperiDONE   Solution 3 milliGRAM(s) Oral at bedtime  senna Syrup 10 milliLiter(s) Oral daily  sodium chloride 0.9% lock flush 3 milliLiter(s) IV Push every 8 hours    MEDICATIONS  (PRN):  oxyCODONE    Solution 5 milliGRAM(s) Oral every 4 hours PRN Moderate Pain (4 - 6)  oxyCODONE    Solution 10 milliGRAM(s) Oral every 4 hours PRN Severe Pain (7 - 10)        Labs:                          9.2    9.77  )-----------( 440      ( 16 Jun 2024 02:00 )             27.4       06-16    138  |  106  |  14  ----------------------------<  115<H>  3.1<L>   |  24  |  0.50    Ca    8.0<L>      16 Jun 2024 02:00  Phos  2.3     06-16  Mg     1.90     06-16      PHYSICAL EXAM:  GEN: NAD  NEURO: alert & oriented x 4/4  HEENT: Exam; neck incision clean, dry, and intact. strong Cook, flap warm and well perfused, good color  CVS: regular rate and rhythm  Pulm: normal respiratory excursions, not tachypneic, no labored breathing  Abd: soft, non-distended  Ext:  no peripheral edema noted, DE drain in place

## 2024-06-17 LAB
-  AMOXICILLIN/CLAVULANIC ACID: SIGNIFICANT CHANGE UP
-  AMPICILLIN/SULBACTAM: SIGNIFICANT CHANGE UP
-  AMPICILLIN: SIGNIFICANT CHANGE UP
-  AZTREONAM: SIGNIFICANT CHANGE UP
-  CEFAZOLIN: SIGNIFICANT CHANGE UP
-  CEFEPIME: SIGNIFICANT CHANGE UP
-  CEFOXITIN: SIGNIFICANT CHANGE UP
-  CEFTRIAXONE: SIGNIFICANT CHANGE UP
-  CIPROFLOXACIN: SIGNIFICANT CHANGE UP
-  ERTAPENEM: SIGNIFICANT CHANGE UP
-  GENTAMICIN: SIGNIFICANT CHANGE UP
-  IMIPENEM: SIGNIFICANT CHANGE UP
-  LEVOFLOXACIN: SIGNIFICANT CHANGE UP
-  MEROPENEM: SIGNIFICANT CHANGE UP
-  PIPERACILLIN/TAZOBACTAM: SIGNIFICANT CHANGE UP
-  TOBRAMYCIN: SIGNIFICANT CHANGE UP
-  TRIMETHOPRIM/SULFAMETHOXAZOLE: SIGNIFICANT CHANGE UP
ANION GAP SERPL CALC-SCNC: 10 MMOL/L — SIGNIFICANT CHANGE UP (ref 7–14)
BUN SERPL-MCNC: 11 MG/DL — SIGNIFICANT CHANGE UP (ref 7–23)
CALCIUM SERPL-MCNC: 8.3 MG/DL — LOW (ref 8.4–10.5)
CHLORIDE SERPL-SCNC: 103 MMOL/L — SIGNIFICANT CHANGE UP (ref 98–107)
CO2 SERPL-SCNC: 25 MMOL/L — SIGNIFICANT CHANGE UP (ref 22–31)
CREAT SERPL-MCNC: 0.42 MG/DL — LOW (ref 0.5–1.3)
CULTURE RESULTS: SIGNIFICANT CHANGE UP
CULTURE RESULTS: SIGNIFICANT CHANGE UP
EGFR: 109 ML/MIN/1.73M2 — SIGNIFICANT CHANGE UP
GLUCOSE SERPL-MCNC: 106 MG/DL — HIGH (ref 70–99)
HCT VFR BLD CALC: 29.4 % — LOW (ref 34.5–45)
HGB BLD-MCNC: 10 G/DL — LOW (ref 11.5–15.5)
MAGNESIUM SERPL-MCNC: 1.8 MG/DL — SIGNIFICANT CHANGE UP (ref 1.6–2.6)
MCHC RBC-ENTMCNC: 29.6 PG — SIGNIFICANT CHANGE UP (ref 27–34)
MCHC RBC-ENTMCNC: 34 GM/DL — SIGNIFICANT CHANGE UP (ref 32–36)
MCV RBC AUTO: 87 FL — SIGNIFICANT CHANGE UP (ref 80–100)
METHOD TYPE: SIGNIFICANT CHANGE UP
NRBC # BLD: 0 /100 WBCS — SIGNIFICANT CHANGE UP (ref 0–0)
NRBC # FLD: 0 K/UL — SIGNIFICANT CHANGE UP (ref 0–0)
PHOSPHATE SERPL-MCNC: 2.3 MG/DL — LOW (ref 2.5–4.5)
PLATELET # BLD AUTO: 550 K/UL — HIGH (ref 150–400)
POTASSIUM SERPL-MCNC: 3.2 MMOL/L — LOW (ref 3.5–5.3)
POTASSIUM SERPL-SCNC: 3.2 MMOL/L — LOW (ref 3.5–5.3)
RBC # BLD: 3.38 M/UL — LOW (ref 3.8–5.2)
RBC # FLD: 15.3 % — HIGH (ref 10.3–14.5)
SODIUM SERPL-SCNC: 138 MMOL/L — SIGNIFICANT CHANGE UP (ref 135–145)
SPECIMEN SOURCE: SIGNIFICANT CHANGE UP
SPECIMEN SOURCE: SIGNIFICANT CHANGE UP
SURGICAL PATHOLOGY STUDY: SIGNIFICANT CHANGE UP
WBC # BLD: 8.52 K/UL — SIGNIFICANT CHANGE UP (ref 3.8–10.5)
WBC # FLD AUTO: 8.52 K/UL — SIGNIFICANT CHANGE UP (ref 3.8–10.5)

## 2024-06-17 PROCEDURE — 99231 SBSQ HOSP IP/OBS SF/LOW 25: CPT | Mod: 24,GC

## 2024-06-17 PROCEDURE — 99232 SBSQ HOSP IP/OBS MODERATE 35: CPT

## 2024-06-17 RX ORDER — POTASSIUM PHOSPHATE, MONOBASIC POTASSIUM PHOSPHATE, DIBASIC INJECTION, 236; 224 MG/ML; MG/ML
15 SOLUTION, CONCENTRATE INTRAVENOUS ONCE
Refills: 0 | Status: DISCONTINUED | OUTPATIENT
Start: 2024-06-17 | End: 2024-06-17

## 2024-06-17 RX ORDER — POTASSIUM CHLORIDE 600 MG/1
10 TABLET, FILM COATED, EXTENDED RELEASE ORAL
Refills: 0 | Status: DISCONTINUED | OUTPATIENT
Start: 2024-06-17 | End: 2024-06-17

## 2024-06-17 RX ADMIN — Medication 3 MILLILITER(S): at 05:24

## 2024-06-17 RX ADMIN — Medication 1 APPLICATION(S): at 12:25

## 2024-06-17 RX ADMIN — Medication 975 MILLIGRAM(S): at 12:24

## 2024-06-17 RX ADMIN — Medication 975 MILLIGRAM(S): at 17:44

## 2024-06-17 RX ADMIN — ENOXAPARIN SODIUM 40 MILLIGRAM(S): 100 INJECTION SUBCUTANEOUS at 14:16

## 2024-06-17 RX ADMIN — AMPICILLIN AND SULBACTAM 200 GRAM(S): 2; 1 INJECTION, POWDER, FOR SOLUTION INTRAMUSCULAR; INTRAVENOUS at 09:33

## 2024-06-17 RX ADMIN — Medication 15 MILLILITER(S): at 18:38

## 2024-06-17 RX ADMIN — PANTOPRAZOLE SODIUM 40 MILLIGRAM(S): 40 INJECTION, POWDER, FOR SOLUTION INTRAVENOUS at 12:23

## 2024-06-17 RX ADMIN — Medication 975 MILLIGRAM(S): at 05:55

## 2024-06-17 RX ADMIN — ACETYLCYSTEINE 4 MILLILITER(S): 200 SOLUTION ORAL; RESPIRATORY (INHALATION) at 03:09

## 2024-06-17 RX ADMIN — Medication 15 MILLILITER(S): at 12:23

## 2024-06-17 RX ADMIN — IPRATROPIUM BROMIDE AND ALBUTEROL SULFATE 3 MILLILITER(S): .5; 3 SOLUTION RESPIRATORY (INHALATION) at 15:28

## 2024-06-17 RX ADMIN — Medication 3 MILLILITER(S): at 22:01

## 2024-06-17 RX ADMIN — Medication 3 MILLILITER(S): at 14:21

## 2024-06-17 RX ADMIN — AMPICILLIN AND SULBACTAM 200 GRAM(S): 2; 1 INJECTION, POWDER, FOR SOLUTION INTRAMUSCULAR; INTRAVENOUS at 21:42

## 2024-06-17 RX ADMIN — IPRATROPIUM BROMIDE AND ALBUTEROL SULFATE 3 MILLILITER(S): .5; 3 SOLUTION RESPIRATORY (INHALATION) at 21:10

## 2024-06-17 RX ADMIN — Medication 975 MILLIGRAM(S): at 18:00

## 2024-06-17 RX ADMIN — Medication 15 MILLILITER(S): at 09:33

## 2024-06-17 RX ADMIN — IPRATROPIUM BROMIDE AND ALBUTEROL SULFATE 3 MILLILITER(S): .5; 3 SOLUTION RESPIRATORY (INHALATION) at 08:08

## 2024-06-17 RX ADMIN — AMPICILLIN AND SULBACTAM 200 GRAM(S): 2; 1 INJECTION, POWDER, FOR SOLUTION INTRAMUSCULAR; INTRAVENOUS at 14:16

## 2024-06-17 RX ADMIN — ACETYLCYSTEINE 4 MILLILITER(S): 200 SOLUTION ORAL; RESPIRATORY (INHALATION) at 15:29

## 2024-06-17 RX ADMIN — IPRATROPIUM BROMIDE AND ALBUTEROL SULFATE 3 MILLILITER(S): .5; 3 SOLUTION RESPIRATORY (INHALATION) at 03:09

## 2024-06-17 RX ADMIN — BUPROPION HYDROCHLORIDE 300 MILLIGRAM(S): 150 TABLET, EXTENDED RELEASE ORAL at 06:02

## 2024-06-17 RX ADMIN — Medication 975 MILLIGRAM(S): at 07:00

## 2024-06-17 RX ADMIN — ACETYLCYSTEINE 4 MILLILITER(S): 200 SOLUTION ORAL; RESPIRATORY (INHALATION) at 21:11

## 2024-06-17 RX ADMIN — Medication 100 MILLIGRAM(S): at 21:43

## 2024-06-17 RX ADMIN — Medication 975 MILLIGRAM(S): at 12:45

## 2024-06-17 RX ADMIN — AMPICILLIN AND SULBACTAM 200 GRAM(S): 2; 1 INJECTION, POWDER, FOR SOLUTION INTRAMUSCULAR; INTRAVENOUS at 04:00

## 2024-06-17 RX ADMIN — BUPROPION HYDROCHLORIDE 150 MILLIGRAM(S): 150 TABLET, EXTENDED RELEASE ORAL at 21:43

## 2024-06-17 RX ADMIN — Medication 15 MILLILITER(S): at 06:01

## 2024-06-17 RX ADMIN — RISPERIDONE 3 MILLIGRAM(S): 0.5 TABLET ORAL at 21:42

## 2024-06-17 RX ADMIN — Medication 15 MILLILITER(S): at 21:43

## 2024-06-17 RX ADMIN — POLYETHYLENE GLYCOL 3350 17 GRAM(S): 1 POWDER ORAL at 12:24

## 2024-06-17 RX ADMIN — Medication 15 MILLILITER(S): at 15:47

## 2024-06-17 RX ADMIN — AMLODIPINE BESYLATE 10 MILLIGRAM(S): 2.5 TABLET ORAL at 05:56

## 2024-06-17 RX ADMIN — ACETYLCYSTEINE 4 MILLILITER(S): 200 SOLUTION ORAL; RESPIRATORY (INHALATION) at 08:08

## 2024-06-17 RX ADMIN — RISPERIDONE 1 MILLIGRAM(S): 0.5 TABLET ORAL at 06:02

## 2024-06-17 RX ADMIN — Medication 975 MILLIGRAM(S): at 00:30

## 2024-06-17 NOTE — PROGRESS NOTE ADULT - SUBJECTIVE AND OBJECTIVE BOX
SICU Daily Progress Note  =====================================================  Interval/Overnight Events:       - No acute events overnight       ALLERGIES:  penicillins (Unknown)      --------------------------------------------------------------------------------------    MEDICATIONS:    Neurologic Medications  acetaminophen   Oral Liquid .. 975 milliGRAM(s) Oral every 6 hours  buPROPion . 300 milliGRAM(s) Oral <User Schedule>  buPROPion . 150 milliGRAM(s) Oral at bedtime  oxyCODONE    Solution 10 milliGRAM(s) Oral every 4 hours PRN Severe Pain (7 - 10)  oxyCODONE    Solution 5 milliGRAM(s) Oral every 4 hours PRN Moderate Pain (4 - 6)  QUEtiapine 100 milliGRAM(s) Oral at bedtime  risperiDONE   Solution 1 milliGRAM(s) Oral <User Schedule>  risperiDONE   Solution 3 milliGRAM(s) Oral at bedtime    Respiratory Medications  acetylcysteine 10%  Inhalation 4 milliLiter(s) Inhalation every 6 hours  albuterol/ipratropium for Nebulization 3 milliLiter(s) Nebulizer every 6 hours    Cardiovascular Medications  amLODIPine   Tablet 10 milliGRAM(s) Oral daily    Gastrointestinal Medications  pantoprazole  Injectable 40 milliGRAM(s) IV Push daily  polyethylene glycol 3350 17 Gram(s) Oral daily  senna Syrup 10 milliLiter(s) Oral daily  sodium chloride 0.9% lock flush 3 milliLiter(s) IV Push every 8 hours    Genitourinary Medications    Hematologic/Oncologic Medications  enoxaparin Injectable 40 milliGRAM(s) SubCutaneous every 24 hours    Antimicrobial/Immunologic Medications  ampicillin/sulbactam  IVPB 3 Gram(s) IV Intermittent every 6 hours    Endocrine/Metabolic Medications    Topical/Other Medications  chlorhexidine 0.12% Liquid 15 milliLiter(s) Swish and Spit <User Schedule>  chlorhexidine 2% Cloths 1 Application(s) Topical daily    --------------------------------------------------------------------------------------    VITAL SIGNS:  ICU Vital Signs Last 24 Hrs  T(C): 36.9 (17 Jun 2024 00:00), Max: 37.6 (16 Jun 2024 16:00)  T(F): 98.4 (17 Jun 2024 00:00), Max: 99.6 (16 Jun 2024 16:00)  HR: 99 (17 Jun 2024 00:00) (76 - 123)  BP: 131/84 (17 Jun 2024 00:00) (121/82 - 160/94)  BP(mean): 95 (17 Jun 2024 00:00) (87 - 115)  ABP: --  ABP(mean): --  RR: 20 (17 Jun 2024 00:00) (15 - 31)  SpO2: 98% (17 Jun 2024 00:00) (92% - 100%)    O2 Parameters below as of 17 Jun 2024 00:00  Patient On (Oxygen Delivery Method): tracheostomy collar  O2 Flow (L/min): 6  O2 Concentration (%): 28      --------------------------------------------------------------------------------------    INS AND OUTS:    06-14-24 @ 07:01  -  06-15-24 @ 07:00  --------------------------------------------------------  IN: 1740 mL / OUT: 825 mL / NET: 915 mL    06-15-24 @ 07:01  -  06-16-24 @ 01:39  --------------------------------------------------------  IN: 1166 mL / OUT: 1065 mL / NET: 101 mL      --------------------------------------------------------------------------------------    EXAM    NEURO: NAD,   HEENT: NC/AT, b/l penrose to neck incision, cook doppler +  RESPIRATORY: nonlabored respirations, normal CW expansion, trach collar  CARDIO: RRR, S1S2  ABDOMEN: soft, nontender, nondistended, KO tube   EXTREMITIES: normal strength, no deformities    --------------------------------------------------------------------------------------    LABS                                      9.2    9.77  )-----------( 440      ( 16 Jun 2024 02:00 )             27.4       06-16    138  |  106  |  14  ----------------------------<  115<H>  3.1<L>   |  24  |  0.50    Ca    8.0<L>      16 Jun 2024 02:00  Phos  2.3     06-16  Mg     1.90     06-16

## 2024-06-17 NOTE — PROGRESS NOTE ADULT - ASSESSMENT
63 yo woman with psych disorder, tongue cancer, s/p glossectomy partial 6/6 with flap reconstruction, trach  flap thrombosed and RTOR 6/11 and again 6/14 for revision     Multiple OR wound cultures growing E coli pansensitive     switched to unasyn 6/11 which she is tolerating despite h/o penicillin allergy     would continue Unasyn  3 gm iv q 6 h     will follow 63 yo woman with psych disorder, tongue cancer, s/p glossectomy partial 6/6 with flap reconstruction, trach    flap thrombosed and RTOR 6/11 and again 6/14 for revision      OR wound cultures from both 6/11 and 6/14 growing E coli  which is pansensitive     switched to unasyn 6/11 which she is tolerating despite h/o penicillin allergy     would continue Unasyn  3 gm iv q 6 h     will follow

## 2024-06-17 NOTE — PROGRESS NOTE ADULT - ASSESSMENT
· Assessment	  64F w/ PMH of depression and schizophrenia s/p R partial glossectomy in the setting of SCCA, R SND I-IV, L RFFF, L thigh STSG, tracheostomy (6/6/24). Pts post-operative course c/b venous congestion of flap w/ RTOR on 6/11/24 for free flap salvage and revision of venous anastamosis, RTOR on 6/14/24 for debridement of necrotic radial forearm flap and replacement with right ALT flap, with 7.5 trach exchanged to 7.0 cuffed trach. Pt is progressing well.     Plan:  -ERAS Day 3  -OOBTC  -C/w IV unasyn  -BID neck irrigation via penrose by PRS  -NPO w/ TFs  -Rest of care per SICU      [facility]  #[id number]  Available on Teams

## 2024-06-17 NOTE — CHART NOTE - NSCHARTNOTEFT_GEN_A_CORE
patient transferred from sicu to floors  signed out to [facility] resident  all questions addressed and answered.    [id number]

## 2024-06-17 NOTE — PROGRESS NOTE ADULT - ASSESSMENT
63 y/o female with schizophrenia and depression s/p R partial glossectomy in the setting of SCCA, R SND I-IV, L RFFF, L thigh STSG, tracheostomy (6/6/24). Pts post-operative course c/b venous congestion of flap w/ RTOR on 6/11 for free flap salvage and revision of venous anastamosis. Flap now infected with E Coli also without flow. RTOR 6/14 for exploration of tongue free flap, muscle flap revision with right thigh flap, 7.5 trach exchanged to 7.0 cuffed trach. SICU consulted for flap checks    PLAN:   Neurologic:   - AOx4 at baseline  - flat effect   - Pain control: Tylenol, oxy prn  - psych meds started: Risperidone, Seroquel Wellbutrin  - q4 hour flap checks     Respiratory:   - SaO2 goal > 92%  - trach collar 7.0 cuffed trach  - Mucomyst 4 q 6 hours    Cardiovascular:   - MAP goal > 65  - Continue amlodipine  - intermittent     Gastrointestinal/Nutrition:   - NPO with TFs  - Bowel regimen  - protonix per flap protocol     Renal/Genitourinary:   - Failed TOV, straight cath x 1 overnight 300 cc --> tov 6am  - monitor electrolytes, replete prn    Hematologic:   - Monitor H&H  - DVT ppx: Lovenox, SCDs    Infectious Disease:   - E Coli in flap, continue unasyn 3 gram q6 per ID recs    Endocrine:   - Monitor blood glucose levels     Disposition: SICU

## 2024-06-17 NOTE — PROGRESS NOTE ADULT - SUBJECTIVE AND OBJECTIVE BOX
64F w/ PMH of depression and schizophrenia s/p R partial glossectomy in the setting of SCCA, R SND I-IV, L RFFF, L thigh STSG, tracheostomy (6/6/24). Pts post-operative course c/b venous congestion of flap w/ RTOR on 6/11/24 for free flap salvage and revision of venous anastamosis, RTOR on 6/14/24 for debridement of necrotic radial forearm flap and replacement with right ALT flap, with 7.5 trach exchanged to 7.0 cuffed trach.     Interval Events: NAEON.       Vital Signs Last 24 Hrs  T(C): 36.7 (17 Jun 2024 04:00), Max: 37.6 (16 Jun 2024 16:00)  T(F): 98 (17 Jun 2024 04:00), Max: 99.6 (16 Jun 2024 16:00)  HR: 106 (17 Jun 2024 04:00) (91 - 123)  BP: 150/96 (17 Jun 2024 04:00) (121/82 - 156/97)  BP(mean): 111 (17 Jun 2024 04:00) (87 - 115)  RR: 18 (17 Jun 2024 04:00) (16 - 31)  SpO2: 98% (17 Jun 2024 04:00) (92% - 100%)    Parameters below as of 17 Jun 2024 04:00  Patient On (Oxygen Delivery Method): tracheostomy collar  O2 Flow (L/min): 6  O2 Concentration (%): 28      I&O's Detail    15 José Miguel 2024 07:01  -  16 Jun 2024 07:00  --------------------------------------------------------  IN:    Enteral Tube Flush: 450 mL    IV PiggyBack: 250 mL    Jevity 1.5: 1038 mL  Total IN: 1738 mL    OUT:    Bulb (mL): 30 mL    Indwelling Catheter - Urethral (mL): 850 mL    Voided (mL): 400 mL  Total OUT: 1280 mL    Total NET: 458 mL      16 Jun 2024 07:01  -  17 Jun 2024 06:25  --------------------------------------------------------  IN:    Enteral Tube Flush: 370 mL    IV PiggyBack: 400 mL    Jevity 1.5: 1311 mL  Total IN: 2081 mL    OUT:    Bulb (mL): 15 mL    Voided (mL): 2450 mL  Total OUT: 2465 mL    Total NET: -384 mL      Medications:    MEDICATIONS  (STANDING):  acetaminophen   Oral Liquid .. 975 milliGRAM(s) Oral every 6 hours  acetylcysteine 10%  Inhalation 4 milliLiter(s) Inhalation every 6 hours  albuterol/ipratropium for Nebulization 3 milliLiter(s) Nebulizer every 6 hours  amLODIPine   Tablet 10 milliGRAM(s) Oral daily  ampicillin/sulbactam  IVPB 3 Gram(s) IV Intermittent every 6 hours  buPROPion . 150 milliGRAM(s) Oral at bedtime  buPROPion . 300 milliGRAM(s) Oral <User Schedule>  chlorhexidine 0.12% Liquid 15 milliLiter(s) Swish and Spit <User Schedule>  chlorhexidine 2% Cloths 1 Application(s) Topical daily  enoxaparin Injectable 40 milliGRAM(s) SubCutaneous every 24 hours  pantoprazole  Injectable 40 milliGRAM(s) IV Push daily  polyethylene glycol 3350 17 Gram(s) Oral daily  QUEtiapine 100 milliGRAM(s) Oral at bedtime  risperiDONE   Solution 1 milliGRAM(s) Oral <User Schedule>  risperiDONE   Solution 3 milliGRAM(s) Oral at bedtime  senna Syrup 10 milliLiter(s) Oral daily  sodium chloride 0.9% lock flush 3 milliLiter(s) IV Push every 8 hours      MEDICATIONS  (PRN):  oxyCODONE    Solution 10 milliGRAM(s) Oral every 4 hours PRN Severe Pain (7 - 10)  oxyCODONE    Solution 5 milliGRAM(s) Oral every 4 hours PRN Moderate Pain (4 - 6)      Labs:                          10.0   8.52  )-----------( 550      ( 17 Jun 2024 03:44 )             29.4       06-17    138  |  103  |  11  ----------------------------<  106<H>  3.2<L>   |  25  |  0.42<L>    Ca    8.3<L>      17 Jun 2024 03:44  Phos  2.3     06-17  Mg     1.80     06-17    PHYSICAL EXAM:  GEN: NAD  NEURO: alert & oriented x 4/4  HEENT: Exam; neck incision clean, dry, and intact. strong Cook, flap warm and well perfused, good color  CVS: regular rate and rhythm  Pulm: normal respiratory excursions, not tachypneic, no labored breathing  Abd: soft, non-distended  Ext:  no peripheral edema noted, HI drain in place

## 2024-06-17 NOTE — PROGRESS NOTE ADULT - ATTENDING COMMENTS
flap viable  malnutrition risk-may switch to bolus TFs and monitor for tolerance
I agree with the detailed interval history, physical, and plan, which I have reviewed and edited where appropriate'; also agree with notes/assessment with my team on service.  I have personally examined the patient.  I was physically present for the key portions of the evaluation and management (E/M) service provided.  I reviewed all the pertinent data.  The patient is a critical care patient with life threatening hemodynamic and metabolic instability in SICU.  The SICU team has a constant risk benefit analyzes discussion and coordinating care with the primary team and all consultants.   The patient is in SICU with the chief complaint and diagnosis mentioned in the note.   The plan will be specified in the note.  63 y/o female with schizophrenia and depression; s/p glossectomy, free flap and tracheostomy in SICU for HD monitoring and post op care.  EXAM  NEUROLOGY  Exam: no focal deficits  RESPIRATORY  Exam: Lungs clear   CARDIOVASCULAR  Exam: RR  GI/NUTRITION  Exam: Abdomen soft  VASCULAR  Exam: Extremities warm    PLAN:   Neurologic:   - Tylenol   -Dilaudid  Respiratory:   - SaO2 goal > 92%  Cardiovascular:   - MAP goal > 65  Gastrointestinal/Nutrition:   - NGT   Renal/Genitourinary:   - IVF LR @ 30cc/Hr  Hematologic:   - Lovenox   Infectious Disease:   - Clindamycin   Endocrine:   - Monitor glucose   Disposition: SICU
s/p ALT flap for primary flap thrombosis   -recovering well, flap checks q4 hour, monitor perfusion   Con't home antipsychotics and meds  BERENICE for tube feeding - tolerating bolus feeds  DVT prophylaxis  Con't abx coverage for potential flap infection   Pain control  dc to floor today      Juan Cao MD  Acute and Critical Care Surgery    The Acute Care Surgery (B Team) Attending Group Practice:  Dr. Jessi Ashley, Dr. Papito Correa, Dr. Juan Cao,  Dr. Lux Del Castillo and Dr. Yamilka Edwards     Urgent issues - spectra [phone] or [phone]  Nonurgent issues - [phone]  Patient appointments or after hours - [phone] .
The patient was seen and examined, chart and notes reviewed.  The current diagnosis, plan of care and alternatives have been discussed with the patient.  All questions have been answered and updates have been discussed.  The case was discussed with B team residents/PA's and medical students at morning B surgical rounds and throughout the course of the day.    Respiratory abnormality  a.  Tolerating tracheal collar  b.  Suction q 4 and prn  c.  CXR/ABG as needed    Tongue CA  a.  S/P glossectomy  b.  Flap checks q 4  c.  Abx per primary service    Schizoaffective disorder  a.  No agitaiton  b,. Appreciate Psych recommendations    Hypertension  a.  On hydralazine  b,.  PRN labetalol  c,  Maintain SBP <140 per primary service    At risk for Malnutrition  a.  NPO  b.  Continue TF at goal as tolerated
s/p ALT flap for primary flap thrombosis   -recovering well, flap checks q1 hour, monitor perfusion   Con't home antipsychotics and meds  BERENICE for tube feeding - tolerating bolus feeds  DVT prophylaxis  Con't abx coverage for potential flap infection   Pain control      Juan Cao MD  Acute and Critical Care Surgery    The Acute Care Surgery (B Team) Attending Group Practice:  Dr. Jessi Ashley, Dr. Papito Correa, Dr. Juan Cao,  Dr. Lux Del Castillo and Dr. Yamilka Edwards     Urgent issues - spectra [phone] or [phone]  Nonurgent issues - [phone]  Patient appointments or after hours - [phone] .

## 2024-06-17 NOTE — PROGRESS NOTE ADULT - ASSESSMENT
ASSESSMENT/PLAN:   LELA LOWERY is a 64yFemale s/p right alt for revision hemiglossal recon 6/14    - Keep systolic <140  - q1h flap checks  - NPO with TF  - Peridex mouthwas for at least 1 wk  - BID neck irrigation through penrose, done by MICHELLE Catherine MD  Plastic and Reconstructive Surgery, PGY-1  MAGDALENA: [id number]  NS: [phone]

## 2024-06-17 NOTE — PROGRESS NOTE ADULT - SUBJECTIVE AND OBJECTIVE BOX
Plastic Surgery Progress Note (pg [facility]: [id number], [facility]: [phone])    SUBJECTIVE  NAEO. AVSS. Pt comfortable in bed.     OBJECTIVE  ___________________________________________________  VITAL SIGNS / I&O's   Vital Signs Last 24 Hrs  T(C): 36.7 (17 Jun 2024 04:00), Max: 37.6 (16 Jun 2024 16:00)  T(F): 98 (17 Jun 2024 04:00), Max: 99.6 (16 Jun 2024 16:00)  HR: 106 (17 Jun 2024 04:00) (91 - 123)  BP: 150/96 (17 Jun 2024 04:00) (131/84 - 156/97)  BP(mean): 111 (17 Jun 2024 04:00) (87 - 115)  RR: 18 (17 Jun 2024 04:00) (16 - 31)  SpO2: 98% (17 Jun 2024 04:00) (92% - 100%)    Parameters below as of 17 Jun 2024 04:00  Patient On (Oxygen Delivery Method): tracheostomy collar  O2 Flow (L/min): 6  O2 Concentration (%): 28      16 Jun 2024 07:01  -  17 Jun 2024 07:00  --------------------------------------------------------  IN:    Enteral Tube Flush: 370 mL    IV PiggyBack: 400 mL    Jevity 1.5: 1311 mL  Total IN: 2081 mL    OUT:    Bulb (mL): 15 mL    Voided (mL): 2450 mL  Total OUT: 2465 mL    Total NET: -384 mL        ___________________________________________________  PHYSICAL EXAM  Gen: NAD, A&Ox3  HEENT: neck swollen but no palpale fluid collection. Flap soft, warm, good color. Cook signal strong  RLE thigh soft, SUZANNE drain SSO. Ace cdi  ___________________________________________________  LABS                        10.0   8.52  )-----------( 550      ( 17 Jun 2024 03:44 )             29.4     17 Jun 2024 03:44    138    |  103    |  11     ----------------------------<  106    3.2     |  25     |  0.42     Ca    8.3        17 Jun 2024 03:44  Phos  2.3       17 Jun 2024 03:44  Mg     1.80      17 Jun 2024 03:44        CAPILLARY BLOOD GLUCOSE            Urinalysis Basic - ( 17 Jun 2024 03:44 )    Color: x / Appearance: x / SG: x / pH: x  Gluc: 106 mg/dL / Ketone: x  / Bili: x / Urobili: x   Blood: x / Protein: x / Nitrite: x   Leuk Esterase: x / RBC: x / WBC x   Sq Epi: x / Non Sq Epi: x / Bacteria: x      ___________________________________________________  MICRO  Recent Cultures:  Specimen Source: .Smear RIGHT NECK CLT, 06-14 @ 12:07; Results   Culture is being performed. Fungal cultures are held for 4 weeks.; Gram Stain:   Numerous polymorphonuclear leukocytes per low power field  No organisms seen per oil power field; Organism: --  Specimen Source: .Blood Blood-Peripheral, 06-12 @ 04:58; Results   No growth at 4 days; Gram Stain: --; Organism: --  Specimen Source: .Blood Blood-Peripheral, 06-12 @ 03:37; Results   No growth at 5 days; Gram Stain: --; Organism: --  Specimen Source: .Surgical Swab RIGHT NECK WOUND, 06-11 @ 05:30; Results   Few Escherichia coli<!>; Gram Stain: --; Organism: Escherichia coli<!>    ___________________________________________________  MEDICATIONS  (STANDING):  acetaminophen   Oral Liquid .. 975 milliGRAM(s) Oral every 6 hours  acetylcysteine 10%  Inhalation 4 milliLiter(s) Inhalation every 6 hours  albuterol/ipratropium for Nebulization 3 milliLiter(s) Nebulizer every 6 hours  amLODIPine   Tablet 10 milliGRAM(s) Oral daily  ampicillin/sulbactam  IVPB 3 Gram(s) IV Intermittent every 6 hours  buPROPion . 300 milliGRAM(s) Oral <User Schedule>  buPROPion . 150 milliGRAM(s) Oral at bedtime  chlorhexidine 0.12% Liquid 15 milliLiter(s) Swish and Spit <User Schedule>  chlorhexidine 2% Cloths 1 Application(s) Topical daily  enoxaparin Injectable 40 milliGRAM(s) SubCutaneous every 24 hours  pantoprazole  Injectable 40 milliGRAM(s) IV Push daily  polyethylene glycol 3350 17 Gram(s) Oral daily  QUEtiapine 100 milliGRAM(s) Oral at bedtime  risperiDONE   Solution 1 milliGRAM(s) Oral <User Schedule>  risperiDONE   Solution 3 milliGRAM(s) Oral at bedtime  senna Syrup 10 milliLiter(s) Oral daily  sodium chloride 0.9% lock flush 3 milliLiter(s) IV Push every 8 hours    MEDICATIONS  (PRN):  oxyCODONE    Solution 10 milliGRAM(s) Oral every 4 hours PRN Severe Pain (7 - 10)  oxyCODONE    Solution 5 milliGRAM(s) Oral every 4 hours PRN Moderate Pain (4 - 6)

## 2024-06-17 NOTE — PROGRESS NOTE ADULT - SUBJECTIVE AND OBJECTIVE BOX
Follow Up:      Interval History/ROS:  OOB     Allergies  penicillins (Unknown)        ANTIMICROBIALS:  ampicillin/sulbactam  IVPB 3 every 6 hours      OTHER MEDS:  acetaminophen   Oral Liquid .. 975 milliGRAM(s) Oral every 6 hours  acetylcysteine 10%  Inhalation 4 milliLiter(s) Inhalation every 6 hours  albuterol/ipratropium for Nebulization 3 milliLiter(s) Nebulizer every 6 hours  amLODIPine   Tablet 10 milliGRAM(s) Oral daily  buPROPion . 150 milliGRAM(s) Oral at bedtime  buPROPion . 300 milliGRAM(s) Oral <User Schedule>  chlorhexidine 0.12% Liquid 15 milliLiter(s) Swish and Spit <User Schedule>  chlorhexidine 2% Cloths 1 Application(s) Topical daily  enoxaparin Injectable 40 milliGRAM(s) SubCutaneous every 24 hours  oxyCODONE    Solution 10 milliGRAM(s) Oral every 4 hours PRN  oxyCODONE    Solution 5 milliGRAM(s) Oral every 4 hours PRN  pantoprazole  Injectable 40 milliGRAM(s) IV Push daily  polyethylene glycol 3350 17 Gram(s) Oral daily  QUEtiapine 100 milliGRAM(s) Oral at bedtime  risperiDONE   Solution 3 milliGRAM(s) Oral at bedtime  risperiDONE   Solution 1 milliGRAM(s) Oral <User Schedule>  senna Syrup 10 milliLiter(s) Oral daily  sodium chloride 0.9% lock flush 3 milliLiter(s) IV Push every 8 hours      Vital Signs Last 24 Hrs  T(C): 37.1 (17 Jun 2024 16:00), Max: 37.1 (17 Jun 2024 16:00)  T(F): 98.8 (17 Jun 2024 16:00), Max: 98.8 (17 Jun 2024 16:00)  HR: 114 (17 Jun 2024 16:00) (92 - 118)  BP: 140/93 (17 Jun 2024 16:00) (131/84 - 156/97)  BP(mean): 104 (17 Jun 2024 16:00) (95 - 115)  RR: 22 (17 Jun 2024 16:00) (18 - 26)  SpO2: 100% (17 Jun 2024 16:00) (98% - 100%)    Parameters below as of 17 Jun 2024 16:00  Patient On (Oxygen Delivery Method): tracheostomy collar  O2 Flow (L/min): 6  O2 Concentration (%): 28    PHYSICAL EXAM:  Constitutional: Non toxic   Eyes: No icterus.  Neck: trach  neck wound with drain   RS: no respiratory distress  CVS: tachy  Abdomen: Soft.   : no paez  Extremities: dressing left forearm, right  leg                             10.0   8.52  )-----------( 550      ( 17 Jun 2024 03:44 )             29.4       06-17    138  |  103  |  11  ----------------------------<  106<H>  3.2<L>   |  25  |  0.42<L>    Ca    8.3<L>      17 Jun 2024 03:44  Phos  2.3     06-17  Mg     1.80     06-17        Urinalysis Basic - ( 17 Jun 2024 03:44 )    Color: x / Appearance: x / SG: x / pH: x  Gluc: 106 mg/dL / Ketone: x  / Bili: x / Urobili: x   Blood: x / Protein: x / Nitrite: x   Leuk Esterase: x / RBC: x / WBC x   Sq Epi: x / Non Sq Epi: x / Bacteria: x        MICROBIOLOGY:  v  .Smear RIGHT NECK CLT  06-14-24   Culture is being performed. Fungal cultures are held for 4 weeks.  --  Escherichia coli      .Blood Blood-Peripheral  06-12-24   No growth at 5 days  --  --      .Blood Blood-Peripheral  06-12-24   No growth at 5 days  --  --      .Surgical Swab RIGHT NECK WOUND  06-11-24   Few Escherichia coli  --  Escherichia coli      .Blood Blood-Peripheral  06-08-24   No growth at 5 days  --  --      .Blood Blood-Venous  06-08-24   No growth at 5 days  --    Moderate polymorphonuclear leukocytes per low power field  No Squamous epithelial cells per low power field  Rare Gram Positive Rods seen per oil power field  Rare Gram Negative Rods seen per oil power field  Rare Gram positive cocci in pairs seen per oil power field          RADIOLOGY:

## 2024-06-18 LAB
ANION GAP SERPL CALC-SCNC: 13 MMOL/L — SIGNIFICANT CHANGE UP (ref 7–14)
BUN SERPL-MCNC: 19 MG/DL — SIGNIFICANT CHANGE UP (ref 7–23)
CALCIUM SERPL-MCNC: 8.3 MG/DL — LOW (ref 8.4–10.5)
CHLORIDE SERPL-SCNC: 104 MMOL/L — SIGNIFICANT CHANGE UP (ref 98–107)
CO2 SERPL-SCNC: 23 MMOL/L — SIGNIFICANT CHANGE UP (ref 22–31)
CREAT SERPL-MCNC: 0.53 MG/DL — SIGNIFICANT CHANGE UP (ref 0.5–1.3)
EGFR: 103 ML/MIN/1.73M2 — SIGNIFICANT CHANGE UP
GLUCOSE SERPL-MCNC: 148 MG/DL — HIGH (ref 70–99)
HCT VFR BLD CALC: 28.9 % — LOW (ref 34.5–45)
HGB BLD-MCNC: 9.8 G/DL — LOW (ref 11.5–15.5)
MAGNESIUM SERPL-MCNC: 1.9 MG/DL — SIGNIFICANT CHANGE UP (ref 1.6–2.6)
MCHC RBC-ENTMCNC: 30 PG — SIGNIFICANT CHANGE UP (ref 27–34)
MCHC RBC-ENTMCNC: 33.9 GM/DL — SIGNIFICANT CHANGE UP (ref 32–36)
MCV RBC AUTO: 88.4 FL — SIGNIFICANT CHANGE UP (ref 80–100)
NRBC # BLD: 0 /100 WBCS — SIGNIFICANT CHANGE UP (ref 0–0)
NRBC # FLD: 0 K/UL — SIGNIFICANT CHANGE UP (ref 0–0)
PHOSPHATE SERPL-MCNC: 2.1 MG/DL — LOW (ref 2.5–4.5)
PLATELET # BLD AUTO: 636 K/UL — HIGH (ref 150–400)
POTASSIUM SERPL-MCNC: 2.8 MMOL/L — CRITICAL LOW (ref 3.5–5.3)
POTASSIUM SERPL-SCNC: 2.8 MMOL/L — CRITICAL LOW (ref 3.5–5.3)
RBC # BLD: 3.27 M/UL — LOW (ref 3.8–5.2)
RBC # FLD: 15 % — HIGH (ref 10.3–14.5)
SODIUM SERPL-SCNC: 140 MMOL/L — SIGNIFICANT CHANGE UP (ref 135–145)
WBC # BLD: 6.51 K/UL — SIGNIFICANT CHANGE UP (ref 3.8–10.5)
WBC # FLD AUTO: 6.51 K/UL — SIGNIFICANT CHANGE UP (ref 3.8–10.5)

## 2024-06-18 PROCEDURE — 99232 SBSQ HOSP IP/OBS MODERATE 35: CPT

## 2024-06-18 PROCEDURE — 93010 ELECTROCARDIOGRAM REPORT: CPT

## 2024-06-18 RX ORDER — PETROLATUM 42 G/100G
1 OINTMENT TOPICAL DAILY
Refills: 0 | Status: DISCONTINUED | OUTPATIENT
Start: 2024-06-18 | End: 2024-07-10

## 2024-06-18 RX ORDER — POTASSIUM PHOSPHATE, MONOBASIC POTASSIUM PHOSPHATE, DIBASIC INJECTION, 236; 224 MG/ML; MG/ML
30 SOLUTION, CONCENTRATE INTRAVENOUS ONCE
Refills: 0 | Status: COMPLETED | OUTPATIENT
Start: 2024-06-18 | End: 2024-06-18

## 2024-06-18 RX ORDER — LIPASE/PROTEASE/AMYLASE 4.5-25-20K
1 CAPSULE,DELAYED RELEASE (ENTERIC COATED) ORAL ONCE
Refills: 0 | Status: DISCONTINUED | OUTPATIENT
Start: 2024-06-18 | End: 2024-06-18

## 2024-06-18 RX ORDER — LIPASE/PROTEASE/AMYLASE 4.5-25-20K
1 CAPSULE,DELAYED RELEASE (ENTERIC COATED) ORAL ONCE
Refills: 0 | Status: COMPLETED | OUTPATIENT
Start: 2024-06-18 | End: 2024-06-18

## 2024-06-18 RX ORDER — SODIUM BICARBONATE 650 MG/1
325 TABLET ORAL ONCE
Refills: 0 | Status: COMPLETED | OUTPATIENT
Start: 2024-06-18 | End: 2024-06-18

## 2024-06-18 RX ORDER — POTASSIUM CHLORIDE 600 MG/1
10 TABLET, FILM COATED, EXTENDED RELEASE ORAL
Refills: 0 | Status: DISCONTINUED | OUTPATIENT
Start: 2024-06-18 | End: 2024-06-18

## 2024-06-18 RX ADMIN — ACETYLCYSTEINE 4 MILLILITER(S): 200 SOLUTION ORAL; RESPIRATORY (INHALATION) at 15:33

## 2024-06-18 RX ADMIN — Medication 100 MILLIGRAM(S): at 23:02

## 2024-06-18 RX ADMIN — Medication 3 MILLILITER(S): at 22:35

## 2024-06-18 RX ADMIN — SODIUM BICARBONATE 325 MILLIGRAM(S): 650 TABLET ORAL at 16:21

## 2024-06-18 RX ADMIN — AMLODIPINE BESYLATE 10 MILLIGRAM(S): 2.5 TABLET ORAL at 05:24

## 2024-06-18 RX ADMIN — Medication 15 MILLILITER(S): at 13:09

## 2024-06-18 RX ADMIN — POTASSIUM PHOSPHATE, MONOBASIC POTASSIUM PHOSPHATE, DIBASIC INJECTION, 83.33 MILLIMOLE(S): 236; 224 SOLUTION, CONCENTRATE INTRAVENOUS at 10:37

## 2024-06-18 RX ADMIN — ACETYLCYSTEINE 4 MILLILITER(S): 200 SOLUTION ORAL; RESPIRATORY (INHALATION) at 12:22

## 2024-06-18 RX ADMIN — Medication 15 MILLILITER(S): at 17:15

## 2024-06-18 RX ADMIN — Medication 1 TABLET(S): at 16:20

## 2024-06-18 RX ADMIN — BUPROPION HYDROCHLORIDE 300 MILLIGRAM(S): 150 TABLET, EXTENDED RELEASE ORAL at 05:23

## 2024-06-18 RX ADMIN — OXYCODONE HYDROCHLORIDE 10 MILLIGRAM(S): 100 SOLUTION ORAL at 17:15

## 2024-06-18 RX ADMIN — Medication 975 MILLIGRAM(S): at 05:24

## 2024-06-18 RX ADMIN — AMPICILLIN AND SULBACTAM 200 GRAM(S): 2; 1 INJECTION, POWDER, FOR SOLUTION INTRAMUSCULAR; INTRAVENOUS at 14:04

## 2024-06-18 RX ADMIN — AMPICILLIN AND SULBACTAM 200 GRAM(S): 2; 1 INJECTION, POWDER, FOR SOLUTION INTRAMUSCULAR; INTRAVENOUS at 03:30

## 2024-06-18 RX ADMIN — IPRATROPIUM BROMIDE AND ALBUTEROL SULFATE 3 MILLILITER(S): .5; 3 SOLUTION RESPIRATORY (INHALATION) at 22:46

## 2024-06-18 RX ADMIN — BUPROPION HYDROCHLORIDE 150 MILLIGRAM(S): 150 TABLET, EXTENDED RELEASE ORAL at 23:02

## 2024-06-18 RX ADMIN — Medication 975 MILLIGRAM(S): at 00:11

## 2024-06-18 RX ADMIN — Medication 975 MILLIGRAM(S): at 23:03

## 2024-06-18 RX ADMIN — Medication 15 MILLILITER(S): at 09:23

## 2024-06-18 RX ADMIN — RISPERIDONE 3 MILLIGRAM(S): 0.5 TABLET ORAL at 23:03

## 2024-06-18 RX ADMIN — IPRATROPIUM BROMIDE AND ALBUTEROL SULFATE 3 MILLILITER(S): .5; 3 SOLUTION RESPIRATORY (INHALATION) at 04:42

## 2024-06-18 RX ADMIN — Medication 1 APPLICATION(S): at 11:36

## 2024-06-18 RX ADMIN — OXYCODONE HYDROCHLORIDE 10 MILLIGRAM(S): 100 SOLUTION ORAL at 18:15

## 2024-06-18 RX ADMIN — Medication 3 MILLILITER(S): at 13:02

## 2024-06-18 RX ADMIN — PETROLATUM 1 APPLICATION(S): 42 OINTMENT TOPICAL at 18:20

## 2024-06-18 RX ADMIN — AMPICILLIN AND SULBACTAM 200 GRAM(S): 2; 1 INJECTION, POWDER, FOR SOLUTION INTRAMUSCULAR; INTRAVENOUS at 09:21

## 2024-06-18 RX ADMIN — POTASSIUM PHOSPHATE, MONOBASIC POTASSIUM PHOSPHATE, DIBASIC INJECTION, 83.33 MILLIMOLE(S): 236; 224 SOLUTION, CONCENTRATE INTRAVENOUS at 17:00

## 2024-06-18 RX ADMIN — RISPERIDONE 1 MILLIGRAM(S): 0.5 TABLET ORAL at 05:24

## 2024-06-18 RX ADMIN — IPRATROPIUM BROMIDE AND ALBUTEROL SULFATE 3 MILLILITER(S): .5; 3 SOLUTION RESPIRATORY (INHALATION) at 15:33

## 2024-06-18 RX ADMIN — Medication 15 MILLILITER(S): at 18:20

## 2024-06-18 RX ADMIN — Medication 975 MILLIGRAM(S): at 17:15

## 2024-06-18 RX ADMIN — Medication 3 MILLILITER(S): at 06:54

## 2024-06-18 RX ADMIN — PANTOPRAZOLE SODIUM 40 MILLIGRAM(S): 40 INJECTION, POWDER, FOR SOLUTION INTRAVENOUS at 11:35

## 2024-06-18 RX ADMIN — AMPICILLIN AND SULBACTAM 200 GRAM(S): 2; 1 INJECTION, POWDER, FOR SOLUTION INTRAMUSCULAR; INTRAVENOUS at 23:02

## 2024-06-18 RX ADMIN — Medication 15 MILLILITER(S): at 06:54

## 2024-06-18 RX ADMIN — Medication 15 MILLILITER(S): at 23:03

## 2024-06-18 RX ADMIN — ACETYLCYSTEINE 4 MILLILITER(S): 200 SOLUTION ORAL; RESPIRATORY (INHALATION) at 22:46

## 2024-06-18 RX ADMIN — IPRATROPIUM BROMIDE AND ALBUTEROL SULFATE 3 MILLILITER(S): .5; 3 SOLUTION RESPIRATORY (INHALATION) at 12:21

## 2024-06-18 RX ADMIN — ENOXAPARIN SODIUM 40 MILLIGRAM(S): 100 INJECTION SUBCUTANEOUS at 13:10

## 2024-06-18 NOTE — PROGRESS NOTE ADULT - ASSESSMENT
65 y/o female with schizophrenia and depression s/p R partial glossectomy in the setting of SCCA, R SND I-IV, L RFFF, L thigh STSG, tracheostomy (6/6/24). Pts post-operative course c/b venous congestion of flap w/ RTOR on 6/11 for free flap salvage and revision of venous anastamosis. Flap now infected with E Coli also without flow. RTOR 6/14 for exploration of tongue free flap, muscle flap revision with right thigh flap, 7.5 trach exchanged to 7.0 cuffed trach. S/p SICU stay for flap checks, now downgraded to the floor

## 2024-06-18 NOTE — PROGRESS NOTE ADULT - SUBJECTIVE AND OBJECTIVE BOX
Dr. Shannan Vargas  Pager [phone]    PROGRESS NOTE:     Patient is a 64y old  Female who presents with a chief complaint of Glossectomy neck dissection trach on 6/6 post op flap had a thrombosis s/p TPA s/p exploration of tongue free flap now s/p muscle flap revision with right thigh flap (15 José Miguel 2024 00:17)      SUBJECTIVE / OVERNIGHT EVENTS: pt is afebrile, getting TF via ngt  ADDITIONAL REVIEW OF SYSTEMS: no chest pain/sob     MEDICATIONS  (STANDING):  acetaminophen   Oral Liquid .. 975 milliGRAM(s) Oral every 6 hours  acetylcysteine 10%  Inhalation 4 milliLiter(s) Inhalation every 6 hours  albuterol/ipratropium for Nebulization 3 milliLiter(s) Nebulizer every 6 hours  amLODIPine   Tablet 10 milliGRAM(s) Oral daily  ampicillin/sulbactam  IVPB 3 Gram(s) IV Intermittent every 6 hours  buPROPion . 300 milliGRAM(s) Oral <User Schedule>  buPROPion . 150 milliGRAM(s) Oral at bedtime  chlorhexidine 0.12% Liquid 15 milliLiter(s) Swish and Spit <User Schedule>  chlorhexidine 2% Cloths 1 Application(s) Topical daily  enoxaparin Injectable 40 milliGRAM(s) SubCutaneous every 24 hours  pantoprazole  Injectable 40 milliGRAM(s) IV Push daily  polyethylene glycol 3350 17 Gram(s) Oral daily  QUEtiapine 100 milliGRAM(s) Oral at bedtime  risperiDONE   Solution 1 milliGRAM(s) Oral <User Schedule>  risperiDONE   Solution 3 milliGRAM(s) Oral at bedtime  senna Syrup 10 milliLiter(s) Oral daily  sodium chloride 0.9% lock flush 3 milliLiter(s) IV Push every 8 hours    MEDICATIONS  (PRN):  oxyCODONE    Solution 5 milliGRAM(s) Oral every 4 hours PRN Moderate Pain (4 - 6)  oxyCODONE    Solution 10 milliGRAM(s) Oral every 4 hours PRN Severe Pain (7 - 10)      CAPILLARY BLOOD GLUCOSE        I&O's Summary    17 Jun 2024 07:01  -  18 Jun 2024 07:00  --------------------------------------------------------  IN: 1201 mL / OUT: 772.5 mL / NET: 428.5 mL        PHYSICAL EXAM:  Vital Signs Last 24 Hrs  T(C): 36.5 (18 Jun 2024 09:15), Max: 37.3 (18 Jun 2024 00:00)  T(F): 97.7 (18 Jun 2024 09:15), Max: 99.1 (18 Jun 2024 00:00)  HR: 102 (18 Jun 2024 09:15) (87 - 118)  BP: 119/73 (18 Jun 2024 09:15) (102/69 - 141/99)  BP(mean): 98 (18 Jun 2024 00:00) (98 - 113)  RR: 20 (18 Jun 2024 09:15) (20 - 25)  SpO2: 99% (18 Jun 2024 09:15) (98% - 100%)    Parameters below as of 18 Jun 2024 09:15  Patient On (Oxygen Delivery Method): tracheostomy collar      CONSTITUTIONAL: nad, ngt in placement  Neck: neck incision wound with dressing, and staples, trach collar   RESPIRATORY: coarse breath sounds/rhonchi , nonlabored breathing   CARDIOVASCULAR: Regular rate and rhythm, normal S1 and S2, no murmur/rub/gallop; No lower extremity edema; Peripheral pulses are 2+ bilaterally  ABDOMEN: Nontender to palpation, normoactive bowel sounds, no rebound/guarding; No hepatosplenomegaly  MUSCULOSKELETAL: no clubbing or cyanosis of digits; no joint swelling or tenderness to palpation  PSYCH: A+O to person, place, and time; affect appropriate    LABS:                        9.8    6.51  )-----------( 636      ( 18 Jun 2024 07:48 )             28.9     06-18    140  |  104  |  19  ----------------------------<  148<H>  2.8<LL>   |  23  |  0.53    Ca    8.3<L>      18 Jun 2024 07:48  Phos  2.1     06-18  Mg     1.90     06-18            Urinalysis Basic - ( 18 Jun 2024 07:48 )    Color: x / Appearance: x / SG: x / pH: x  Gluc: 148 mg/dL / Ketone: x  / Bili: x / Urobili: x   Blood: x / Protein: x / Nitrite: x   Leuk Esterase: x / RBC: x / WBC x   Sq Epi: x / Non Sq Epi: x / Bacteria: x    RADIOLOGY & ADDITIONAL TESTS:  Results Reviewed:   Imaging Personally Reviewed:  < from: Xray Chest 1 View- PORTABLE-Urgent (Xray Chest 1 View- PORTABLE-Urgent .) (06.14.24 @ 18:52) >    IMPRESSION:  Similar right pleural effusion and atelectasis.    < from: US Duplex Venous Lower Ext Complete, Bilateral (06.10.24 @ 20:58) >  IMPRESSION:  No evidence of deep venous thrombosis in either lower extremity.      Electrocardiogram Personally Reviewed:    COORDINATION OF CARE:  Care Discussed with Consultants/Other Providers [Y/N]:  Prior or Outpatient Records Reviewed [Y/N]:

## 2024-06-18 NOTE — PROGRESS NOTE ADULT - PROBLEM SELECTOR PLAN 2
-R partial glossectomy in the setting of SCCA, R SND I-IV, L RFFF, L thigh STSG, tracheostomy (6/6/24). Pts post-operative course c/b venous congestion of flap w/ RTOR on 6/11 for free flap salvage and revision of venous anastamosis. Flap now infected with E Coli also without flow. RTOR 6/14 for exploration of tongue free flap, muscle flap revision with right thigh flap, 7.5 trach exchanged to 7.0 cuffed trach.  -OR wound cx (6/11, 6/14) grew E.coli, c/w Unsyn 3g q6h (6/11--), ID following   -management per ENT

## 2024-06-18 NOTE — PROGRESS NOTE ADULT - ASSESSMENT
· Assessment	  64F w/ PMH of depression and schizophrenia s/p R partial glossectomy in the setting of SCCA, R SND I-IV, L RFFF, L thigh STSG, tracheostomy (6/6/24). Pts post-operative course c/b venous congestion of flap w/ RTOR on 6/11/24 for free flap salvage and revision of venous anastamosis, RTOR on 6/14/24 for debridement of necrotic radial forearm flap and replacement with right ALT flap, with 7.5 trach exchanged to 7.0 cuffed trach. Pt stepped down to the floor (6/17/24), Pt is progressing well.     Plan:  -ERAS Day 4  -Q4h flap  -NPO w/ bolus TF  -IV unasyn  -penrose irrigation BID by plastics     OM  #[id number]  Available on Teams

## 2024-06-18 NOTE — PROGRESS NOTE ADULT - SUBJECTIVE AND OBJECTIVE BOX
64F w/ PMH of depression and schizophrenia s/p R partial glossectomy in the setting of SCCA, R SND I-IV, L RFFF, L thigh STSG, tracheostomy (6/6/24). Pts post-operative course c/b venous congestion of flap w/ RTOR on 6/11/24 for free flap salvage and revision of venous anastamosis, RTOR on 6/14/24 for debridement of necrotic radial forearm flap and replacement with right ALT flap, with 7.5 trach exchanged to 7.0 cuffed trach.     Interval Events: NAEON, AVSS, pt stepped down to the floor.    ICU Vital Signs Last 24 Hrs  T(C): 37 (18 Jun 2024 06:01), Max: 37.3 (18 Jun 2024 00:00)  T(F): 98.6 (18 Jun 2024 06:01), Max: 99.1 (18 Jun 2024 00:00)  HR: 109 (18 Jun 2024 06:01) (87 - 118)  BP: 122/81 (18 Jun 2024 06:01) (102/69 - 141/99)  BP(mean): 98 (18 Jun 2024 00:00) (98 - 113)  ABP: --  ABP(mean): --  RR: 21 (18 Jun 2024 06:01) (21 - 25)  SpO2: 99% (18 Jun 2024 06:01) (98% - 100%)    O2 Parameters below as of 18 Jun 2024 06:01  Patient On (Oxygen Delivery Method): tracheostomy collar  O2 Flow (L/min): 6  O2 Concentration (%): 28    I&O's Detail    17 Jun 2024 07:01  -  18 Jun 2024 07:00  --------------------------------------------------------  IN:    Enteral Tube Flush: 250 mL    IV PiggyBack: 100 mL    Jevity 1.5: 851 mL  Total IN: 1201 mL    OUT:    Bulb (mL): 22.5 mL    Voided (mL): 750 mL  Total OUT: 772.5 mL    Total NET: 428.5 mL      MEDICATIONS  (STANDING):  acetaminophen   Oral Liquid .. 975 milliGRAM(s) Oral every 6 hours  acetylcysteine 10%  Inhalation 4 milliLiter(s) Inhalation every 6 hours  albuterol/ipratropium for Nebulization 3 milliLiter(s) Nebulizer every 6 hours  amLODIPine   Tablet 10 milliGRAM(s) Oral daily  ampicillin/sulbactam  IVPB 3 Gram(s) IV Intermittent every 6 hours  buPROPion . 150 milliGRAM(s) Oral at bedtime  buPROPion . 300 milliGRAM(s) Oral <User Schedule>  chlorhexidine 0.12% Liquid 15 milliLiter(s) Swish and Spit <User Schedule>  chlorhexidine 2% Cloths 1 Application(s) Topical daily  enoxaparin Injectable 40 milliGRAM(s) SubCutaneous every 24 hours  pantoprazole  Injectable 40 milliGRAM(s) IV Push daily  polyethylene glycol 3350 17 Gram(s) Oral daily  potassium phosphate IVPB 30 milliMole(s) IV Intermittent once  QUEtiapine 100 milliGRAM(s) Oral at bedtime  risperiDONE   Solution 1 milliGRAM(s) Oral <User Schedule>  risperiDONE   Solution 3 milliGRAM(s) Oral at bedtime  senna Syrup 10 milliLiter(s) Oral daily  sodium chloride 0.9% lock flush 3 milliLiter(s) IV Push every 8 hours    MEDICATIONS  (PRN):  oxyCODONE    Solution 10 milliGRAM(s) Oral every 4 hours PRN Severe Pain (7 - 10)  oxyCODONE    Solution 5 milliGRAM(s) Oral every 4 hours PRN Moderate Pain (4 - 6)    PHYSICAL EXAM:  GEN: NAD  NEURO: alert & oriented x 4/4  HEENT: Exam; neck incision clean, dry, and intact. strong Cook, flap warm and well perfused, good color  CVS: regular rate and rhythm  Pulm: normal respiratory excursions, not tachypneic, no labored breathing  Abd: soft, non-distended  Ext:  no peripheral edema noted, TN drain in place                          10.0   8.52  )-----------( 550      ( 17 Jun 2024 03:44 )             29.4   06-17    138  |  103  |  11  ----------------------------<  106<H>  3.2<L>   |  25  |  0.42<L>    Ca    8.3<L>      17 Jun 2024 03:44  Phos  2.3     06-17  Mg     1.80     06-17

## 2024-06-18 NOTE — PROGRESS NOTE ADULT - PROBLEM SELECTOR PLAN 1
Trend HR   OR wound E coli, started on Unasyn    Pain control   TTE reviewed, normal EF 60-65%  PE ruled out

## 2024-06-18 NOTE — PROGRESS NOTE ADULT - PROBLEM SELECTOR PLAN 3
F/w [facility], on wellbutrin, risperidone, seroquel  trend ECG, hold antipsychotics if QTc>500ms

## 2024-06-18 NOTE — PROGRESS NOTE ADULT - SUBJECTIVE AND OBJECTIVE BOX
Plastic Surgery Progress Note (pg [facility]: [id number], [facility]: [phone])    SUBJECTIVE  NAEO. AVSS. Pt comfortable in bed.     OBJECTIVE  ___________________________________________________  VITAL SIGNS / I&O's   Vital Signs Last 24 Hrs  T(C): 37 (18 Jun 2024 06:01), Max: 37.3 (18 Jun 2024 00:00)  T(F): 98.6 (18 Jun 2024 06:01), Max: 99.1 (18 Jun 2024 00:00)  HR: 109 (18 Jun 2024 06:01) (87 - 118)  BP: 122/81 (18 Jun 2024 06:01) (102/69 - 147/89)  BP(mean): 98 (18 Jun 2024 00:00) (98 - 113)  RR: 21 (18 Jun 2024 06:01) (19 - 25)  SpO2: 99% (18 Jun 2024 06:01) (98% - 100%)    Parameters below as of 18 Jun 2024 06:01  Patient On (Oxygen Delivery Method): tracheostomy collar  O2 Flow (L/min): 6  O2 Concentration (%): 28      17 Jun 2024 07:01  -  18 Jun 2024 07:00  --------------------------------------------------------  IN:    Enteral Tube Flush: 250 mL    IV PiggyBack: 100 mL    Jevity 1.5: 851 mL  Total IN: 1201 mL    OUT:    Bulb (mL): 22.5 mL    Voided (mL): 750 mL  Total OUT: 772.5 mL    Total NET: 428.5 mL        ___________________________________________________  PHYSICAL EXAM  Gen: NAD, A&Ox3  HEENT: neck swollen but no palpale fluid collection. Flap soft, warm, good color. Cook signal strong  RLE: thigh soft, SUZANNE drain SSO. Ace cdi  LUE: ace c/d/i  ___________________________________________________  LABS                        10.0   8.52  )-----------( 550      ( 17 Jun 2024 03:44 )             29.4     17 Jun 2024 03:44    138    |  103    |  11     ----------------------------<  106    3.2     |  25     |  0.42     Ca    8.3        17 Jun 2024 03:44  Phos  2.3       17 Jun 2024 03:44  Mg     1.80      17 Jun 2024 03:44        CAPILLARY BLOOD GLUCOSE            Urinalysis Basic - ( 17 Jun 2024 03:44 )    Color: x / Appearance: x / SG: x / pH: x  Gluc: 106 mg/dL / Ketone: x  / Bili: x / Urobili: x   Blood: x / Protein: x / Nitrite: x   Leuk Esterase: x / RBC: x / WBC x   Sq Epi: x / Non Sq Epi: x / Bacteria: x      ___________________________________________________  MICRO  Recent Cultures:  Specimen Source: .Smear RIGHT NECK CLT, 06-14 @ 12:07; Results   Culture is being performed. Fungal cultures are held for 4 weeks.; Gram Stain:   Numerous polymorphonuclear leukocytes per low power field  No organisms seen per oil power field; Organism: Escherichia coli<!>  Specimen Source: .Blood Blood-Peripheral, 06-12 @ 04:58; Results   No growth at 5 days; Gram Stain: --; Organism: --  Specimen Source: .Blood Blood-Peripheral, 06-12 @ 03:37; Results   No growth at 5 days; Gram Stain: --; Organism: --    ___________________________________________________  MEDICATIONS  (STANDING):  acetaminophen   Oral Liquid .. 975 milliGRAM(s) Oral every 6 hours  acetylcysteine 10%  Inhalation 4 milliLiter(s) Inhalation every 6 hours  albuterol/ipratropium for Nebulization 3 milliLiter(s) Nebulizer every 6 hours  amLODIPine   Tablet 10 milliGRAM(s) Oral daily  ampicillin/sulbactam  IVPB 3 Gram(s) IV Intermittent every 6 hours  buPROPion . 300 milliGRAM(s) Oral <User Schedule>  buPROPion . 150 milliGRAM(s) Oral at bedtime  chlorhexidine 0.12% Liquid 15 milliLiter(s) Swish and Spit <User Schedule>  chlorhexidine 2% Cloths 1 Application(s) Topical daily  enoxaparin Injectable 40 milliGRAM(s) SubCutaneous every 24 hours  pantoprazole  Injectable 40 milliGRAM(s) IV Push daily  polyethylene glycol 3350 17 Gram(s) Oral daily  QUEtiapine 100 milliGRAM(s) Oral at bedtime  risperiDONE   Solution 3 milliGRAM(s) Oral at bedtime  risperiDONE   Solution 1 milliGRAM(s) Oral <User Schedule>  senna Syrup 10 milliLiter(s) Oral daily  sodium chloride 0.9% lock flush 3 milliLiter(s) IV Push every 8 hours    MEDICATIONS  (PRN):  oxyCODONE    Solution 10 milliGRAM(s) Oral every 4 hours PRN Severe Pain (7 - 10)  oxyCODONE    Solution 5 milliGRAM(s) Oral every 4 hours PRN Moderate Pain (4 - 6)

## 2024-06-19 LAB
ANION GAP SERPL CALC-SCNC: 12 MMOL/L — SIGNIFICANT CHANGE UP (ref 7–14)
BASOPHILS # BLD AUTO: 0.02 K/UL — SIGNIFICANT CHANGE UP (ref 0–0.2)
BASOPHILS NFR BLD AUTO: 0.4 % — SIGNIFICANT CHANGE UP (ref 0–2)
BUN SERPL-MCNC: 13 MG/DL — SIGNIFICANT CHANGE UP (ref 7–23)
CALCIUM SERPL-MCNC: 8.7 MG/DL — SIGNIFICANT CHANGE UP (ref 8.4–10.5)
CHLORIDE SERPL-SCNC: 106 MMOL/L — SIGNIFICANT CHANGE UP (ref 98–107)
CO2 SERPL-SCNC: 23 MMOL/L — SIGNIFICANT CHANGE UP (ref 22–31)
CREAT SERPL-MCNC: 0.54 MG/DL — SIGNIFICANT CHANGE UP (ref 0.5–1.3)
CULTURE RESULTS: ABNORMAL
EGFR: 103 ML/MIN/1.73M2 — SIGNIFICANT CHANGE UP
EOSINOPHIL # BLD AUTO: 0.05 K/UL — SIGNIFICANT CHANGE UP (ref 0–0.5)
EOSINOPHIL NFR BLD AUTO: 1 % — SIGNIFICANT CHANGE UP (ref 0–6)
GLUCOSE SERPL-MCNC: 96 MG/DL — SIGNIFICANT CHANGE UP (ref 70–99)
HCT VFR BLD CALC: 29 % — LOW (ref 34.5–45)
HGB BLD-MCNC: 9.4 G/DL — LOW (ref 11.5–15.5)
IANC: 3.47 K/UL — SIGNIFICANT CHANGE UP (ref 1.8–7.4)
IMM GRANULOCYTES NFR BLD AUTO: 0.6 % — SIGNIFICANT CHANGE UP (ref 0–0.9)
LYMPHOCYTES # BLD AUTO: 0.81 K/UL — LOW (ref 1–3.3)
LYMPHOCYTES # BLD AUTO: 16.5 % — SIGNIFICANT CHANGE UP (ref 13–44)
MAGNESIUM SERPL-MCNC: 2.1 MG/DL — SIGNIFICANT CHANGE UP (ref 1.6–2.6)
MCHC RBC-ENTMCNC: 29 PG — SIGNIFICANT CHANGE UP (ref 27–34)
MCHC RBC-ENTMCNC: 32.4 GM/DL — SIGNIFICANT CHANGE UP (ref 32–36)
MCV RBC AUTO: 89.5 FL — SIGNIFICANT CHANGE UP (ref 80–100)
MONOCYTES # BLD AUTO: 0.52 K/UL — SIGNIFICANT CHANGE UP (ref 0–0.9)
MONOCYTES NFR BLD AUTO: 10.6 % — SIGNIFICANT CHANGE UP (ref 2–14)
NEUTROPHILS # BLD AUTO: 3.47 K/UL — SIGNIFICANT CHANGE UP (ref 1.8–7.4)
NEUTROPHILS NFR BLD AUTO: 70.9 % — SIGNIFICANT CHANGE UP (ref 43–77)
NRBC # BLD: 0 /100 WBCS — SIGNIFICANT CHANGE UP (ref 0–0)
NRBC # FLD: 0 K/UL — SIGNIFICANT CHANGE UP (ref 0–0)
ORGANISM # SPEC MICROSCOPIC CNT: ABNORMAL
ORGANISM # SPEC MICROSCOPIC CNT: ABNORMAL
PHOSPHATE SERPL-MCNC: 3.4 MG/DL — SIGNIFICANT CHANGE UP (ref 2.5–4.5)
PLATELET # BLD AUTO: 668 K/UL — HIGH (ref 150–400)
POTASSIUM SERPL-MCNC: 3.7 MMOL/L — SIGNIFICANT CHANGE UP (ref 3.5–5.3)
POTASSIUM SERPL-SCNC: 3.7 MMOL/L — SIGNIFICANT CHANGE UP (ref 3.5–5.3)
RBC # BLD: 3.24 M/UL — LOW (ref 3.8–5.2)
RBC # FLD: 14.7 % — HIGH (ref 10.3–14.5)
SODIUM SERPL-SCNC: 141 MMOL/L — SIGNIFICANT CHANGE UP (ref 135–145)
SPECIMEN SOURCE: SIGNIFICANT CHANGE UP
WBC # BLD: 4.9 K/UL — SIGNIFICANT CHANGE UP (ref 3.8–10.5)
WBC # FLD AUTO: 4.9 K/UL — SIGNIFICANT CHANGE UP (ref 3.8–10.5)

## 2024-06-19 PROCEDURE — 99232 SBSQ HOSP IP/OBS MODERATE 35: CPT

## 2024-06-19 RX ADMIN — AMPICILLIN AND SULBACTAM 200 GRAM(S): 2; 1 INJECTION, POWDER, FOR SOLUTION INTRAMUSCULAR; INTRAVENOUS at 15:23

## 2024-06-19 RX ADMIN — Medication 15 MILLILITER(S): at 18:15

## 2024-06-19 RX ADMIN — Medication 975 MILLIGRAM(S): at 17:12

## 2024-06-19 RX ADMIN — Medication 15 MILLILITER(S): at 22:03

## 2024-06-19 RX ADMIN — IPRATROPIUM BROMIDE AND ALBUTEROL SULFATE 3 MILLILITER(S): .5; 3 SOLUTION RESPIRATORY (INHALATION) at 22:14

## 2024-06-19 RX ADMIN — BUPROPION HYDROCHLORIDE 300 MILLIGRAM(S): 150 TABLET, EXTENDED RELEASE ORAL at 05:54

## 2024-06-19 RX ADMIN — Medication 15 MILLILITER(S): at 12:13

## 2024-06-19 RX ADMIN — Medication 975 MILLIGRAM(S): at 05:54

## 2024-06-19 RX ADMIN — AMLODIPINE BESYLATE 10 MILLIGRAM(S): 2.5 TABLET ORAL at 05:53

## 2024-06-19 RX ADMIN — PANTOPRAZOLE SODIUM 40 MILLIGRAM(S): 40 INJECTION, POWDER, FOR SOLUTION INTRAVENOUS at 11:14

## 2024-06-19 RX ADMIN — ACETYLCYSTEINE 4 MILLILITER(S): 200 SOLUTION ORAL; RESPIRATORY (INHALATION) at 22:14

## 2024-06-19 RX ADMIN — Medication 15 MILLILITER(S): at 11:14

## 2024-06-19 RX ADMIN — ENOXAPARIN SODIUM 40 MILLIGRAM(S): 100 INJECTION SUBCUTANEOUS at 15:23

## 2024-06-19 RX ADMIN — Medication 3 MILLILITER(S): at 12:51

## 2024-06-19 RX ADMIN — Medication 975 MILLIGRAM(S): at 11:44

## 2024-06-19 RX ADMIN — AMPICILLIN AND SULBACTAM 200 GRAM(S): 2; 1 INJECTION, POWDER, FOR SOLUTION INTRAMUSCULAR; INTRAVENOUS at 22:03

## 2024-06-19 RX ADMIN — Medication 15 MILLILITER(S): at 15:24

## 2024-06-19 RX ADMIN — Medication 1 APPLICATION(S): at 11:14

## 2024-06-19 RX ADMIN — AMPICILLIN AND SULBACTAM 200 GRAM(S): 2; 1 INJECTION, POWDER, FOR SOLUTION INTRAMUSCULAR; INTRAVENOUS at 04:03

## 2024-06-19 RX ADMIN — Medication 975 MILLIGRAM(S): at 17:42

## 2024-06-19 RX ADMIN — Medication 3 MILLILITER(S): at 21:56

## 2024-06-19 RX ADMIN — AMPICILLIN AND SULBACTAM 200 GRAM(S): 2; 1 INJECTION, POWDER, FOR SOLUTION INTRAMUSCULAR; INTRAVENOUS at 09:12

## 2024-06-19 RX ADMIN — IPRATROPIUM BROMIDE AND ALBUTEROL SULFATE 3 MILLILITER(S): .5; 3 SOLUTION RESPIRATORY (INHALATION) at 09:56

## 2024-06-19 RX ADMIN — Medication 975 MILLIGRAM(S): at 11:14

## 2024-06-19 RX ADMIN — ACETYLCYSTEINE 4 MILLILITER(S): 200 SOLUTION ORAL; RESPIRATORY (INHALATION) at 04:59

## 2024-06-19 RX ADMIN — ACETYLCYSTEINE 4 MILLILITER(S): 200 SOLUTION ORAL; RESPIRATORY (INHALATION) at 09:56

## 2024-06-19 RX ADMIN — IPRATROPIUM BROMIDE AND ALBUTEROL SULFATE 3 MILLILITER(S): .5; 3 SOLUTION RESPIRATORY (INHALATION) at 04:59

## 2024-06-19 RX ADMIN — Medication 3 MILLILITER(S): at 05:48

## 2024-06-19 RX ADMIN — Medication 100 MILLIGRAM(S): at 22:04

## 2024-06-19 RX ADMIN — RISPERIDONE 1 MILLIGRAM(S): 0.5 TABLET ORAL at 05:54

## 2024-06-19 RX ADMIN — PETROLATUM 1 APPLICATION(S): 42 OINTMENT TOPICAL at 11:15

## 2024-06-19 RX ADMIN — IPRATROPIUM BROMIDE AND ALBUTEROL SULFATE 3 MILLILITER(S): .5; 3 SOLUTION RESPIRATORY (INHALATION) at 16:42

## 2024-06-19 RX ADMIN — ACETYLCYSTEINE 4 MILLILITER(S): 200 SOLUTION ORAL; RESPIRATORY (INHALATION) at 16:43

## 2024-06-19 RX ADMIN — RISPERIDONE 3 MILLIGRAM(S): 0.5 TABLET ORAL at 22:03

## 2024-06-19 RX ADMIN — Medication 15 MILLILITER(S): at 05:53

## 2024-06-19 RX ADMIN — BUPROPION HYDROCHLORIDE 150 MILLIGRAM(S): 150 TABLET, EXTENDED RELEASE ORAL at 22:04

## 2024-06-19 NOTE — PROGRESS NOTE ADULT - ASSESSMENT
Assessment	  64F w/ PMH of depression and schizophrenia s/p R partial glossectomy in the setting of SCCA, R SND I-IV, L RFFF, L thigh STSG, tracheostomy (6/6/24). Pts post-operative course c/b venous congestion of flap w/ RTOR on 6/11/24 for free flap salvage and revision of venous anastamosis, RTOR on 6/14/24 for debridement of necrotic radial forearm flap and replacement with right ALT flap, with 7.5 trach exchanged to 7.0 cuffed trach. Pt stepped down to the floor (6/17/24), Pt is progressing well.     Plan:  -ERAS Day 5  -Swallow Eval  -NPO w/ TFS  -IV unasyn  -penrose irrigation BID by plastics       OMFS  #[id number]  Available on Teams

## 2024-06-19 NOTE — SWALLOW BEDSIDE ASSESSMENT ADULT - H & P REVIEW
yes Hospitalist 6/19, "65 y/o female with schizophrenia and depression s/p R partial glossectomy in the setting of SCCA, R SND I-IV, L RFFF, L thigh STSG, tracheostomy (6/6/24). Pts post-operative course c/b venous congestion of flap w/ RTOR on 6/11 for free flap salvage and revision of venous anastamosis. Flap now infected with E Coli also without flow. RTOR 6/14 for exploration of tongue free flap, muscle flap revision with right thigh flap, 7.5 trach exchanged to 7.0 cuffed trach. S/p SICU stay for flap checks, now downgraded to the floor"/yes

## 2024-06-19 NOTE — PROGRESS NOTE ADULT - PROBLEM SELECTOR PLAN 2
-R partial glossectomy in the setting of SCCA, R SND I-IV, L RFFF, L thigh STSG, tracheostomy (6/6/24). Pts post-operative course c/b venous congestion of flap w/ RTOR on 6/11 for free flap salvage and revision of venous anastomosis. Flap now infected with E Coli also without flow. RTOR 6/14 for exploration of tongue free flap, muscle flap revision with right thigh flap, 7.5 trach exchanged to 7.0 cuffed trach.  -OR wound cx (6/11, 6/14) grew E.coli, c/w Unsyn 3g q6h (6/11--), ID following   -management per ENT/plastic sx  - NPO on TF via NGT

## 2024-06-19 NOTE — SWALLOW BEDSIDE ASSESSMENT ADULT - ADDITIONAL RECOMMENDATIONS
1. Medical team advised to reconsult this service as patient becomes medically optimized (i.e., tracheostomy downsized, cuffless tracheostomy, etc). 2. This service to follow to re-assess for oral diet program candidacy as schedule permits.

## 2024-06-19 NOTE — SWALLOW BEDSIDE ASSESSMENT ADULT - SWALLOW EVAL: RECOMMENDED DIET
1. Consider NPO with continuation of short term non-oral means of nutrition/ hydration/ medication via NGT in place

## 2024-06-19 NOTE — SWALLOW BEDSIDE ASSESSMENT ADULT - SWALLOW EVAL: ORAL MUSCULATURE
s/p R partial glossectomy;  c/b venous congestion of flap w/ RTOR on 6/11/24 for free flap salvage and revision of venous anastamosis, RTOR on 6/14/24 for debridement of necrotic radial forearm flap and replacement with right ALT flap/anomalies present

## 2024-06-19 NOTE — PROGRESS NOTE ADULT - SUBJECTIVE AND OBJECTIVE BOX
64F w/ PMH of depression and schizophrenia s/p R partial glossectomy in the setting of SCCA, R SND I-IV, L RFFF, L thigh STSG, tracheostomy (6/6/24). Pts post-operative course c/b venous congestion of flap w/ RTOR on 6/11/24 for free flap salvage and revision of venous anastamosis, RTOR on 6/14/24 for debridement of necrotic radial forearm flap and replacement with right ALT flap, with 7.5 trach exchanged to 7.0 cuffed trach.       Interval Events: NAEON. aVSS.       Vital Signs Last 24 Hrs  T(C): 36.6 (19 Jun 2024 06:05), Max: 37 (18 Jun 2024 18:00)  T(F): 97.9 (19 Jun 2024 06:05), Max: 98.6 (18 Jun 2024 18:00)  HR: 93 (19 Jun 2024 06:05) (68 - 110)  BP: 130/70 (19 Jun 2024 06:05) (119/73 - 134/74)  BP(mean): --  RR: 20 (19 Jun 2024 06:05) (19 - 20)  SpO2: 100% (19 Jun 2024 06:05) (96% - 100%)    Parameters below as of 19 Jun 2024 06:05  Patient On (Oxygen Delivery Method): tracheostomy collar  O2 Flow (L/min): 6  O2 Concentration (%): 28      I&O's Detail    18 Jun 2024 07:01  -  19 Jun 2024 07:00  --------------------------------------------------------  IN:    Enteral Tube Flush: 200 mL    Jevity 1.5: 680 mL  Total IN: 880 mL    OUT:    Bulb (mL): 20.5 mL    Voided (mL): 1025 mL  Total OUT: 1045.5 mL    Total NET: -165.5 mL            Medications:    MEDICATIONS  (STANDING):  acetaminophen   Oral Liquid .. 975 milliGRAM(s) Oral every 6 hours  acetylcysteine 10%  Inhalation 4 milliLiter(s) Inhalation every 6 hours  albuterol/ipratropium for Nebulization 3 milliLiter(s) Nebulizer every 6 hours  amLODIPine   Tablet 10 milliGRAM(s) Oral daily  ampicillin/sulbactam  IVPB 3 Gram(s) IV Intermittent every 6 hours  AQUAPHOR (petrolatum Ointment) 1 Application(s) Topical daily  buPROPion . 300 milliGRAM(s) Oral <User Schedule>  buPROPion . 150 milliGRAM(s) Oral at bedtime  chlorhexidine 0.12% Liquid 15 milliLiter(s) Swish and Spit <User Schedule>  chlorhexidine 2% Cloths 1 Application(s) Topical daily  enoxaparin Injectable 40 milliGRAM(s) SubCutaneous every 24 hours  pantoprazole  Injectable 40 milliGRAM(s) IV Push daily  polyethylene glycol 3350 17 Gram(s) Oral daily  QUEtiapine 100 milliGRAM(s) Oral at bedtime  risperiDONE   Solution 1 milliGRAM(s) Oral <User Schedule>  risperiDONE   Solution 3 milliGRAM(s) Oral at bedtime  senna Syrup 10 milliLiter(s) Oral daily  sodium chloride 0.9% lock flush 3 milliLiter(s) IV Push every 8 hours    MEDICATIONS  (PRN):  oxyCODONE    Solution 10 milliGRAM(s) Oral every 4 hours PRN Severe Pain (7 - 10)  oxyCODONE    Solution 5 milliGRAM(s) Oral every 4 hours PRN Moderate Pain (4 - 6)        Labs:                          9.4    4.90  )-----------( 668      ( 19 Jun 2024 06:30 )             29.0       06-19    141  |  106  |  13  ----------------------------<  96  3.7   |  23  |  0.54    Ca    8.7      19 Jun 2024 06:30  Phos  3.4     06-19  Mg     2.10     06-19      PHYSICAL EXAM:  GEN: NAD  NEURO: alert & oriented x 4/4  HEENT: Exam; neck incision clean, dry, and intact. strong Cook, flap warm and well perfused, good color  CVS: regular rate and rhythm  Pulm: normal respiratory excursions, not tachypneic, no labored breathing  Abd: soft, non-distended  Ext:  no peripheral edema noted, MO drain in place

## 2024-06-19 NOTE — SWALLOW BEDSIDE ASSESSMENT ADULT - COMMENTS
Patient received upright awake/ alert in bed, noted with NGT in place, Shiley cuffed tracheostomy in place OMFS 6/19, "64F w/ PMH of depression and schizophrenia s/p R partial glossectomy in the setting of SCCA, R SND I-IV, L RFFF, L thigh STSG, tracheostomy (6/6/24). Pts post-operative course c/b venous congestion of flap w/ RTOR on 6/11/24 for free flap salvage and revision of venous anastamosis, RTOR on 6/14/24 for debridement of necrotic radial forearm flap and replacement with right ALT flap, with 7.5 trach exchanged to 7.0 cuffed trach. Pt stepped down to the floor (6/17/24), Pt is progressing well. Plan: -ERAS Day 5 -Swallow Eval"    CXR 6/14: IMPRESSION: Similar right pleural effusion and atelectasis.    SLP spoke with OMFS who provided clearance for PO in trials inclusive of puree/ thickened liquids/ thin liquids as tolerated.     Patient received upright awake/ alert in bed, noted with NGT in place, Jess cuffed tracheostomy in place (cuff deflated) on trach collar status. Patient without attempt to vocalize, answering yes/ no questions via head nod/shake. Patient offered pen/ paper however declined.

## 2024-06-19 NOTE — PROGRESS NOTE ADULT - SUBJECTIVE AND OBJECTIVE BOX
Dr. Shannan Vargas  Pager [phone]    PROGRESS NOTE:     Patient is a 64y old  Female who presents with a chief complaint of s/p surgery (18 Jun 2024 11:59)      SUBJECTIVE / OVERNIGHT EVENTS: denies chest pain or sob   ADDITIONAL REVIEW OF SYSTEMS: afebrile     MEDICATIONS  (STANDING):  acetaminophen   Oral Liquid .. 975 milliGRAM(s) Oral every 6 hours  acetylcysteine 10%  Inhalation 4 milliLiter(s) Inhalation every 6 hours  albuterol/ipratropium for Nebulization 3 milliLiter(s) Nebulizer every 6 hours  amLODIPine   Tablet 10 milliGRAM(s) Oral daily  ampicillin/sulbactam  IVPB 3 Gram(s) IV Intermittent every 6 hours  AQUAPHOR (petrolatum Ointment) 1 Application(s) Topical daily  buPROPion . 300 milliGRAM(s) Oral <User Schedule>  buPROPion . 150 milliGRAM(s) Oral at bedtime  chlorhexidine 0.12% Liquid 15 milliLiter(s) Swish and Spit <User Schedule>  chlorhexidine 2% Cloths 1 Application(s) Topical daily  enoxaparin Injectable 40 milliGRAM(s) SubCutaneous every 24 hours  pantoprazole  Injectable 40 milliGRAM(s) IV Push daily  polyethylene glycol 3350 17 Gram(s) Oral daily  QUEtiapine 100 milliGRAM(s) Oral at bedtime  risperiDONE   Solution 3 milliGRAM(s) Oral at bedtime  risperiDONE   Solution 1 milliGRAM(s) Oral <User Schedule>  senna Syrup 10 milliLiter(s) Oral daily  sodium chloride 0.9% lock flush 3 milliLiter(s) IV Push every 8 hours    MEDICATIONS  (PRN):  oxyCODONE    Solution 10 milliGRAM(s) Oral every 4 hours PRN Severe Pain (7 - 10)  oxyCODONE    Solution 5 milliGRAM(s) Oral every 4 hours PRN Moderate Pain (4 - 6)      CAPILLARY BLOOD GLUCOSE        I&O's Summary    18 Jun 2024 07:01  -  19 Jun 2024 07:00  --------------------------------------------------------  IN: 880 mL / OUT: 1045.5 mL / NET: -165.5 mL    19 Jun 2024 07:01  -  19 Jun 2024 12:03  --------------------------------------------------------  IN: 0 mL / OUT: 210 mL / NET: -210 mL        PHYSICAL EXAM:  Vital Signs Last 24 Hrs  T(C): 37.3 (19 Jun 2024 09:18), Max: 37.3 (19 Jun 2024 09:18)  T(F): 99.2 (19 Jun 2024 09:18), Max: 99.2 (19 Jun 2024 09:18)  HR: 106 (19 Jun 2024 09:56) (68 - 110)  BP: 141/80 (19 Jun 2024 09:18) (125/82 - 141/80)  BP(mean): --  RR: 20 (19 Jun 2024 09:18) (19 - 20)  SpO2: 99% (19 Jun 2024 09:18) (96% - 100%)    Parameters below as of 19 Jun 2024 09:18  Patient On (Oxygen Delivery Method): tracheostomy collar      CONSTITUTIONAL: nad, ngt in placement  Neck: neck incision wound with dressing, and staples, trach collar   RESPIRATORY: coarse breath sounds/rhonchi , nonlabored breathing   CARDIOVASCULAR: Regular rate and rhythm, normal S1 and S2, no murmur/rub/gallop; No lower extremity edema; Peripheral pulses are 2+ bilaterally  ABDOMEN: Nontender to palpation, normoactive bowel sounds, no rebound/guarding; No hepatosplenomegaly  MUSCULOSKELETAL: no clubbing or cyanosis of digits; no joint swelling or tenderness to palpation  PSYCH: A+O to person, place, and time; affect appropriate    LABS:                        9.4    4.90  )-----------( 668      ( 19 Jun 2024 06:30 )             29.0     06-19    141  |  106  |  13  ----------------------------<  96  3.7   |  23  |  0.54    Ca    8.7      19 Jun 2024 06:30  Phos  3.4     06-19  Mg     2.10     06-19            Urinalysis Basic - ( 19 Jun 2024 06:30 )    Color: x / Appearance: x / SG: x / pH: x  Gluc: 96 mg/dL / Ketone: x  / Bili: x / Urobili: x   Blood: x / Protein: x / Nitrite: x   Leuk Esterase: x / RBC: x / WBC x   Sq Epi: x / Non Sq Epi: x / Bacteria: x          RADIOLOGY & ADDITIONAL TESTS:  Results Reviewed:   Imaging Personally Reviewed:  Electrocardiogram Personally Reviewed:    COORDINATION OF CARE:  Care Discussed with Consultants/Other Providers [Y/N]:  Prior or Outpatient Records Reviewed [Y/N]:

## 2024-06-19 NOTE — SWALLOW BEDSIDE ASSESSMENT ADULT - ASR SWALLOW RECOMMEND DIAG
Objective testing Not warranted at this time given overt s/s penetration/ aspiration noted on trialed consistency (cough response)

## 2024-06-19 NOTE — PROGRESS NOTE ADULT - SUBJECTIVE AND OBJECTIVE BOX
Plastic Surgery Progress Note (pg [facility]: [id number], [facility]: [phone])    SUBJECTIVE  NAEO. Tachy to 103 overnight. Pt comfortable in bed. Pain well controlled, no complaints. Has not ambulated.    OBJECTIVE  ___________________________________________________  VITAL SIGNS / I&O's   Vital Signs Last 24 Hrs  T(C): 36.6 (19 Jun 2024 02:14), Max: 37 (18 Jun 2024 18:00)  T(F): 97.9 (19 Jun 2024 02:14), Max: 98.6 (18 Jun 2024 18:00)  HR: 68 (19 Jun 2024 02:14) (68 - 110)  BP: 128/78 (19 Jun 2024 02:14) (119/73 - 134/74)  BP(mean): --  RR: 19 (19 Jun 2024 02:14) (19 - 20)  SpO2: 99% (19 Jun 2024 02:14) (96% - 100%)    Parameters below as of 19 Jun 2024 02:14  Patient On (Oxygen Delivery Method): tracheostomy collar          18 Jun 2024 07:01  -  19 Jun 2024 07:00  --------------------------------------------------------  IN:    Enteral Tube Flush: 200 mL    Jevity 1.5: 680 mL  Total IN: 880 mL    OUT:    Bulb (mL): 20.5 mL    Voided (mL): 1025 mL  Total OUT: 1045.5 mL    Total NET: -165.5 mL        ___________________________________________________  PHYSICAL EXAM  Gen: NAD, A&Ox3  HEENT: neck swollen but no palpale fluid collection. Flap soft, warm, good color. Cook signal strong; penrose with slight drainage  RLE: thigh soft, SUZANNE drain SSO. Ace cdi  LUE: ace c/d/i  ___________________________________________________  LABS                        9.8    6.51  )-----------( 636      ( 18 Jun 2024 07:48 )             28.9     18 Jun 2024 07:48    140    |  104    |  19     ----------------------------<  148    2.8     |  23     |  0.53     Ca    8.3        18 Jun 2024 07:48  Phos  2.1       18 Jun 2024 07:48  Mg     1.90      18 Jun 2024 07:48        CAPILLARY BLOOD GLUCOSE            Urinalysis Basic - ( 18 Jun 2024 07:48 )    Color: x / Appearance: x / SG: x / pH: x  Gluc: 148 mg/dL / Ketone: x  / Bili: x / Urobili: x   Blood: x / Protein: x / Nitrite: x   Leuk Esterase: x / RBC: x / WBC x   Sq Epi: x / Non Sq Epi: x / Bacteria: x      ___________________________________________________  MICRO  Recent Cultures:  Specimen Source: .Smear RIGHT NECK CLT, 06-14 @ 12:07; Results   Culture is being performed. Fungal cultures are held for 4 weeks.; Gram Stain:   Numerous polymorphonuclear leukocytes per low power field  No organisms seen per oil power field; Organism: Escherichia coli<!>    ___________________________________________________  MEDICATIONS  (STANDING):  acetaminophen   Oral Liquid .. 975 milliGRAM(s) Oral every 6 hours  acetylcysteine 10%  Inhalation 4 milliLiter(s) Inhalation every 6 hours  albuterol/ipratropium for Nebulization 3 milliLiter(s) Nebulizer every 6 hours  amLODIPine   Tablet 10 milliGRAM(s) Oral daily  ampicillin/sulbactam  IVPB 3 Gram(s) IV Intermittent every 6 hours  AQUAPHOR (petrolatum Ointment) 1 Application(s) Topical daily  buPROPion . 300 milliGRAM(s) Oral <User Schedule>  buPROPion . 150 milliGRAM(s) Oral at bedtime  chlorhexidine 0.12% Liquid 15 milliLiter(s) Swish and Spit <User Schedule>  chlorhexidine 2% Cloths 1 Application(s) Topical daily  enoxaparin Injectable 40 milliGRAM(s) SubCutaneous every 24 hours  pantoprazole  Injectable 40 milliGRAM(s) IV Push daily  polyethylene glycol 3350 17 Gram(s) Oral daily  QUEtiapine 100 milliGRAM(s) Oral at bedtime  risperiDONE   Solution 1 milliGRAM(s) Oral <User Schedule>  risperiDONE   Solution 3 milliGRAM(s) Oral at bedtime  senna Syrup 10 milliLiter(s) Oral daily  sodium chloride 0.9% lock flush 3 milliLiter(s) IV Push every 8 hours    MEDICATIONS  (PRN):  oxyCODONE    Solution 5 milliGRAM(s) Oral every 4 hours PRN Moderate Pain (4 - 6)  oxyCODONE    Solution 10 milliGRAM(s) Oral every 4 hours PRN Severe Pain (7 - 10)

## 2024-06-19 NOTE — PROGRESS NOTE ADULT - ASSESSMENT
ASSESSMENT/PLAN:   LELA LOWERY is a 64yFemale s/p right alt for revision hemiglossal recon 6/14    - Aquaphor to left thigh donor site daily  - LUE dressings changed 6/19  - q4h flap checks  - NPO with TF  - Continue penrose  - Continue peridex  - Continue unasyn  - Monitor drainage thru penrose, no betadine flushes    Ladonna Catherine MD  Plastic and Reconstructive Surgery, PGY-1  [facility]: [id number]

## 2024-06-19 NOTE — SWALLOW BEDSIDE ASSESSMENT ADULT - SWALLOW EVAL: DIAGNOSIS
Patient accepted ~2 oz mildly thick liquids impregnated with green food color dye. Moderate oral dysphagia for mildly thick liquids characterized by adequate labial seal closure to cup presentation for adequate oral containment, suspect slow piecemeal transfer/ deglutition for anterior to poster transfer ~x3 with mild oral residue located in anterior oral cavity (lingual surface/ anterior sulcus). Of Note: SLP provided oral suctioning via Yankauer post trials. Pharyngeal dysphagia suspected for mildly thick liquids characterized by suspect delayed initiation of the pharyngeal swallow and hyolaryngeal excursion upon digital palpation with immediate prolonged cough response noted post swallow.

## 2024-06-19 NOTE — SWALLOW BEDSIDE ASSESSMENT ADULT - NS SPL SWALLOW CLINIC TRIAL FT
RN provided tracheal suctioning following PO trials without evidence of green dye return and ~30 minutes following without evidence of green dye return (gross aspiration); however, patient exhibiting overt s/s penetration/ aspiration evidenced by immediate/ prolonged cough response post swallow. RN provided tracheal suctioning following PO trials without evidence of green dye return and ~40minutes following without evidence of green dye return upon suctioning (gross aspiration); however, patient exhibiting overt s/s penetration/ aspiration evidenced by immediate/ prolonged cough response post swallow. Patient's brother and mother present during delayed suctioning. SLP provided education to patient and patient's family.

## 2024-06-20 LAB
ANION GAP SERPL CALC-SCNC: 11 MMOL/L — SIGNIFICANT CHANGE UP (ref 7–14)
BUN SERPL-MCNC: 14 MG/DL — SIGNIFICANT CHANGE UP (ref 7–23)
CALCIUM SERPL-MCNC: 8.6 MG/DL — SIGNIFICANT CHANGE UP (ref 8.4–10.5)
CHLORIDE SERPL-SCNC: 106 MMOL/L — SIGNIFICANT CHANGE UP (ref 98–107)
CO2 SERPL-SCNC: 23 MMOL/L — SIGNIFICANT CHANGE UP (ref 22–31)
CREAT SERPL-MCNC: 0.54 MG/DL — SIGNIFICANT CHANGE UP (ref 0.5–1.3)
EGFR: 103 ML/MIN/1.73M2 — SIGNIFICANT CHANGE UP
GLUCOSE SERPL-MCNC: 89 MG/DL — SIGNIFICANT CHANGE UP (ref 70–99)
HCT VFR BLD CALC: 28.4 % — LOW (ref 34.5–45)
HGB BLD-MCNC: 9.7 G/DL — LOW (ref 11.5–15.5)
MAGNESIUM SERPL-MCNC: 2.1 MG/DL — SIGNIFICANT CHANGE UP (ref 1.6–2.6)
MCHC RBC-ENTMCNC: 29.9 PG — SIGNIFICANT CHANGE UP (ref 27–34)
MCHC RBC-ENTMCNC: 34.2 GM/DL — SIGNIFICANT CHANGE UP (ref 32–36)
MCV RBC AUTO: 87.7 FL — SIGNIFICANT CHANGE UP (ref 80–100)
NRBC # BLD: 0 /100 WBCS — SIGNIFICANT CHANGE UP (ref 0–0)
NRBC # FLD: 0 K/UL — SIGNIFICANT CHANGE UP (ref 0–0)
PHOSPHATE SERPL-MCNC: 2.8 MG/DL — SIGNIFICANT CHANGE UP (ref 2.5–4.5)
PLATELET # BLD AUTO: 646 K/UL — HIGH (ref 150–400)
POTASSIUM SERPL-MCNC: 3.7 MMOL/L — SIGNIFICANT CHANGE UP (ref 3.5–5.3)
POTASSIUM SERPL-SCNC: 3.7 MMOL/L — SIGNIFICANT CHANGE UP (ref 3.5–5.3)
RBC # BLD: 3.24 M/UL — LOW (ref 3.8–5.2)
RBC # FLD: 14.7 % — HIGH (ref 10.3–14.5)
SODIUM SERPL-SCNC: 140 MMOL/L — SIGNIFICANT CHANGE UP (ref 135–145)
WBC # BLD: 4.95 K/UL — SIGNIFICANT CHANGE UP (ref 3.8–10.5)
WBC # FLD AUTO: 4.95 K/UL — SIGNIFICANT CHANGE UP (ref 3.8–10.5)

## 2024-06-20 PROCEDURE — 74177 CT ABD & PELVIS W/CONTRAST: CPT | Mod: 26

## 2024-06-20 PROCEDURE — 71045 X-RAY EXAM CHEST 1 VIEW: CPT | Mod: 26

## 2024-06-20 PROCEDURE — 99232 SBSQ HOSP IP/OBS MODERATE 35: CPT

## 2024-06-20 PROCEDURE — 71260 CT THORAX DX C+: CPT | Mod: 26

## 2024-06-20 RX ADMIN — AMPICILLIN AND SULBACTAM 200 GRAM(S): 2; 1 INJECTION, POWDER, FOR SOLUTION INTRAMUSCULAR; INTRAVENOUS at 22:20

## 2024-06-20 RX ADMIN — Medication 15 MILLILITER(S): at 13:56

## 2024-06-20 RX ADMIN — IPRATROPIUM BROMIDE AND ALBUTEROL SULFATE 3 MILLILITER(S): .5; 3 SOLUTION RESPIRATORY (INHALATION) at 04:50

## 2024-06-20 RX ADMIN — Medication 1 APPLICATION(S): at 11:10

## 2024-06-20 RX ADMIN — Medication 975 MILLIGRAM(S): at 19:02

## 2024-06-20 RX ADMIN — BUPROPION HYDROCHLORIDE 300 MILLIGRAM(S): 150 TABLET, EXTENDED RELEASE ORAL at 11:09

## 2024-06-20 RX ADMIN — IPRATROPIUM BROMIDE AND ALBUTEROL SULFATE 3 MILLILITER(S): .5; 3 SOLUTION RESPIRATORY (INHALATION) at 14:33

## 2024-06-20 RX ADMIN — ACETYLCYSTEINE 4 MILLILITER(S): 200 SOLUTION ORAL; RESPIRATORY (INHALATION) at 08:22

## 2024-06-20 RX ADMIN — RISPERIDONE 3 MILLIGRAM(S): 0.5 TABLET ORAL at 22:20

## 2024-06-20 RX ADMIN — ACETYLCYSTEINE 4 MILLILITER(S): 200 SOLUTION ORAL; RESPIRATORY (INHALATION) at 22:03

## 2024-06-20 RX ADMIN — IPRATROPIUM BROMIDE AND ALBUTEROL SULFATE 3 MILLILITER(S): .5; 3 SOLUTION RESPIRATORY (INHALATION) at 22:01

## 2024-06-20 RX ADMIN — AMLODIPINE BESYLATE 10 MILLIGRAM(S): 2.5 TABLET ORAL at 11:08

## 2024-06-20 RX ADMIN — Medication 3 MILLILITER(S): at 22:20

## 2024-06-20 RX ADMIN — Medication 15 MILLILITER(S): at 06:53

## 2024-06-20 RX ADMIN — Medication 15 MILLILITER(S): at 22:18

## 2024-06-20 RX ADMIN — IPRATROPIUM BROMIDE AND ALBUTEROL SULFATE 3 MILLILITER(S): .5; 3 SOLUTION RESPIRATORY (INHALATION) at 08:22

## 2024-06-20 RX ADMIN — Medication 15 MILLILITER(S): at 10:59

## 2024-06-20 RX ADMIN — Medication 100 MILLIGRAM(S): at 22:19

## 2024-06-20 RX ADMIN — BUPROPION HYDROCHLORIDE 150 MILLIGRAM(S): 150 TABLET, EXTENDED RELEASE ORAL at 22:19

## 2024-06-20 RX ADMIN — Medication 15 MILLILITER(S): at 16:33

## 2024-06-20 RX ADMIN — ENOXAPARIN SODIUM 40 MILLIGRAM(S): 100 INJECTION SUBCUTANEOUS at 18:34

## 2024-06-20 RX ADMIN — Medication 10 MILLILITER(S): at 22:21

## 2024-06-20 RX ADMIN — Medication 975 MILLIGRAM(S): at 11:08

## 2024-06-20 RX ADMIN — AMPICILLIN AND SULBACTAM 200 GRAM(S): 2; 1 INJECTION, POWDER, FOR SOLUTION INTRAMUSCULAR; INTRAVENOUS at 04:48

## 2024-06-20 RX ADMIN — ACETYLCYSTEINE 4 MILLILITER(S): 200 SOLUTION ORAL; RESPIRATORY (INHALATION) at 14:33

## 2024-06-20 RX ADMIN — Medication 3 MILLILITER(S): at 06:52

## 2024-06-20 RX ADMIN — PANTOPRAZOLE SODIUM 40 MILLIGRAM(S): 40 INJECTION, POWDER, FOR SOLUTION INTRAVENOUS at 11:08

## 2024-06-20 RX ADMIN — AMPICILLIN AND SULBACTAM 200 GRAM(S): 2; 1 INJECTION, POWDER, FOR SOLUTION INTRAMUSCULAR; INTRAVENOUS at 16:34

## 2024-06-20 RX ADMIN — Medication 975 MILLIGRAM(S): at 18:33

## 2024-06-20 RX ADMIN — AMPICILLIN AND SULBACTAM 200 GRAM(S): 2; 1 INJECTION, POWDER, FOR SOLUTION INTRAMUSCULAR; INTRAVENOUS at 10:55

## 2024-06-20 RX ADMIN — Medication 975 MILLIGRAM(S): at 00:08

## 2024-06-20 RX ADMIN — Medication 975 MILLIGRAM(S): at 11:38

## 2024-06-20 RX ADMIN — Medication 3 MILLILITER(S): at 13:26

## 2024-06-20 RX ADMIN — ACETYLCYSTEINE 4 MILLILITER(S): 200 SOLUTION ORAL; RESPIRATORY (INHALATION) at 04:55

## 2024-06-20 RX ADMIN — PETROLATUM 1 APPLICATION(S): 42 OINTMENT TOPICAL at 11:10

## 2024-06-20 RX ADMIN — Medication 15 MILLILITER(S): at 18:40

## 2024-06-20 RX ADMIN — RISPERIDONE 1 MILLIGRAM(S): 0.5 TABLET ORAL at 11:22

## 2024-06-20 NOTE — PROVIDER CONTACT NOTE (OTHER) - ACTION/TREATMENT ORDERED:
oral team aware; team came to bedside and replaced KFT; pending xray holding morning meds/feed until xray confirms placement

## 2024-06-20 NOTE — PROGRESS NOTE ADULT - ASSESSMENT
Assessment	  64F w/ PMH of depression and schizophrenia s/p R partial glossectomy in the setting of SCCA, R SND I-IV, L RFFF, L thigh STSG, tracheostomy (6/6/24). Pts post-operative course c/b venous congestion of flap w/ RTOR on 6/11/24 for free flap salvage and revision of venous anastamosis, RTOR on 6/14/24 for debridement of necrotic radial forearm flap and replacement with right ALT flap, with 7.5 trach exchanged to 7.0 cuffed trach. Pt stepped down to the floor (6/17/24), Pt is progressing well.     Plan:  -ERAS Day 6  - Ko tube repositioned  - IR consult for PEG tube  -NPO w/ TFS  -IV unasyn  -penrose irrigation BID by plastics       OMFS  #[id number]  Available on Teams

## 2024-06-20 NOTE — CHART NOTE - NSCHARTNOTEFT_GEN_A_CORE
Source: [X] Patient       [x] EMR        [X] RN        [] Family at bedside       [] Other:       Nutrition Follow Up Note. Per chart review, 65 y/o F with medical history  of depression and schizophrenia s/p R partial glossectomy in the setting of SCCA, R SND I-IV, L RFFF, L thigh STSG, tracheostomy (6/6/24). Post-operative course c/b venous congestion of flap w/ RTOR on 6/11/24 for free flap salvage and revision of venous anastamosis, RTOR on 6/14/24 for debridement of necrotic radial forearm flap and replacement with right ALT flap, with 7.5 trach exchanged to 7.0 cuffed trach. Pt stepped down to the floor (8S)(6/18/24).    Patient is maintained on NGT of Jevity1.5 bolus feeds 340mL [id number] provides total volume of 1360mL, 2040mL, 87g pro, 1034mL free water. Patient is tolerating tube feeds well. Denies any nausea/vomiting/diarrhea/constipation. Last documented on 6/19. Failed swallow bedside evaluation on 6/19- recommended to "consider NPO with continuation of short term non-oral means of nutrition/ hydration/ medication via NGT in place." IR consult for PEG per OMFS team note on 6/20. When PEG placed, patient may resume current TF regimen.         Diet Prescription: Diet, NPO with Tube Feed:   Tube Feeding Modality: Nasogastric  Jevity 1.5 Boubacar (JEVITY1.5RTH)  Total Volume for 24 Hours (mL): 1360  Bolus  Total Volume of Bolus (mL):  340  Total # of Feeds: 4  Tube Feed Frequency: Every 6 hours   Tube Feed Start Time: 10:00  Bolus Feed Rate (mL per Hour): 340   Bolus Feed Duration (in Hours): 1 (06-17-24 @ 09:50)    Food Allergy: NKFA    Pertinent Medications: MEDICATIONS  (STANDING):  acetaminophen   Oral Liquid .. 975 milliGRAM(s) Oral every 6 hours  acetylcysteine 10%  Inhalation 4 milliLiter(s) Inhalation every 6 hours  albuterol/ipratropium for Nebulization 3 milliLiter(s) Nebulizer every 6 hours  amLODIPine   Tablet 10 milliGRAM(s) Oral daily  ampicillin/sulbactam  IVPB 3 Gram(s) IV Intermittent every 6 hours  AQUAPHOR (petrolatum Ointment) 1 Application(s) Topical daily  buPROPion . 300 milliGRAM(s) Oral <User Schedule>  buPROPion . 150 milliGRAM(s) Oral at bedtime  chlorhexidine 0.12% Liquid 15 milliLiter(s) Swish and Spit <User Schedule>  chlorhexidine 2% Cloths 1 Application(s) Topical daily  enoxaparin Injectable 40 milliGRAM(s) SubCutaneous every 24 hours  pantoprazole  Injectable 40 milliGRAM(s) IV Push daily  polyethylene glycol 3350 17 Gram(s) Oral daily  QUEtiapine 100 milliGRAM(s) Oral at bedtime  risperiDONE   Solution 1 milliGRAM(s) Oral <User Schedule>  risperiDONE   Solution 3 milliGRAM(s) Oral at bedtime  senna Syrup 10 milliLiter(s) Oral daily  sodium chloride 0.9% lock flush 3 milliLiter(s) IV Push every 8 hours    MEDICATIONS  (PRN):  oxyCODONE    Solution 10 milliGRAM(s) Oral every 4 hours PRN Severe Pain (7 - 10)  oxyCODONE    Solution 5 milliGRAM(s) Oral every 4 hours PRN Moderate Pain (4 - 6)    Pertinent Labs: 06-20 Na140 mmol/L Glu 89 mg/dL K+ 3.7 mmol/L Cr  0.54 mg/dL BUN 14 mg/dL 06-20 Phos 2.8 mg/dL        Weight (kg): dosing weights: 59 (06-14 @ 06:41)  59kg (6/6)  59kg (5/30)  Fluctuating daily weights documented during the course of admission-?question accuracy  Daily weights (kg): 6/17- 69.8kg   6/16-68.4kg  6/15-68.4kg  6/14-71.2kg  6/9-65.2kg  6/6-72.3kg   Weight Assessment:  Height: 162.6cm  IBW: 54.5 kg +/-10%  BMI: 22.3kg/m^2    Edema: 1+ generalized edema per nursing flowsheets.   Pressure Injury: Left upper thigh surgical incision per nursing flowsheets.       Estimated Needs:   [X] No change since previous assessment, based on dosing weight 59kg    Estimated Energy Needs (kcal/kg):   30-35 = 1770-2065kcal/d  Estimated Protein Needs(g/kg):      1.2-1.5 = 71-89g pro/d    [ ] recalculated, with consideration for, based on weight    Previous Nutrition Diagnosis:    [X ] Increased Nutrient Needs   Nutrition Diagnosis is [X ] ongoing  [ ] resolved [ ] not applicable     [X]  Inadequate protein energy intake, related to unable to tolerate po intake As evidenced by dysphagia  Nutrition Diagnosis is [ ] ongoing  [ ] resolved [X ] not applicable       New Nutrition Diagnosis: [ X] not applicable     Education:  [ ] Provided  [ ] Provided on previous assessment by RD  [X ] Not applicable secondary to patient with NGT feeds  [ ] Pt refused     Nutrition Interventions:     1) Continue current TF via NGT at this time as meeting patient estimated energy/protein needs.   2) Given patient with prolonged NGT and recently failed swallow evaluation, strongly consider alternate means of nutrition support.  - If/when PEG placed, patient may continue with current TF regimen. Additional fluids per MD discretion.  3) Monitor weights, labs, BM's, skin integrity, tolerance to EN.  4) Further adjustments to rate/volume/duration/free water provision of enteral feeds dependant on long term monitoring of patient's tolerance, weight trends and needs   5) RN to obtain new weight and weekly weights.      RD remains available.      Helio Wright, RAKESH, MS, CDN pager [phone]  Also available on [employer] Teams.

## 2024-06-20 NOTE — PROGRESS NOTE ADULT - SUBJECTIVE AND OBJECTIVE BOX
LELA LOWERY  MRN-[id number]  [facility]      64F w/ PMH of depression and schizophrenia s/p R partial glossectomy in the setting of SCCA, R SND I-IV, L RFFF, L thigh STSG, tracheostomy (6/6/24). Pts post-operative course c/b venous congestion of flap w/ RTOR on 6/11/24 for free flap salvage and revision of venous anastamosis, RTOR on 6/14/24 for debridement of necrotic radial forearm flap and replacement with right ALT flap, with 7.5 trach exchanged to 7.0 cuffed trach.       Interval Events: NAEON. aVSS. patient failed s/s. KO tube reposititoned        General: aaox3. well-appearing. no apparent distress.     PHYSICAL EXAM:  GEN: NAD  NEURO: alert & oriented x 4/4  HEENT: Exam; neck incision clean, dry, and intact. strong Cook, flap warm and well perfused, good color  CVS: regular rate and rhythm  Pulm: normal respiratory excursions, not tachypneic, no labored breathing  Abd: soft, non-distended  Ext:  no peripheral edema noted, WV drain in place          Vitals:    Vital Signs Last 24 Hrs  T(C): 36.9 (20 Jun 2024 05:03), Max: 37.3 (19 Jun 2024 09:18)  T(F): 98.5 (20 Jun 2024 05:03), Max: 99.2 (19 Jun 2024 09:18)  HR: 105 (20 Jun 2024 05:03) (92 - 115)  BP: 127/88 (20 Jun 2024 05:03) (120/77 - 141/89)  BP(mean): --  RR: 18 (20 Jun 2024 05:03) (18 - 20)  SpO2: 100% (20 Jun 2024 05:03) (97% - 100%)    Parameters below as of 20 Jun 2024 05:03  Patient On (Oxygen Delivery Method): tracheostomy collar        I&O's Detail    19 Jun 2024 07:01  -  20 Jun 2024 07:00  --------------------------------------------------------  IN:    Enteral Tube Flush: 300 mL    IV PiggyBack: 400 mL    Jevity 1.5: 1020 mL  Total IN: 1720 mL    OUT:    Bulb (mL): 30 mL    Voided (mL): 1450 mL  Total OUT: 1480 mL    Total NET: 240 mL            Medications:    MEDICATIONS  (STANDING):  acetaminophen   Oral Liquid .. 975 milliGRAM(s) Oral every 6 hours  acetylcysteine 10%  Inhalation 4 milliLiter(s) Inhalation every 6 hours  albuterol/ipratropium for Nebulization 3 milliLiter(s) Nebulizer every 6 hours  amLODIPine   Tablet 10 milliGRAM(s) Oral daily  ampicillin/sulbactam  IVPB 3 Gram(s) IV Intermittent every 6 hours  AQUAPHOR (petrolatum Ointment) 1 Application(s) Topical daily  buPROPion . 300 milliGRAM(s) Oral <User Schedule>  buPROPion . 150 milliGRAM(s) Oral at bedtime  chlorhexidine 0.12% Liquid 15 milliLiter(s) Swish and Spit <User Schedule>  chlorhexidine 2% Cloths 1 Application(s) Topical daily  enoxaparin Injectable 40 milliGRAM(s) SubCutaneous every 24 hours  pantoprazole  Injectable 40 milliGRAM(s) IV Push daily  polyethylene glycol 3350 17 Gram(s) Oral daily  QUEtiapine 100 milliGRAM(s) Oral at bedtime  risperiDONE   Solution 3 milliGRAM(s) Oral at bedtime  risperiDONE   Solution 1 milliGRAM(s) Oral <User Schedule>  senna Syrup 10 milliLiter(s) Oral daily  sodium chloride 0.9% lock flush 3 milliLiter(s) IV Push every 8 hours    MEDICATIONS  (PRN):  oxyCODONE    Solution 5 milliGRAM(s) Oral every 4 hours PRN Moderate Pain (4 - 6)  oxyCODONE    Solution 10 milliGRAM(s) Oral every 4 hours PRN Severe Pain (7 - 10)        Labs:                          9.4    4.90  )-----------( 668      ( 19 Jun 2024 06:30 )             29.0       06-19    141  |  106  |  13  ----------------------------<  96  3.7   |  23  |  0.54    Ca    8.7      19 Jun 2024 06:30  Phos  3.4     06-19  Mg     2.10     06-19        BMI: BMI (kg/m2): 22.3 (06-14-24 @ 06:41)  HbA1c: A1C with Estimated Average Glucose Result: 4.9 % (06-06-24 @ 18:30)    Glucose: POCT Blood Glucose.: 91 mg/dL (06-09-24 @ 08:35)    BP: 127/88 (06-20-24 @ 05:03) (102/69 - 147/89)Vital Signs Last 24 Hrs  T(C): 36.9 (06-20-24 @ 05:03), Max: 37.3 (06-19-24 @ 09:18)  T(F): 98.5 (06-20-24 @ 05:03), Max: 99.2 (06-19-24 @ 09:18)  HR: 105 (06-20-24 @ 05:03) (92 - 115)  BP: 127/88 (06-20-24 @ 05:03) (120/77 - 141/89)  BP(mean): --  RR: 18 (06-20-24 @ 05:03) (18 - 20)  SpO2: 100% (06-20-24 @ 05:03) (97% - 100%)      Lipid Panel:         CAPILLARY BLOOD GLUCOSE              Urinalysis Basic - ( 19 Jun 2024 06:30 )    Color: x / Appearance: x / SG: x / pH: x  Gluc: 96 mg/dL / Ketone: x  / Bili: x / Urobili: x   Blood: x / Protein: x / Nitrite: x   Leuk Esterase: x / RBC: x / WBC x   Sq Epi: x / Non Sq Epi: x / Bacteria: x

## 2024-06-20 NOTE — PROGRESS NOTE ADULT - SUBJECTIVE AND OBJECTIVE BOX
Dr. Shannan Vargas  Pager [phone]    PROGRESS NOTE:     Patient is a 64y old  Female who presents with a chief complaint of s/p surgery (19 Jun 2024 12:03)      SUBJECTIVE / OVERNIGHT EVENTS: no specific complaints, penrose removed  ADDITIONAL REVIEW OF SYSTEMS: afebrile     MEDICATIONS  (STANDING):  acetaminophen   Oral Liquid .. 975 milliGRAM(s) Oral every 6 hours  acetylcysteine 10%  Inhalation 4 milliLiter(s) Inhalation every 6 hours  albuterol/ipratropium for Nebulization 3 milliLiter(s) Nebulizer every 6 hours  amLODIPine   Tablet 10 milliGRAM(s) Oral daily  ampicillin/sulbactam  IVPB 3 Gram(s) IV Intermittent every 6 hours  AQUAPHOR (petrolatum Ointment) 1 Application(s) Topical daily  buPROPion . 300 milliGRAM(s) Oral <User Schedule>  buPROPion . 150 milliGRAM(s) Oral at bedtime  chlorhexidine 0.12% Liquid 15 milliLiter(s) Swish and Spit <User Schedule>  chlorhexidine 2% Cloths 1 Application(s) Topical daily  enoxaparin Injectable 40 milliGRAM(s) SubCutaneous every 24 hours  pantoprazole  Injectable 40 milliGRAM(s) IV Push daily  polyethylene glycol 3350 17 Gram(s) Oral daily  QUEtiapine 100 milliGRAM(s) Oral at bedtime  risperiDONE   Solution 1 milliGRAM(s) Oral <User Schedule>  risperiDONE   Solution 3 milliGRAM(s) Oral at bedtime  senna Syrup 10 milliLiter(s) Oral daily  sodium chloride 0.9% lock flush 3 milliLiter(s) IV Push every 8 hours    MEDICATIONS  (PRN):  oxyCODONE    Solution 10 milliGRAM(s) Oral every 4 hours PRN Severe Pain (7 - 10)  oxyCODONE    Solution 5 milliGRAM(s) Oral every 4 hours PRN Moderate Pain (4 - 6)      CAPILLARY BLOOD GLUCOSE        I&O's Summary    19 Jun 2024 07:01  -  20 Jun 2024 07:00  --------------------------------------------------------  IN: 1720 mL / OUT: 1480 mL / NET: 240 mL        PHYSICAL EXAM:  Vital Signs Last 24 Hrs  T(C): 37.1 (20 Jun 2024 10:40), Max: 37.1 (19 Jun 2024 14:00)  T(F): 98.8 (20 Jun 2024 10:40), Max: 98.8 (19 Jun 2024 14:00)  HR: 99 (20 Jun 2024 10:40) (99 - 115)  BP: 150/92 (20 Jun 2024 10:40) (120/77 - 150/92)  BP(mean): --  RR: 17 (20 Jun 2024 10:40) (17 - 20)  SpO2: 100% (20 Jun 2024 10:40) (97% - 100%)    Parameters below as of 20 Jun 2024 10:40  Patient On (Oxygen Delivery Method): tracheostomy collar      CONSTITUTIONAL: nad, ngt in placement  Neck: neck incision wound with dressing, and staples, trach collar   RESPIRATORY: coarse breath sounds/rhonchi , nonlabored breathing   CARDIOVASCULAR: Regular rate and rhythm, normal S1 and S2, no murmur/rub/gallop; No lower extremity edema; Peripheral pulses are 2+ bilaterally  ABDOMEN: Nontender to palpation, normoactive bowel sounds, no rebound/guarding; No hepatosplenomegaly  MUSCULOSKELETAL: no clubbing or cyanosis of digits; no joint swelling or tenderness to palpation  PSYCH: A+O to person, place, and time; affect appropriate    LABS:                        9.7    4.95  )-----------( 646      ( 20 Jun 2024 07:05 )             28.4     06-20    140  |  106  |  14  ----------------------------<  89  3.7   |  23  |  0.54    Ca    8.6      20 Jun 2024 07:05  Phos  2.8     06-20  Mg     2.10     06-20      Urinalysis Basic - ( 20 Jun 2024 07:05 )    Color: x / Appearance: x / SG: x / pH: x  Gluc: 89 mg/dL / Ketone: x  / Bili: x / Urobili: x   Blood: x / Protein: x / Nitrite: x   Leuk Esterase: x / RBC: x / WBC x   Sq Epi: x / Non Sq Epi: x / Bacteria: x    RADIOLOGY & ADDITIONAL TESTS:  Results Reviewed:   Imaging Personally Reviewed:  < from: Xray Chest 1 View- PORTABLE-Urgent (Xray Chest 1 View- PORTABLE-Urgent .) (06.20.24 @ 09:04) >  IMPRESSION: NG tube with tip just distal to the GE junction.        Electrocardiogram Personally Reviewed:    COORDINATION OF CARE:  Care Discussed with Consultants/Other Providers [Y/N]:  Prior or Outpatient Records Reviewed [Y/N]:

## 2024-06-20 NOTE — PROGRESS NOTE ADULT - ASSESSMENT
LELA LOWERY is a 64yFemale s/p right alt for revision hemiglossal recon 6/14    - Aquaphor to left thigh donor site daily  - LUE dressings changed 6/19  - q4h flap checks  - NPO with TF  - Continue peridex  - Continue unasyn  - penrose removed this AM- monitor drainage from site    [facility] PRS: [id number]

## 2024-06-20 NOTE — PROGRESS NOTE ADULT - ASSESSMENT
63 y/o female with schizophrenia and depression s/p R partial glossectomy in the setting of SCCA, R SND I-IV, L RFFF, L thigh STSG, tracheostomy (6/6/24). Pts post-operative course c/b venous congestion of flap w/ RTOR on 6/11 for free flap salvage and revision of venous anastamosis. Flap now infected with E Coli also without flow. RTOR 6/14 for exploration of tongue free flap, muscle flap revision with right thigh flap, 7.5 trach exchanged to 7.0 cuffed trach. S/p SICU stay for flap checks, now downgraded to the floor

## 2024-06-20 NOTE — PROGRESS NOTE ADULT - SUBJECTIVE AND OBJECTIVE BOX
Plastic Surgery Progress Note (pg [facility]: [id number], [facility]: [phone])    SUBJECTIVE  The patient was seen and examined. No acute events overnight.    OBJECTIVE  ___________________________________________________  VITAL SIGNS / I&O's   Vital Signs Last 24 Hrs  T(C): 36.9 (20 Jun 2024 05:03), Max: 37.3 (19 Jun 2024 09:18)  T(F): 98.5 (20 Jun 2024 05:03), Max: 99.2 (19 Jun 2024 09:18)  HR: 105 (20 Jun 2024 05:03) (92 - 115)  BP: 127/88 (20 Jun 2024 05:03) (120/77 - 141/89)  BP(mean): --  RR: 18 (20 Jun 2024 05:03) (18 - 20)  SpO2: 100% (20 Jun 2024 05:03) (97% - 100%)    Parameters below as of 20 Jun 2024 05:03  Patient On (Oxygen Delivery Method): tracheostomy collar          19 Jun 2024 07:01  -  20 Jun 2024 07:00  --------------------------------------------------------  IN:    Enteral Tube Flush: 300 mL    IV PiggyBack: 400 mL    Jevity 1.5: 1020 mL  Total IN: 1720 mL    OUT:    Bulb (mL): 30 mL    Voided (mL): 1450 mL  Total OUT: 1480 mL    Total NET: 240 mL        ___________________________________________________  PHYSICAL EXAM    -- CONSTITUTIONAL: NAD, lying in bed  -- NEURO: Awake, alert  HEENT: neck soft no palpale fluid collection. Flap soft, warm, good color. Cook signal strong; penrose with minimal draiange - removed this AM  RLE: thigh soft, SUZANNE drain SSO. Ace cdi  LUE: ace c/d/i    ___________________________________________________  LABS                        9.7    4.95  )-----------( 646      ( 20 Jun 2024 07:05 )             28.4     19 Jun 2024 06:30    141    |  106    |  13     ----------------------------<  96     3.7     |  23     |  0.54     Ca    8.7        19 Jun 2024 06:30  Phos  3.4       19 Jun 2024 06:30  Mg     2.10      19 Jun 2024 06:30        CAPILLARY BLOOD GLUCOSE            Urinalysis Basic - ( 19 Jun 2024 06:30 )    Color: x / Appearance: x / SG: x / pH: x  Gluc: 96 mg/dL / Ketone: x  / Bili: x / Urobili: x   Blood: x / Protein: x / Nitrite: x   Leuk Esterase: x / RBC: x / WBC x   Sq Epi: x / Non Sq Epi: x / Bacteria: x      ___________________________________________________  MICRO  Recent Cultures:  Specimen Source: .Smear RIGHT NECK CLT, 06-14 @ 12:07; Results   Culture is being performed. Fungal cultures are held for 4 weeks.; Gram Stain:   Numerous polymorphonuclear leukocytes per low power field  No organisms seen per oil power field; Organism: Escherichia coli<!>    ___________________________________________________  MEDICATIONS  (STANDING):  acetaminophen   Oral Liquid .. 975 milliGRAM(s) Oral every 6 hours  acetylcysteine 10%  Inhalation 4 milliLiter(s) Inhalation every 6 hours  albuterol/ipratropium for Nebulization 3 milliLiter(s) Nebulizer every 6 hours  amLODIPine   Tablet 10 milliGRAM(s) Oral daily  ampicillin/sulbactam  IVPB 3 Gram(s) IV Intermittent every 6 hours  AQUAPHOR (petrolatum Ointment) 1 Application(s) Topical daily  buPROPion . 300 milliGRAM(s) Oral <User Schedule>  buPROPion . 150 milliGRAM(s) Oral at bedtime  chlorhexidine 0.12% Liquid 15 milliLiter(s) Swish and Spit <User Schedule>  chlorhexidine 2% Cloths 1 Application(s) Topical daily  enoxaparin Injectable 40 milliGRAM(s) SubCutaneous every 24 hours  pantoprazole  Injectable 40 milliGRAM(s) IV Push daily  polyethylene glycol 3350 17 Gram(s) Oral daily  QUEtiapine 100 milliGRAM(s) Oral at bedtime  risperiDONE   Solution 1 milliGRAM(s) Oral <User Schedule>  risperiDONE   Solution 3 milliGRAM(s) Oral at bedtime  senna Syrup 10 milliLiter(s) Oral daily  sodium chloride 0.9% lock flush 3 milliLiter(s) IV Push every 8 hours    MEDICATIONS  (PRN):  oxyCODONE    Solution 10 milliGRAM(s) Oral every 4 hours PRN Severe Pain (7 - 10)  oxyCODONE    Solution 5 milliGRAM(s) Oral every 4 hours PRN Moderate Pain (4 - 6)

## 2024-06-21 LAB
ANION GAP SERPL CALC-SCNC: 14 MMOL/L — SIGNIFICANT CHANGE UP (ref 7–14)
BUN SERPL-MCNC: 16 MG/DL — SIGNIFICANT CHANGE UP (ref 7–23)
CALCIUM SERPL-MCNC: 8.4 MG/DL — SIGNIFICANT CHANGE UP (ref 8.4–10.5)
CHLORIDE SERPL-SCNC: 108 MMOL/L — HIGH (ref 98–107)
CO2 SERPL-SCNC: 22 MMOL/L — SIGNIFICANT CHANGE UP (ref 22–31)
CREAT SERPL-MCNC: 0.59 MG/DL — SIGNIFICANT CHANGE UP (ref 0.5–1.3)
EGFR: 101 ML/MIN/1.73M2 — SIGNIFICANT CHANGE UP
GLUCOSE SERPL-MCNC: 93 MG/DL — SIGNIFICANT CHANGE UP (ref 70–99)
HCT VFR BLD CALC: 31 % — LOW (ref 34.5–45)
HGB BLD-MCNC: 10.1 G/DL — LOW (ref 11.5–15.5)
MAGNESIUM SERPL-MCNC: 2.1 MG/DL — SIGNIFICANT CHANGE UP (ref 1.6–2.6)
MCHC RBC-ENTMCNC: 29.5 PG — SIGNIFICANT CHANGE UP (ref 27–34)
MCHC RBC-ENTMCNC: 32.6 GM/DL — SIGNIFICANT CHANGE UP (ref 32–36)
MCV RBC AUTO: 90.6 FL — SIGNIFICANT CHANGE UP (ref 80–100)
NRBC # BLD: 0 /100 WBCS — SIGNIFICANT CHANGE UP (ref 0–0)
NRBC # FLD: 0 K/UL — SIGNIFICANT CHANGE UP (ref 0–0)
PHOSPHATE SERPL-MCNC: 3 MG/DL — SIGNIFICANT CHANGE UP (ref 2.5–4.5)
PLATELET # BLD AUTO: 696 K/UL — HIGH (ref 150–400)
POTASSIUM SERPL-MCNC: 3.7 MMOL/L — SIGNIFICANT CHANGE UP (ref 3.5–5.3)
POTASSIUM SERPL-SCNC: 3.7 MMOL/L — SIGNIFICANT CHANGE UP (ref 3.5–5.3)
RBC # BLD: 3.42 M/UL — LOW (ref 3.8–5.2)
RBC # FLD: 15.1 % — HIGH (ref 10.3–14.5)
SODIUM SERPL-SCNC: 144 MMOL/L — SIGNIFICANT CHANGE UP (ref 135–145)
WBC # BLD: 4.12 K/UL — SIGNIFICANT CHANGE UP (ref 3.8–10.5)
WBC # FLD AUTO: 4.12 K/UL — SIGNIFICANT CHANGE UP (ref 3.8–10.5)

## 2024-06-21 PROCEDURE — 99232 SBSQ HOSP IP/OBS MODERATE 35: CPT

## 2024-06-21 RX ORDER — DIPHENHYDRAMINE HCL 12.5MG/5ML
25 ELIXIR ORAL ONCE
Refills: 0 | Status: COMPLETED | OUTPATIENT
Start: 2024-06-21 | End: 2024-06-21

## 2024-06-21 RX ADMIN — AMPICILLIN AND SULBACTAM 200 GRAM(S): 2; 1 INJECTION, POWDER, FOR SOLUTION INTRAMUSCULAR; INTRAVENOUS at 03:36

## 2024-06-21 RX ADMIN — Medication 15 MILLILITER(S): at 09:53

## 2024-06-21 RX ADMIN — OXYCODONE HYDROCHLORIDE 5 MILLIGRAM(S): 100 SOLUTION ORAL at 10:23

## 2024-06-21 RX ADMIN — Medication 975 MILLIGRAM(S): at 05:23

## 2024-06-21 RX ADMIN — Medication 15 MILLILITER(S): at 06:03

## 2024-06-21 RX ADMIN — Medication 100 MILLIGRAM(S): at 21:45

## 2024-06-21 RX ADMIN — PETROLATUM 1 APPLICATION(S): 42 OINTMENT TOPICAL at 11:55

## 2024-06-21 RX ADMIN — Medication 1 APPLICATION(S): at 11:55

## 2024-06-21 RX ADMIN — Medication 975 MILLIGRAM(S): at 19:00

## 2024-06-21 RX ADMIN — OXYCODONE HYDROCHLORIDE 10 MILLIGRAM(S): 100 SOLUTION ORAL at 14:56

## 2024-06-21 RX ADMIN — ACETYLCYSTEINE 4 MILLILITER(S): 200 SOLUTION ORAL; RESPIRATORY (INHALATION) at 16:37

## 2024-06-21 RX ADMIN — Medication 975 MILLIGRAM(S): at 12:25

## 2024-06-21 RX ADMIN — Medication 3 MILLILITER(S): at 06:02

## 2024-06-21 RX ADMIN — ACETYLCYSTEINE 4 MILLILITER(S): 200 SOLUTION ORAL; RESPIRATORY (INHALATION) at 04:26

## 2024-06-21 RX ADMIN — IPRATROPIUM BROMIDE AND ALBUTEROL SULFATE 3 MILLILITER(S): .5; 3 SOLUTION RESPIRATORY (INHALATION) at 04:26

## 2024-06-21 RX ADMIN — ENOXAPARIN SODIUM 40 MILLIGRAM(S): 100 INJECTION SUBCUTANEOUS at 14:53

## 2024-06-21 RX ADMIN — Medication 25 MILLIGRAM(S): at 18:30

## 2024-06-21 RX ADMIN — RISPERIDONE 1 MILLIGRAM(S): 0.5 TABLET ORAL at 06:02

## 2024-06-21 RX ADMIN — OXYCODONE HYDROCHLORIDE 5 MILLIGRAM(S): 100 SOLUTION ORAL at 09:53

## 2024-06-21 RX ADMIN — Medication 975 MILLIGRAM(S): at 00:13

## 2024-06-21 RX ADMIN — AMLODIPINE BESYLATE 10 MILLIGRAM(S): 2.5 TABLET ORAL at 05:23

## 2024-06-21 RX ADMIN — BUPROPION HYDROCHLORIDE 300 MILLIGRAM(S): 150 TABLET, EXTENDED RELEASE ORAL at 06:03

## 2024-06-21 RX ADMIN — Medication 15 MILLILITER(S): at 22:23

## 2024-06-21 RX ADMIN — Medication 15 MILLILITER(S): at 19:26

## 2024-06-21 RX ADMIN — AMPICILLIN AND SULBACTAM 200 GRAM(S): 2; 1 INJECTION, POWDER, FOR SOLUTION INTRAMUSCULAR; INTRAVENOUS at 09:53

## 2024-06-21 RX ADMIN — RISPERIDONE 3 MILLIGRAM(S): 0.5 TABLET ORAL at 21:46

## 2024-06-21 RX ADMIN — Medication 3 MILLILITER(S): at 21:45

## 2024-06-21 RX ADMIN — IPRATROPIUM BROMIDE AND ALBUTEROL SULFATE 3 MILLILITER(S): .5; 3 SOLUTION RESPIRATORY (INHALATION) at 09:22

## 2024-06-21 RX ADMIN — IPRATROPIUM BROMIDE AND ALBUTEROL SULFATE 3 MILLILITER(S): .5; 3 SOLUTION RESPIRATORY (INHALATION) at 22:46

## 2024-06-21 RX ADMIN — IPRATROPIUM BROMIDE AND ALBUTEROL SULFATE 3 MILLILITER(S): .5; 3 SOLUTION RESPIRATORY (INHALATION) at 16:28

## 2024-06-21 RX ADMIN — ACETYLCYSTEINE 4 MILLILITER(S): 200 SOLUTION ORAL; RESPIRATORY (INHALATION) at 22:45

## 2024-06-21 RX ADMIN — Medication 975 MILLIGRAM(S): at 23:42

## 2024-06-21 RX ADMIN — Medication 3 MILLILITER(S): at 13:07

## 2024-06-21 RX ADMIN — BUPROPION HYDROCHLORIDE 150 MILLIGRAM(S): 150 TABLET, EXTENDED RELEASE ORAL at 21:45

## 2024-06-21 RX ADMIN — Medication 975 MILLIGRAM(S): at 11:55

## 2024-06-21 RX ADMIN — ACETYLCYSTEINE 4 MILLILITER(S): 200 SOLUTION ORAL; RESPIRATORY (INHALATION) at 09:22

## 2024-06-21 RX ADMIN — OXYCODONE HYDROCHLORIDE 10 MILLIGRAM(S): 100 SOLUTION ORAL at 15:26

## 2024-06-21 RX ADMIN — Medication 975 MILLIGRAM(S): at 18:30

## 2024-06-21 RX ADMIN — Medication 15 MILLILITER(S): at 14:53

## 2024-06-21 RX ADMIN — Medication 15 MILLILITER(S): at 18:29

## 2024-06-21 RX ADMIN — PANTOPRAZOLE SODIUM 40 MILLIGRAM(S): 40 INJECTION, POWDER, FOR SOLUTION INTRAVENOUS at 11:54

## 2024-06-21 NOTE — CHART NOTE - NSCHARTNOTEFT_GEN_A_CORE
Interventional Radiology Pre-Procedure Note    This is a 64y Female w/ PMH of depression and schizophrenia s/p R partial glossectomy in the setting of SCCA, R SND I-IV, L RFFF, L thigh STSG, tracheostomy (6/6/24). Pts post-operative course c/b venous congestion of flap w/ RTOR on 6/11/24 for free flap salvage and revision of venous anastamosis, RTOR on 6/14/24 for debridement of necrotic radial forearm flap and replacement with right ALT flap, with 7.5 trach exchanged to 7.0 cuffed trach.     NPO:  NG tube feeds   Antibiotics: n/a   Anticoagulation: prophylactic lovenox   Adverse events to anesthesia: none   Objection to blood products: none     PAST MEDICAL & SURGICAL HISTORY:  Tongue cancer      Schizophrenia      Anxiety and depression      Other diseases of tongue      H/O hand surgery      Cancer determined by biopsy of tongue      S/P cataract extraction      H/O colonoscopy           Vitals:Vital Signs Last 24 Hrs  T(C): 37.2 (21 Jun 2024 14:00), Max: 37.3 (21 Jun 2024 10:18)  T(F): 98.9 (21 Jun 2024 14:00), Max: 99.2 (21 Jun 2024 10:18)  HR: 90 (21 Jun 2024 14:00) (78 - 114)  BP: 124/66 (21 Jun 2024 14:00) (109/65 - 124/66)  BP(mean): --  RR: 18 (21 Jun 2024 14:00) (17 - 18)  SpO2: 97% (21 Jun 2024 14:00) (96% - 100%)    Parameters below as of 21 Jun 2024 14:00  Patient On (Oxygen Delivery Method): tracheostomy collar        Allergies: Allergies    penicillins (Unknown)    Intolerances        Physical Exam:     Labs:                         10.1   4.12  )-----------( 696      ( 21 Jun 2024 05:40 )             31.0     06-21    144  |  108<H>  |  16  ----------------------------<  93  3.7   |  22  |  0.59    Ca    8.4      21 Jun 2024 05:40  Phos  3.0     06-21  Mg     2.10     06-21    GEN: NAD  NEURO: alert & oriented x 4/4  HEENT: neck incision clean, dry, and intact. strong Cook, flap warm and well perfused, good color  RLE: thigh soft, SUZANNE drain SSO. Ace cdi  LUE: ace c/d/i      Informed consent obtained. All questions and concerns have been addressed at this time.

## 2024-06-21 NOTE — PROGRESS NOTE ADULT - SUBJECTIVE AND OBJECTIVE BOX
Follow Up:      Interval History/ROS:   indicates 'so- so"   has a rash    Allergies  penicillins (Unknown)        ANTIMICROBIALS:  ampicillin/sulbactam  IVPB 3 every 6 hours      OTHER MEDS:  acetaminophen   Oral Liquid .. 975 milliGRAM(s) Oral every 6 hours  acetylcysteine 10%  Inhalation 4 milliLiter(s) Inhalation every 6 hours  albuterol/ipratropium for Nebulization 3 milliLiter(s) Nebulizer every 6 hours  amLODIPine   Tablet 10 milliGRAM(s) Oral daily  buPROPion . 150 milliGRAM(s) Oral at bedtime  buPROPion . 300 milliGRAM(s) Oral <User Schedule>  chlorhexidine 0.12% Liquid 15 milliLiter(s) Swish and Spit <User Schedule>  chlorhexidine 2% Cloths 1 Application(s) Topical daily  enoxaparin Injectable 40 milliGRAM(s) SubCutaneous every 24 hours  oxyCODONE    Solution 10 milliGRAM(s) Oral every 4 hours PRN  oxyCODONE    Solution 5 milliGRAM(s) Oral every 4 hours PRN  pantoprazole  Injectable 40 milliGRAM(s) IV Push daily  polyethylene glycol 3350 17 Gram(s) Oral daily  QUEtiapine 100 milliGRAM(s) Oral at bedtime  risperiDONE   Solution 3 milliGRAM(s) Oral at bedtime  risperiDONE   Solution 1 milliGRAM(s) Oral <User Schedule>  senna Syrup 10 milliLiter(s) Oral daily  sodium chloride 0.9% lock flush 3 milliLiter(s) IV Push every 8 hours    Vital Signs Last 24 Hrs  T(F): 98.9 (06-21-24 @ 14:00), Max: 99.2 (06-21-24 @ 10:18)  HR: 90 (06-21-24 @ 14:00)  BP: 124/66 (06-21-24 @ 14:00)  RR: 18 (06-21-24 @ 14:00)  SpO2: 97% (06-21-24 @ 14:00) (96% - 100%)      PHYSICAL EXAM:  Constitutional: Non toxic uncomfortable   Eyes: No icterus.  NGT  Neck: trach, neck wound closed  RS: no respiratory distress  CVS: s1s2  Abdomen: Soft.   : no paez  Extremities: dressing left forearm, right  leg   rash mac/ pap back trunk                            10.1   4.12  )-----------( 696      ( 21 Jun 2024 05:40 )             31.0 06-21    144  |  108  |  16  ----------------------------<  93  3.7   |  22  |  0.59  Ca    8.4      21 Jun 2024 05:40Phos  3.0     06-21Mg     2.10     06-21        Urinalysis Basic - ( 17 Jun 2024 03:44 )    Color: x / Appearance: x / SG: x / pH: x  Gluc: 106 mg/dL / Ketone: x  / Bili: x / Urobili: x   Blood: x / Protein: x / Nitrite: x   Leuk Esterase: x / RBC: x / WBC x   Sq Epi: x / Non Sq Epi: x / Bacteria: x        MICROBIOLOGY:  v  .Smear RIGHT NECK CLT  06-14-24   Culture is being performed. Fungal cultures are held for 4 weeks.  --  Escherichia coli    Culture - Surgical Swab (06.14.24 @ 12:07)   - Ertapenem: S <=0.5  - Gentamicin: S <=2  - Imipenem: S <=1  - Levofloxacin: S <=0.5  - Meropenem: S <=1  - Piperacillin/Tazobactam: S <=8  - Tobramycin: S <=2  - Trimethoprim/Sulfamethoxazole: S <=0.5/9.5  - Amoxicillin/Clavulanic Acid: S <=8/4  - Ampicillin: S <=8 These ampicillin results predict results for amoxicillin  - Ampicillin/Sulbactam: S <=4/2  - Aztreonam: S <=4  - Cefazolin: S <=2  - Cefepime: S <=2  - Cefoxitin: S <=8  - Ceftriaxone: S <=1  - Ciprofloxacin: S <=0.25  Specimen Source: .Surgical Swab RIGHT NECK CLT  Culture Results:   Growth in fluid media only Escherichia coli  Organism Identification: Escherichia coli  Organism: Escherichia coli  Method Type: MINESH      .Blood Blood-Peripheral  06-12-24   No growth at 5 days  --  --      .Blood Blood-Peripheral  06-12-24   No growth at 5 days  --  --      .Surgical Swab RIGHT NECK WOUND  06-11-24   Few Escherichia coli  --  Escherichia coli      .Blood Blood-Peripheral  06-08-24   No growth at 5 days  --  --      .Blood Blood-Venous  06-08-24   No growth at 5 days  --    Moderate polymorphonuclear leukocytes per low power field  No Squamous epithelial cells per low power field  Rare Gram Positive Rods seen per oil power field  Rare Gram Negative Rods seen per oil power field  Rare Gram positive cocci in pairs seen per oil power field          RADIOLOGY:

## 2024-06-21 NOTE — PROVIDER CONTACT NOTE (OTHER) - ASSESSMENT
pt had 6+ loose/liquid bowel movements during shift. pt continues to have loose BMs throughout shift.

## 2024-06-21 NOTE — PROGRESS NOTE ADULT - SUBJECTIVE AND OBJECTIVE BOX
Dr. Shannan Vargas  Pager [phone]    PROGRESS NOTE:     Patient is a 64y old  Female who presents with a chief complaint of s/p surgery (20 Jun 2024 11:07)      SUBJECTIVE / OVERNIGHT EVENTS: denies chest pain or sob   ADDITIONAL REVIEW OF SYSTEMS: afebrile     MEDICATIONS  (STANDING):  acetaminophen   Oral Liquid .. 975 milliGRAM(s) Oral every 6 hours  acetylcysteine 10%  Inhalation 4 milliLiter(s) Inhalation every 6 hours  albuterol/ipratropium for Nebulization 3 milliLiter(s) Nebulizer every 6 hours  amLODIPine   Tablet 10 milliGRAM(s) Oral daily  ampicillin/sulbactam  IVPB 3 Gram(s) IV Intermittent every 6 hours  AQUAPHOR (petrolatum Ointment) 1 Application(s) Topical daily  buPROPion . 150 milliGRAM(s) Oral at bedtime  buPROPion . 300 milliGRAM(s) Oral <User Schedule>  chlorhexidine 0.12% Liquid 15 milliLiter(s) Swish and Spit <User Schedule>  chlorhexidine 2% Cloths 1 Application(s) Topical daily  enoxaparin Injectable 40 milliGRAM(s) SubCutaneous every 24 hours  pantoprazole  Injectable 40 milliGRAM(s) IV Push daily  polyethylene glycol 3350 17 Gram(s) Oral daily  QUEtiapine 100 milliGRAM(s) Oral at bedtime  risperiDONE   Solution 1 milliGRAM(s) Oral <User Schedule>  risperiDONE   Solution 3 milliGRAM(s) Oral at bedtime  senna Syrup 10 milliLiter(s) Oral daily  sodium chloride 0.9% lock flush 3 milliLiter(s) IV Push every 8 hours    MEDICATIONS  (PRN):  oxyCODONE    Solution 10 milliGRAM(s) Oral every 4 hours PRN Severe Pain (7 - 10)  oxyCODONE    Solution 5 milliGRAM(s) Oral every 4 hours PRN Moderate Pain (4 - 6)      CAPILLARY BLOOD GLUCOSE        I&O's Summary    20 Jun 2024 07:01  -  21 Jun 2024 07:00  --------------------------------------------------------  IN: 1670 mL / OUT: 1109 mL / NET: 561 mL        PHYSICAL EXAM:  Vital Signs Last 24 Hrs  T(C): 37.3 (21 Jun 2024 10:18), Max: 37.3 (21 Jun 2024 10:18)  T(F): 99.2 (21 Jun 2024 10:18), Max: 99.2 (21 Jun 2024 10:18)  HR: 105 (21 Jun 2024 10:18) (78 - 114)  BP: 114/68 (21 Jun 2024 10:18) (109/65 - 124/75)  BP(mean): --  RR: 18 (21 Jun 2024 10:18) (17 - 18)  SpO2: 100% (21 Jun 2024 10:18) (96% - 100%)    Parameters below as of 21 Jun 2024 10:18  Patient On (Oxygen Delivery Method): tracheostomy collar      CONSTITUTIONAL: nad, ngt in placement  Neck: neck incision wound with dressing, and yves, trach collar , jorgito removed  RESPIRATORY: coarse breath sounds/rhonchi , nonlabored breathing   CARDIOVASCULAR: Regular rate and rhythm, normal S1 and S2, no murmur/rub/gallop; No lower extremity edema; Peripheral pulses are 2+ bilaterally  ABDOMEN: Nontender to palpation, normoactive bowel sounds, no rebound/guarding;   MUSCULOSKELETAL: no clubbing or cyanosis of digits; no joint swelling or tenderness to palpation  left thigh donor site dressing   PSYCH: A+O to person, place; affect appropriate  LABS:                        10.1   4.12  )-----------( 696      ( 21 Jun 2024 05:40 )             31.0     06-21    144  |  108<H>  |  16  ----------------------------<  93  3.7   |  22  |  0.59    Ca    8.4      21 Jun 2024 05:40  Phos  3.0     06-21  Mg     2.10     06-21            Urinalysis Basic - ( 21 Jun 2024 05:40 )    Color: x / Appearance: x / SG: x / pH: x  Gluc: 93 mg/dL / Ketone: x  / Bili: x / Urobili: x   Blood: x / Protein: x / Nitrite: x   Leuk Esterase: x / RBC: x / WBC x   Sq Epi: x / Non Sq Epi: x / Bacteria: x          RADIOLOGY & ADDITIONAL TESTS:  Results Reviewed:   Imaging Personally Reviewed:  Electrocardiogram Personally Reviewed:    COORDINATION OF CARE:  Care Discussed with Consultants/Other Providers [Y/N]:  Prior or Outpatient Records Reviewed [Y/N]:

## 2024-06-21 NOTE — PROGRESS NOTE ADULT - ASSESSMENT
65 yo woman with psych disorder, tongue cancer, s/p glossectomy partial 6/6 with flap reconstruction, trach    flap thrombosed and RTOR 6/11 and again 6/14 for revision      OR wound cultures from both 6/11 and 6/14 growing E coli  which is pansensitive     switched to unasyn 6/11     today has diffuse rash- likely drug rash  - suspect unasyn     suggest:    d/c unasyn     monitor off antibiotics for next day or so   has already completed 8 days post 2nd reconstruction   may complete 6 more days of flagyl and cipro early next week with QT monitoring     ID service available over weekend

## 2024-06-21 NOTE — PROVIDER CONTACT NOTE (OTHER) - ACTION/TREATMENT ORDERED:
[facility] resident made aware of patients bowel movements. provider states she will discontinue laxative medications and follow up.

## 2024-06-21 NOTE — PROGRESS NOTE ADULT - PROBLEM SELECTOR PLAN 2
-R partial glossectomy in the setting of SCCA, R SND I-IV, L RFFF, L thigh STSG, tracheostomy (6/6/24). Pts post-operative course c/b venous congestion of flap w/ RTOR on 6/11 for free flap salvage and revision of venous anastomosis. Flap now infected with E Coli also without flow. RTOR 6/14 for exploration of tongue free flap, muscle flap revision with right thigh flap, 7.5 trach exchanged to 7.0 cuffed trach.  -OR wound cx (6/11, 6/14) grew E.coli, c/w Unsyn 3g q6h (6/11--), ID following   -management per ENT/plastic sx  - NPO on TF via NGT, plan for IR PEG per primary team

## 2024-06-21 NOTE — CONSULT NOTE ADULT - SUBJECTIVE AND OBJECTIVE BOX
Interventional Radiology    Evaluate for Procedure:  Gastrostomy tube placement    HPI: 64F w/ PMH of depression and schizophrenia s/p R partial glossectomy in the setting of SCCA, R SND I-IV, L RFFF, L thigh STSG, tracheostomy (6/6/24). Pts post-operative course c/b venous congestion of flap w/ RTOR on 6/11/24 for free flap salvage and revision of venous anastamosis, RTOR on 6/14/24 for debridement of necrotic radial forearm flap and replacement with right ALT flap, completing antibiotic coverage today.  IR consulted for placement of gastrostromy tube in the setting or head and neck cancer and poor oral intake.     Allergies: penicillins (Unknown)    Medications (Abx/Cardiac/Anticoagulation/Blood Products)    amLODIPine   Tablet: 10 milliGRAM(s) Oral (06-21 @ 05:23)  ampicillin/sulbactam  IVPB: 200 mL/Hr IV Intermittent (06-21 @ 09:53)  enoxaparin Injectable: 40 milliGRAM(s) SubCutaneous (06-21 @ 14:53)    Data:    T(C): 37.2  HR: 90  BP: 124/66  RR: 18  SpO2: 97%    -WBC 4.12 / HgB 10.1 / Hct 31.0 / Plt 696  -Na 144 / Cl 108 / BUN 16 / Glucose 93  -K 3.7 / CO2 22 / Cr 0.59  -ALT -- / Alk Phos -- / T.Bili --  -INR 1.02 / PTT 27.7    Radiology:   ACC: [id number] EXAM:  CT CHEST IC   ORDERED BY: MINH FITZGERALD     ACC: [id number] EXAM:  CT ABDOMEN AND PELVIS IC   ORDERED BY: MINH FITZGERALD     PROCEDURE DATE:  06/20/2024          INTERPRETATION:  CLINICAL INFORMATION: PEG tube placement    COMPARISON: CTA chest 6/10/2024.    CONTRAST/COMPLICATIONS:  IV Contrast: Omnipaque 350   90 cc administered   10 cc discarded  Oral Contrast: NONE  Complications: None reported at time of study completion    PROCEDURE:  CT of the Chest, Abdomen and Pelvis was performed.  Sagittal and coronal reformats were performed.    FINDINGS:  CHEST:  LUNGS AND LARGE AIRWAYS: Tracheostomy tube terminates above coby.   Opacification of the right middle and right lower lobe bronchus. There is   complete atelectasis of the right middle and near complete atelectasis of   the right lower lobe. Stable right upper lobe groundglass nodule   measuring 1.3 cm (2/23).  Improved aeration of the left lower lobe. New peripheral patchy   groundglass opacities in the left upper and left lower lobe.  PLEURA: Interval decrease of bilateral pleural effusions. Residual small   right pleural effusion.  VESSELS: Within normal limits.  HEART: Heart size is normal. No pericardial effusion.  MEDIASTINUM AND DEREK: Stable prominent axillary and subpectoral lymph   nodes.  CHEST WALL AND LOWER NECK: Postsurgical changes of the right chin/lower   neck with skin staples and subcutaneous emphysema.    ABDOMEN AND PELVIS:  LIVER: Within normal limits.  BILE DUCTS: Normal caliber.  GALLBLADDER: Within normal limits.  SPLEEN: Within normal limits.  PANCREAS: Within normal limits.  ADRENALS: Within normal limits.  KIDNEYS/URETERS: Within normal limits.    BLADDER: Within normal limits.  REPRODUCTIVE ORGANS: Uterus and adnexa within normal limits.    BOWEL: No bowel obstruction. Mild rectal wall thickening with perirectal   fat stranding. Appendix is not visualized. No evidence of inflammation in   the pericecal region.  Enteric tube seen within the stomach with distal tip terminating in the   fundus. Antrum of the stomach is abutting the anterior wall of the   stomach without intervening colon or abdominal organ.  PERITONEUM/RETROPERITONEUM: Within normal limits.  VESSELS: Within normal limits.  LYMPH NODES: No lymphadenopathy.  ABDOMINAL WALL: Tiny fat-containing umbilical hernia. Curvilinear density   anterior to the skin surface in the region of right groin and courses   within the subcutaneous soft tissue caudally (2/311). Scattered punctate   metallic densities noted within the partially visualized right quadriceps   muscle.  BONES: Degenerative changes.    IMPRESSION:  CHEST:  Interval decrease of bilateral pleural effusions.  Improved aeration of the left lower lobe. Stable complete atelectasis of   the right middle and near complete atelectasis of the right lower lobe.  New patchy peripheral left upper and left lower lobe groundglass   opacities, nonspecific, may represent secretions.    ABDOMEN AND PELVIS:  Mild proctitis.  Enteric tube seen within the stomach with distal tip terminating in the   fundus. Antrum of the stomach is abutting the anterior wall of the   stomach without intervening colon or abdominal organ.    --- End of Report ---          FAISAL KEATING DO; Resident Radiologist  This document has been electronically signed.  LEVY SALDANA MD; Attending Radiologist      Assessment/Plan:   64F w/ PMH of depression and schizophrenia s/p R partial glossectomy in the setting of SCCA, R SND I-IV, L RFFF, L thigh STSG, tracheostomy (6/6/24). Pts post-operative course c/b venous congestion of flap w/ RTOR on 6/11/24 for free flap salvage and revision of venous anastamosis, RTOR on 6/14/24 for debridement of necrotic radial forearm flap and replacement with right ALT flap, completing antibiotic coverage today.  IR consulted for placement of gastrostromy tube in the setting or head and neck cancer and poor oral intake.     Please optimize pulmonary atelectasis.      - case reviewed and approved for __6/25/2024__  - please place IR procedure order under ___[place]___  - STAT labs in AM (cbc,coags, bmp, T&S)  - hold prophylaxis lovenox x_12_hrs, resume 12-24h later  - NPO on __6/24__ at 11pm  - d/w primary team Minh Fitzgerald

## 2024-06-21 NOTE — PROGRESS NOTE ADULT - ASSESSMENT
Assessment	  64F w/ PMH of depression and schizophrenia s/p R partial glossectomy in the setting of SCCA, R SND I-IV, L RFFF, L thigh STSG, tracheostomy (6/6/24). Pts post-operative course c/b venous congestion of flap w/ RTOR on 6/11/24 for free flap salvage and revision of venous anastamosis, RTOR on 6/14/24 for debridement of necrotic radial forearm flap and replacement with right ALT flap, with 7.5 trach exchanged to 7.0 cuffed trach. Pt stepped down to the floor (6/17/24), Pt is progressing well.     Plan:  - ERAS Day 7  - IR consult for PEG tube  - cont TF  - IV unasyn (day 7/7). D/c tomorrow       [facility]FS  #[id number]  Available on Teams

## 2024-06-21 NOTE — PROGRESS NOTE ADULT - SUBJECTIVE AND OBJECTIVE BOX
LELA LOWERY  MRN-[id number]  [facility]      64F w/ PMH of depression and schizophrenia s/p R partial glossectomy in the setting of SCCA, R SND I-IV, L RFFF, L thigh STSG, tracheostomy (6/6/24). Pts post-operative course c/b venous congestion of flap w/ RTOR on 6/11/24 for free flap salvage and revision of venous anastamosis, RTOR on 6/14/24 for debridement of necrotic radial forearm flap and replacement with right ALT flap, with 7.5 trach exchanged to 7.0 cuffed trach.       Interval Events: NAEON. aVSS         PHYSICAL EXAM:  GEN: NAD  NEURO: alert & oriented x 4/4  HEENT: neck incision clean, dry, and intact. strong Cook, flap warm and well perfused, good color  RLE: thigh soft, SUZANNE drain SSO. Ace cdi  LUE: ace c/d/i          Vitals:    Vital Signs Last 24 Hrs  T(C): 36.9 (20 Jun 2024 05:03), Max: 37.3 (19 Jun 2024 09:18)  T(F): 98.5 (20 Jun 2024 05:03), Max: 99.2 (19 Jun 2024 09:18)  HR: 105 (20 Jun 2024 05:03) (92 - 115)  BP: 127/88 (20 Jun 2024 05:03) (120/77 - 141/89)  BP(mean): --  RR: 18 (20 Jun 2024 05:03) (18 - 20)  SpO2: 100% (20 Jun 2024 05:03) (97% - 100%)    Parameters below as of 20 Jun 2024 05:03  Patient On (Oxygen Delivery Method): tracheostomy collar        I&O's Detail    19 Jun 2024 07:01  -  20 Jun 2024 07:00  --------------------------------------------------------  IN:    Enteral Tube Flush: 300 mL    IV PiggyBack: 400 mL    Jevity 1.5: 1020 mL  Total IN: 1720 mL    OUT:    Bulb (mL): 30 mL    Voided (mL): 1450 mL  Total OUT: 1480 mL    Total NET: 240 mL            Medications:    MEDICATIONS  (STANDING):  acetaminophen   Oral Liquid .. 975 milliGRAM(s) Oral every 6 hours  acetylcysteine 10%  Inhalation 4 milliLiter(s) Inhalation every 6 hours  albuterol/ipratropium for Nebulization 3 milliLiter(s) Nebulizer every 6 hours  amLODIPine   Tablet 10 milliGRAM(s) Oral daily  ampicillin/sulbactam  IVPB 3 Gram(s) IV Intermittent every 6 hours  AQUAPHOR (petrolatum Ointment) 1 Application(s) Topical daily  buPROPion . 300 milliGRAM(s) Oral <User Schedule>  buPROPion . 150 milliGRAM(s) Oral at bedtime  chlorhexidine 0.12% Liquid 15 milliLiter(s) Swish and Spit <User Schedule>  chlorhexidine 2% Cloths 1 Application(s) Topical daily  enoxaparin Injectable 40 milliGRAM(s) SubCutaneous every 24 hours  pantoprazole  Injectable 40 milliGRAM(s) IV Push daily  polyethylene glycol 3350 17 Gram(s) Oral daily  QUEtiapine 100 milliGRAM(s) Oral at bedtime  risperiDONE   Solution 3 milliGRAM(s) Oral at bedtime  risperiDONE   Solution 1 milliGRAM(s) Oral <User Schedule>  senna Syrup 10 milliLiter(s) Oral daily  sodium chloride 0.9% lock flush 3 milliLiter(s) IV Push every 8 hours    MEDICATIONS  (PRN):  oxyCODONE    Solution 5 milliGRAM(s) Oral every 4 hours PRN Moderate Pain (4 - 6)  oxyCODONE    Solution 10 milliGRAM(s) Oral every 4 hours PRN Severe Pain (7 - 10)        Labs:                          9.4    4.90  )-----------( 668      ( 19 Jun 2024 06:30 )             29.0       06-19    141  |  106  |  13  ----------------------------<  96  3.7   |  23  |  0.54    Ca    8.7      19 Jun 2024 06:30  Phos  3.4     06-19  Mg     2.10     06-19        BMI: BMI (kg/m2): 22.3 (06-14-24 @ 06:41)  HbA1c: A1C with Estimated Average Glucose Result: 4.9 % (06-06-24 @ 18:30)    Glucose: POCT Blood Glucose.: 91 mg/dL (06-09-24 @ 08:35)    BP: 127/88 (06-20-24 @ 05:03) (102/69 - 147/89)Vital Signs Last 24 Hrs  T(C): 36.9 (06-20-24 @ 05:03), Max: 37.3 (06-19-24 @ 09:18)  T(F): 98.5 (06-20-24 @ 05:03), Max: 99.2 (06-19-24 @ 09:18)  HR: 105 (06-20-24 @ 05:03) (92 - 115)  BP: 127/88 (06-20-24 @ 05:03) (120/77 - 141/89)  BP(mean): --  RR: 18 (06-20-24 @ 05:03) (18 - 20)  SpO2: 100% (06-20-24 @ 05:03) (97% - 100%)      Lipid Panel:         CAPILLARY BLOOD GLUCOSE              Urinalysis Basic - ( 19 Jun 2024 06:30 )    Color: x / Appearance: x / SG: x / pH: x  Gluc: 96 mg/dL / Ketone: x  / Bili: x / Urobili: x   Blood: x / Protein: x / Nitrite: x   Leuk Esterase: x / RBC: x / WBC x   Sq Epi: x / Non Sq Epi: x / Bacteria: x

## 2024-06-21 NOTE — PROGRESS NOTE ADULT - ASSESSMENT
LELA LOWERY is a 64yFemale s/p right alt for revision hemiglossal recon 6/14    - monitor neck for ss infection  - dc'ed cook and jorgito drain  - Aquaphor to left thigh donor site daily  - LUE dressings changed 6/21  - NPO with TF  - Continue peridex  - Continue unasyn    MAGDALENA PRS: [id number]

## 2024-06-21 NOTE — PROGRESS NOTE ADULT - SUBJECTIVE AND OBJECTIVE BOX
Plastic Surgery Progress Note (pg [facility]: 12050, [facility]: [phone])    SUBJECTIVE  NAEO. Tachy to 114 overnight. Pt comfortable in bed. Pain well controlled, no complaints.    OBJECTIVE  ___________________________________________________  VITAL SIGNS / I&O's   Vital Signs Last 24 Hrs  T(C): 36.9 (21 Jun 2024 05:23), Max: 37.2 (21 Jun 2024 02:00)  T(F): 98.5 (21 Jun 2024 05:23), Max: 99 (21 Jun 2024 02:00)  HR: 93 (21 Jun 2024 05:23) (78 - 114)  BP: 116/72 (21 Jun 2024 05:23) (109/65 - 150/92)  BP(mean): --  RR: 18 (21 Jun 2024 05:23) (17 - 18)  SpO2: 100% (21 Jun 2024 05:23) (96% - 100%)    Parameters below as of 21 Jun 2024 05:23  Patient On (Oxygen Delivery Method): tracheostomy collar          20 Jun 2024 07:01  -  21 Jun 2024 07:00  --------------------------------------------------------  IN:    Enteral Tube Flush: 250 mL    IV PiggyBack: 400 mL    Jevity 1.5: 1020 mL  Total IN: 1670 mL    OUT:    Bulb (mL): 9 mL    Voided (mL): 1100 mL  Total OUT: 1109 mL    Total NET: 561 mL        ___________________________________________________  PHYSICAL EXAM    -- CONSTITUTIONAL: NAD, lying in bed  -- NEURO: Awake, alert  HEENT: neck soft no palpale fluid collection. Flap soft, warm, good color. Cook signal strong; penrose with minimal draiange - removed this AM  RLE: thigh soft, SUZANNE drain SSO. Ace cdi  LUE: ace c/d/i    ___________________________________________________  LABS                        10.1   4.12  )-----------( 696      ( 21 Jun 2024 05:40 )             31.0     21 Jun 2024 05:40    144    |  108    |  16     ----------------------------<  93     3.7     |  22     |  0.59     Ca    8.4        21 Jun 2024 05:40  Phos  3.0       21 Jun 2024 05:40  Mg     2.10      21 Jun 2024 05:40        CAPILLARY BLOOD GLUCOSE            Urinalysis Basic - ( 21 Jun 2024 05:40 )    Color: x / Appearance: x / SG: x / pH: x  Gluc: 93 mg/dL / Ketone: x  / Bili: x / Urobili: x   Blood: x / Protein: x / Nitrite: x   Leuk Esterase: x / RBC: x / WBC x   Sq Epi: x / Non Sq Epi: x / Bacteria: x      ___________________________________________________  MICRO  Recent Cultures:  Specimen Source: .Smear RIGHT NECK CLT, 06-14 @ 12:07; Results   Culture is being performed. Fungal cultures are held for 4 weeks.; Gram Stain:   Numerous polymorphonuclear leukocytes per low power field  No organisms seen per oil power field; Organism: Escherichia coli<!>    ___________________________________________________  MEDICATIONS  (STANDING):  acetaminophen   Oral Liquid .. 975 milliGRAM(s) Oral every 6 hours  acetylcysteine 10%  Inhalation 4 milliLiter(s) Inhalation every 6 hours  albuterol/ipratropium for Nebulization 3 milliLiter(s) Nebulizer every 6 hours  amLODIPine   Tablet 10 milliGRAM(s) Oral daily  ampicillin/sulbactam  IVPB 3 Gram(s) IV Intermittent every 6 hours  AQUAPHOR (petrolatum Ointment) 1 Application(s) Topical daily  buPROPion . 150 milliGRAM(s) Oral at bedtime  buPROPion . 300 milliGRAM(s) Oral <User Schedule>  chlorhexidine 0.12% Liquid 15 milliLiter(s) Swish and Spit <User Schedule>  chlorhexidine 2% Cloths 1 Application(s) Topical daily  enoxaparin Injectable 40 milliGRAM(s) SubCutaneous every 24 hours  pantoprazole  Injectable 40 milliGRAM(s) IV Push daily  polyethylene glycol 3350 17 Gram(s) Oral daily  QUEtiapine 100 milliGRAM(s) Oral at bedtime  risperiDONE   Solution 1 milliGRAM(s) Oral <User Schedule>  risperiDONE   Solution 3 milliGRAM(s) Oral at bedtime  senna Syrup 10 milliLiter(s) Oral daily  sodium chloride 0.9% lock flush 3 milliLiter(s) IV Push every 8 hours    MEDICATIONS  (PRN):  oxyCODONE    Solution 5 milliGRAM(s) Oral every 4 hours PRN Moderate Pain (4 - 6)  oxyCODONE    Solution 10 milliGRAM(s) Oral every 4 hours PRN Severe Pain (7 - 10)

## 2024-06-21 NOTE — PROVIDER CONTACT NOTE (OTHER) - ACTION/TREATMENT ORDERED:
OMFS resident made aware of patients rash/possb allergic reaction. provider states she will come bedside to assess.

## 2024-06-22 LAB
ANION GAP SERPL CALC-SCNC: 11 MMOL/L — SIGNIFICANT CHANGE UP (ref 7–14)
BUN SERPL-MCNC: 18 MG/DL — SIGNIFICANT CHANGE UP (ref 7–23)
CALCIUM SERPL-MCNC: 8 MG/DL — LOW (ref 8.4–10.5)
CHLORIDE SERPL-SCNC: 104 MMOL/L — SIGNIFICANT CHANGE UP (ref 98–107)
CO2 SERPL-SCNC: 23 MMOL/L — SIGNIFICANT CHANGE UP (ref 22–31)
CREAT SERPL-MCNC: 0.5 MG/DL — SIGNIFICANT CHANGE UP (ref 0.5–1.3)
EGFR: 105 ML/MIN/1.73M2 — SIGNIFICANT CHANGE UP
GLUCOSE SERPL-MCNC: 93 MG/DL — SIGNIFICANT CHANGE UP (ref 70–99)
HCT VFR BLD CALC: 29.7 % — LOW (ref 34.5–45)
HGB BLD-MCNC: 10.1 G/DL — LOW (ref 11.5–15.5)
MAGNESIUM SERPL-MCNC: 2 MG/DL — SIGNIFICANT CHANGE UP (ref 1.6–2.6)
MCHC RBC-ENTMCNC: 30.5 PG — SIGNIFICANT CHANGE UP (ref 27–34)
MCHC RBC-ENTMCNC: 34 GM/DL — SIGNIFICANT CHANGE UP (ref 32–36)
MCV RBC AUTO: 89.7 FL — SIGNIFICANT CHANGE UP (ref 80–100)
NRBC # BLD: 0 /100 WBCS — SIGNIFICANT CHANGE UP (ref 0–0)
NRBC # FLD: 0 K/UL — SIGNIFICANT CHANGE UP (ref 0–0)
PHOSPHATE SERPL-MCNC: 3.1 MG/DL — SIGNIFICANT CHANGE UP (ref 2.5–4.5)
PLATELET # BLD AUTO: 658 K/UL — HIGH (ref 150–400)
POTASSIUM SERPL-MCNC: 3.7 MMOL/L — SIGNIFICANT CHANGE UP (ref 3.5–5.3)
POTASSIUM SERPL-SCNC: 3.7 MMOL/L — SIGNIFICANT CHANGE UP (ref 3.5–5.3)
RBC # BLD: 3.31 M/UL — LOW (ref 3.8–5.2)
RBC # FLD: 15.2 % — HIGH (ref 10.3–14.5)
SODIUM SERPL-SCNC: 138 MMOL/L — SIGNIFICANT CHANGE UP (ref 135–145)
WBC # BLD: 4.62 K/UL — SIGNIFICANT CHANGE UP (ref 3.8–10.5)
WBC # FLD AUTO: 4.62 K/UL — SIGNIFICANT CHANGE UP (ref 3.8–10.5)

## 2024-06-22 RX ORDER — DIPHENHYDRAMINE HCL 12.5MG/5ML
25 ELIXIR ORAL ONCE
Refills: 0 | Status: DISCONTINUED | OUTPATIENT
Start: 2024-06-22 | End: 2024-06-22

## 2024-06-22 RX ORDER — DIPHENHYDRAMINE HCL 12.5MG/5ML
25 ELIXIR ORAL ONCE
Refills: 0 | Status: COMPLETED | OUTPATIENT
Start: 2024-06-22 | End: 2024-06-22

## 2024-06-22 RX ADMIN — Medication 3 MILLILITER(S): at 21:57

## 2024-06-22 RX ADMIN — Medication 15 MILLILITER(S): at 12:23

## 2024-06-22 RX ADMIN — RISPERIDONE 1 MILLIGRAM(S): 0.5 TABLET ORAL at 06:53

## 2024-06-22 RX ADMIN — OXYCODONE HYDROCHLORIDE 10 MILLIGRAM(S): 100 SOLUTION ORAL at 16:52

## 2024-06-22 RX ADMIN — ENOXAPARIN SODIUM 40 MILLIGRAM(S): 100 INJECTION SUBCUTANEOUS at 14:34

## 2024-06-22 RX ADMIN — ACETYLCYSTEINE 4 MILLILITER(S): 200 SOLUTION ORAL; RESPIRATORY (INHALATION) at 04:56

## 2024-06-22 RX ADMIN — Medication 1 APPLICATION(S): at 11:48

## 2024-06-22 RX ADMIN — PANTOPRAZOLE SODIUM 40 MILLIGRAM(S): 40 INJECTION, POWDER, FOR SOLUTION INTRAVENOUS at 11:47

## 2024-06-22 RX ADMIN — BUPROPION HYDROCHLORIDE 300 MILLIGRAM(S): 150 TABLET, EXTENDED RELEASE ORAL at 06:52

## 2024-06-22 RX ADMIN — IPRATROPIUM BROMIDE AND ALBUTEROL SULFATE 3 MILLILITER(S): .5; 3 SOLUTION RESPIRATORY (INHALATION) at 04:56

## 2024-06-22 RX ADMIN — Medication 15 MILLILITER(S): at 19:12

## 2024-06-22 RX ADMIN — Medication 25 MILLIGRAM(S): at 18:21

## 2024-06-22 RX ADMIN — Medication 975 MILLIGRAM(S): at 12:17

## 2024-06-22 RX ADMIN — OXYCODONE HYDROCHLORIDE 10 MILLIGRAM(S): 100 SOLUTION ORAL at 17:22

## 2024-06-22 RX ADMIN — Medication 975 MILLIGRAM(S): at 05:09

## 2024-06-22 RX ADMIN — RISPERIDONE 3 MILLIGRAM(S): 0.5 TABLET ORAL at 21:57

## 2024-06-22 RX ADMIN — AMLODIPINE BESYLATE 10 MILLIGRAM(S): 2.5 TABLET ORAL at 05:09

## 2024-06-22 RX ADMIN — Medication 100 MILLIGRAM(S): at 21:57

## 2024-06-22 RX ADMIN — BUPROPION HYDROCHLORIDE 150 MILLIGRAM(S): 150 TABLET, EXTENDED RELEASE ORAL at 21:57

## 2024-06-22 RX ADMIN — Medication 975 MILLIGRAM(S): at 23:47

## 2024-06-22 RX ADMIN — Medication 975 MILLIGRAM(S): at 11:47

## 2024-06-22 RX ADMIN — Medication 3 MILLILITER(S): at 06:53

## 2024-06-22 RX ADMIN — IPRATROPIUM BROMIDE AND ALBUTEROL SULFATE 3 MILLILITER(S): .5; 3 SOLUTION RESPIRATORY (INHALATION) at 11:14

## 2024-06-22 RX ADMIN — Medication 975 MILLIGRAM(S): at 18:51

## 2024-06-22 RX ADMIN — ACETYLCYSTEINE 4 MILLILITER(S): 200 SOLUTION ORAL; RESPIRATORY (INHALATION) at 16:16

## 2024-06-22 RX ADMIN — Medication 975 MILLIGRAM(S): at 18:21

## 2024-06-22 RX ADMIN — ACETYLCYSTEINE 4 MILLILITER(S): 200 SOLUTION ORAL; RESPIRATORY (INHALATION) at 21:26

## 2024-06-22 RX ADMIN — ACETYLCYSTEINE 4 MILLILITER(S): 200 SOLUTION ORAL; RESPIRATORY (INHALATION) at 11:15

## 2024-06-22 RX ADMIN — IPRATROPIUM BROMIDE AND ALBUTEROL SULFATE 3 MILLILITER(S): .5; 3 SOLUTION RESPIRATORY (INHALATION) at 21:26

## 2024-06-22 RX ADMIN — Medication 15 MILLILITER(S): at 21:58

## 2024-06-22 RX ADMIN — PETROLATUM 1 APPLICATION(S): 42 OINTMENT TOPICAL at 11:48

## 2024-06-22 RX ADMIN — Medication 3 MILLILITER(S): at 13:32

## 2024-06-22 RX ADMIN — Medication 15 MILLILITER(S): at 09:31

## 2024-06-22 RX ADMIN — OXYCODONE HYDROCHLORIDE 5 MILLIGRAM(S): 100 SOLUTION ORAL at 12:17

## 2024-06-22 RX ADMIN — IPRATROPIUM BROMIDE AND ALBUTEROL SULFATE 3 MILLILITER(S): .5; 3 SOLUTION RESPIRATORY (INHALATION) at 16:15

## 2024-06-22 RX ADMIN — Medication 15 MILLILITER(S): at 06:52

## 2024-06-22 RX ADMIN — OXYCODONE HYDROCHLORIDE 5 MILLIGRAM(S): 100 SOLUTION ORAL at 11:47

## 2024-06-22 NOTE — PROGRESS NOTE ADULT - ASSESSMENT
LELASHANTEL LOWERY is a 64yFemale s/p right alt for revision hemiglossal recon 6/14    - monitor neck for ss infection or drug rash  - Aquaphor to left thigh donor site daily  - LUE dressings changed 6/21  - NPO with TF  - Continue peridex  - Continue unasyn LELA LOWERY is a 64yFemale s/p right alt for revision hemiglossal recon 6/14    - monitor neck for ss infection or drug rash  - Aquaphor to left thigh donor site daily  - LUE dressings changed 6/22  - NPO with TF, f/u IR PEG placement  - Continue peridex  - Continue unasyn  - Appreciate care per primary    Plastic Surgery   [facility]: [id number]  [facility]: [phone]

## 2024-06-22 NOTE — PROGRESS NOTE ADULT - SUBJECTIVE AND OBJECTIVE BOX
LELA LOWERY  MRN-[id number]  [facility]      64F w/ PMH of depression and schizophrenia s/p R partial glossectomy in the setting of SCCA, R SND I-IV, L RFFF, L thigh STSG, tracheostomy (6/6/24). Pts post-operative course c/b venous congestion of flap w/ RTOR on 6/11/24 for free flap salvage and revision of venous anastamosis, RTOR on 6/14/24 for debridement of necrotic radial forearm flap and replacement with right ALT flap, with 7.5 trach exchanged to 7.0 cuffed trach.       Interval Events: NAEON. aVSS         PHYSICAL EXAM:  GEN: NAD  NEURO: AAOx3, drowsy   HEENT: neck incision clean, dry, and intact. flap warm and well perfused, good color  RLE: thigh soft, incision c/d/i.   LUE: Incision c/d/i.           Vitals:    Vital Signs Last 24 Hrs  T(C): 36.9 (20 Jun 2024 05:03), Max: 37.3 (19 Jun 2024 09:18)  T(F): 98.5 (20 Jun 2024 05:03), Max: 99.2 (19 Jun 2024 09:18)  HR: 105 (20 Jun 2024 05:03) (92 - 115)  BP: 127/88 (20 Jun 2024 05:03) (120/77 - 141/89)  BP(mean): --  RR: 18 (20 Jun 2024 05:03) (18 - 20)  SpO2: 100% (20 Jun 2024 05:03) (97% - 100%)    Parameters below as of 20 Jun 2024 05:03  Patient On (Oxygen Delivery Method): tracheostomy collar        I&O's Detail    19 Jun 2024 07:01  -  20 Jun 2024 07:00  --------------------------------------------------------  IN:    Enteral Tube Flush: 300 mL    IV PiggyBack: 400 mL    Jevity 1.5: 1020 mL  Total IN: 1720 mL    OUT:    Bulb (mL): 30 mL    Voided (mL): 1450 mL  Total OUT: 1480 mL    Total NET: 240 mL            Medications:    MEDICATIONS  (STANDING):  acetaminophen   Oral Liquid .. 975 milliGRAM(s) Oral every 6 hours  acetylcysteine 10%  Inhalation 4 milliLiter(s) Inhalation every 6 hours  albuterol/ipratropium for Nebulization 3 milliLiter(s) Nebulizer every 6 hours  amLODIPine   Tablet 10 milliGRAM(s) Oral daily  ampicillin/sulbactam  IVPB 3 Gram(s) IV Intermittent every 6 hours  AQUAPHOR (petrolatum Ointment) 1 Application(s) Topical daily  buPROPion . 300 milliGRAM(s) Oral <User Schedule>  buPROPion . 150 milliGRAM(s) Oral at bedtime  chlorhexidine 0.12% Liquid 15 milliLiter(s) Swish and Spit <User Schedule>  chlorhexidine 2% Cloths 1 Application(s) Topical daily  enoxaparin Injectable 40 milliGRAM(s) SubCutaneous every 24 hours  pantoprazole  Injectable 40 milliGRAM(s) IV Push daily  polyethylene glycol 3350 17 Gram(s) Oral daily  QUEtiapine 100 milliGRAM(s) Oral at bedtime  risperiDONE   Solution 3 milliGRAM(s) Oral at bedtime  risperiDONE   Solution 1 milliGRAM(s) Oral <User Schedule>  senna Syrup 10 milliLiter(s) Oral daily  sodium chloride 0.9% lock flush 3 milliLiter(s) IV Push every 8 hours    MEDICATIONS  (PRN):  oxyCODONE    Solution 5 milliGRAM(s) Oral every 4 hours PRN Moderate Pain (4 - 6)  oxyCODONE    Solution 10 milliGRAM(s) Oral every 4 hours PRN Severe Pain (7 - 10)        Labs:                          9.4    4.90  )-----------( 668      ( 19 Jun 2024 06:30 )             29.0       06-19    141  |  106  |  13  ----------------------------<  96  3.7   |  23  |  0.54    Ca    8.7      19 Jun 2024 06:30  Phos  3.4     06-19  Mg     2.10     06-19        BMI: BMI (kg/m2): 22.3 (06-14-24 @ 06:41)  HbA1c: A1C with Estimated Average Glucose Result: 4.9 % (06-06-24 @ 18:30)    Glucose: POCT Blood Glucose.: 91 mg/dL (06-09-24 @ 08:35)    BP: 127/88 (06-20-24 @ 05:03) (102/69 - 147/89)Vital Signs Last 24 Hrs  T(C): 36.9 (06-20-24 @ 05:03), Max: 37.3 (06-19-24 @ 09:18)  T(F): 98.5 (06-20-24 @ 05:03), Max: 99.2 (06-19-24 @ 09:18)  HR: 105 (06-20-24 @ 05:03) (92 - 115)  BP: 127/88 (06-20-24 @ 05:03) (120/77 - 141/89)  BP(mean): --  RR: 18 (06-20-24 @ 05:03) (18 - 20)  SpO2: 100% (06-20-24 @ 05:03) (97% - 100%)      Lipid Panel:         CAPILLARY BLOOD GLUCOSE              Urinalysis Basic - ( 19 Jun 2024 06:30 )    Color: x / Appearance: x / SG: x / pH: x  Gluc: 96 mg/dL / Ketone: x  / Bili: x / Urobili: x   Blood: x / Protein: x / Nitrite: x   Leuk Esterase: x / RBC: x / WBC x   Sq Epi: x / Non Sq Epi: x / Bacteria: x

## 2024-06-22 NOTE — PROGRESS NOTE ADULT - ASSESSMENT
Assessment	  64F w/ PMH of depression and schizophrenia s/p R partial glossectomy in the setting of SCCA, R SND I-IV, L RFFF, L thigh STSG, tracheostomy (6/6/24). Pts post-operative course c/b venous congestion of flap w/ RTOR on 6/11/24 for free flap salvage and revision of venous anastamosis, RTOR on 6/14/24 for debridement of necrotic radial forearm flap and replacement with right ALT flap, with 7.5 trach exchanged to 7.0 cuffed trach. Pt stepped down to the floor (6/17/24), Pt is progressing well.     Plan:  - IR PEG tube (6/25). Hold LVX 12h prior, NPO, pre-op labs   - cont TF  - engage CM for rehab placement       OMFS  #[id number]  Available on Teams

## 2024-06-22 NOTE — PROGRESS NOTE ADULT - SUBJECTIVE AND OBJECTIVE BOX
Plastic Surgery Progress Note (pg [facility]: [id number], [facility]: [phone])    SUBJECTIVE  NAEO. Tachy to 105 overnight. Pt comfortable in bed. Pain well controlled, no complaints.    OBJECTIVE  ___________________________________________________  VITAL SIGNS / I&O's   Vital Signs Last 24 Hrs  T(C): 36.7 (22 Jun 2024 05:05), Max: 37.3 (21 Jun 2024 10:18)  T(F): 98 (22 Jun 2024 05:05), Max: 99.2 (21 Jun 2024 10:18)  HR: 99 (22 Jun 2024 05:05) (90 - 105)  BP: 163/80 (22 Jun 2024 05:05) (113/74 - 163/80)  BP(mean): --  RR: 18 (22 Jun 2024 05:05) (18 - 18)  SpO2: 99% (22 Jun 2024 05:05) (97% - 101%)    Parameters below as of 22 Jun 2024 05:05  Patient On (Oxygen Delivery Method): tracheostomy collar          21 Jun 2024 07:01  -  22 Jun 2024 07:00  --------------------------------------------------------  IN:    Enteral Tube Flush: 700 mL    IV PiggyBack: 100 mL    Jevity 1.5: 1020 mL  Total IN: 1820 mL    OUT:    Voided (mL): 450 mL  Total OUT: 450 mL    Total NET: 1370 mL        ___________________________________________________  PHYSICAL EXAM    -- CONSTITUTIONAL: NAD, lying in bed  -- NEURO: Awake, alert  -- HEENT: NC/AT, mucous membranes moist  -- NECK: Neck soft no palpable fluid collection, diffuse maculopapular rash over neck. Flap soft, warm, good color. Cook signal strong.   -- PULM: Non-labored respirations, equal chest rise bilaterally  -- ABDOMEN: Nondistended  -- RLE: thigh soft, SUZANNE drain SSO. Ace cdi  -- LUE: ace c/d/i  -- PSYCH: Affect normal, A&Ox3    ___________________________________________________  LABS                        10.1   4.62  )-----------( 658      ( 22 Jun 2024 06:05 )             29.7     22 Jun 2024 06:05    138    |  104    |  18     ----------------------------<  93     3.7     |  23     |  0.50     Ca    8.0        22 Jun 2024 06:05  Phos  3.1       22 Jun 2024 06:05  Mg     2.00      22 Jun 2024 06:05        CAPILLARY BLOOD GLUCOSE            Urinalysis Basic - ( 22 Jun 2024 06:05 )    Color: x / Appearance: x / SG: x / pH: x  Gluc: 93 mg/dL / Ketone: x  / Bili: x / Urobili: x   Blood: x / Protein: x / Nitrite: x   Leuk Esterase: x / RBC: x / WBC x   Sq Epi: x / Non Sq Epi: x / Bacteria: x      ___________________________________________________  MICRO  Recent Cultures:    ___________________________________________________  MEDICATIONS  (STANDING):  acetaminophen   Oral Liquid .. 975 milliGRAM(s) Oral every 6 hours  acetylcysteine 10%  Inhalation 4 milliLiter(s) Inhalation every 6 hours  albuterol/ipratropium for Nebulization 3 milliLiter(s) Nebulizer every 6 hours  amLODIPine   Tablet 10 milliGRAM(s) Oral daily  AQUAPHOR (petrolatum Ointment) 1 Application(s) Topical daily  buPROPion . 300 milliGRAM(s) Oral <User Schedule>  buPROPion . 150 milliGRAM(s) Oral at bedtime  chlorhexidine 0.12% Liquid 15 milliLiter(s) Swish and Spit <User Schedule>  chlorhexidine 2% Cloths 1 Application(s) Topical daily  enoxaparin Injectable 40 milliGRAM(s) SubCutaneous every 24 hours  pantoprazole  Injectable 40 milliGRAM(s) IV Push daily  QUEtiapine 100 milliGRAM(s) Oral at bedtime  risperiDONE   Solution 1 milliGRAM(s) Oral <User Schedule>  risperiDONE   Solution 3 milliGRAM(s) Oral at bedtime  sodium chloride 0.9% lock flush 3 milliLiter(s) IV Push every 8 hours    MEDICATIONS  (PRN):  oxyCODONE    Solution 5 milliGRAM(s) Oral every 4 hours PRN Moderate Pain (4 - 6)  oxyCODONE    Solution 10 milliGRAM(s) Oral every 4 hours PRN Severe Pain (7 - 10)   Plastic Surgery Progress Note (pg [facility]: [id number], [facility]: [phone])    SUBJECTIVE  NAEO. Tachy to 105 overnight. Pt comfortable in bed. Pain well controlled. C/o pruritic rash in torso    OBJECTIVE  ___________________________________________________  VITAL SIGNS / I&O's   Vital Signs Last 24 Hrs  T(C): 36.7 (22 Jun 2024 05:05), Max: 37.3 (21 Jun 2024 10:18)  T(F): 98 (22 Jun 2024 05:05), Max: 99.2 (21 Jun 2024 10:18)  HR: 99 (22 Jun 2024 05:05) (90 - 105)  BP: 163/80 (22 Jun 2024 05:05) (113/74 - 163/80)  BP(mean): --  RR: 18 (22 Jun 2024 05:05) (18 - 18)  SpO2: 99% (22 Jun 2024 05:05) (97% - 101%)    Parameters below as of 22 Jun 2024 05:05  Patient On (Oxygen Delivery Method): tracheostomy collar          21 Jun 2024 07:01  -  22 Jun 2024 07:00  --------------------------------------------------------  IN:    Enteral Tube Flush: 700 mL    IV PiggyBack: 100 mL    Jevity 1.5: 1020 mL  Total IN: 1820 mL    OUT:    Voided (mL): 450 mL  Total OUT: 450 mL    Total NET: 1370 mL        ___________________________________________________  PHYSICAL EXAM    -- CONSTITUTIONAL: NAD, lying in bed  -- NEURO: Awake, alert  -- HEENT: NC/AT, mucous membranes moist  -- NECK: Neck soft no palpable fluid collection, diffuse maculopapular rash over neck. Flap soft, warm, good color.   -- PULM: Non-labored respirations, equal chest rise bilaterally  -- ABDOMEN: Nondistended  -- RLE: thigh soft, Ace cdi  -- LUE: ace c/d/i, skin graft with good take  -- PSYCH: Affect normal, A&Ox3    ___________________________________________________  LABS                        10.1   4.62  )-----------( 658      ( 22 Jun 2024 06:05 )             29.7     22 Jun 2024 06:05    138    |  104    |  18     ----------------------------<  93     3.7     |  23     |  0.50     Ca    8.0        22 Jun 2024 06:05  Phos  3.1       22 Jun 2024 06:05  Mg     2.00      22 Jun 2024 06:05        CAPILLARY BLOOD GLUCOSE            Urinalysis Basic - ( 22 Jun 2024 06:05 )    Color: x / Appearance: x / SG: x / pH: x  Gluc: 93 mg/dL / Ketone: x  / Bili: x / Urobili: x   Blood: x / Protein: x / Nitrite: x   Leuk Esterase: x / RBC: x / WBC x   Sq Epi: x / Non Sq Epi: x / Bacteria: x      ___________________________________________________  MICRO  Recent Cultures:    ___________________________________________________  MEDICATIONS  (STANDING):  acetaminophen   Oral Liquid .. 975 milliGRAM(s) Oral every 6 hours  acetylcysteine 10%  Inhalation 4 milliLiter(s) Inhalation every 6 hours  albuterol/ipratropium for Nebulization 3 milliLiter(s) Nebulizer every 6 hours  amLODIPine   Tablet 10 milliGRAM(s) Oral daily  AQUAPHOR (petrolatum Ointment) 1 Application(s) Topical daily  buPROPion . 300 milliGRAM(s) Oral <User Schedule>  buPROPion . 150 milliGRAM(s) Oral at bedtime  chlorhexidine 0.12% Liquid 15 milliLiter(s) Swish and Spit <User Schedule>  chlorhexidine 2% Cloths 1 Application(s) Topical daily  enoxaparin Injectable 40 milliGRAM(s) SubCutaneous every 24 hours  pantoprazole  Injectable 40 milliGRAM(s) IV Push daily  QUEtiapine 100 milliGRAM(s) Oral at bedtime  risperiDONE   Solution 1 milliGRAM(s) Oral <User Schedule>  risperiDONE   Solution 3 milliGRAM(s) Oral at bedtime  sodium chloride 0.9% lock flush 3 milliLiter(s) IV Push every 8 hours    MEDICATIONS  (PRN):  oxyCODONE    Solution 5 milliGRAM(s) Oral every 4 hours PRN Moderate Pain (4 - 6)  oxyCODONE    Solution 10 milliGRAM(s) Oral every 4 hours PRN Severe Pain (7 - 10)

## 2024-06-22 NOTE — PROVIDER CONTACT NOTE (OTHER) - ACTION/TREATMENT ORDERED:
OMFS resident made aware of patients rash. provider states OMFS team is already aware. no new intervention ordered at this time.

## 2024-06-23 LAB
ANION GAP SERPL CALC-SCNC: 12 MMOL/L — SIGNIFICANT CHANGE UP (ref 7–14)
BUN SERPL-MCNC: 19 MG/DL — SIGNIFICANT CHANGE UP (ref 7–23)
CALCIUM SERPL-MCNC: 8.3 MG/DL — LOW (ref 8.4–10.5)
CHLORIDE SERPL-SCNC: 104 MMOL/L — SIGNIFICANT CHANGE UP (ref 98–107)
CO2 SERPL-SCNC: 22 MMOL/L — SIGNIFICANT CHANGE UP (ref 22–31)
CREAT SERPL-MCNC: 0.54 MG/DL — SIGNIFICANT CHANGE UP (ref 0.5–1.3)
EGFR: 103 ML/MIN/1.73M2 — SIGNIFICANT CHANGE UP
GLUCOSE SERPL-MCNC: 130 MG/DL — HIGH (ref 70–99)
HCT VFR BLD CALC: 30.7 % — LOW (ref 34.5–45)
HGB BLD-MCNC: 10.1 G/DL — LOW (ref 11.5–15.5)
MAGNESIUM SERPL-MCNC: 2 MG/DL — SIGNIFICANT CHANGE UP (ref 1.6–2.6)
MCHC RBC-ENTMCNC: 30.1 PG — SIGNIFICANT CHANGE UP (ref 27–34)
MCHC RBC-ENTMCNC: 32.9 GM/DL — SIGNIFICANT CHANGE UP (ref 32–36)
MCV RBC AUTO: 91.4 FL — SIGNIFICANT CHANGE UP (ref 80–100)
NRBC # BLD: 0 /100 WBCS — SIGNIFICANT CHANGE UP (ref 0–0)
NRBC # FLD: 0 K/UL — SIGNIFICANT CHANGE UP (ref 0–0)
PHOSPHATE SERPL-MCNC: 2.8 MG/DL — SIGNIFICANT CHANGE UP (ref 2.5–4.5)
PLATELET # BLD AUTO: 628 K/UL — HIGH (ref 150–400)
POTASSIUM SERPL-MCNC: 3.9 MMOL/L — SIGNIFICANT CHANGE UP (ref 3.5–5.3)
POTASSIUM SERPL-SCNC: 3.9 MMOL/L — SIGNIFICANT CHANGE UP (ref 3.5–5.3)
RBC # BLD: 3.36 M/UL — LOW (ref 3.8–5.2)
RBC # FLD: 15.5 % — HIGH (ref 10.3–14.5)
SODIUM SERPL-SCNC: 138 MMOL/L — SIGNIFICANT CHANGE UP (ref 135–145)
WBC # BLD: 5.86 K/UL — SIGNIFICANT CHANGE UP (ref 3.8–10.5)
WBC # FLD AUTO: 5.86 K/UL — SIGNIFICANT CHANGE UP (ref 3.8–10.5)

## 2024-06-23 RX ORDER — DIPHENHYDRAMINE HCL 12.5MG/5ML
25 ELIXIR ORAL ONCE
Refills: 0 | Status: COMPLETED | OUTPATIENT
Start: 2024-06-23 | End: 2024-06-23

## 2024-06-23 RX ADMIN — Medication 3 MILLILITER(S): at 06:18

## 2024-06-23 RX ADMIN — Medication 975 MILLIGRAM(S): at 17:35

## 2024-06-23 RX ADMIN — Medication 15 MILLILITER(S): at 22:48

## 2024-06-23 RX ADMIN — PANTOPRAZOLE SODIUM 40 MILLIGRAM(S): 40 INJECTION, POWDER, FOR SOLUTION INTRAVENOUS at 11:53

## 2024-06-23 RX ADMIN — Medication 15 MILLILITER(S): at 09:56

## 2024-06-23 RX ADMIN — ENOXAPARIN SODIUM 40 MILLIGRAM(S): 100 INJECTION SUBCUTANEOUS at 14:02

## 2024-06-23 RX ADMIN — Medication 15 MILLILITER(S): at 14:02

## 2024-06-23 RX ADMIN — Medication 975 MILLIGRAM(S): at 22:49

## 2024-06-23 RX ADMIN — BUPROPION HYDROCHLORIDE 300 MILLIGRAM(S): 150 TABLET, EXTENDED RELEASE ORAL at 06:17

## 2024-06-23 RX ADMIN — RISPERIDONE 3 MILLIGRAM(S): 0.5 TABLET ORAL at 22:49

## 2024-06-23 RX ADMIN — IPRATROPIUM BROMIDE AND ALBUTEROL SULFATE 3 MILLILITER(S): .5; 3 SOLUTION RESPIRATORY (INHALATION) at 10:02

## 2024-06-23 RX ADMIN — ACETYLCYSTEINE 4 MILLILITER(S): 200 SOLUTION ORAL; RESPIRATORY (INHALATION) at 03:20

## 2024-06-23 RX ADMIN — Medication 3 MILLILITER(S): at 15:43

## 2024-06-23 RX ADMIN — Medication 15 MILLILITER(S): at 06:17

## 2024-06-23 RX ADMIN — Medication 3 MILLILITER(S): at 23:22

## 2024-06-23 RX ADMIN — Medication 100 MILLIGRAM(S): at 22:49

## 2024-06-23 RX ADMIN — RISPERIDONE 1 MILLIGRAM(S): 0.5 TABLET ORAL at 06:18

## 2024-06-23 RX ADMIN — AMLODIPINE BESYLATE 10 MILLIGRAM(S): 2.5 TABLET ORAL at 05:13

## 2024-06-23 RX ADMIN — IPRATROPIUM BROMIDE AND ALBUTEROL SULFATE 3 MILLILITER(S): .5; 3 SOLUTION RESPIRATORY (INHALATION) at 20:55

## 2024-06-23 RX ADMIN — PETROLATUM 1 APPLICATION(S): 42 OINTMENT TOPICAL at 11:54

## 2024-06-23 RX ADMIN — Medication 975 MILLIGRAM(S): at 12:23

## 2024-06-23 RX ADMIN — Medication 975 MILLIGRAM(S): at 05:12

## 2024-06-23 RX ADMIN — Medication 25 MILLIGRAM(S): at 09:51

## 2024-06-23 RX ADMIN — ACETYLCYSTEINE 4 MILLILITER(S): 200 SOLUTION ORAL; RESPIRATORY (INHALATION) at 20:54

## 2024-06-23 RX ADMIN — IPRATROPIUM BROMIDE AND ALBUTEROL SULFATE 3 MILLILITER(S): .5; 3 SOLUTION RESPIRATORY (INHALATION) at 15:55

## 2024-06-23 RX ADMIN — BUPROPION HYDROCHLORIDE 150 MILLIGRAM(S): 150 TABLET, EXTENDED RELEASE ORAL at 22:49

## 2024-06-23 RX ADMIN — IPRATROPIUM BROMIDE AND ALBUTEROL SULFATE 3 MILLILITER(S): .5; 3 SOLUTION RESPIRATORY (INHALATION) at 03:20

## 2024-06-23 RX ADMIN — Medication 1 APPLICATION(S): at 11:54

## 2024-06-23 RX ADMIN — Medication 975 MILLIGRAM(S): at 18:05

## 2024-06-23 RX ADMIN — Medication 15 MILLILITER(S): at 20:27

## 2024-06-23 RX ADMIN — Medication 15 MILLILITER(S): at 16:17

## 2024-06-23 RX ADMIN — Medication 975 MILLIGRAM(S): at 11:53

## 2024-06-23 NOTE — PROGRESS NOTE ADULT - ASSESSMENT
LELA LOWERY is a 64yFemale s/p right ALT flap for revision hemiglossal recon 6/14    - monitor neck for ss infection or drug rash  - Aquaphor to left thigh donor site daily  - LUE skin graft dressings changed 6/22  - NPO with TF, f/u IR PEG placement, scheduled for 6/25  - Continue peridex  - Continue unasyn  - Appreciate care per primary    Plastic Surgery   [facility]: [id number]  [facility]: [phone]

## 2024-06-23 NOTE — PROVIDER CONTACT NOTE (OTHER) - BACKGROUND
patient s/p R partial glossectomy, R neck dissection, reconstruction w/ L radial forearm free flap & tracheostomy. Flap revision and muscle flap revision w/ R thigh

## 2024-06-23 NOTE — PROGRESS NOTE ADULT - SUBJECTIVE AND OBJECTIVE BOX
Plastic Surgery Progress Note (pg [facility]: [id number], [facility]: [phone])    SUBJECTIVE  The patient was seen and examined. No acute events overnight. Pain controlled, afebrile w/ stable vitals.     OBJECTIVE  ___________________________________________________  VITAL SIGNS / I&O's   Vital Signs Last 24 Hrs  T(C): 36.8 (23 Jun 2024 05:10), Max: 37.4 (23 Jun 2024 02:00)  T(F): 98.2 (23 Jun 2024 05:10), Max: 99.3 (23 Jun 2024 02:00)  HR: 96 (23 Jun 2024 05:10) (96 - 100)  BP: 119/74 (23 Jun 2024 05:10) (106/70 - 136/62)  BP(mean): --  RR: 17 (23 Jun 2024 05:10) (17 - 18)  SpO2: 100% (23 Jun 2024 05:10) (95% - 100%)    Parameters below as of 23 Jun 2024 05:10  Patient On (Oxygen Delivery Method): tracheostomy collar          22 Jun 2024 07:01  -  23 Jun 2024 07:00  --------------------------------------------------------  IN:    Enteral Tube Flush: 700 mL    Jevity 1.5: 1360 mL  Total IN: 2060 mL    OUT:    Voided (mL): 1000 mL  Total OUT: 1000 mL    Total NET: 1060 mL        ___________________________________________________  PHYSICAL EXAM    -- CONSTITUTIONAL: NAD, lying in bed  -- NEURO: Awake, alert  -- HEENT: NC/AT, mucous membranes moist  -- NECK: Neck soft no palpable fluid collection, diffuse maculopapular rash over neck less erythematous. Flap soft, warm, good color.   -- PULM: Non-labored respirations, equal chest rise bilaterally  -- ABDOMEN: Nondistended  -- RLE: thigh soft, Ace cdi  -- LUE: ace c/d/i, skin graft with good take  -- PSYCH: Affect normal, A&Ox3    ___________________________________________________  LABS                        10.1   5.86  )-----------( 628      ( 23 Jun 2024 06:00 )             30.7     23 Jun 2024 06:00    138    |  104    |  19     ----------------------------<  130    3.9     |  22     |  0.54     Ca    8.3        23 Jun 2024 06:00  Phos  2.8       23 Jun 2024 06:00  Mg     2.00      23 Jun 2024 06:00        CAPILLARY BLOOD GLUCOSE            Urinalysis Basic - ( 23 Jun 2024 06:00 )    Color: x / Appearance: x / SG: x / pH: x  Gluc: 130 mg/dL / Ketone: x  / Bili: x / Urobili: x   Blood: x / Protein: x / Nitrite: x   Leuk Esterase: x / RBC: x / WBC x   Sq Epi: x / Non Sq Epi: x / Bacteria: x      ___________________________________________________  MICRO  Recent Cultures:    ___________________________________________________  MEDICATIONS  (STANDING):  acetaminophen   Oral Liquid .. 975 milliGRAM(s) Oral every 6 hours  acetylcysteine 10%  Inhalation 4 milliLiter(s) Inhalation every 6 hours  albuterol/ipratropium for Nebulization 3 milliLiter(s) Nebulizer every 6 hours  amLODIPine   Tablet 10 milliGRAM(s) Oral daily  AQUAPHOR (petrolatum Ointment) 1 Application(s) Topical daily  buPROPion . 150 milliGRAM(s) Oral at bedtime  buPROPion . 300 milliGRAM(s) Oral <User Schedule>  chlorhexidine 0.12% Liquid 15 milliLiter(s) Swish and Spit <User Schedule>  chlorhexidine 2% Cloths 1 Application(s) Topical daily  diphenhydrAMINE 25 milliGRAM(s) Oral once  enoxaparin Injectable 40 milliGRAM(s) SubCutaneous every 24 hours  pantoprazole  Injectable 40 milliGRAM(s) IV Push daily  QUEtiapine 100 milliGRAM(s) Oral at bedtime  risperiDONE   Solution 1 milliGRAM(s) Oral <User Schedule>  risperiDONE   Solution 3 milliGRAM(s) Oral at bedtime  sodium chloride 0.9% lock flush 3 milliLiter(s) IV Push every 8 hours    MEDICATIONS  (PRN):

## 2024-06-23 NOTE — PROGRESS NOTE ADULT - SUBJECTIVE AND OBJECTIVE BOX
64F w/ PMH of depression and schizophrenia s/p R partial glossectomy in the setting of SCCA, R SND I-IV, L RFFF, L thigh STSG, tracheostomy (6/6/24). Pts post-operative course c/b venous congestion of flap w/ RTOR on 6/11/24 for free flap salvage and revision of venous anastamosis, RTOR on 6/14/24 for debridement of necrotic radial forearm flap and replacement with right ALT flap, with 7.5 trach exchanged to 7.0 cuffed trach.       Interval Events:   -NAEON. aVSS   -pt w/ some improvement but still residual heat rash on the back/buttocks/extremities    ICU Vital Signs Last 24 Hrs  T(C): 36.8 (23 Jun 2024 05:10), Max: 37.4 (23 Jun 2024 02:00)  T(F): 98.2 (23 Jun 2024 05:10), Max: 99.3 (23 Jun 2024 02:00)  HR: 96 (23 Jun 2024 05:10) (96 - 100)  BP: 119/74 (23 Jun 2024 05:10) (106/70 - 136/62)  BP(mean): --  ABP: --  ABP(mean): --  RR: 17 (23 Jun 2024 05:10) (17 - 18)  SpO2: 100% (23 Jun 2024 05:10) (95% - 100%)    O2 Parameters below as of 23 Jun 2024 05:10  Patient On (Oxygen Delivery Method): tracheostomy collar        I&O's Detail    22 Jun 2024 07:01  -  23 Jun 2024 07:00  --------------------------------------------------------  IN:    Enteral Tube Flush: 700 mL    Jevity 1.5: 1360 mL  Total IN: 2060 mL    OUT:    Voided (mL): 1000 mL  Total OUT: 1000 mL    Total NET: 1060 mL      MEDICATIONS  (STANDING):  acetaminophen   Oral Liquid .. 975 milliGRAM(s) Oral every 6 hours  acetylcysteine 10%  Inhalation 4 milliLiter(s) Inhalation every 6 hours  albuterol/ipratropium for Nebulization 3 milliLiter(s) Nebulizer every 6 hours  amLODIPine   Tablet 10 milliGRAM(s) Oral daily  AQUAPHOR (petrolatum Ointment) 1 Application(s) Topical daily  buPROPion . 300 milliGRAM(s) Oral <User Schedule>  buPROPion . 150 milliGRAM(s) Oral at bedtime  chlorhexidine 0.12% Liquid 15 milliLiter(s) Swish and Spit <User Schedule>  chlorhexidine 2% Cloths 1 Application(s) Topical daily  diphenhydrAMINE 25 milliGRAM(s) Oral once  enoxaparin Injectable 40 milliGRAM(s) SubCutaneous every 24 hours  pantoprazole  Injectable 40 milliGRAM(s) IV Push daily  QUEtiapine 100 milliGRAM(s) Oral at bedtime  risperiDONE   Solution 3 milliGRAM(s) Oral at bedtime  risperiDONE   Solution 1 milliGRAM(s) Oral <User Schedule>  sodium chloride 0.9% lock flush 3 milliLiter(s) IV Push every 8 hours    MEDICATIONS  (PRN):     64F w/ PMH of depression and schizophrenia s/p R partial glossectomy in the setting of SCCA, R SND I-IV, L RFFF, L thigh STSG, tracheostomy (6/6/24). Pts post-operative course c/b venous congestion of flap w/ RTOR on 6/11/24 for free flap salvage and revision of venous anastamosis, RTOR on 6/14/24 for debridement of necrotic radial forearm flap and replacement with right ALT flap, with 7.5 trach exchanged to 7.0 cuffed trach.       Interval Events:   -NAEON. aVSS   -pt w/ some improvement but still residual heat rash on the back/buttocks/extremities    ICU Vital Signs Last 24 Hrs  T(C): 36.8 (23 Jun 2024 05:10), Max: 37.4 (23 Jun 2024 02:00)  T(F): 98.2 (23 Jun 2024 05:10), Max: 99.3 (23 Jun 2024 02:00)  HR: 96 (23 Jun 2024 05:10) (96 - 100)  BP: 119/74 (23 Jun 2024 05:10) (106/70 - 136/62)  BP(mean): --  ABP: --  ABP(mean): --  RR: 17 (23 Jun 2024 05:10) (17 - 18)  SpO2: 100% (23 Jun 2024 05:10) (95% - 100%)    O2 Parameters below as of 23 Jun 2024 05:10  Patient On (Oxygen Delivery Method): tracheostomy collar        I&O's Detail    22 Jun 2024 07:01  -  23 Jun 2024 07:00  --------------------------------------------------------  IN:    Enteral Tube Flush: 700 mL    Jevity 1.5: 1360 mL  Total IN: 2060 mL    OUT:    Voided (mL): 1000 mL  Total OUT: 1000 mL    Total NET: 1060 mL      MEDICATIONS  (STANDING):  acetaminophen   Oral Liquid .. 975 milliGRAM(s) Oral every 6 hours  acetylcysteine 10%  Inhalation 4 milliLiter(s) Inhalation every 6 hours  albuterol/ipratropium for Nebulization 3 milliLiter(s) Nebulizer every 6 hours  amLODIPine   Tablet 10 milliGRAM(s) Oral daily  AQUAPHOR (petrolatum Ointment) 1 Application(s) Topical daily  buPROPion . 300 milliGRAM(s) Oral <User Schedule>  buPROPion . 150 milliGRAM(s) Oral at bedtime  chlorhexidine 0.12% Liquid 15 milliLiter(s) Swish and Spit <User Schedule>  chlorhexidine 2% Cloths 1 Application(s) Topical daily  diphenhydrAMINE 25 milliGRAM(s) Oral once  enoxaparin Injectable 40 milliGRAM(s) SubCutaneous every 24 hours  pantoprazole  Injectable 40 milliGRAM(s) IV Push daily  QUEtiapine 100 milliGRAM(s) Oral at bedtime  risperiDONE   Solution 3 milliGRAM(s) Oral at bedtime  risperiDONE   Solution 1 milliGRAM(s) Oral <User Schedule>  sodium chloride 0.9% lock flush 3 milliLiter(s) IV Push every 8 hours    MEDICATIONS  (PRN):      PHYSICAL EXAM:  GEN: NAD  NEURO: AAOx3, drowsy   HEENT: neck incision clean, dry, and intact. flap warm and well perfused, good color  RLE: thigh soft, incision c/d/i.   LUE: Incision c/d/i.                          10.1   5.86  )-----------( 628      ( 23 Jun 2024 06:00 )             30.7   06-23    138  |  104  |  19  ----------------------------<  130<H>  3.9   |  22  |  0.54    Ca    8.3<L>      23 Jun 2024 06:00  Phos  2.8     06-23  Mg     2.00     06-23

## 2024-06-24 LAB
ANION GAP SERPL CALC-SCNC: 12 MMOL/L — SIGNIFICANT CHANGE UP (ref 7–14)
BUN SERPL-MCNC: 14 MG/DL — SIGNIFICANT CHANGE UP (ref 7–23)
CALCIUM SERPL-MCNC: 8.6 MG/DL — SIGNIFICANT CHANGE UP (ref 8.4–10.5)
CHLORIDE SERPL-SCNC: 102 MMOL/L — SIGNIFICANT CHANGE UP (ref 98–107)
CO2 SERPL-SCNC: 24 MMOL/L — SIGNIFICANT CHANGE UP (ref 22–31)
CREAT SERPL-MCNC: 0.58 MG/DL — SIGNIFICANT CHANGE UP (ref 0.5–1.3)
EGFR: 101 ML/MIN/1.73M2 — SIGNIFICANT CHANGE UP
GLUCOSE SERPL-MCNC: 96 MG/DL — SIGNIFICANT CHANGE UP (ref 70–99)
HCT VFR BLD CALC: 31.2 % — LOW (ref 34.5–45)
HGB BLD-MCNC: 10.2 G/DL — LOW (ref 11.5–15.5)
MAGNESIUM SERPL-MCNC: 2 MG/DL — SIGNIFICANT CHANGE UP (ref 1.6–2.6)
MCHC RBC-ENTMCNC: 30.1 PG — SIGNIFICANT CHANGE UP (ref 27–34)
MCHC RBC-ENTMCNC: 32.7 GM/DL — SIGNIFICANT CHANGE UP (ref 32–36)
MCV RBC AUTO: 92 FL — SIGNIFICANT CHANGE UP (ref 80–100)
NRBC # BLD: 0 /100 WBCS — SIGNIFICANT CHANGE UP (ref 0–0)
NRBC # FLD: 0 K/UL — SIGNIFICANT CHANGE UP (ref 0–0)
PHOSPHATE SERPL-MCNC: 2.9 MG/DL — SIGNIFICANT CHANGE UP (ref 2.5–4.5)
PLATELET # BLD AUTO: 597 K/UL — HIGH (ref 150–400)
POTASSIUM SERPL-MCNC: 4 MMOL/L — SIGNIFICANT CHANGE UP (ref 3.5–5.3)
POTASSIUM SERPL-SCNC: 4 MMOL/L — SIGNIFICANT CHANGE UP (ref 3.5–5.3)
RBC # BLD: 3.39 M/UL — LOW (ref 3.8–5.2)
RBC # FLD: 15.7 % — HIGH (ref 10.3–14.5)
SODIUM SERPL-SCNC: 138 MMOL/L — SIGNIFICANT CHANGE UP (ref 135–145)
WBC # BLD: 5.14 K/UL — SIGNIFICANT CHANGE UP (ref 3.8–10.5)
WBC # FLD AUTO: 5.14 K/UL — SIGNIFICANT CHANGE UP (ref 3.8–10.5)

## 2024-06-24 PROCEDURE — 99232 SBSQ HOSP IP/OBS MODERATE 35: CPT

## 2024-06-24 RX ORDER — DEXTROSE MONOHYDRATE AND SODIUM CHLORIDE 5; .3 G/100ML; G/100ML
1000 INJECTION, SOLUTION INTRAVENOUS
Refills: 0 | Status: DISCONTINUED | OUTPATIENT
Start: 2024-06-24 | End: 2024-06-26

## 2024-06-24 RX ADMIN — AMLODIPINE BESYLATE 10 MILLIGRAM(S): 2.5 TABLET ORAL at 05:03

## 2024-06-24 RX ADMIN — RISPERIDONE 1 MILLIGRAM(S): 0.5 TABLET ORAL at 05:59

## 2024-06-24 RX ADMIN — IPRATROPIUM BROMIDE AND ALBUTEROL SULFATE 3 MILLILITER(S): .5; 3 SOLUTION RESPIRATORY (INHALATION) at 03:16

## 2024-06-24 RX ADMIN — Medication 3 MILLILITER(S): at 14:00

## 2024-06-24 RX ADMIN — ACETYLCYSTEINE 4 MILLILITER(S): 200 SOLUTION ORAL; RESPIRATORY (INHALATION) at 21:06

## 2024-06-24 RX ADMIN — PETROLATUM 1 APPLICATION(S): 42 OINTMENT TOPICAL at 11:10

## 2024-06-24 RX ADMIN — Medication 975 MILLIGRAM(S): at 05:02

## 2024-06-24 RX ADMIN — Medication 975 MILLIGRAM(S): at 17:49

## 2024-06-24 RX ADMIN — ACETYLCYSTEINE 4 MILLILITER(S): 200 SOLUTION ORAL; RESPIRATORY (INHALATION) at 11:11

## 2024-06-24 RX ADMIN — RISPERIDONE 3 MILLIGRAM(S): 0.5 TABLET ORAL at 22:32

## 2024-06-24 RX ADMIN — Medication 975 MILLIGRAM(S): at 11:40

## 2024-06-24 RX ADMIN — BUPROPION HYDROCHLORIDE 150 MILLIGRAM(S): 150 TABLET, EXTENDED RELEASE ORAL at 22:32

## 2024-06-24 RX ADMIN — ACETYLCYSTEINE 4 MILLILITER(S): 200 SOLUTION ORAL; RESPIRATORY (INHALATION) at 16:06

## 2024-06-24 RX ADMIN — IPRATROPIUM BROMIDE AND ALBUTEROL SULFATE 3 MILLILITER(S): .5; 3 SOLUTION RESPIRATORY (INHALATION) at 16:01

## 2024-06-24 RX ADMIN — Medication 1 APPLICATION(S): at 11:10

## 2024-06-24 RX ADMIN — IPRATROPIUM BROMIDE AND ALBUTEROL SULFATE 3 MILLILITER(S): .5; 3 SOLUTION RESPIRATORY (INHALATION) at 20:49

## 2024-06-24 RX ADMIN — Medication 15 MILLILITER(S): at 11:11

## 2024-06-24 RX ADMIN — Medication 100 MILLIGRAM(S): at 22:32

## 2024-06-24 RX ADMIN — Medication 15 MILLILITER(S): at 15:53

## 2024-06-24 RX ADMIN — Medication 3 MILLILITER(S): at 05:00

## 2024-06-24 RX ADMIN — Medication 3 MILLILITER(S): at 22:57

## 2024-06-24 RX ADMIN — Medication 15 MILLILITER(S): at 05:04

## 2024-06-24 RX ADMIN — Medication 15 MILLILITER(S): at 22:47

## 2024-06-24 RX ADMIN — ACETYLCYSTEINE 4 MILLILITER(S): 200 SOLUTION ORAL; RESPIRATORY (INHALATION) at 03:16

## 2024-06-24 RX ADMIN — Medication 15 MILLILITER(S): at 17:19

## 2024-06-24 RX ADMIN — Medication 975 MILLIGRAM(S): at 11:10

## 2024-06-24 RX ADMIN — Medication 15 MILLILITER(S): at 19:14

## 2024-06-24 RX ADMIN — BUPROPION HYDROCHLORIDE 300 MILLIGRAM(S): 150 TABLET, EXTENDED RELEASE ORAL at 05:03

## 2024-06-24 RX ADMIN — IPRATROPIUM BROMIDE AND ALBUTEROL SULFATE 3 MILLILITER(S): .5; 3 SOLUTION RESPIRATORY (INHALATION) at 11:11

## 2024-06-24 RX ADMIN — Medication 975 MILLIGRAM(S): at 17:19

## 2024-06-24 RX ADMIN — PANTOPRAZOLE SODIUM 40 MILLIGRAM(S): 40 INJECTION, POWDER, FOR SOLUTION INTRAVENOUS at 11:11

## 2024-06-24 NOTE — PROGRESS NOTE ADULT - PROBLEM SELECTOR PLAN 1
- Trend HR   - OR wound E coli, s/p antibiotics   - Pain control   - TTE reviewed, normal EF 60-65%  - PE ruled out  - improving currently in 90's

## 2024-06-24 NOTE — PROGRESS NOTE ADULT - SUBJECTIVE AND OBJECTIVE BOX
Follow Up:      Interval History/ROS:   no complaint      Allergies  penicillins (Unknown)        ANTIMICROBIALS:   unasyn held 6/21 due to rash       MEDICATIONS  (STANDING):  acetaminophen   Oral Liquid .. 975 every 6 hours  acetylcysteine 10%  Inhalation 4 every 6 hours  albuterol/ipratropium for Nebulization 3 every 6 hours  amLODIPine   Tablet 10 daily  buPROPion . 300 <User Schedule>  buPROPion . 150 at bedtime  pantoprazole  Injectable 40 daily  QUEtiapine 100 at bedtime  risperiDONE   Solution 1 <User Schedule>  risperiDONE   Solution 3 at bedtime      Vital Signs Last 24 Hrs  T(F): 97.9 (06-24-24 @ 09:51), Max: 98.7 (06-24-24 @ 01:56)  HR: 102 (06-24-24 @ 11:11)  BP: 108/67 (06-24-24 @ 09:51)  RR: 18 (06-24-24 @ 09:51)  SpO2: 100% (06-24-24 @ 09:51) (98% - 100%)      PHYSICAL EXAM:  Constitutional: Non toxic uncomfortable   Eyes: No icterus.  NGT  Neck: trach, neck wound closed  RS: no respiratory distress  CVS: s1s2  Abdomen: Soft.   : no paez  Extremities: dressing left forearm, right  leg   rash mac/ pap back trunk extremities fading                             10.2   5.14  )-----------( 597      ( 24 Jun 2024 05:00 )             31.2 06-24    138  |  102  |  14  ----------------------------<  96  4.0   |  24  |  0.58  Ca    8.6      24 Jun 2024 05:00Phos  2.9     06-24Mg     2.00     06-24              MICROBIOLOGY:    .Smear RIGHT NECK CLT  06-14-24   Culture is being performed. Fungal cultures are held for 4 weeks.  --  Escherichia coli    Culture - Surgical Swab (06.14.24 @ 12:07)   - Ertapenem: S <=0.5  - Gentamicin: S <=2  - Imipenem: S <=1  - Levofloxacin: S <=0.5  - Meropenem: S <=1  - Piperacillin/Tazobactam: S <=8  - Tobramycin: S <=2  - Trimethoprim/Sulfamethoxazole: S <=0.5/9.5  - Amoxicillin/Clavulanic Acid: S <=8/4  - Ampicillin: S <=8 These ampicillin results predict results for amoxicillin  - Ampicillin/Sulbactam: S <=4/2  - Aztreonam: S <=4  - Cefazolin: S <=2  - Cefepime: S <=2  - Cefoxitin: S <=8  - Ceftriaxone: S <=1  - Ciprofloxacin: S <=0.25  Specimen Source: .Surgical Swab RIGHT NECK CLT  Culture Results:   Growth in fluid media only Escherichia coli  Organism Identification: Escherichia coli  Organism: Escherichia coli  Method Type: MINESH      .Blood Blood-Peripheral  06-12-24   No growth at 5 days  --  --      .Blood Blood-Peripheral  06-12-24   No growth at 5 days  --  --      .Surgical Swab RIGHT NECK WOUND  06-11-24   Few Escherichia coli  --  Escherichia coli      .Blood Blood-Peripheral  06-08-24   No growth at 5 days  --  --      .Blood Blood-Venous  06-08-24   No growth at 5 days  --    Moderate polymorphonuclear leukocytes per low power field  No Squamous epithelial cells per low power field  Rare Gram Positive Rods seen per oil power field  Rare Gram Negative Rods seen per oil power field  Rare Gram positive cocci in pairs seen per oil power field          RADIOLOGY:

## 2024-06-24 NOTE — PROGRESS NOTE ADULT - SUBJECTIVE AND OBJECTIVE BOX
64F w/ PMH of depression and schizophrenia s/p R partial glossectomy in the setting of SCCA, R SND I-IV, L RFFF, L thigh STSG, tracheostomy (6/6/24). Pts post-operative course c/b venous congestion of flap w/ RTOR on 6/11/24 for free flap salvage and revision of venous anastamosis, RTOR on 6/14/24 for debridement of necrotic radial forearm flap and replacement with right ALT flap, with 7.5 trach exchanged to 7.0 cuffed trach.     Interval Events:   -NAEON. aVSS   -pt w/ improvement but still residual heat rash on the back/buttocks/extremities     Vital Signs Last 24 Hrs  T(C): 36.7 (24 Jun 2024 05:46), Max: 37.3 (23 Jun 2024 14:00)  T(F): 98 (24 Jun 2024 05:46), Max: 99.1 (23 Jun 2024 14:00)  HR: 108 (24 Jun 2024 05:46) (92 - 116)  BP: 133/77 (24 Jun 2024 05:46) (110/70 - 142/85)  BP(mean): --  RR: 18 (24 Jun 2024 05:46) (18 - 22)  SpO2: 99% (24 Jun 2024 05:46) (98% - 100%)    Parameters below as of 24 Jun 2024 05:46  Patient On (Oxygen Delivery Method): tracheostomy collar    I&O's Detail    23 Jun 2024 07:01  -  24 Jun 2024 07:00  --------------------------------------------------------  IN:    Enteral Tube Flush: 400 mL    Jevity 1.5: 1360 mL  Total IN: 1760 mL    OUT:    Voided (mL): 1300 mL  Total OUT: 1300 mL    Total NET: 460 mL    Medications:    MEDICATIONS  (STANDING):  acetaminophen   Oral Liquid .. 975 milliGRAM(s) Oral every 6 hours  acetylcysteine 10%  Inhalation 4 milliLiter(s) Inhalation every 6 hours  albuterol/ipratropium for Nebulization 3 milliLiter(s) Nebulizer every 6 hours  amLODIPine   Tablet 10 milliGRAM(s) Oral daily  AQUAPHOR (petrolatum Ointment) 1 Application(s) Topical daily  buPROPion . 300 milliGRAM(s) Oral <User Schedule>  buPROPion . 150 milliGRAM(s) Oral at bedtime  chlorhexidine 0.12% Liquid 15 milliLiter(s) Swish and Spit <User Schedule>  chlorhexidine 2% Cloths 1 Application(s) Topical daily  enoxaparin Injectable 40 milliGRAM(s) SubCutaneous every 24 hours  pantoprazole  Injectable 40 milliGRAM(s) IV Push daily  QUEtiapine 100 milliGRAM(s) Oral at bedtime  risperiDONE   Solution 1 milliGRAM(s) Oral <User Schedule>  risperiDONE   Solution 3 milliGRAM(s) Oral at bedtime  sodium chloride 0.9% lock flush 3 milliLiter(s) IV Push every 8 hours    MEDICATIONS  (PRN):      Labs:                          10.2   5.14  )-----------( 597      ( 24 Jun 2024 05:00 )             31.2       06-23    138  |  104  |  19  ----------------------------<  130<H>  3.9   |  22  |  0.54    Ca    8.3<L>      23 Jun 2024 06:00  Phos  2.8     06-23  Mg     2.00     06-23        PHYSICAL EXAM:  GEN: NAD  NEURO: AAOx3, drowsy   HEENT: neck incision clean, dry, and intact. flap warm and well perfused, good color  RLE: thigh soft, incision c/d/i.   LUE: Incision c/d/i.

## 2024-06-24 NOTE — PROGRESS NOTE ADULT - ASSESSMENT
LELA LOWERY is a 64yFemale s/p right ALT flap for revision hemiglossal recon 6/14    - monitor neck for ss infection or drug rash  - Aquaphor to left thigh donor site daily  - LUE skin graft dressings changed 6/22  - NPO with TF, f/u IR PEG placement, scheduled for 6/25  - Continue peridex  - Continue unasyn  - Appreciate care per primary    Plastic Surgery   [facility]: [id number]  [facility]: [phone]    LELA LOWERY is a 64yFemale s/p right ALT flap for revision hemiglossal recon 6/14    - monitor neck for ss infection or drug rash  - Aquaphor to left thigh donor site daily  - LUE skin graft dressings changed 6/24 -- will transition to aquaphor only on 6/25  - NPO with TF, f/u IR PEG placement, scheduled for 6/25  - Continue peridex  - Appreciate care per primary    [facility]  Plastic Surgery  #[phone] [facility] pager  [phone] [facility] pager  Available on teams

## 2024-06-24 NOTE — PROGRESS NOTE ADULT - SUBJECTIVE AND OBJECTIVE BOX
[facility] Division of Hospital Medicine  Yulia Condon MD  Pager (J-F, 2G-8O): [phone]  Other Times:  [phone]    Patient is a 64y old  Female who presents with a chief complaint of s/p surgery (21 Jun 2024 11:06)      SUBJECTIVE / OVERNIGHT EVENTS: BP ok ; NAEON       MEDICATIONS  (STANDING):  acetaminophen   Oral Liquid .. 975 milliGRAM(s) Oral every 6 hours  acetylcysteine 10%  Inhalation 4 milliLiter(s) Inhalation every 6 hours  albuterol/ipratropium for Nebulization 3 milliLiter(s) Nebulizer every 6 hours  amLODIPine   Tablet 10 milliGRAM(s) Oral daily  AQUAPHOR (petrolatum Ointment) 1 Application(s) Topical daily  buPROPion . 150 milliGRAM(s) Oral at bedtime  buPROPion . 300 milliGRAM(s) Oral <User Schedule>  chlorhexidine 0.12% Liquid 15 milliLiter(s) Swish and Spit <User Schedule>  chlorhexidine 2% Cloths 1 Application(s) Topical daily  pantoprazole  Injectable 40 milliGRAM(s) IV Push daily  QUEtiapine 100 milliGRAM(s) Oral at bedtime  risperiDONE   Solution 3 milliGRAM(s) Oral at bedtime  risperiDONE   Solution 1 milliGRAM(s) Oral <User Schedule>  sodium chloride 0.9% lock flush 3 milliLiter(s) IV Push every 8 hours    MEDICATIONS  (PRN):      CAPILLARY BLOOD GLUCOSE        I&O's Summary    23 Jun 2024 07:01  -  24 Jun 2024 07:00  --------------------------------------------------------  IN: 1760 mL / OUT: 1300 mL / NET: 460 mL    24 Jun 2024 07:01  -  24 Jun 2024 12:42  --------------------------------------------------------  IN: 0 mL / OUT: 50 mL / NET: -50 mL        PHYSICAL EXAM:  Vital Signs Last 24 Hrs  T(C): 36.6 (24 Jun 2024 09:51), Max: 37.3 (23 Jun 2024 14:00)  T(F): 97.9 (24 Jun 2024 09:51), Max: 99.1 (23 Jun 2024 14:00)  HR: 102 (24 Jun 2024 11:11) (95 - 116)  BP: 108/67 (24 Jun 2024 09:51) (108/67 - 142/85)  BP(mean): --  RR: 18 (24 Jun 2024 09:51) (18 - 22)  SpO2: 100% (24 Jun 2024 09:51) (98% - 100%)    Parameters below as of 24 Jun 2024 09:51  Patient On (Oxygen Delivery Method): tracheostomy collar      CONSTITUTIONAL: NAD  ENMT: +NGT  RESPIRATORY: Normal respiratory effort; lungs are clear to auscultation bilaterally  CARDIOVASCULAR: Regular rate and rhythm, normal S1 and S2, no murmur/rub/gallop; No lower extremity edema; Peripheral pulses are 2+ bilaterally  ABDOMEN: Nontender to palpation, normoactive bowel sounds, no rebound/guarding; No hepatosplenomegaly  MUSCULOSKELETAL:  Normal gait; no clubbing or cyanosis of digits; no joint swelling or tenderness to palpatione  NEUROLOGY: follows command moving all ext     LABS:                        10.2   5.14  )-----------( 597      ( 24 Jun 2024 05:00 )             31.2     06-24    138  |  102  |  14  ----------------------------<  96  4.0   |  24  |  0.58    Ca    8.6      24 Jun 2024 05:00  Phos  2.9     06-24  Mg     2.00     06-24            Urinalysis Basic - ( 24 Jun 2024 05:00 )    Color: x / Appearance: x / SG: x / pH: x  Gluc: 96 mg/dL / Ketone: x  / Bili: x / Urobili: x   Blood: x / Protein: x / Nitrite: x   Leuk Esterase: x / RBC: x / WBC x   Sq Epi: x / Non Sq Epi: x / Bacteria: x          RADIOLOGY & ADDITIONAL TESTS:  Results Reviewed:   Imaging Personally Reviewed:  Electrocardiogram Personally Reviewed:    COORDINATION OF CARE:  Care Discussed with Consultants/Other Providers [Y/N]:  Prior or Outpatient Records Reviewed [Y/N]:

## 2024-06-24 NOTE — PROGRESS NOTE ADULT - ASSESSMENT
65 yo woman with psych disorder, tongue cancer, s/p glossectomy partial 6/6 with flap reconstruction, trach    flap thrombosed and RTOR 6/11 and again 6/14 for revision      OR wound cultures from both 6/11 and 6/14 growing E coli  which is pansensitive     switched to unasyn 6/11 6/21 developed diffuse rash- likely drug rash  - suspect unasyn   improving     suggest:    monitor off antibiotics for now     had already completed 11 days post 6/11 revision and  8 days post 6/14 revision      if additional antibiotics needed would give flagyl and cipro with QT monitoring

## 2024-06-24 NOTE — PROGRESS NOTE ADULT - ASSESSMENT
64F w/ PMH of depression and schizophrenia s/p R partial glossectomy in the setting of SCCA, R SND I-IV, L RFFF, L thigh STSG, tracheostomy (6/6/24). Pts post-operative course c/b venous congestion of flap w/ RTOR on 6/11/24 for free flap salvage and revision of venous anastamosis, RTOR on 6/14/24 for debridement of necrotic radial forearm flap and replacement with right ALT flap, with 7.5 trach exchanged to 7.0 cuffed trach. Pt stepped down to the floor (6/17/24), Pt is progressing well.     Plan:  - IR PEG tube (6/25). Hold LVX 12h prior, NPO, pre-op labs   - reposition the patient Q4-5h intervals, clean/bath the patient daily for improvement of the heat rash  - cont TF  - engage CM for rehab placement   - Aquaphor to left thigh donor site daily    Xavier Gabriel  Oral and Maxillofacial Surgery  [facility] Pager #[phone]  [facility] Pager: [phone]  [facility] Pager: [phone]  Available on Teams

## 2024-06-24 NOTE — PROGRESS NOTE ADULT - SUBJECTIVE AND OBJECTIVE BOX
Plastic Surgery Progress Note (pg [facility]: [id number], [facility]: [phone])    SUBJECTIVE  The patient was seen and examined. No acute events overnight. Pain controlled, afebrile w/ stable vitals.     OBJECTIVE  ___________________________________________________  VITAL SIGNS / I&O's   Vital Signs Last 24 Hrs  T(C): 36.8 (23 Jun 2024 05:10), Max: 37.4 (23 Jun 2024 02:00)  T(F): 98.2 (23 Jun 2024 05:10), Max: 99.3 (23 Jun 2024 02:00)  HR: 96 (23 Jun 2024 05:10) (96 - 100)  BP: 119/74 (23 Jun 2024 05:10) (106/70 - 136/62)  BP(mean): --  RR: 17 (23 Jun 2024 05:10) (17 - 18)  SpO2: 100% (23 Jun 2024 05:10) (95% - 100%)    Parameters below as of 23 Jun 2024 05:10  Patient On (Oxygen Delivery Method): tracheostomy collar          22 Jun 2024 07:01  -  23 Jun 2024 07:00  --------------------------------------------------------  IN:    Enteral Tube Flush: 700 mL    Jevity 1.5: 1360 mL  Total IN: 2060 mL    OUT:    Voided (mL): 1000 mL  Total OUT: 1000 mL    Total NET: 1060 mL        ___________________________________________________  PHYSICAL EXAM    -- CONSTITUTIONAL: NAD, lying in bed  -- NEURO: Awake, alert  -- HEENT: NC/AT, mucous membranes moist  -- NECK: Neck soft no palpable fluid collection, diffuse maculopapular rash over neck less erythematous. Flap soft, warm, good color.   -- PULM: Non-labored respirations, equal chest rise bilaterally  -- ABDOMEN: Nondistended  -- RLE: thigh soft, Ace cdi  -- LUE: ace c/d/i, skin graft with good take  -- PSYCH: Affect normal, A&Ox3    ___________________________________________________  LABS                        10.1   5.86  )-----------( 628      ( 23 Jun 2024 06:00 )             30.7     23 Jun 2024 06:00    138    |  104    |  19     ----------------------------<  130    3.9     |  22     |  0.54     Ca    8.3        23 Jun 2024 06:00  Phos  2.8       23 Jun 2024 06:00  Mg     2.00      23 Jun 2024 06:00        CAPILLARY BLOOD GLUCOSE            Urinalysis Basic - ( 23 Jun 2024 06:00 )    Color: x / Appearance: x / SG: x / pH: x  Gluc: 130 mg/dL / Ketone: x  / Bili: x / Urobili: x   Blood: x / Protein: x / Nitrite: x   Leuk Esterase: x / RBC: x / WBC x   Sq Epi: x / Non Sq Epi: x / Bacteria: x      ___________________________________________________  MICRO  Recent Cultures:    ___________________________________________________  MEDICATIONS  (STANDING):  acetaminophen   Oral Liquid .. 975 milliGRAM(s) Oral every 6 hours  acetylcysteine 10%  Inhalation 4 milliLiter(s) Inhalation every 6 hours  albuterol/ipratropium for Nebulization 3 milliLiter(s) Nebulizer every 6 hours  amLODIPine   Tablet 10 milliGRAM(s) Oral daily  AQUAPHOR (petrolatum Ointment) 1 Application(s) Topical daily  buPROPion . 150 milliGRAM(s) Oral at bedtime  buPROPion . 300 milliGRAM(s) Oral <User Schedule>  chlorhexidine 0.12% Liquid 15 milliLiter(s) Swish and Spit <User Schedule>  chlorhexidine 2% Cloths 1 Application(s) Topical daily  diphenhydrAMINE 25 milliGRAM(s) Oral once  enoxaparin Injectable 40 milliGRAM(s) SubCutaneous every 24 hours  pantoprazole  Injectable 40 milliGRAM(s) IV Push daily  QUEtiapine 100 milliGRAM(s) Oral at bedtime  risperiDONE   Solution 1 milliGRAM(s) Oral <User Schedule>  risperiDONE   Solution 3 milliGRAM(s) Oral at bedtime  sodium chloride 0.9% lock flush 3 milliLiter(s) IV Push every 8 hours    MEDICATIONS  (PRN):   Plastic Surgery Progress Note (pg [facility]: [id number], [facility]: [phone])    SUBJECTIVE  The patient was seen and examined. No acute events overnight. Pain controlled, afebrile w/ stable vitals. LUE dressing changed this AM.    OBJECTIVE  ___________________________________________________  VITAL SIGNS / I&O's   Vital Signs Last 24 Hrs  T(C): 36.7 (24 Jun 2024 05:46), Max: 37.3 (23 Jun 2024 14:00)  T(F): 98 (24 Jun 2024 05:46), Max: 99.1 (23 Jun 2024 14:00)  HR: 108 (24 Jun 2024 05:46) (92 - 116)  BP: 133/77 (24 Jun 2024 05:46) (110/70 - 142/85)  BP(mean): --  ABP: --  ABP(mean): --  RR: 18 (24 Jun 2024 05:46) (18 - 22)  SpO2: 99% (24 Jun 2024 05:46) (98% - 100%)    O2 Parameters below as of 24 Jun 2024 05:46  Patient On (Oxygen Delivery Method): tracheostomy collar        I&O's Detail    23 Jun 2024 07:01  -  24 Jun 2024 07:00  --------------------------------------------------------  IN:    Enteral Tube Flush: 400 mL    Jevity 1.5: 1360 mL  Total IN: 1760 mL    OUT:    Voided (mL): 1300 mL  Total OUT: 1300 mL    Total NET: 460 mL      ___________________________________________________  PHYSICAL EXAM    -- CONSTITUTIONAL: NAD, lying in bed  -- NEURO: Awake, alert  -- HEENT: NC/AT, mucous membranes moist  -- NECK: Neck soft no palpable fluid collection, diffuse maculopapular rash over neck less erythematous. Flap soft, warm, good color.   -- PULM: Non-labored respirations, equal chest rise bilaterally  -- ABDOMEN: Nondistended  -- RLE: thigh soft, incision cdi  -- LUE: ace c/d/i, skin graft with good take  -- PSYCH: Affect normal, A&Ox3    ___________________________________________________  LABS                           10.2   5.14  )-----------( 597      ( 24 Jun 2024 05:00 )             31.2   06-24    138  |  102  |  14  ----------------------------<  96  4.0   |  24  |  0.58    Ca    8.6      24 Jun 2024 05:00  Phos  2.9     06-24  Mg     2.00     06-24        ___________________________________________________  MICRO  Recent Cultures:    ___________________________________________________  MEDICATIONS  (STANDING):  acetaminophen   Oral Liquid .. 975 milliGRAM(s) Oral every 6 hours  acetylcysteine 10%  Inhalation 4 milliLiter(s) Inhalation every 6 hours  albuterol/ipratropium for Nebulization 3 milliLiter(s) Nebulizer every 6 hours  amLODIPine   Tablet 10 milliGRAM(s) Oral daily  AQUAPHOR (petrolatum Ointment) 1 Application(s) Topical daily  buPROPion . 150 milliGRAM(s) Oral at bedtime  buPROPion . 300 milliGRAM(s) Oral <User Schedule>  chlorhexidine 0.12% Liquid 15 milliLiter(s) Swish and Spit <User Schedule>  chlorhexidine 2% Cloths 1 Application(s) Topical daily  diphenhydrAMINE 25 milliGRAM(s) Oral once  enoxaparin Injectable 40 milliGRAM(s) SubCutaneous every 24 hours  pantoprazole  Injectable 40 milliGRAM(s) IV Push daily  QUEtiapine 100 milliGRAM(s) Oral at bedtime  risperiDONE   Solution 1 milliGRAM(s) Oral <User Schedule>  risperiDONE   Solution 3 milliGRAM(s) Oral at bedtime  sodium chloride 0.9% lock flush 3 milliLiter(s) IV Push every 8 hours    MEDICATIONS  (PRN):

## 2024-06-25 ENCOUNTER — TRANSCRIPTION ENCOUNTER (OUTPATIENT)
Age: 64
End: 2024-06-25

## 2024-06-25 DIAGNOSIS — F20.9 SCHIZOPHRENIA, UNSPECIFIED: ICD-10-CM

## 2024-06-25 LAB
ADD ON TEST-SPECIMEN IN LAB: SIGNIFICANT CHANGE UP
ANION GAP SERPL CALC-SCNC: 12 MMOL/L — SIGNIFICANT CHANGE UP (ref 7–14)
APTT BLD: 29 SEC — SIGNIFICANT CHANGE UP (ref 24.5–35.6)
BUN SERPL-MCNC: 14 MG/DL — SIGNIFICANT CHANGE UP (ref 7–23)
CALCIUM SERPL-MCNC: 8.5 MG/DL — SIGNIFICANT CHANGE UP (ref 8.4–10.5)
CHLORIDE SERPL-SCNC: 104 MMOL/L — SIGNIFICANT CHANGE UP (ref 98–107)
CO2 SERPL-SCNC: 23 MMOL/L — SIGNIFICANT CHANGE UP (ref 22–31)
CREAT SERPL-MCNC: 0.65 MG/DL — SIGNIFICANT CHANGE UP (ref 0.5–1.3)
EGFR: 98 ML/MIN/1.73M2 — SIGNIFICANT CHANGE UP
GLUCOSE SERPL-MCNC: 99 MG/DL — SIGNIFICANT CHANGE UP (ref 70–99)
HCT VFR BLD CALC: 28.7 % — LOW (ref 34.5–45)
HGB BLD-MCNC: 9.6 G/DL — LOW (ref 11.5–15.5)
INR BLD: 0.97 RATIO — SIGNIFICANT CHANGE UP (ref 0.85–1.18)
MCHC RBC-ENTMCNC: 30.5 PG — SIGNIFICANT CHANGE UP (ref 27–34)
MCHC RBC-ENTMCNC: 33.4 GM/DL — SIGNIFICANT CHANGE UP (ref 32–36)
MCV RBC AUTO: 91.1 FL — SIGNIFICANT CHANGE UP (ref 80–100)
NRBC # BLD: 0 /100 WBCS — SIGNIFICANT CHANGE UP (ref 0–0)
NRBC # FLD: 0 K/UL — SIGNIFICANT CHANGE UP (ref 0–0)
PLATELET # BLD AUTO: 515 K/UL — HIGH (ref 150–400)
POTASSIUM SERPL-MCNC: 4.3 MMOL/L — SIGNIFICANT CHANGE UP (ref 3.5–5.3)
POTASSIUM SERPL-SCNC: 4.3 MMOL/L — SIGNIFICANT CHANGE UP (ref 3.5–5.3)
PROTHROM AB SERPL-ACNC: 11 SEC — SIGNIFICANT CHANGE UP (ref 9.5–13)
RBC # BLD: 3.15 M/UL — LOW (ref 3.8–5.2)
RBC # FLD: 15.9 % — HIGH (ref 10.3–14.5)
SODIUM SERPL-SCNC: 139 MMOL/L — SIGNIFICANT CHANGE UP (ref 135–145)
T4 FREE SERPL-MCNC: 1.2 NG/DL — SIGNIFICANT CHANGE UP (ref 0.9–1.8)
TSH SERPL-MCNC: 9.89 UIU/ML — HIGH (ref 0.27–4.2)
WBC # BLD: 3.98 K/UL — SIGNIFICANT CHANGE UP (ref 3.8–10.5)
WBC # FLD AUTO: 3.98 K/UL — SIGNIFICANT CHANGE UP (ref 3.8–10.5)

## 2024-06-25 PROCEDURE — 49440 PLACE GASTROSTOMY TUBE PERC: CPT

## 2024-06-25 PROCEDURE — 99231 SBSQ HOSP IP/OBS SF/LOW 25: CPT

## 2024-06-25 PROCEDURE — 99232 SBSQ HOSP IP/OBS MODERATE 35: CPT

## 2024-06-25 RX ORDER — PETROLATUM 42 G/100G
1 OINTMENT TOPICAL DAILY
Refills: 0 | Status: DISCONTINUED | OUTPATIENT
Start: 2024-06-25 | End: 2024-07-10

## 2024-06-25 RX ORDER — FENTANYL CITRATE 50 UG/ML
50 INJECTION, SOLUTION INTRAMUSCULAR; INTRAVENOUS
Refills: 0 | Status: DISCONTINUED | OUTPATIENT
Start: 2024-06-25 | End: 2024-06-26

## 2024-06-25 RX ORDER — FENTANYL CITRATE 50 UG/ML
25 INJECTION, SOLUTION INTRAMUSCULAR; INTRAVENOUS
Refills: 0 | Status: DISCONTINUED | OUTPATIENT
Start: 2024-06-25 | End: 2024-06-26

## 2024-06-25 RX ORDER — ONDANSETRON HYDROCHLORIDE 2 MG/ML
4 INJECTION INTRAMUSCULAR; INTRAVENOUS ONCE
Refills: 0 | Status: DISCONTINUED | OUTPATIENT
Start: 2024-06-25 | End: 2024-06-26

## 2024-06-25 RX ADMIN — IPRATROPIUM BROMIDE AND ALBUTEROL SULFATE 3 MILLILITER(S): .5; 3 SOLUTION RESPIRATORY (INHALATION) at 21:09

## 2024-06-25 RX ADMIN — Medication 15 MILLILITER(S): at 23:47

## 2024-06-25 RX ADMIN — Medication 3 MILLILITER(S): at 16:23

## 2024-06-25 RX ADMIN — Medication 15 MILLILITER(S): at 19:21

## 2024-06-25 RX ADMIN — Medication 3 MILLILITER(S): at 05:23

## 2024-06-25 RX ADMIN — Medication 975 MILLIGRAM(S): at 00:28

## 2024-06-25 RX ADMIN — IPRATROPIUM BROMIDE AND ALBUTEROL SULFATE 3 MILLILITER(S): .5; 3 SOLUTION RESPIRATORY (INHALATION) at 03:54

## 2024-06-25 RX ADMIN — ACETYLCYSTEINE 4 MILLILITER(S): 200 SOLUTION ORAL; RESPIRATORY (INHALATION) at 15:44

## 2024-06-25 RX ADMIN — Medication 975 MILLIGRAM(S): at 19:20

## 2024-06-25 RX ADMIN — ACETYLCYSTEINE 4 MILLILITER(S): 200 SOLUTION ORAL; RESPIRATORY (INHALATION) at 04:21

## 2024-06-25 RX ADMIN — ACETYLCYSTEINE 4 MILLILITER(S): 200 SOLUTION ORAL; RESPIRATORY (INHALATION) at 21:10

## 2024-06-25 RX ADMIN — Medication 975 MILLIGRAM(S): at 01:00

## 2024-06-25 RX ADMIN — Medication 3 MILLILITER(S): at 21:48

## 2024-06-25 RX ADMIN — PANTOPRAZOLE SODIUM 40 MILLIGRAM(S): 40 INJECTION, POWDER, FOR SOLUTION INTRAVENOUS at 15:50

## 2024-06-25 RX ADMIN — Medication 15 MILLILITER(S): at 06:03

## 2024-06-25 RX ADMIN — IPRATROPIUM BROMIDE AND ALBUTEROL SULFATE 3 MILLILITER(S): .5; 3 SOLUTION RESPIRATORY (INHALATION) at 15:43

## 2024-06-25 NOTE — PROGRESS NOTE ADULT - PROBLEM SELECTOR PLAN 4
-likely secondary to post-op changes; pt hemodynamically stable;   trend CBC, transfuse prn to keep hgb>7

## 2024-06-25 NOTE — PROGRESS NOTE ADULT - SUBJECTIVE AND OBJECTIVE BOX
LELA LOWERY  MRN-[id number]  [facility]      64F w/ PMH of depression and schizophrenia s/p R partial glossectomy in the setting of SCCA, R SND I-IV, L RFFF, L thigh STSG, tracheostomy (6/6/24). Pts post-operative course c/b venous congestion of flap w/ RTOR on 6/11/24 for free flap salvage and revision of venous anastamosis, RTOR on 6/14/24 for debridement of necrotic radial forearm flap and replacement with right ALT flap, with 7.5 trach exchanged to 7.0 cuffed trach.     Interval Events:   -NAEON. aVSS   -pt w/ improvement but still residual heat rash on the back/buttocks/extremities  General: aaox3. well-appearing. no apparent distress.     HEENT:  Head: normocephalic  E: normal hearing  E: PERRL, no conjunctival hemmorage noted  N: nares clear, minimal heme consistent with post-operative bleeding  T: neck soft and supple, no sings of indurations. no LAD    Intraoral exam:        Vitals:    Vital Signs Last 24 Hrs  T(C): 36.6 (25 Jun 2024 06:00), Max: 37.2 (24 Jun 2024 14:00)  T(F): 97.9 (25 Jun 2024 06:00), Max: 98.9 (24 Jun 2024 14:00)  HR: 98 (25 Jun 2024 06:00) (92 - 105)  BP: 136/80 (25 Jun 2024 06:00) (108/67 - 140/86)  BP(mean): --  RR: 18 (25 Jun 2024 06:00) (18 - 22)  SpO2: 100% (25 Jun 2024 06:00) (97% - 100%)    Parameters below as of 25 Jun 2024 06:00  Patient On (Oxygen Delivery Method): tracheostomy collar  O2 Flow (L/min): 6  O2 Concentration (%): 28    I&O's Detail    24 Jun 2024 07:01  -  25 Jun 2024 07:00  --------------------------------------------------------  IN:    Enteral Tube Flush: 200 mL    Jevity 1.5: 680 mL    Lactated Ringers: 700 mL  Total IN: 1580 mL    OUT:    Voided (mL): 900 mL  Total OUT: 900 mL    Total NET: 680 mL            Medications:    MEDICATIONS  (STANDING):  acetaminophen   Oral Liquid .. 975 milliGRAM(s) Oral every 6 hours  acetylcysteine 10%  Inhalation 4 milliLiter(s) Inhalation every 6 hours  albuterol/ipratropium for Nebulization 3 milliLiter(s) Nebulizer every 6 hours  amLODIPine   Tablet 10 milliGRAM(s) Oral daily  AQUAPHOR (petrolatum Ointment) 1 Application(s) Topical daily  buPROPion . 150 milliGRAM(s) Oral at bedtime  buPROPion . 300 milliGRAM(s) Oral <User Schedule>  chlorhexidine 0.12% Liquid 15 milliLiter(s) Swish and Spit <User Schedule>  chlorhexidine 2% Cloths 1 Application(s) Topical daily  lactated ringers. 1000 milliLiter(s) (100 mL/Hr) IV Continuous <Continuous>  pantoprazole  Injectable 40 milliGRAM(s) IV Push daily  QUEtiapine 100 milliGRAM(s) Oral at bedtime  risperiDONE   Solution 3 milliGRAM(s) Oral at bedtime  risperiDONE   Solution 1 milliGRAM(s) Oral <User Schedule>  sodium chloride 0.9% lock flush 3 milliLiter(s) IV Push every 8 hours    MEDICATIONS  (PRN):        Labs:                          9.6    3.98  )-----------( 515      ( 25 Jun 2024 05:15 )             28.7       06-25    139  |  104  |  14  ----------------------------<  99  4.3   |  23  |  0.65    Ca    8.5      25 Jun 2024 05:15  Phos  2.9     06-24  Mg     2.00     06-24        BMI: BMI (kg/m2): 22.3 (06-14-24 @ 06:41)  HbA1c: A1C with Estimated Average Glucose Result: 4.9 % (06-06-24 @ 18:30)    Glucose: POCT Blood Glucose.: 91 mg/dL (06-09-24 @ 08:35)    BP: 136/80 (06-25-24 @ 06:00) (106/70 - 142/85)Vital Signs Last 24 Hrs  T(C): 36.6 (06-25-24 @ 06:00), Max: 37.2 (06-24-24 @ 14:00)  T(F): 97.9 (06-25-24 @ 06:00), Max: 98.9 (06-24-24 @ 14:00)  HR: 98 (06-25-24 @ 06:00) (92 - 105)  BP: 136/80 (06-25-24 @ 06:00) (108/67 - 140/86)  BP(mean): --  RR: 18 (06-25-24 @ 06:00) (18 - 22)  SpO2: 100% (06-25-24 @ 06:00) (97% - 100%)      Lipid Panel:     PT/INR - ( 25 Jun 2024 05:15 )   PT: 11.0 sec;   INR: 0.97 ratio         PTT - ( 25 Jun 2024 05:15 )  PTT:29.0 sec    CAPILLARY BLOOD GLUCOSE              Urinalysis Basic - ( 25 Jun 2024 05:15 )    Color: x / Appearance: x / SG: x / pH: x  Gluc: 99 mg/dL / Ketone: x  / Bili: x / Urobili: x   Blood: x / Protein: x / Nitrite: x   Leuk Esterase: x / RBC: x / WBC x   Sq Epi: x / Non Sq Epi: x / Bacteria: x

## 2024-06-25 NOTE — PROGRESS NOTE ADULT - SUBJECTIVE AND OBJECTIVE BOX
Follow Up:      Interval History/ROS:   no complaint      Allergies  penicillins (Unknown)        ANTIMICROBIALS:   unasyn held 6/21 due to rash         MEDICATIONS  (STANDING):  acetaminophen   Oral Liquid .. 975 every 6 hours  acetylcysteine 10%  Inhalation 4 every 6 hours  albuterol/ipratropium for Nebulization 3 every 6 hours  amLODIPine   Tablet 10 daily  buPROPion . 300 <User Schedule>  buPROPion . 150 at bedtime  pantoprazole  Injectable 40 daily  QUEtiapine 100 at bedtime  risperiDONE   Solution 1 <User Schedule>  risperiDONE   Solution 3 at bedtime      Vital Signs Last 24 Hrs  T(F): 98.7 (06-25-24 @ 11:05), Max: 98.7 (06-25-24 @ 11:05)  HR: 97 (06-25-24 @ 15:52)  BP: 155/84 (06-25-24 @ 12:30)  RR: 19 (06-25-24 @ 12:30)  SpO2: 98% (06-25-24 @ 15:52) (97% - 100%)      PHYSICAL EXAM:  Constitutional: Non toxic no acute distress  Eyes: No icterus.  NGT  Neck: trach, neck wound closed  RS: no respiratory distress  CVS: s1s2  Abdomen: Soft.   : no paez  Extremities: dressing left forearm, right  leg   rash mac/ pap back trunk extremities fading                             9.6    3.98  )-----------( 515      ( 25 Jun 2024 05:15 )             28.7 06-25    139  |  104  |  14  ----------------------------<  99  4.3   |  23  |  0.65  Ca    8.5      25 Jun 2024 05:15Phos  2.9     06-24Mg     2.00     06-24          MICROBIOLOGY:    .Smear RIGHT NECK CLT  06-14-24   Culture is being performed. Fungal cultures are held for 4 weeks.  --     Culture - Surgical Swab (06.14.24 @ 12:07)   - Ertapenem: S <=0.5  - Gentamicin: S <=2  - Imipenem: S <=1  - Levofloxacin: S <=0.5  - Meropenem: S <=1  - Piperacillin/Tazobactam: S <=8  - Tobramycin: S <=2  - Trimethoprim/Sulfamethoxazole: S <=0.5/9.5  - Amoxicillin/Clavulanic Acid: S <=8/4  - Ampicillin: S <=8 These ampicillin results predict results for amoxicillin  - Ampicillin/Sulbactam: S <=4/2  - Aztreonam: S <=4  - Cefazolin: S <=2  - Cefepime: S <=2  - Cefoxitin: S <=8  - Ceftriaxone: S <=1  - Ciprofloxacin: S <=0.25  Specimen Source: .Surgical Swab RIGHT NECK CLT  Culture Results:   Growth in fluid media only Escherichia coli  Organism Identification: Escherichia coli  Organism: Escherichia coli  Method Type: MINESH      .Blood Blood-Peripheral  06-12-24   No growth at 5 days  --  --      .Blood Blood-Peripheral  06-12-24   No growth at 5 days  --  --      .Surgical Swab RIGHT NECK WOUND  06-11-24   Few Escherichia coli  --  Escherichia coli      .Blood Blood-Peripheral  06-08-24   No growth at 5 days  --  --      .Blood Blood-Venous  06-08-24   No growth at 5 days  --            RADIOLOGY:

## 2024-06-25 NOTE — CHART NOTE - NSCHARTNOTEFT_GEN_A_CORE
Nutrition Follow Up Note. Per chart review, 63 y/o female with schizophrenia and depression s/p R partial glossectomy in the setting of SCCA, R SND I-IV, L RFFF, L thigh STSG, tracheostomy (6/6/24). Pts post-operative course c/b venous congestion of flap w/ RTOR on 6/11 for free flap salvage and revision of venous anastamosis. Flap now infected with E Coli also without flow. RTOR 6/14 for exploration of tongue free flap, muscle flap revision with right thigh flap, 7.5 trach exchanged to 7.0 cuffed trach. S/p SICU stay for flap checks.  Pt stepped down to the floor (8S)(6/18/24).      Patient NPO for PEG placement. She was previously maintained on NGT of Jevity1.5 bolus feeds 340mL [id number] provides total volume of 1360mL, 2040mL, 87g pro, 1034mL free water. Tolerating tube feeds well. No nausea/vomiting/diarrhea/constipation. Last documented on 6/22. Failed swallow bedside evaluation on 6/19- recommended to "consider NPO with continuation of short term non-oral means of nutrition/ hydration/ medication via NGT in place." IR consult for PEG per OMFS team note on 6/20. When PEG feeds initiated, patient may resume current TF regimen.       Diet Prescription: Diet, NPO:   NPO for Procedure/Test     NPO Start Date: 24-Jun-2024,   NPO Start Time: 23:00 (06-24-24 @ 18:24)    Food Allergy: NKFA    Pertinent Medications: MEDICATIONS  (STANDING):  acetaminophen   Oral Liquid .. 975 milliGRAM(s) Oral every 6 hours  acetylcysteine 10%  Inhalation 4 milliLiter(s) Inhalation every 6 hours  albuterol/ipratropium for Nebulization 3 milliLiter(s) Nebulizer every 6 hours  amLODIPine   Tablet 10 milliGRAM(s) Oral daily  AQUAPHOR (petrolatum Ointment) 1 Application(s) Topical daily  AQUAPHOR (petrolatum Ointment) 1 Application(s) Topical daily  buPROPion . 150 milliGRAM(s) Oral at bedtime  buPROPion . 300 milliGRAM(s) Oral <User Schedule>  chlorhexidine 0.12% Liquid 15 milliLiter(s) Swish and Spit <User Schedule>  chlorhexidine 2% Cloths 1 Application(s) Topical daily  lactated ringers. 1000 milliLiter(s) (100 mL/Hr) IV Continuous <Continuous>  pantoprazole  Injectable 40 milliGRAM(s) IV Push daily  QUEtiapine 100 milliGRAM(s) Oral at bedtime  risperiDONE   Solution 1 milliGRAM(s) Oral <User Schedule>  risperiDONE   Solution 3 milliGRAM(s) Oral at bedtime  sodium chloride 0.9% lock flush 3 milliLiter(s) IV Push every 8 hours    MEDICATIONS  (PRN):  fentaNYL    Injectable 50 MICROGram(s) IV Push every 30 minutes PRN Severe Pain (7 - 10)  fentaNYL    Injectable 25 MICROGram(s) IV Push every 5 minutes PRN Moderate Pain (4 - 6)  ondansetron Injectable 4 milliGRAM(s) IV Push once PRN Nausea and/or Vomiting    Pertinent Labs: 06-25 Na139 mmol/L Glu 99 mg/dL K+ 4.3 mmol/L Cr  0.65 mg/dL BUN 14 mg/dL 06-24 Phos 2.9 mg/dL       Weight (kg): dosing weights: 59 (06-14 @ 06:41)  59kg (6/6)  59kg (5/30)  Fluctuating daily weights documented during the course of admission-?question accuracy  Daily weights (kg): 6/17- 69.8kg   6/16-68.4kg  6/15-68.4kg  6/14-71.2kg  6/9-65.2kg  6/6-72.3kg   Weight Assessment:  Height: 162.6cm  IBW: 54.5 kg +/-10%  BMI: 22.3kg/m^2    Edema: no edema per nursing flowsheets.  Pressure Injury: No pressure ulcers/DTI noted in flowsheets.       EEstimated Needs:   [X] No change since previous assessment, based on dosing weight 59kg    Estimated Energy Needs (kcal/kg):   30-35 = 1770-2065kcal/d  Estimated Protein Needs(g/kg):      1.2-1.5 = 71-89g pro/d    [ ] recalculated, with consideration for, based on weight    Previous Nutrition Diagnosis:    [X ] Increased Nutrient Needs   Nutrition Diagnosis is [X ] ongoing  [ ] resolved [ ] not applicable     [X]  Inadequate protein energy intake, related to unable to tolerate po intake As evidenced by dysphagia  Nutrition Diagnosis is [ ] ongoing  [ ] resolved [X ] not applicable       New Nutrition Diagnosis: [ X] not applicable     Education:  [ ] Provided  [ ] Provided on previous assessment by RD  [X ] Not applicable secondary to patient is NPO for peg placement  [ ] Pt refused       Nutrition Interventions:   1) When PEG feeds initiated, patient may continue with current TF regimen as mentioned above. Additional fluids per MD discretion.  3) Monitor weights, labs, BM's, skin integrity, tolerance to EN.  4) Further adjustments to rate/volume/duration/free water provision of enteral feeds dependant on long term monitoring of patient's tolerance, weight trends and needs   5) RN to obtain new weight and weekly weights.      RD remains available.      Helio Wright RD, MS, CDN pager [phone]  Also available on [employer] Teams Nutrition Follow Up Note. Per chart review, 63 y/o female with schizophrenia and depression s/p R partial glossectomy in the setting of SCCA, R SND I-IV, L RFFF, L thigh STSG, tracheostomy (6/6/24). Pts post-operative course c/b venous congestion of flap w/ RTOR on 6/11 for free flap salvage and revision of venous anastamosis. Flap now infected with E Coli also without flow. RTOR 6/14 for exploration of tongue free flap, muscle flap revision with right thigh flap, 7.5 trach exchanged to 7.0 cuffed trach. S/p SICU stay for flap checks.  Pt stepped down to the floor (8S)(6/18/24).      Patient NPO for PEG placement. She was previously maintained on NGT of Jevity1.5 bolus feeds 340mL [id number] provides total volume of 1360mL, 2040mL, 87g pro, 1034mL free water. Tolerating tube feeds well. No nausea/vomiting/diarrhea/constipation reported. Last documented on 6/22. Failed swallow bedside evaluation on 6/19- recommended to "consider NPO with continuation of short term non-oral means of nutrition/ hydration/ medication via NGT in place." IR consult for PEG per OMFS team note on 6/20. When PEG feeds initiated, patient may resume tube feeds regimen previous ordered as mentioned above.      Diet Prescription: Diet, NPO:   NPO for Procedure/Test     NPO Start Date: 24-Jun-2024,   NPO Start Time: 23:00 (06-24-24 @ 18:24)    Food Allergy: NKFA    Pertinent Medications: MEDICATIONS  (STANDING):  acetaminophen   Oral Liquid .. 975 milliGRAM(s) Oral every 6 hours  acetylcysteine 10%  Inhalation 4 milliLiter(s) Inhalation every 6 hours  albuterol/ipratropium for Nebulization 3 milliLiter(s) Nebulizer every 6 hours  amLODIPine   Tablet 10 milliGRAM(s) Oral daily  AQUAPHOR (petrolatum Ointment) 1 Application(s) Topical daily  AQUAPHOR (petrolatum Ointment) 1 Application(s) Topical daily  buPROPion . 150 milliGRAM(s) Oral at bedtime  buPROPion . 300 milliGRAM(s) Oral <User Schedule>  chlorhexidine 0.12% Liquid 15 milliLiter(s) Swish and Spit <User Schedule>  chlorhexidine 2% Cloths 1 Application(s) Topical daily  lactated ringers. 1000 milliLiter(s) (100 mL/Hr) IV Continuous <Continuous>  pantoprazole  Injectable 40 milliGRAM(s) IV Push daily  QUEtiapine 100 milliGRAM(s) Oral at bedtime  risperiDONE   Solution 1 milliGRAM(s) Oral <User Schedule>  risperiDONE   Solution 3 milliGRAM(s) Oral at bedtime  sodium chloride 0.9% lock flush 3 milliLiter(s) IV Push every 8 hours    MEDICATIONS  (PRN):  fentaNYL    Injectable 50 MICROGram(s) IV Push every 30 minutes PRN Severe Pain (7 - 10)  fentaNYL    Injectable 25 MICROGram(s) IV Push every 5 minutes PRN Moderate Pain (4 - 6)  ondansetron Injectable 4 milliGRAM(s) IV Push once PRN Nausea and/or Vomiting    Pertinent Labs: 06-25 Na139 mmol/L Glu 99 mg/dL K+ 4.3 mmol/L Cr  0.65 mg/dL BUN 14 mg/dL 06-24 Phos 2.9 mg/dL       Weight (kg): dosing weights: 59 (06-14 @ 06:41)  59kg (6/6)  59kg (5/30)  Fluctuating daily weights documented during the course of admission-?question accuracy  Daily weights (kg): 6/17- 69.8kg   6/16-68.4kg  6/15-68.4kg  6/14-71.2kg  6/9-65.2kg  6/6-72.3kg   Weight Assessment:  Height: 162.6cm  IBW: 54.5 kg +/-10%  BMI: 22.3kg/m^2    Edema: no edema per nursing flowsheets.  Pressure Injury: No pressure ulcers/DTI noted in flowsheets.       EEstimated Needs:   [X] No change since previous assessment, based on dosing weight 59kg    Estimated Energy Needs (kcal/kg):   30-35 = 1770-2065kcal/d  Estimated Protein Needs(g/kg):      1.2-1.5 = 71-89g pro/d    [ ] recalculated, with consideration for, based on weight    Previous Nutrition Diagnosis:    [X ] Increased Nutrient Needs   Nutrition Diagnosis is [X ] ongoing  [ ] resolved [ ] not applicable     [X]  Inadequate protein energy intake, related to unable to tolerate po intake As evidenced by dysphagia  Nutrition Diagnosis is [ ] ongoing  [ ] resolved [X ] not applicable       New Nutrition Diagnosis: [ X] not applicable     Education:  [ ] Provided  [ ] Provided on previous assessment by RD  [X ] Not applicable secondary to patient is NPO for peg placement  [ ] Pt refused       Nutrition Interventions:   1) When PEG feeds initiated, patient may continue with current TF regimen as mentioned above. Additional fluids per MD discretion.  3) Monitor weights, labs, BM's, skin integrity, tolerance to EN.  4) Further adjustments to rate/volume/duration/free water provision of enteral feeds dependant on long term monitoring of patient's tolerance, weight trends and needs   5) RN to obtain new weight and weekly weights.      RD remains available.      Helio Wright RD, MS, CDN pager [phone]  Also available on [employer] Teams Nutrition Follow Up Note. Per chart review, 63 y/o female with schizophrenia and depression s/p R partial glossectomy in the setting of SCCA, R SND I-IV, L RFFF, L thigh STSG, tracheostomy (6/6/24). Pts post-operative course c/b venous congestion of flap w/ RTOR on 6/11 for free flap salvage and revision of venous anastamosis. Flap now infected with E Coli also without flow. RTOR 6/14 for exploration of tongue free flap, muscle flap revision with right thigh flap, 7.5 trach exchanged to 7.0 cuffed trach. S/p SICU stay for flap checks.  Pt stepped down to the floor (8S)(6/18/24).      Patient NPO for PEG placement. She was previously maintained on NGT of Jevity1.5 bolus feeds 340mL [id number] provides total volume of 1360mL, 2040kcal, 87g pro, 1034mL free water. Tolerating tube feeds well. No nausea/vomiting/diarrhea/constipation reported. Last documented on 6/22. Failed swallow bedside evaluation on 6/19- recommended to "consider NPO with continuation of short term non-oral means of nutrition/ hydration/ medication via NGT in place." IR consult for PEG per OMFS team note on 6/20. When PEG feeds initiated, patient may resume tube feeds regimen previous ordered as mentioned above.      Diet Prescription: Diet, NPO:   NPO for Procedure/Test     NPO Start Date: 24-Jun-2024,   NPO Start Time: 23:00 (06-24-24 @ 18:24)    Food Allergy: NKFA    Pertinent Medications: MEDICATIONS  (STANDING):  acetaminophen   Oral Liquid .. 975 milliGRAM(s) Oral every 6 hours  acetylcysteine 10%  Inhalation 4 milliLiter(s) Inhalation every 6 hours  albuterol/ipratropium for Nebulization 3 milliLiter(s) Nebulizer every 6 hours  amLODIPine   Tablet 10 milliGRAM(s) Oral daily  AQUAPHOR (petrolatum Ointment) 1 Application(s) Topical daily  AQUAPHOR (petrolatum Ointment) 1 Application(s) Topical daily  buPROPion . 150 milliGRAM(s) Oral at bedtime  buPROPion . 300 milliGRAM(s) Oral <User Schedule>  chlorhexidine 0.12% Liquid 15 milliLiter(s) Swish and Spit <User Schedule>  chlorhexidine 2% Cloths 1 Application(s) Topical daily  lactated ringers. 1000 milliLiter(s) (100 mL/Hr) IV Continuous <Continuous>  pantoprazole  Injectable 40 milliGRAM(s) IV Push daily  QUEtiapine 100 milliGRAM(s) Oral at bedtime  risperiDONE   Solution 1 milliGRAM(s) Oral <User Schedule>  risperiDONE   Solution 3 milliGRAM(s) Oral at bedtime  sodium chloride 0.9% lock flush 3 milliLiter(s) IV Push every 8 hours    MEDICATIONS  (PRN):  fentaNYL    Injectable 50 MICROGram(s) IV Push every 30 minutes PRN Severe Pain (7 - 10)  fentaNYL    Injectable 25 MICROGram(s) IV Push every 5 minutes PRN Moderate Pain (4 - 6)  ondansetron Injectable 4 milliGRAM(s) IV Push once PRN Nausea and/or Vomiting    Pertinent Labs: 06-25 Na139 mmol/L Glu 99 mg/dL K+ 4.3 mmol/L Cr  0.65 mg/dL BUN 14 mg/dL 06-24 Phos 2.9 mg/dL       Weight (kg): dosing weights: 59 (06-14 @ 06:41)  59kg (6/6)  59kg (5/30)  Fluctuating daily weights documented during the course of admission-?question accuracy  Daily weights (kg): 6/17- 69.8kg   6/16-68.4kg  6/15-68.4kg  6/14-71.2kg  6/9-65.2kg  6/6-72.3kg   Weight Assessment:  Height: 162.6cm  IBW: 54.5 kg +/-10%  BMI: 22.3kg/m^2    Edema: no edema per nursing flowsheets.  Pressure Injury: No pressure ulcers/DTI noted in flowsheets.       EEstimated Needs:   [X] No change since previous assessment, based on dosing weight 59kg    Estimated Energy Needs (kcal/kg):   30-35 = 1770-2065kcal/d  Estimated Protein Needs(g/kg):      1.2-1.5 = 71-89g pro/d    [ ] recalculated, with consideration for, based on weight    Previous Nutrition Diagnosis:    [X ] Increased Nutrient Needs   Nutrition Diagnosis is [X ] ongoing  [ ] resolved [ ] not applicable     [X]  Inadequate protein energy intake, related to unable to tolerate po intake As evidenced by dysphagia  Nutrition Diagnosis is [ ] ongoing  [ ] resolved [X ] not applicable       New Nutrition Diagnosis: [ X] not applicable     Education:  [ ] Provided  [ ] Provided on previous assessment by RD  [X ] Not applicable secondary to patient is NPO for peg placement  [ ] Pt refused       Nutrition Interventions:   1) When PEG feeds initiated, patient may continue with current TF regimen as mentioned above. Additional fluids per MD discretion.  3) Monitor weights, labs, BM's, skin integrity, tolerance to EN.  4) Further adjustments to rate/volume/duration/free water provision of enteral feeds dependant on long term monitoring of patient's tolerance, weight trends and needs   5) RN to obtain new weight and weekly weights.      RD remains available.      Helio Wright RD, MS, CDN pager [phone]  Also available on [employer] Teams

## 2024-06-25 NOTE — PROGRESS NOTE ADULT - PROBLEM SELECTOR PLAN 3
F/w [facility], on wellbutrin, risperidone, seroquel  trend ECG, hold antipsychotics if QTc>500ms.

## 2024-06-25 NOTE — PROGRESS NOTE ADULT - ASSESSMENT
63 yo woman with psych disorder, tongue cancer, s/p glossectomy partial 6/6 with flap reconstruction, trach    flap thrombosed and RTOR 6/11 and again 6/14 for revision      OR wound cultures from both 6/11 and 6/14 growing E coli  which is pansensitive     switched to unasyn 6/11 6/21 developed diffuse rash- likely drug rash  - suspect unasyn   improving     suggest:    monitor off antibiotics for now     had already completed 11 days post 6/11 revision and  8 days post 6/14 revision      if additional antibiotics needed would give flagyl and cipro with QT monitoring

## 2024-06-25 NOTE — CHART NOTE - NSCHARTNOTEFT_GEN_A_CORE
PRE-INTERVENTIONAL RADIOLOGY PROCEDURE NOTE      Patient Age: 64y    Patient Gender: Female    Procedure: PEG Placement    Diagnosis/Indication: s/p R partial glossectomy in the setting of SCCA, R SND I-IV, L RFFF, L thigh STSG, tracheostomy (6/6/24). Pts post-operative course c/b venous congestion of flap w/ RTOR on 6/11/24 for free flap salvage and revision of venous anastamosis, RTOR on 6/14/24 for debridement of necrotic radial forearm flap and replacement with right ALT flap, with 7.5 trach exchanged to 7.0 cuffed trach.     Interventional Radiology Attending Physician:     Ordering Attending Physician: Dr. Lux Lockett    Pertinent labs:                      9.6    3.98  )-----------( 515      ( 25 Jun 2024 05:15 )             28.7       06-25    139  |  104  |  14  ----------------------------<  99  4.3   |  23  |  0.65      Xavier Gabriel  Oral and Maxillofacial Surgery  [facility] Pager #[phone]  [facility] Pager: [phone]  [facility] Pager: [phone]  Available on Teams    Ca    8.5      25 Jun 2024 05:15  Phos  2.9     06-24  Mg     2.00     06-24        PT/INR - ( 25 Jun 2024 05:15 )   PT: 11.0 sec;   INR: 0.97 ratio         PTT - ( 25 Jun 2024 05:15 )  PTT:29.0 sec        Patient and Family Aware ? Yes PRE-INTERVENTIONAL RADIOLOGY PROCEDURE NOTE      Patient Age: 64y    Patient Gender: Female    Procedure: PEG Placement    Diagnosis/Indication: s/p R partial glossectomy in the setting of SCCA, R SND I-IV, L RFFF, L thigh STSG, tracheostomy (6/6/24). Pts post-operative course c/b venous congestion of flap w/ RTOR on 6/11/24 for free flap salvage and revision of venous anastamosis, RTOR on 6/14/24 for debridement of necrotic radial forearm flap and replacement with right ALT flap, with 7.5 trach exchanged to 7.0 cuffed trach.     Interventional Radiology Attending Physician: Dr. Patricia Acosta    Ordering Attending Physician: Dr. Lux Lockett    Pertinent labs:                      9.6    3.98  )-----------( 515      ( 25 Jun 2024 05:15 )             28.7       06-25    139  |  104  |  14  ----------------------------<  99  4.3   |  23  |  0.65      Xavier Gabriel  Oral and Maxillofacial Surgery  [facility] Pager #[phone]  [facility] Pager: [phone]  [facility] Pager: [phone]  Available on Teams    Ca    8.5      25 Jun 2024 05:15  Phos  2.9     06-24  Mg     2.00     06-24        PT/INR - ( 25 Jun 2024 05:15 )   PT: 11.0 sec;   INR: 0.97 ratio         PTT - ( 25 Jun 2024 05:15 )  PTT:29.0 sec        Patient and Family Aware ? Yes

## 2024-06-25 NOTE — PROCEDURE NOTE - PLAN
gravity bag drainage for 10 hrs followed by saline infusion for 10 hrs  if tolerates infusion, can use gastrostomy tube for feeds tomorrow 6/26 at 8 am

## 2024-06-25 NOTE — PROGRESS NOTE ADULT - PROBLEM SELECTOR PLAN 2
-R partial glossectomy in the setting of SCCA, R SND I-IV, L RFFF, L thigh STSG, tracheostomy (6/6/24). Pts post-operative course c/b venous congestion of flap w/ RTOR on 6/11 for free flap salvage and revision of venous anastomosis. Flap now infected with E Coli also without flow. RTOR 6/14 for exploration of tongue free flap, muscle flap revision with right thigh flap, 7.5 trach exchanged to 7.0 cuffed trach.  -OR wound cx (6/11, 6/14) grew E.coli, s/p Unsyn 3g q6h   -management per ENT/plastic sx  - s/p PEG   - feeds as per nutrition

## 2024-06-25 NOTE — PROGRESS NOTE ADULT - SUBJECTIVE AND OBJECTIVE BOX
Plastic Surgery Progress Note (pg [facility]: [id number], [facility]: [phone])    SUBJECTIVE  The patient was seen and examined. No acute events overnight. Pain controlled, afebrile w/ stable vitals. LUE dressing taken down this AM..    OBJECTIVE  ___________________________________________________  VITAL SIGNS / I&O's   Vital Signs Last 24 Hrs  T(C): 36.6 (25 Jun 2024 06:00), Max: 37.2 (24 Jun 2024 14:00)  T(F): 97.9 (25 Jun 2024 06:00), Max: 98.9 (24 Jun 2024 14:00)  HR: 98 (25 Jun 2024 06:00) (92 - 105)  BP: 136/80 (25 Jun 2024 06:00) (108/67 - 140/86)  BP(mean): --  ABP: --  ABP(mean): --  RR: 18 (25 Jun 2024 06:00) (18 - 22)  SpO2: 100% (25 Jun 2024 06:00) (97% - 100%)    O2 Parameters below as of 25 Jun 2024 06:00  Patient On (Oxygen Delivery Method): tracheostomy collar  O2 Flow (L/min): 6  O2 Concentration (%): 28      I&O's Detail    24 Jun 2024 07:01  -  25 Jun 2024 07:00  --------------------------------------------------------  IN:    Enteral Tube Flush: 200 mL    Jevity 1.5: 680 mL    Lactated Ringers: 700 mL  Total IN: 1580 mL    OUT:    Voided (mL): 900 mL  Total OUT: 900 mL    Total NET: 680 mL      ___________________________________________________  PHYSICAL EXAM    -- CONSTITUTIONAL: NAD, lying in bed  -- NEURO: Awake, alert  -- HEENT: NC/AT, mucous membranes moist  -- NECK: Neck soft no palpable fluid collection, diffuse maculopapular rash over neck less erythematous. Flap soft, warm, good color.   -- PULM: Non-labored respirations, equal chest rise bilaterally  -- ABDOMEN: Nondistended  -- RLE: thigh soft, incision cdi  -- LUE: ace c/d/i, skin graft with good take  -- PSYCH: Affect normal, A&Ox3    ___________________________________________________  LABS                           9.6    3.98  )-----------( 515      ( 25 Jun 2024 05:15 )             28.7     06-25    139  |  104  |  14  ----------------------------<  99  4.3   |  23  |  0.65    Ca    8.5      25 Jun 2024 05:15  Phos  2.9     06-24  Mg     2.00     06-24      ___________________________________________________  MICRO  Recent Cultures:    ___________________________________________________  MEDICATIONS  (STANDING):  acetaminophen   Oral Liquid .. 975 milliGRAM(s) Oral every 6 hours  acetylcysteine 10%  Inhalation 4 milliLiter(s) Inhalation every 6 hours  albuterol/ipratropium for Nebulization 3 milliLiter(s) Nebulizer every 6 hours  amLODIPine   Tablet 10 milliGRAM(s) Oral daily  AQUAPHOR (petrolatum Ointment) 1 Application(s) Topical daily  buPROPion . 150 milliGRAM(s) Oral at bedtime  buPROPion . 300 milliGRAM(s) Oral <User Schedule>  chlorhexidine 0.12% Liquid 15 milliLiter(s) Swish and Spit <User Schedule>  chlorhexidine 2% Cloths 1 Application(s) Topical daily  diphenhydrAMINE 25 milliGRAM(s) Oral once  enoxaparin Injectable 40 milliGRAM(s) SubCutaneous every 24 hours  pantoprazole  Injectable 40 milliGRAM(s) IV Push daily  QUEtiapine 100 milliGRAM(s) Oral at bedtime  risperiDONE   Solution 1 milliGRAM(s) Oral <User Schedule>  risperiDONE   Solution 3 milliGRAM(s) Oral at bedtime  sodium chloride 0.9% lock flush 3 milliLiter(s) IV Push every 8 hours    MEDICATIONS  (PRN):

## 2024-06-25 NOTE — PROGRESS NOTE ADULT - SUBJECTIVE AND OBJECTIVE BOX
[facility] of Hospital Medicine  Yulia Condon MD  Pager (T-F, 6A-9T): [phone]  Other Times:  [phone]    Patient is a 64y old  Female who presents with a chief complaint of s/p surgery (21 Jun 2024 11:06)      SUBJECTIVE / OVERNIGHT EVENTS: Pt in NAD: S/p PEg placement       MEDICATIONS  (STANDING):  acetaminophen   Oral Liquid .. 975 milliGRAM(s) Oral every 6 hours  acetylcysteine 10%  Inhalation 4 milliLiter(s) Inhalation every 6 hours  albuterol/ipratropium for Nebulization 3 milliLiter(s) Nebulizer every 6 hours  amLODIPine   Tablet 10 milliGRAM(s) Oral daily  AQUAPHOR (petrolatum Ointment) 1 Application(s) Topical daily  AQUAPHOR (petrolatum Ointment) 1 Application(s) Topical daily  buPROPion . 300 milliGRAM(s) Oral <User Schedule>  buPROPion . 150 milliGRAM(s) Oral at bedtime  chlorhexidine 0.12% Liquid 15 milliLiter(s) Swish and Spit <User Schedule>  chlorhexidine 2% Cloths 1 Application(s) Topical daily  lactated ringers. 1000 milliLiter(s) (100 mL/Hr) IV Continuous <Continuous>  pantoprazole  Injectable 40 milliGRAM(s) IV Push daily  QUEtiapine 100 milliGRAM(s) Oral at bedtime  risperiDONE   Solution 3 milliGRAM(s) Oral at bedtime  risperiDONE   Solution 1 milliGRAM(s) Oral <User Schedule>  sodium chloride 0.9% lock flush 3 milliLiter(s) IV Push every 8 hours    MEDICATIONS  (PRN):  fentaNYL    Injectable 25 MICROGram(s) IV Push every 5 minutes PRN Moderate Pain (4 - 6)  fentaNYL    Injectable 50 MICROGram(s) IV Push every 30 minutes PRN Severe Pain (7 - 10)  ondansetron Injectable 4 milliGRAM(s) IV Push once PRN Nausea and/or Vomiting      CAPILLARY BLOOD GLUCOSE        I&O's Summary    24 Jun 2024 07:01  -  25 Jun 2024 07:00  --------------------------------------------------------  IN: 1580 mL / OUT: 900 mL / NET: 680 mL        PHYSICAL EXAM:  Vital Signs Last 24 Hrs  T(C): 37.1 (25 Jun 2024 11:05), Max: 37.2 (24 Jun 2024 14:00)  T(F): 98.7 (25 Jun 2024 11:05), Max: 98.9 (24 Jun 2024 14:00)  HR: 93 (25 Jun 2024 12:30) (82 - 114)  BP: 155/84 (25 Jun 2024 12:30) (123/80 - 162/92)  BP(mean): --  RR: 19 (25 Jun 2024 12:30) (18 - 22)  SpO2: 100% (25 Jun 2024 12:30) (97% - 100%)    Parameters below as of 25 Jun 2024 12:30  Patient On (Oxygen Delivery Method): tracheostomy collar  O2 Flow (L/min): 6    CONSTITUTIONAL: NAD  ENMT: +NGT  RESPIRATORY: Normal respiratory effort; lungs are clear to auscultation bilaterally  CARDIOVASCULAR: Regular rate and rhythm, normal S1 and S2, no murmur/rub/gallop; No lower extremity edema; Peripheral pulses are 2+ bilaterally  ABDOMEN: Nontender to palpation, normoactive bowel sounds, no rebound/guarding; + PEG   MUSCULOSKELETAL:  Normal gait; no clubbing or cyanosis of digits; no joint swelling or tenderness to palpatione  NEUROLOGY: follows command moving all ext     LABS:                        9.6    3.98  )-----------( 515      ( 25 Jun 2024 05:15 )             28.7     06-25    139  |  104  |  14  ----------------------------<  99  4.3   |  23  |  0.65    Ca    8.5      25 Jun 2024 05:15  Phos  2.9     06-24  Mg     2.00     06-24      PT/INR - ( 25 Jun 2024 05:15 )   PT: 11.0 sec;   INR: 0.97 ratio         PTT - ( 25 Jun 2024 05:15 )  PTT:29.0 sec      Urinalysis Basic - ( 25 Jun 2024 05:15 )    Color: x / Appearance: x / SG: x / pH: x  Gluc: 99 mg/dL / Ketone: x  / Bili: x / Urobili: x   Blood: x / Protein: x / Nitrite: x   Leuk Esterase: x / RBC: x / WBC x   Sq Epi: x / Non Sq Epi: x / Bacteria: x          RADIOLOGY & ADDITIONAL TESTS:  Results Reviewed:   Imaging Personally Reviewed:  Electrocardiogram Personally Reviewed:    COORDINATION OF CARE:  Care Discussed with Consultants/Other Providers [Y/N]:  Prior or Outpatient Records Reviewed [Y/N]:

## 2024-06-25 NOTE — PROGRESS NOTE ADULT - ASSESSMENT
LELA LOWERY is a 64yFemale s/p right ALT flap for revision hemiglossal recon 6/14    - monitor neck for ss infection or drug rash  - Aquaphor to left thigh and LUE donor site daily  - NPO with TF, f/u IR PEG placement, scheduled for 6/25  - Continue peridex  - Appreciate care per primary    [facility]  Plastic Surgery  #[id number] [facility] pager  [phone] [facility] pager  Available on teams

## 2024-06-25 NOTE — PRE PROCEDURE NOTE - PRE PROCEDURE EVALUATION
Interventional Radiology    HPI: 64y Female with head and neck ca s/p partial glossectomy and trach, presents for gastrostomy tube placement for poor oral intake.    Allergies: penicillins (Unknown)    Medications (Abx/Cardiac/Anticoagulation/Blood Products)    amLODIPine   Tablet: 10 milliGRAM(s) Oral (06-24 @ 05:03)  enoxaparin Injectable: 40 milliGRAM(s) SubCutaneous (06-23 @ 14:02)    Data:    T(C): 36.6  HR: 98  BP: 136/80  RR: 18  SpO2: 100%    Exam  General: No acute distress  Chest: Non labored breathing  Abdomen: Non-distended  Extremities: No swelling, warm    -WBC 3.98 / HgB 9.6 / Hct 28.7 / Plt 515  -Na 139 / Cl 104 / BUN 14 / Glucose 99  -K 4.3 / CO2 23 / Cr 0.65  -ALT -- / Alk Phos -- / T.Bili --  -INR0.97    Imaging: relevant imaging reviewed    Plan:    64y Female with head and neck ca s/p partial glossectomy and trach, presents for gastrostomy tube placement for poor oral intake.  -- Relevant imaging and labs were reviewed.   -- No additional antibiotics are indicated for this procedure.   -- Risks, benefits, and alternatives were explained to the patient and informed consent was obtained.

## 2024-06-25 NOTE — PROGRESS NOTE ADULT - ASSESSMENT
64F w/ PMH of depression and schizophrenia s/p R partial glossectomy in the setting of SCCA, R SND I-IV, L RFFF, L thigh STSG, tracheostomy (6/6/24). Pts post-operative course c/b venous congestion of flap w/ RTOR on 6/11/24 for free flap salvage and revision of venous anastamosis, RTOR on 6/14/24 for debridement of necrotic radial forearm flap and replacement with right ALT flap, with 7.5 trach exchanged to 7.0 cuffed trach. Pt stepped down to the floor (6/17/24), Pt is progressing well.     Plan:  - IR PEG tube (6/25). Hold LVX 12h prior, NPO, pre-op labs   - reposition the patient Q4-5h intervals, clean/bath the patient daily for improvement of the heat rash  - cont TF  - engage CM for rehab placement   - Aquaphor to left thigh donor site daily    Minh Fitzgerald  Oral and Maxillofacial Surgery  [facility] Pager #[phone]  [facility] Pager: [phone]  [facility] Pager: [phone]  Available on Teams

## 2024-06-25 NOTE — PROGRESS NOTE ADULT - PROBLEM SELECTOR PLAN 1
- Trend HR   - OR wound E coli, s/p antibiotics   - Pain control   - TTE reviewed, normal EF 60-65%  - PE ruled out  - check TSH and FT4

## 2024-06-26 DIAGNOSIS — I10 ESSENTIAL (PRIMARY) HYPERTENSION: ICD-10-CM

## 2024-06-26 LAB
ANION GAP SERPL CALC-SCNC: 14 MMOL/L — SIGNIFICANT CHANGE UP (ref 7–14)
BUN SERPL-MCNC: 9 MG/DL — SIGNIFICANT CHANGE UP (ref 7–23)
CALCIUM SERPL-MCNC: 8.4 MG/DL — SIGNIFICANT CHANGE UP (ref 8.4–10.5)
CHLORIDE SERPL-SCNC: 100 MMOL/L — SIGNIFICANT CHANGE UP (ref 98–107)
CO2 SERPL-SCNC: 23 MMOL/L — SIGNIFICANT CHANGE UP (ref 22–31)
CREAT SERPL-MCNC: 0.51 MG/DL — SIGNIFICANT CHANGE UP (ref 0.5–1.3)
EGFR: 104 ML/MIN/1.73M2 — SIGNIFICANT CHANGE UP
GLUCOSE SERPL-MCNC: 96 MG/DL — SIGNIFICANT CHANGE UP (ref 70–99)
HCT VFR BLD CALC: 31.9 % — LOW (ref 34.5–45)
HGB BLD-MCNC: 10.7 G/DL — LOW (ref 11.5–15.5)
MAGNESIUM SERPL-MCNC: 1.8 MG/DL — SIGNIFICANT CHANGE UP (ref 1.6–2.6)
MCHC RBC-ENTMCNC: 30.4 PG — SIGNIFICANT CHANGE UP (ref 27–34)
MCHC RBC-ENTMCNC: 33.5 GM/DL — SIGNIFICANT CHANGE UP (ref 32–36)
MCV RBC AUTO: 90.6 FL — SIGNIFICANT CHANGE UP (ref 80–100)
NRBC # BLD: 0 /100 WBCS — SIGNIFICANT CHANGE UP (ref 0–0)
NRBC # FLD: 0 K/UL — SIGNIFICANT CHANGE UP (ref 0–0)
PHOSPHATE SERPL-MCNC: 3.4 MG/DL — SIGNIFICANT CHANGE UP (ref 2.5–4.5)
PLATELET # BLD AUTO: 523 K/UL — HIGH (ref 150–400)
POTASSIUM SERPL-MCNC: 3.6 MMOL/L — SIGNIFICANT CHANGE UP (ref 3.5–5.3)
POTASSIUM SERPL-SCNC: 3.6 MMOL/L — SIGNIFICANT CHANGE UP (ref 3.5–5.3)
RBC # BLD: 3.52 M/UL — LOW (ref 3.8–5.2)
RBC # FLD: 15.8 % — HIGH (ref 10.3–14.5)
SODIUM SERPL-SCNC: 137 MMOL/L — SIGNIFICANT CHANGE UP (ref 135–145)
WBC # BLD: 5.1 K/UL — SIGNIFICANT CHANGE UP (ref 3.8–10.5)
WBC # FLD AUTO: 5.1 K/UL — SIGNIFICANT CHANGE UP (ref 3.8–10.5)

## 2024-06-26 PROCEDURE — 99232 SBSQ HOSP IP/OBS MODERATE 35: CPT

## 2024-06-26 RX ORDER — AMLODIPINE BESYLATE 2.5 MG/1
10 TABLET ORAL DAILY
Refills: 0 | Status: DISCONTINUED | OUTPATIENT
Start: 2024-06-26 | End: 2024-07-10

## 2024-06-26 RX ORDER — RISPERIDONE 0.5 MG/1
1 TABLET ORAL
Refills: 0 | Status: DISCONTINUED | OUTPATIENT
Start: 2024-06-26 | End: 2024-07-10

## 2024-06-26 RX ORDER — BUPROPION HYDROCHLORIDE 150 MG/1
300 TABLET, EXTENDED RELEASE ORAL
Refills: 0 | Status: DISCONTINUED | OUTPATIENT
Start: 2024-06-26 | End: 2024-07-10

## 2024-06-26 RX ORDER — ACETAMINOPHEN 325 MG
975 TABLET ORAL EVERY 6 HOURS
Refills: 0 | Status: DISCONTINUED | OUTPATIENT
Start: 2024-06-26 | End: 2024-07-10

## 2024-06-26 RX ORDER — ENOXAPARIN SODIUM 100 MG/ML
40 INJECTION SUBCUTANEOUS EVERY 24 HOURS
Refills: 0 | Status: DISCONTINUED | OUTPATIENT
Start: 2024-06-26 | End: 2024-07-10

## 2024-06-26 RX ORDER — BUPROPION HYDROCHLORIDE 150 MG/1
300 TABLET, EXTENDED RELEASE ORAL
Refills: 0 | Status: DISCONTINUED | OUTPATIENT
Start: 2024-06-26 | End: 2024-06-26

## 2024-06-26 RX ORDER — BUPROPION HYDROCHLORIDE 150 MG/1
150 TABLET, EXTENDED RELEASE ORAL AT BEDTIME
Refills: 0 | Status: DISCONTINUED | OUTPATIENT
Start: 2024-06-26 | End: 2024-07-10

## 2024-06-26 RX ORDER — RISPERIDONE 0.5 MG/1
3 TABLET ORAL AT BEDTIME
Refills: 0 | Status: DISCONTINUED | OUTPATIENT
Start: 2024-06-26 | End: 2024-07-10

## 2024-06-26 RX ADMIN — PETROLATUM 1 APPLICATION(S): 42 OINTMENT TOPICAL at 14:56

## 2024-06-26 RX ADMIN — Medication 975 MILLIGRAM(S): at 15:20

## 2024-06-26 RX ADMIN — ACETYLCYSTEINE 4 MILLILITER(S): 200 SOLUTION ORAL; RESPIRATORY (INHALATION) at 04:22

## 2024-06-26 RX ADMIN — RISPERIDONE 1 MILLIGRAM(S): 0.5 TABLET ORAL at 10:12

## 2024-06-26 RX ADMIN — Medication 3 MILLILITER(S): at 22:54

## 2024-06-26 RX ADMIN — Medication 1 APPLICATION(S): at 14:56

## 2024-06-26 RX ADMIN — IPRATROPIUM BROMIDE AND ALBUTEROL SULFATE 3 MILLILITER(S): .5; 3 SOLUTION RESPIRATORY (INHALATION) at 04:21

## 2024-06-26 RX ADMIN — IPRATROPIUM BROMIDE AND ALBUTEROL SULFATE 3 MILLILITER(S): .5; 3 SOLUTION RESPIRATORY (INHALATION) at 22:11

## 2024-06-26 RX ADMIN — Medication 100 MILLIGRAM(S): at 21:35

## 2024-06-26 RX ADMIN — Medication 15 MILLILITER(S): at 18:55

## 2024-06-26 RX ADMIN — Medication 15 MILLILITER(S): at 21:35

## 2024-06-26 RX ADMIN — RISPERIDONE 3 MILLIGRAM(S): 0.5 TABLET ORAL at 21:35

## 2024-06-26 RX ADMIN — BUPROPION HYDROCHLORIDE 300 MILLIGRAM(S): 150 TABLET, EXTENDED RELEASE ORAL at 10:13

## 2024-06-26 RX ADMIN — ACETYLCYSTEINE 4 MILLILITER(S): 200 SOLUTION ORAL; RESPIRATORY (INHALATION) at 09:13

## 2024-06-26 RX ADMIN — IPRATROPIUM BROMIDE AND ALBUTEROL SULFATE 3 MILLILITER(S): .5; 3 SOLUTION RESPIRATORY (INHALATION) at 09:11

## 2024-06-26 RX ADMIN — Medication 15 MILLILITER(S): at 10:14

## 2024-06-26 RX ADMIN — BUPROPION HYDROCHLORIDE 150 MILLIGRAM(S): 150 TABLET, EXTENDED RELEASE ORAL at 21:35

## 2024-06-26 RX ADMIN — Medication 15 MILLILITER(S): at 15:20

## 2024-06-26 RX ADMIN — PANTOPRAZOLE SODIUM 40 MILLIGRAM(S): 40 INJECTION, POWDER, FOR SOLUTION INTRAVENOUS at 14:56

## 2024-06-26 RX ADMIN — Medication 3 MILLILITER(S): at 14:55

## 2024-06-26 RX ADMIN — Medication 15 MILLILITER(S): at 07:12

## 2024-06-26 RX ADMIN — ENOXAPARIN SODIUM 40 MILLIGRAM(S): 100 INJECTION SUBCUTANEOUS at 14:57

## 2024-06-26 RX ADMIN — Medication 975 MILLIGRAM(S): at 14:55

## 2024-06-26 NOTE — CHART NOTE - NSCHARTNOTEFT_GEN_A_CORE
patient trach changed to 4 uncuffed shiley  capped at 8AM    patient tolerated procedure well without complication   O2 sats monitored without significant desaturation    JESSICA Brumfield Pager: [phone]  [place] Pager: [phone]  [facility] Pager: [phone]  Avaliable on Microsoft Teams (PLEASE USE RESIDENT)

## 2024-06-26 NOTE — PROGRESS NOTE ADULT - PROBLEM SELECTOR PLAN 1
-R partial glossectomy in the setting of SCCA, R SND I-IV, L RFFF, L thigh STSG, tracheostomy (6/6/24). Pts post-operative course c/b venous congestion of flap w/ RTOR on 6/11 for free flap salvage and revision of venous anastomosis. Flap now infected with E Coli also without flow. RTOR 6/14 for exploration of tongue free flap, muscle flap revision with right thigh flap, 7.5 trach exchanged to 7.0 cuffed trach.  -OR wound cx (6/11, 6/14) grew E.coli, s/p Unsyn 3g q6h   -management per ENT/plastic sx  - s/p PEG   - feeds as per nutrition  - trach care as per primary   - capping trial

## 2024-06-26 NOTE — PROGRESS NOTE ADULT - ASSESSMENT
LELA LOWERY is a 64yFemale s/p right ALT flap for revision hemiglossal recon 6/14    - monitor neck for ss infection or drug rash  - Aquaphor to left thigh and LUE donor site daily  - NPO with TF, f/u IR PEG placement, scheduled for 6/25  - Continue peridex  - Appreciate care per primary    [facility]  Plastic Surgery  #[id number] [facility] pager  [phone] [facility] pager  Available on teams    LELA LOWERY is a 64yFemale s/p right ALT flap for revision hemiglossal recon 6/14    - neck rash improved; neck now with wound  - Wound care: Aquacel strip/gauze for now, will re-evaluate dressings moving forward  - Aquaphor to left thigh and LUE donor site daily  - Diet: TFs  - PEG placed 6/25  - Continue peridex  - Appreciate care per primary    [facility]  Plastic Surgery  #[id number] [facility] pager  [phone] [facility] pager  Available on teams

## 2024-06-26 NOTE — PROGRESS NOTE ADULT - PROBLEM SELECTOR PLAN 3
- Trend HR   - OR wound E coli, s/p antibiotics   - Pain control   - TTE reviewed, normal EF 60-65%  - PE ruled out  - elevated TSH - 9.8 with normal FT4; repeat TFT in 6 weeks

## 2024-06-26 NOTE — PROGRESS NOTE ADULT - ASSESSMENT
65 y/o female with schizophrenia and depression s/p R partial glossectomy in the setting of SCCA, R SND I-IV, L RFFF, L thigh STSG, tracheostomy (6/6/24). Pts post-operative course c/b venous congestion of flap w/ RTOR on 6/11 for free flap salvage and revision of venous anastamosis. Flap now infected with E Coli also without flow. RTOR 6/14 for exploration of tongue free flap, muscle flap revision with right thigh flap.

## 2024-06-26 NOTE — DISCHARGE NOTE PROVIDER - PROVIDER TOKENS
FREE:[LAST:[Levy],FIRST:[Lux],PHONE:[phone]],FAX:[(   )    -],ADDRESS:[[facility]  [address] [place] [id number]],SCHEDULEDAPPT:[07/03/2024],ESTABLISHEDPATIENT:[T]]

## 2024-06-26 NOTE — PROGRESS NOTE ADULT - ASSESSMENT
64F w/ PMH of depression and schizophrenia s/p R partial glossectomy in the setting of SCCA, R SND I-IV, L RFFF, L thigh STSG, tracheostomy (6/6/24). Pts post-operative course c/b venous congestion of flap w/ RTOR on 6/11/24 for free flap salvage and revision of venous anastamosis, RTOR on 6/14/24 for debridement of necrotic radial forearm flap and replacement with right ALT flap, with 7.5 trach exchanged to 7.0 cuffed trach. Pt stepped down to the floor (6/17/24), Pt is progressing well.     Plan:  - start PEG feeds   - downsize trach later this AM   - reposition the patient Q4-5h intervals, clean/bath the patient daily for improvement of the heat rash  - engage CM for rehab placement   - Aquaphor to left thigh donor site daily      Oral and Maxillofacial Surgery  [facility] Pager #[phone]  [facility] Pager: [phone]  [facility] Pager: [phone]  Available on Teams

## 2024-06-26 NOTE — PROGRESS NOTE ADULT - SUBJECTIVE AND OBJECTIVE BOX
64y Female s/p gastrostomy tube placement on 6/25 in Interventional Radiology.     Patient seen and examined bedside resting comfortably. No complaints offered.    T(F): 98.4 (06-26-24 @ 10:05), Max: 99 (06-26-24 @ 02:40)  HR: 115 (06-26-24 @ 10:05) (82 - 117)  BP: 145/95 (06-26-24 @ 10:05) (125/76 - 160/100)  RR: 20 (06-26-24 @ 10:05) (18 - 22)  SpO2: 100% (06-26-24 @ 10:05) (94% - 100%)  Wt(kg): --    LABS:                        10.7   5.10  )-----------( 523      ( 26 Jun 2024 06:20 )             31.9     06-26    137  |  100  |  9   ----------------------------<  96  3.6   |  23  |  0.51    Ca    8.4      26 Jun 2024 06:20  Phos  3.4     06-26  Mg     1.80     06-26      PT/INR - ( 25 Jun 2024 05:15 )   PT: 11.0 sec;   INR: 0.97 ratio         PTT - ( 25 Jun 2024 05:15 )  PTT:29.0 sec  I&O's Detail    25 Jun 2024 07:01  -  26 Jun 2024 07:00  --------------------------------------------------------  IN:    Lactated Ringers: 500 mL  Total IN: 500 mL    OUT:    Voided (mL): 200 mL  Total OUT: 200 mL    Total NET: 300 mL            PHYSICAL EXAM:  General: Nontoxic, in NAD  Gastrostomy Tube: dressing c/d/i

## 2024-06-26 NOTE — PROGRESS NOTE ADULT - SUBJECTIVE AND OBJECTIVE BOX
[facility] Division of Hospital Medicine  Yulia Condon MD  Pager (B-F, 6F-1A): [phone]  Other Times:  [phone]    Patient is a 64y old  Female who presents with a chief complaint of s/p surgery (21 Jun 2024 11:06)      SUBJECTIVE / OVERNIGHT EVENTS: feeds started via PEG afebrile no acute events ON     MEDICATIONS  (STANDING):  acetaminophen   Oral Liquid .. 975 milliGRAM(s) Oral every 6 hours  acetylcysteine 10%  Inhalation 4 milliLiter(s) Inhalation every 6 hours  albuterol/ipratropium for Nebulization 3 milliLiter(s) Nebulizer every 6 hours  amLODIPine   Tablet 10 milliGRAM(s) Oral daily  AQUAPHOR (petrolatum Ointment) 1 Application(s) Topical daily  AQUAPHOR (petrolatum Ointment) 1 Application(s) Topical daily  buPROPion . 150 milliGRAM(s) Oral at bedtime  buPROPion . 300 milliGRAM(s) Oral <User Schedule>  chlorhexidine 0.12% Liquid 15 milliLiter(s) Swish and Spit <User Schedule>  chlorhexidine 2% Cloths 1 Application(s) Topical daily  pantoprazole  Injectable 40 milliGRAM(s) IV Push daily  QUEtiapine 100 milliGRAM(s) Oral at bedtime  risperiDONE   Solution 1 milliGRAM(s) Oral <User Schedule>  risperiDONE   Solution 3 milliGRAM(s) Oral at bedtime  sodium chloride 0.9% lock flush 3 milliLiter(s) IV Push every 8 hours    MEDICATIONS  (PRN):      CAPILLARY BLOOD GLUCOSE        I&O's Summary    25 Jun 2024 07:01  -  26 Jun 2024 07:00  --------------------------------------------------------  IN: 500 mL / OUT: 200 mL / NET: 300 mL        PHYSICAL EXAM:  Vital Signs Last 24 Hrs  T(C): 36.7 (26 Jun 2024 05:44), Max: 37.2 (25 Jun 2024 14:58)  T(F): 98 (26 Jun 2024 05:44), Max: 99 (26 Jun 2024 02:40)  HR: 108 (26 Jun 2024 05:44) (82 - 117)  BP: 148/99 (26 Jun 2024 05:44) (125/76 - 160/100)  BP(mean): --  RR: 18 (26 Jun 2024 05:44) (18 - 22)  SpO2: 98% (26 Jun 2024 05:44) (94% - 100%)    Parameters below as of 26 Jun 2024 05:44  Patient On (Oxygen Delivery Method): tracheostomy collar  O2 Flow (L/min): 6  O2 Concentration (%): 38    CONSTITUTIONAL: NAD  ENMT: +NGT + Trach   RESPIRATORY: Normal respiratory effort; lungs are clear to auscultation bilaterally  CARDIOVASCULAR: Regular rate and rhythm, normal S1 and S2, no murmur/rub/gallop; No lower extremity edema; Peripheral pulses are 2+ bilaterally  ABDOMEN: Nontender to palpation, normoactive bowel sounds, no rebound/guarding; + PEG   MUSCULOSKELETAL:  LT UE graft site + Lt thigh graft site   NEUROLOGY: follows command moving all ext    LABS:                        10.7   5.10  )-----------( 523      ( 26 Jun 2024 06:20 )             31.9     06-26    137  |  100  |  9   ----------------------------<  96  3.6   |  23  |  0.51    Ca    8.4      26 Jun 2024 06:20  Phos  3.4     06-26  Mg     1.80     06-26      PT/INR - ( 25 Jun 2024 05:15 )   PT: 11.0 sec;   INR: 0.97 ratio         PTT - ( 25 Jun 2024 05:15 )  PTT:29.0 sec      Urinalysis Basic - ( 26 Jun 2024 06:20 )    Color: x / Appearance: x / SG: x / pH: x  Gluc: 96 mg/dL / Ketone: x  / Bili: x / Urobili: x   Blood: x / Protein: x / Nitrite: x   Leuk Esterase: x / RBC: x / WBC x   Sq Epi: x / Non Sq Epi: x / Bacteria: x          RADIOLOGY & ADDITIONAL TESTS:  Results Reviewed:   Imaging Personally Reviewed:  Electrocardiogram Personally Reviewed:    COORDINATION OF CARE:  Care Discussed with Consultants/Other Providers [Y/N]:  Prior or Outpatient Records Reviewed [Y/N]:

## 2024-06-26 NOTE — DISCHARGE NOTE PROVIDER - NSDCCPCAREPLAN_GEN_ALL_CORE_FT
PRINCIPAL DISCHARGE DIAGNOSIS  Diagnosis: Malignant neoplasm of tongue  Assessment and Plan of Treatment:

## 2024-06-26 NOTE — CHART NOTE - NSCHARTNOTEFT_GEN_A_CORE
OMFS:    patient is cleared to delay oncologic/radiation therapy treatment to facilitate transfer to [place] for rehab.     patient cleared for transfer to [place] per FS     Minh Fitzgerald DDS  [facility]  [place] Pager: [phone]  [place] Pager: [phone]  [facility] Pager: [phone]  Avaliable on Microsoft Teams (PLEASE USE RESIDENT) OMFS:    patient will not require oncologic and radiation therapy during her stay of short term rehab      Minh Fitzgerald DDS  OMFS  [place] Pager: [phone]  [place] Pager: [phone]  [facility] Pager: [phone]  Avaliable on Microsoft Teams (PLEASE USE RESIDENT)

## 2024-06-26 NOTE — DISCHARGE NOTE PROVIDER - NSDCMRMEDTOKEN_GEN_ALL_CORE_FT
acetaminophen 500 mg oral tablet: 1 tab(s) orally 2 times a day  amLODIPine 5 mg oral tablet: 1 tab(s) orally  Balance of Nature Fruits - 3 cap(s) orally once a day AM:   BuPROPion (Eqv-Wellbutrin SR) 150 mg/12 hours oral tablet, extended release: 2 tab(s) orally once a day AM  BuPROPion (Eqv-Wellbutrin SR) 150 mg/12 hours oral tablet, extended release: 1 tab(s) orally once a day PM  ibuprofen 200 mg oral tablet: 2 tab(s) orally once a day MIDDAY  melatonin 5 mg oral tablet: 1 tab(s) orally once a day PM  QUEtiapine 100 mg oral tablet: 1 tab(s) orally once a day PM  risperiDONE 1 mg oral tablet: 1 tab(s) orally once a day AM  risperiDONE 3 mg oral tablet: 1 tab(s) orally once a day PM  Ritual Essential Multivitamin for Women 50 + - 2 cap(s) orally once a day AM:   Vitamin B12 1000 mcg oral tablet: 1 tab(s) orally once a day AM   acetaminophen 500 mg oral tablet: 1 tab(s) orally 2 times a day  amLODIPine 5 mg oral tablet: 1 tab(s) orally once a day  Balance of Nature Fruits - 3 cap(s) orally once a day AM:   BuPROPion (Eqv-Wellbutrin SR) 150 mg/12 hours oral tablet, extended release: 2 tab(s) orally once a day AM  BuPROPion (Eqv-Wellbutrin SR) 150 mg/12 hours oral tablet, extended release: 1 tab(s) orally once a day PM  melatonin 5 mg oral tablet: 1 tab(s) orally once a day PM  QUEtiapine 100 mg oral tablet: 1 tab(s) orally once a day PM  risperiDONE 1 mg oral tablet: 1 tab(s) orally once a day AM  risperiDONE 3 mg oral tablet: 1 tab(s) orally once a day PM  Ritual Essential Multivitamin for Women 50 + - 2 cap(s) orally once a day AM:   Vitamin B12 1000 mcg oral tablet: 1 tab(s) orally once a day AM

## 2024-06-26 NOTE — DISCHARGE NOTE PROVIDER - NSRESEARCHGRANT_OVERRIDEREC_GEN_A_CORE
This is a surgical and/or non-medical patient. Benzoyl Peroxide Pregnancy And Lactation Text: This medication is Pregnancy Category C. It is unknown if benzoyl peroxide is excreted in breast milk.

## 2024-06-26 NOTE — PROGRESS NOTE ADULT - SUBJECTIVE AND OBJECTIVE BOX
Plastic Surgery Progress Note (pg [facility]: [id number], [facility]: [phone])    SUBJECTIVE  The patient was seen and examined. No acute events overnight. Pain controlled, afebrile w/ stable vitals. R neck incision staples removed by primary team 6/25 and wound now open.    OBJECTIVE  ___________________________________________________  VITAL SIGNS / I&O's   Vital Signs Last 24 Hrs  T(C): 36.7 (26 Jun 2024 05:44), Max: 37.2 (25 Jun 2024 14:58)  T(F): 98 (26 Jun 2024 05:44), Max: 99 (26 Jun 2024 02:40)  HR: 108 (26 Jun 2024 05:44) (82 - 117)  BP: 148/99 (26 Jun 2024 05:44) (125/76 - 162/92)  BP(mean): --  ABP: --  ABP(mean): --  RR: 18 (26 Jun 2024 05:44) (18 - 22)  SpO2: 98% (26 Jun 2024 05:44) (94% - 100%)    O2 Parameters below as of 26 Jun 2024 05:44  Patient On (Oxygen Delivery Method): tracheostomy collar  O2 Flow (L/min): 6  O2 Concentration (%): 38      ___________________________________________________  PHYSICAL EXAM    -- CONSTITUTIONAL: NAD, lying in bed  -- NEURO: Awake, alert  -- HEENT: NC/AT, mucous membranes moist  -- NECK: Neck soft no palpable fluid collection, rash improving. 5x2cm wound dehiscence to down to   -- PULM: Non-labored respirations, equal chest rise bilaterally  -- ABDOMEN: Nondistended  -- RLE: thigh soft, incision cdi  -- LUE: ace c/d/i, skin graft with good take  -- PSYCH: Affect normal, A&Ox3    ___________________________________________________  LABS                           9.6    3.98  )-----------( 515      ( 25 Jun 2024 05:15 )             28.7     06-25    139  |  104  |  14  ----------------------------<  99  4.3   |  23  |  0.65    Ca    8.5      25 Jun 2024 05:15  Phos  2.9     06-24  Mg     2.00     06-24      ___________________________________________________  MICRO  Recent Cultures:    ___________________________________________________  MEDICATIONS  (STANDING):  acetaminophen   Oral Liquid .. 975 milliGRAM(s) Oral every 6 hours  acetylcysteine 10%  Inhalation 4 milliLiter(s) Inhalation every 6 hours  albuterol/ipratropium for Nebulization 3 milliLiter(s) Nebulizer every 6 hours  amLODIPine   Tablet 10 milliGRAM(s) Oral daily  AQUAPHOR (petrolatum Ointment) 1 Application(s) Topical daily  buPROPion . 150 milliGRAM(s) Oral at bedtime  buPROPion . 300 milliGRAM(s) Oral <User Schedule>  chlorhexidine 0.12% Liquid 15 milliLiter(s) Swish and Spit <User Schedule>  chlorhexidine 2% Cloths 1 Application(s) Topical daily  diphenhydrAMINE 25 milliGRAM(s) Oral once  enoxaparin Injectable 40 milliGRAM(s) SubCutaneous every 24 hours  pantoprazole  Injectable 40 milliGRAM(s) IV Push daily  QUEtiapine 100 milliGRAM(s) Oral at bedtime  risperiDONE   Solution 1 milliGRAM(s) Oral <User Schedule>  risperiDONE   Solution 3 milliGRAM(s) Oral at bedtime  sodium chloride 0.9% lock flush 3 milliLiter(s) IV Push every 8 hours    MEDICATIONS  (PRN):   Plastic Surgery Progress Note (pg [facility]: [id number], [facility]: [phone])    SUBJECTIVE  The patient was seen and examined. No acute events overnight. Pain controlled, afebrile w/ stable vitals. R neck incision staples removed 6/25 and wound now open.    OBJECTIVE  ___________________________________________________  VITAL SIGNS / I&O's   Vital Signs Last 24 Hrs  T(C): 36.7 (26 Jun 2024 05:44), Max: 37.2 (25 Jun 2024 14:58)  T(F): 98 (26 Jun 2024 05:44), Max: 99 (26 Jun 2024 02:40)  HR: 108 (26 Jun 2024 05:44) (82 - 117)  BP: 148/99 (26 Jun 2024 05:44) (125/76 - 162/92)  BP(mean): --  ABP: --  ABP(mean): --  RR: 18 (26 Jun 2024 05:44) (18 - 22)  SpO2: 98% (26 Jun 2024 05:44) (94% - 100%)    O2 Parameters below as of 26 Jun 2024 05:44  Patient On (Oxygen Delivery Method): tracheostomy collar  O2 Flow (L/min): 6  O2 Concentration (%): 38      ___________________________________________________  PHYSICAL EXAM    -- CONSTITUTIONAL: NAD, lying in bed  -- NEURO: Awake, alert  -- HEENT: NC/AT, mucous membranes moist  -- NECK: Neck soft no palpable fluid collection, rash improving. 5x2cm wound dehiscence to down to level of muscle.   -- PULM: Non-labored respirations, equal chest rise bilaterally  -- ABDOMEN: Nondistended  -- RLE: thigh soft, incision cdi  -- LUE: ace c/d/i, skin graft with good take  -- PSYCH: Affect normal, A&Ox3    ___________________________________________________  LABS                            10.7   5.10  )-----------( 523      ( 26 Jun 2024 06:20 )             31.9   06-26    137  |  100  |  9   ----------------------------<  96  3.6   |  23  |  0.51    Ca    8.4      26 Jun 2024 06:20  Phos  3.4     06-26  Mg     1.80     06-26          ___________________________________________________  MICRO  Recent Cultures:    ___________________________________________________  MEDICATIONS  (STANDING):  acetaminophen   Oral Liquid .. 975 milliGRAM(s) Oral every 6 hours  acetylcysteine 10%  Inhalation 4 milliLiter(s) Inhalation every 6 hours  albuterol/ipratropium for Nebulization 3 milliLiter(s) Nebulizer every 6 hours  amLODIPine   Tablet 10 milliGRAM(s) Oral daily  AQUAPHOR (petrolatum Ointment) 1 Application(s) Topical daily  buPROPion . 150 milliGRAM(s) Oral at bedtime  buPROPion . 300 milliGRAM(s) Oral <User Schedule>  chlorhexidine 0.12% Liquid 15 milliLiter(s) Swish and Spit <User Schedule>  chlorhexidine 2% Cloths 1 Application(s) Topical daily  diphenhydrAMINE 25 milliGRAM(s) Oral once  enoxaparin Injectable 40 milliGRAM(s) SubCutaneous every 24 hours  pantoprazole  Injectable 40 milliGRAM(s) IV Push daily  QUEtiapine 100 milliGRAM(s) Oral at bedtime  risperiDONE   Solution 1 milliGRAM(s) Oral <User Schedule>  risperiDONE   Solution 3 milliGRAM(s) Oral at bedtime  sodium chloride 0.9% lock flush 3 milliLiter(s) IV Push every 8 hours    MEDICATIONS  (PRN):

## 2024-06-26 NOTE — DISCHARGE NOTE PROVIDER - NSDCCPTREATMENT_GEN_ALL_CORE_FT
PRINCIPAL PROCEDURE  Procedure: Partial glossectomy  Findings and Treatment:       SECONDARY PROCEDURE  Procedure: Creation, flap, free, radial forearm  Findings and Treatment:

## 2024-06-26 NOTE — PROGRESS NOTE ADULT - SUBJECTIVE AND OBJECTIVE BOX
LELA LOWERY  MRN-[id number]  [facility]      64F w/ PMH of depression and schizophrenia s/p R partial glossectomy in the setting of SCCA, R SND I-IV, L RFFF, L thigh STSG, tracheostomy (6/6/24). Pts post-operative course c/b venous congestion of flap w/ RTOR on 6/11/24 for free flap salvage and revision of venous anastamosis, RTOR on 6/14/24 for debridement of necrotic radial forearm flap and replacement with right ALT flap, with 7.5 trach exchanged to 7.0 cuffed trach.     Interval Events:   -s/p PEG (6/25)       General: aaox3. well-appearing. no apparent distress.     HEENT:  Head: normocephalic  E: normal hearing  E: PERRL, no conjunctival hemmorage noted  N: nares clear, minimal heme consistent with post-operative bleeding  T: neck soft and supple, no sings of indurations. no LAD    Intraoral exam:        Vitals:    Vital Signs Last 24 Hrs  T(C): 36.6 (25 Jun 2024 06:00), Max: 37.2 (24 Jun 2024 14:00)  T(F): 97.9 (25 Jun 2024 06:00), Max: 98.9 (24 Jun 2024 14:00)  HR: 98 (25 Jun 2024 06:00) (92 - 105)  BP: 136/80 (25 Jun 2024 06:00) (108/67 - 140/86)  BP(mean): --  RR: 18 (25 Jun 2024 06:00) (18 - 22)  SpO2: 100% (25 Jun 2024 06:00) (97% - 100%)    Parameters below as of 25 Jun 2024 06:00  Patient On (Oxygen Delivery Method): tracheostomy collar  O2 Flow (L/min): 6  O2 Concentration (%): 28    I&O's Detail    24 Jun 2024 07:01  -  25 Jun 2024 07:00  --------------------------------------------------------  IN:    Enteral Tube Flush: 200 mL    Jevity 1.5: 680 mL    Lactated Ringers: 700 mL  Total IN: 1580 mL    OUT:    Voided (mL): 900 mL  Total OUT: 900 mL    Total NET: 680 mL            Medications:    MEDICATIONS  (STANDING):  acetaminophen   Oral Liquid .. 975 milliGRAM(s) Oral every 6 hours  acetylcysteine 10%  Inhalation 4 milliLiter(s) Inhalation every 6 hours  albuterol/ipratropium for Nebulization 3 milliLiter(s) Nebulizer every 6 hours  amLODIPine   Tablet 10 milliGRAM(s) Oral daily  AQUAPHOR (petrolatum Ointment) 1 Application(s) Topical daily  buPROPion . 150 milliGRAM(s) Oral at bedtime  buPROPion . 300 milliGRAM(s) Oral <User Schedule>  chlorhexidine 0.12% Liquid 15 milliLiter(s) Swish and Spit <User Schedule>  chlorhexidine 2% Cloths 1 Application(s) Topical daily  lactated ringers. 1000 milliLiter(s) (100 mL/Hr) IV Continuous <Continuous>  pantoprazole  Injectable 40 milliGRAM(s) IV Push daily  QUEtiapine 100 milliGRAM(s) Oral at bedtime  risperiDONE   Solution 3 milliGRAM(s) Oral at bedtime  risperiDONE   Solution 1 milliGRAM(s) Oral <User Schedule>  sodium chloride 0.9% lock flush 3 milliLiter(s) IV Push every 8 hours    MEDICATIONS  (PRN):        Labs:                          9.6    3.98  )-----------( 515      ( 25 Jun 2024 05:15 )             28.7       06-25    139  |  104  |  14  ----------------------------<  99  4.3   |  23  |  0.65    Ca    8.5      25 Jun 2024 05:15  Phos  2.9     06-24  Mg     2.00     06-24        BMI: BMI (kg/m2): 22.3 (06-14-24 @ 06:41)  HbA1c: A1C with Estimated Average Glucose Result: 4.9 % (06-06-24 @ 18:30)    Glucose: POCT Blood Glucose.: 91 mg/dL (06-09-24 @ 08:35)    BP: 136/80 (06-25-24 @ 06:00) (106/70 - 142/85)Vital Signs Last 24 Hrs  T(C): 36.6 (06-25-24 @ 06:00), Max: 37.2 (06-24-24 @ 14:00)  T(F): 97.9 (06-25-24 @ 06:00), Max: 98.9 (06-24-24 @ 14:00)  HR: 98 (06-25-24 @ 06:00) (92 - 105)  BP: 136/80 (06-25-24 @ 06:00) (108/67 - 140/86)  BP(mean): --  RR: 18 (06-25-24 @ 06:00) (18 - 22)  SpO2: 100% (06-25-24 @ 06:00) (97% - 100%)      Lipid Panel:     PT/INR - ( 25 Jun 2024 05:15 )   PT: 11.0 sec;   INR: 0.97 ratio         PTT - ( 25 Jun 2024 05:15 )  PTT:29.0 sec    CAPILLARY BLOOD GLUCOSE              Urinalysis Basic - ( 25 Jun 2024 05:15 )    Color: x / Appearance: x / SG: x / pH: x  Gluc: 99 mg/dL / Ketone: x  / Bili: x / Urobili: x   Blood: x / Protein: x / Nitrite: x   Leuk Esterase: x / RBC: x / WBC x   Sq Epi: x / Non Sq Epi: x / Bacteria: x

## 2024-06-26 NOTE — DISCHARGE NOTE PROVIDER - HOSPITAL COURSE
6/6/24 HD1  63 y/o female with schizophrenia and depression known to Dr. Lockett presents to [facility] for glossectomy, selective neck dissection, tracheostomy, forearm free flap. Procedure went well without complication and patient tolerated procedure well. patient had uneventful post operative course. patient admitted to SICU for flap checks    6/7/24 HD2 POD1  overnight hypertensive.  - venous oozing from trach  - 1x push hydral 10mg, 1x push labetalol 10mg for hypertension. otherwise patient progressing well    6/8/24 HD3 POD2: cuff down, tolerating trach collar at 30%. HTN resolved.   -D/C paez, A-line, TOV passed. tube feeds started    6/9/24 HD4 POD3: febrile overnight. patient progressing well.     6/10/24 HD5 POD4: Downgraded to floor, Started on Bactrim for UTI, Tachy to 120-130s o/n, pt asymptomatic, EKG ordered showing sinus tachycardia, 1L bolus given without resolution. 1 AM pt persistently tachy, medicine consulted, administered 1x dose of Lopressor and CTA/ LE duplex ordered    6/11/24 HD6 POD5: Per nutrition, started back on TFs w/ FLD, SLP eval ordered. Per medicine, transitioned to Xopenex from Duonebs due to this being possible source of tachycardia. JOSSY also ordered. Duplex negative for DVT. ENT paged early this AM due to pt desatting to 85. Pt evaluated bedside, noticed significant congestion of flap intraorally. Decision made to bring pt to OR emergently for exploration of neck. Pt is currently in OR.     6/12/24 HD7 POD6/POD1: Oozing from neck incision, heparin dc'd. Febrile to 101.4 --> Blood cultures, CXR, EKG (sinus tachycardia). TTE --> 60-65% EF w/ trace mitral/tricuspid regurgitation    6/13/24 HD8 POD7/POD2: no events overnight. patient progressing well.  6/14/24 HD9 POD8/POD3: no events overnight. patient progressing well.  6/15/24 HD10 POD9/POD4: no events overnight. patient progressing well.  6/16/24 HD 11 POD 10/POD5: Urinary retention sp straight cath 300 cc --> f/u TOV 6am  6/17/24 HD12 POD11/POD6: no events overnight. patient progressing well.  6/18/24 HD13 POD12/POD7: no events overnight. patient progressing well. patient stepped down to floor  6/19/24 HD14 POD13/POD8: no events overnight. patient progressing well.  6/20/24 HD15 POD14/POD9: no events overnight. patient progressing well. patient failed s/s. KO tube repositioned  6/21/24 HD16 POD15/POD10: no events overnight. patient progressing well.  6/22/24 HD17 POD16/POD11: no events overnight. patient progressing well.  6/23/24 HD18 POD17/ POD12: no events overnight. patient progressing well.  6/24/24 HD19 POD18/POD13: no events overnight. patient progressing well. patient planned for PEG tomorrow  6/25/24 HD20 POD19/POD14: no events overnight. patient progressing well. patient underwent peg placement   6/26/24 HD21 POD20/POD15: patient downsized to 4 uncuffed shiley. no events overnight. patient progressing well.  6/20/24 HD22 POD21/POD16: 6/6/24 HD1  63 y/o female with schizophrenia and depression known to Dr. Lockett presents to [facility] for glossectomy, selective neck dissection, tracheostomy, forearm free flap. Procedure went well without complication and patient tolerated procedure well. patient had uneventful post operative course. patient admitted to SICU for flap checks    6/7/24 HD2 POD1  overnight hypertensive.  - venous oozing from trach  - 1x push hydral 10mg, 1x push labetalol 10mg for hypertension. otherwise patient progressing well    6/8/24 HD3 POD2: cuff down, tolerating trach collar at 30%. HTN resolved.   -D/C paez, A-line, TOV passed. tube feeds started    6/9/24 HD4 POD3: febrile overnight. patient progressing well.     6/10/24 HD5 POD4: Downgraded to floor, Started on Bactrim for UTI, Tachy to 120-130s o/n, pt asymptomatic, EKG ordered showing sinus tachycardia, 1L bolus given without resolution. 1 AM pt persistently tachy, medicine consulted, administered 1x dose of Lopressor and CTA/ LE duplex ordered    6/11/24 HD6 POD5: Per nutrition, started back on TFs w/ FLD, SLP eval ordered. Per medicine, transitioned to Xopenex from Duonebs due to this being possible source of tachycardia. JOSSY also ordered. Duplex negative for DVT. ENT paged early this AM due to pt desatting to 85. Pt evaluated bedside, noticed significant congestion of flap intraorally. Decision made to bring pt to OR emergently for exploration of neck. Pt is currently in OR.     6/12/24 HD7 POD6/POD1: Oozing from neck incision, heparin dc'd. Febrile to 101.4 --> Blood cultures, CXR, EKG (sinus tachycardia). TTE --> 60-65% EF w/ trace mitral/tricuspid regurgitation    6/13/24 HD8 POD7/POD2: no events overnight. patient progressing well.  6/14/24 HD9 POD8/POD3: no events overnight. patient progressing well.  6/15/24 HD10 POD9/POD4: no events overnight. patient progressing well.  6/16/24 HD 11 POD 10/POD5: Urinary retention sp straight cath 300 cc --> f/u TOV 6am  6/17/24 HD12 POD11/POD6: no events overnight. patient progressing well.  6/18/24 HD13 POD12/POD7: no events overnight. patient progressing well. patient stepped down to floor  6/19/24 HD14 POD13/POD8: no events overnight. patient progressing well.  6/20/24 HD15 POD14/POD9: no events overnight. patient progressing well. patient failed s/s. KO tube repositioned  6/21/24 HD16 POD15/POD10: no events overnight. patient progressing well.  6/22/24 HD17 POD16/POD11: no events overnight. patient progressing well.  6/23/24 HD18 POD17/ POD12: no events overnight. patient progressing well.  6/24/24 HD19 POD18/POD13: no events overnight. patient progressing well. patient planned for PEG tomorrow  6/25/24 HD20 POD19/POD14: no events overnight. patient progressing well. patient underwent peg placement   6/26/24 HD21 POD20/POD15: patient downsized to 4 uncuffed shiley. no events overnight. patient progressing well.  6/20/24 HD22 POD21/POD16: on ERAS protocol and regular flap checks, progressing well without acute events, IR consulted for PEG  06/21/2024 HD23 POD22/POD17: on ERAS protocol and regular flap checks, progressing well without acute events, dc'ed cook and jorgito drain  06/22/2024 HD24 POD23/POD18: progressing well without acute events  06/23/2024 HD25 POD24/POD19: progressing well without acute events  06/24/2024 HD26 POD25/POD20: progressing well without acute events  06/25/2024 HD27 POD26/POD21: progressing well without acute events, PEG placed by IR  06/26/2024 HD28 POD27/POD22: progressing well without acute events, started ad tolerating PEG feeds  06/27/2024 HD29 POD28/POD23: tolerating full PEG feeds, neck rash dehiscence   06/28/2024 HD30 POD29/POD24: OR for neck washout with plastics  06/29/2024 HD31 POD30/POD25: progressing well without acute events,  trach downsized to 4uncuffed and capped  06/30/2024 HD32 POD31/POD26: Progressing well without acute events, trach decannulated, ready for discharge to rehab   07/01/2024 HD33 POD32/POD27: Progressing well without acute events, ready for discharge to rehab   07/02/2024 HD34 POD34/POD28: ready for discharge to rehab    6/6/24 HD1  63 y/o female with schizophrenia and depression known to Dr. Lockett presents to [facility] for glossectomy, selective neck dissection, tracheostomy, forearm free flap. Procedure went well without complication and patient tolerated procedure well. patient had uneventful post operative course. patient admitted to SICU for flap checks    6/7/24 HD2 POD1  overnight hypertensive.  - venous oozing from trach  - 1x push hydral 10mg, 1x push labetalol 10mg for hypertension. otherwise patient progressing well    6/8/24 HD3 POD2: cuff down, tolerating trach collar at 30%. HTN resolved.   -D/C paez, A-line, TOV passed. tube feeds started    6/9/24 HD4 POD3: febrile overnight. patient progressing well.     6/10/24 HD5 POD4: Downgraded to floor, Started on Bactrim for UTI, Tachy to 120-130s o/n, pt asymptomatic, EKG ordered showing sinus tachycardia, 1L bolus given without resolution. 1 AM pt persistently tachy, medicine consulted, administered 1x dose of Lopressor and CTA/ LE duplex ordered    6/11/24 HD6 POD5: Per nutrition, started back on TFs w/ FLD, SLP eval ordered. Per medicine, transitioned to Xopenex from Duonebs due to this being possible source of tachycardia. JOSSY also ordered. Duplex negative for DVT. ENT paged early this AM due to pt desatting to 85. Pt evaluated bedside, noticed significant congestion of flap intraorally. Decision made to bring pt to OR emergently for exploration of neck. Pt is currently in OR.     6/12/24 HD7 POD6/POD1: Oozing from neck incision, heparin dc'd. Febrile to 101.4 --> Blood cultures, CXR, EKG (sinus tachycardia). TTE --> 60-65% EF w/ trace mitral/tricuspid regurgitation    6/13/24 HD8 POD7/POD2: no events overnight. patient progressing well.  6/14/24 HD9 POD8/POD3: no events overnight. patient progressing well.  6/15/24 HD10 POD9/POD4: no events overnight. patient progressing well.  6/16/24 HD 11 POD 10/POD5: Urinary retention sp straight cath 300 cc --> f/u TOV 6am  6/17/24 HD12 POD11/POD6: no events overnight. patient progressing well.  6/18/24 HD13 POD12/POD7: no events overnight. patient progressing well. patient stepped down to floor  6/19/24 HD14 POD13/POD8: no events overnight. patient progressing well.  6/20/24 HD15 POD14/POD9: no events overnight. patient progressing well. patient failed s/s. KO tube repositioned  6/21/24 HD16 POD15/POD10: no events overnight. patient progressing well.  6/22/24 HD17 POD16/POD11: no events overnight. patient progressing well.  6/23/24 HD18 POD17/ POD12: no events overnight. patient progressing well.  6/24/24 HD19 POD18/POD13: no events overnight. patient progressing well. patient planned for PEG tomorrow  6/25/24 HD20 POD19/POD14: no events overnight. patient progressing well. patient underwent peg placement   6/26/24 HD21 POD20/POD15: patient downsized to 4 uncuffed shiley. no events overnight. patient progressing well.  6/20/24 HD22 POD21/POD16: on ERAS protocol and regular flap checks, progressing well without acute events, IR consulted for PEG  06/21/2024 HD23 POD22/POD17: on ERAS protocol and regular flap checks, progressing well without acute events, dc'ed cook and jorgito drain  06/22/2024 HD24 POD23/POD18: progressing well without acute events  06/23/2024 HD25 POD24/POD19: progressing well without acute events  06/24/2024 HD26 POD25/POD20: progressing well without acute events  06/25/2024 HD27 POD26/POD21: progressing well without acute events, PEG placed by IR  06/26/2024 HD28 POD27/POD22: progressing well without acute events, started ad tolerating PEG feeds  06/27/2024 HD29 POD28/POD23: tolerating full PEG feeds, neck rash dehiscence   06/28/2024 HD30 POD29/POD24: OR for neck washout with plastics  06/29/2024 HD31 POD30/POD25: progressing well without acute events,  trach downsized to 4uncuffed and capped  06/30/2024 HD32 POD31/POD26: Progressing well without acute events, trach decannulated, ready for discharge to rehab   07/01/2024 HD33 POD32/POD27: Progressing well without acute events, ready for discharge to rehab   07/02/2024 HD34 POD34/POD28: Progressing well without acute events, ready for discharge to rehab   07/03/2024 HD35 POD35/POD29: Progressing well without acute events, ready for discharge to rehab   07/04/2024 HD36 POD36/POD30: Progressing well without acute events, ready for discharge to rehab   07/05/2024 HD35 POD35/POD29: Progressing well without acute events, ready for discharge to rehab   07/06/2024 HD35 POD35/POD29: Acute episode of diarrhea overnight, senna dc'd, loperamide added  07/07/2024 HD35 POD35/POD29: Ready for discharge to rehab

## 2024-06-26 NOTE — PROGRESS NOTE ADULT - ASSESSMENT
64y Female with head and neck ca s/p partial glossectomy and trach with poor oral intake s/p gastrostomy tube placement on 6/25 in Interventional Radiology.     Plan:  - gravity bag drainage for 10 hrs followed by saline infusion for 10 hrs  - if tolerates infusion, can use gastrostomy tube for feeds.  - Routine 3 months exchange.

## 2024-06-26 NOTE — DISCHARGE NOTE PROVIDER - NSDCFUSCHEDAPPT_GEN_ALL_CORE_FT
[facility] Physician [facility]  [address]ENTAL [phone]  Scheduled Appointment: 07/09/2024     [facility] Physician [facility]  DENTAL [phone]  Scheduled Appointment: 07/24/2024

## 2024-06-26 NOTE — DISCHARGE NOTE PROVIDER - CARE PROVIDER_API CALL
Lux Lockett [facility]  [phone] [place] [address]  Phone: [phone]  Fax: (   )    -  Established Patient  Scheduled Appointment: 07/03/2024

## 2024-06-26 NOTE — PROVIDER CONTACT NOTE (OTHER) - BACKGROUND
Pt is a 63 y/o F, pmhx of depression and schizo, admitted w/ scc of tongue, s/p partial glossectomy, rfff, l thigh graft and trach

## 2024-06-27 DIAGNOSIS — R33.9 RETENTION OF URINE, UNSPECIFIED: ICD-10-CM

## 2024-06-27 LAB
ANION GAP SERPL CALC-SCNC: 13 MMOL/L — SIGNIFICANT CHANGE UP (ref 7–14)
BASOPHILS # BLD AUTO: 0.02 K/UL — SIGNIFICANT CHANGE UP (ref 0–0.2)
BASOPHILS NFR BLD AUTO: 0.5 % — SIGNIFICANT CHANGE UP (ref 0–2)
BUN SERPL-MCNC: 14 MG/DL — SIGNIFICANT CHANGE UP (ref 7–23)
CALCIUM SERPL-MCNC: 8.6 MG/DL — SIGNIFICANT CHANGE UP (ref 8.4–10.5)
CHLORIDE SERPL-SCNC: 101 MMOL/L — SIGNIFICANT CHANGE UP (ref 98–107)
CO2 SERPL-SCNC: 23 MMOL/L — SIGNIFICANT CHANGE UP (ref 22–31)
CREAT SERPL-MCNC: 0.58 MG/DL — SIGNIFICANT CHANGE UP (ref 0.5–1.3)
EGFR: 101 ML/MIN/1.73M2 — SIGNIFICANT CHANGE UP
EOSINOPHIL # BLD AUTO: 0.11 K/UL — SIGNIFICANT CHANGE UP (ref 0–0.5)
EOSINOPHIL NFR BLD AUTO: 2.5 % — SIGNIFICANT CHANGE UP (ref 0–6)
GLUCOSE SERPL-MCNC: 149 MG/DL — HIGH (ref 70–99)
HCT VFR BLD CALC: 29.1 % — LOW (ref 34.5–45)
HGB BLD-MCNC: 9.8 G/DL — LOW (ref 11.5–15.5)
IANC: 3.17 K/UL — SIGNIFICANT CHANGE UP (ref 1.8–7.4)
IMM GRANULOCYTES NFR BLD AUTO: 0.5 % — SIGNIFICANT CHANGE UP (ref 0–0.9)
LYMPHOCYTES # BLD AUTO: 0.67 K/UL — LOW (ref 1–3.3)
LYMPHOCYTES # BLD AUTO: 15.4 % — SIGNIFICANT CHANGE UP (ref 13–44)
MAGNESIUM SERPL-MCNC: 1.9 MG/DL — SIGNIFICANT CHANGE UP (ref 1.6–2.6)
MCHC RBC-ENTMCNC: 30.3 PG — SIGNIFICANT CHANGE UP (ref 27–34)
MCHC RBC-ENTMCNC: 33.7 GM/DL — SIGNIFICANT CHANGE UP (ref 32–36)
MCV RBC AUTO: 90.1 FL — SIGNIFICANT CHANGE UP (ref 80–100)
MONOCYTES # BLD AUTO: 0.36 K/UL — SIGNIFICANT CHANGE UP (ref 0–0.9)
MONOCYTES NFR BLD AUTO: 8.3 % — SIGNIFICANT CHANGE UP (ref 2–14)
NEUTROPHILS # BLD AUTO: 3.17 K/UL — SIGNIFICANT CHANGE UP (ref 1.8–7.4)
NEUTROPHILS NFR BLD AUTO: 72.8 % — SIGNIFICANT CHANGE UP (ref 43–77)
NRBC # BLD: 0 /100 WBCS — SIGNIFICANT CHANGE UP (ref 0–0)
NRBC # FLD: 0 K/UL — SIGNIFICANT CHANGE UP (ref 0–0)
PHOSPHATE SERPL-MCNC: 3.2 MG/DL — SIGNIFICANT CHANGE UP (ref 2.5–4.5)
PLATELET # BLD AUTO: 452 K/UL — HIGH (ref 150–400)
POTASSIUM SERPL-MCNC: 3.1 MMOL/L — LOW (ref 3.5–5.3)
POTASSIUM SERPL-SCNC: 3.1 MMOL/L — LOW (ref 3.5–5.3)
RBC # BLD: 3.23 M/UL — LOW (ref 3.8–5.2)
RBC # FLD: 16.2 % — HIGH (ref 10.3–14.5)
SODIUM SERPL-SCNC: 137 MMOL/L — SIGNIFICANT CHANGE UP (ref 135–145)
WBC # BLD: 4.35 K/UL — SIGNIFICANT CHANGE UP (ref 3.8–10.5)
WBC # FLD AUTO: 4.35 K/UL — SIGNIFICANT CHANGE UP (ref 3.8–10.5)

## 2024-06-27 PROCEDURE — 99232 SBSQ HOSP IP/OBS MODERATE 35: CPT

## 2024-06-27 RX ORDER — POTASSIUM CHLORIDE 600 MG/1
20 TABLET, FILM COATED, EXTENDED RELEASE ORAL
Refills: 0 | Status: COMPLETED | OUTPATIENT
Start: 2024-06-27 | End: 2024-06-27

## 2024-06-27 RX ORDER — DEXTROSE MONOHYDRATE AND SODIUM CHLORIDE 5; .3 G/100ML; G/100ML
1000 INJECTION, SOLUTION INTRAVENOUS
Refills: 0 | Status: DISCONTINUED | OUTPATIENT
Start: 2024-06-28 | End: 2024-06-28

## 2024-06-27 RX ADMIN — Medication 1 APPLICATION(S): at 16:21

## 2024-06-27 RX ADMIN — Medication 15 MILLILITER(S): at 19:17

## 2024-06-27 RX ADMIN — RISPERIDONE 3 MILLIGRAM(S): 0.5 TABLET ORAL at 22:49

## 2024-06-27 RX ADMIN — RISPERIDONE 1 MILLIGRAM(S): 0.5 TABLET ORAL at 06:06

## 2024-06-27 RX ADMIN — PANTOPRAZOLE SODIUM 40 MILLIGRAM(S): 40 INJECTION, POWDER, FOR SOLUTION INTRAVENOUS at 16:22

## 2024-06-27 RX ADMIN — Medication 975 MILLIGRAM(S): at 06:06

## 2024-06-27 RX ADMIN — Medication 15 MILLILITER(S): at 06:06

## 2024-06-27 RX ADMIN — IPRATROPIUM BROMIDE AND ALBUTEROL SULFATE 3 MILLILITER(S): .5; 3 SOLUTION RESPIRATORY (INHALATION) at 16:48

## 2024-06-27 RX ADMIN — IPRATROPIUM BROMIDE AND ALBUTEROL SULFATE 3 MILLILITER(S): .5; 3 SOLUTION RESPIRATORY (INHALATION) at 03:21

## 2024-06-27 RX ADMIN — BUPROPION HYDROCHLORIDE 300 MILLIGRAM(S): 150 TABLET, EXTENDED RELEASE ORAL at 06:07

## 2024-06-27 RX ADMIN — BUPROPION HYDROCHLORIDE 150 MILLIGRAM(S): 150 TABLET, EXTENDED RELEASE ORAL at 22:49

## 2024-06-27 RX ADMIN — POTASSIUM CHLORIDE 20 MILLIEQUIVALENT(S): 600 TABLET, FILM COATED, EXTENDED RELEASE ORAL at 19:16

## 2024-06-27 RX ADMIN — POTASSIUM CHLORIDE 20 MILLIEQUIVALENT(S): 600 TABLET, FILM COATED, EXTENDED RELEASE ORAL at 16:38

## 2024-06-27 RX ADMIN — Medication 3 MILLILITER(S): at 07:00

## 2024-06-27 RX ADMIN — Medication 3 MILLILITER(S): at 22:40

## 2024-06-27 RX ADMIN — ACETYLCYSTEINE 4 MILLILITER(S): 200 SOLUTION ORAL; RESPIRATORY (INHALATION) at 03:21

## 2024-06-27 RX ADMIN — Medication 15 MILLILITER(S): at 22:49

## 2024-06-27 RX ADMIN — PETROLATUM 1 APPLICATION(S): 42 OINTMENT TOPICAL at 16:23

## 2024-06-27 RX ADMIN — ENOXAPARIN SODIUM 40 MILLIGRAM(S): 100 INJECTION SUBCUTANEOUS at 16:23

## 2024-06-27 RX ADMIN — Medication 100 MILLIGRAM(S): at 22:49

## 2024-06-27 RX ADMIN — POTASSIUM CHLORIDE 20 MILLIEQUIVALENT(S): 600 TABLET, FILM COATED, EXTENDED RELEASE ORAL at 17:54

## 2024-06-27 RX ADMIN — Medication 3 MILLILITER(S): at 14:32

## 2024-06-27 RX ADMIN — AMLODIPINE BESYLATE 10 MILLIGRAM(S): 2.5 TABLET ORAL at 06:07

## 2024-06-27 RX ADMIN — Medication 15 MILLILITER(S): at 16:24

## 2024-06-27 RX ADMIN — ACETYLCYSTEINE 4 MILLILITER(S): 200 SOLUTION ORAL; RESPIRATORY (INHALATION) at 16:48

## 2024-06-27 NOTE — PROGRESS NOTE ADULT - SUBJECTIVE AND OBJECTIVE BOX
[facility] Division of Hospital Medicine  Yulia Condon MD  Pager (N-F, 5J-5W): [phone]  Other Times:  [phone]    Patient is a 64y old  Female who presents with a chief complaint of s/p surgery (21 Jun 2024 11:06)      SUBJECTIVE / OVERNIGHT EVENTS: seen with RN noted bladder scan 351; wound dehiscence in LT neck wound     MEDICATIONS  (STANDING):  acetaminophen   Oral Liquid .. 975 milliGRAM(s) Oral every 6 hours  acetylcysteine 10%  Inhalation 4 milliLiter(s) Inhalation every 6 hours  albuterol/ipratropium for Nebulization 3 milliLiter(s) Nebulizer every 6 hours  amLODIPine   Tablet 10 milliGRAM(s) Oral daily  AQUAPHOR (petrolatum Ointment) 1 Application(s) Topical daily  AQUAPHOR (petrolatum Ointment) 1 Application(s) Topical daily  buPROPion . 150 milliGRAM(s) Oral at bedtime  buPROPion . 300 milliGRAM(s) Oral <User Schedule>  chlorhexidine 0.12% Liquid 15 milliLiter(s) Swish and Spit <User Schedule>  chlorhexidine 2% Cloths 1 Application(s) Topical daily  enoxaparin Injectable 40 milliGRAM(s) SubCutaneous every 24 hours  pantoprazole  Injectable 40 milliGRAM(s) IV Push daily  potassium chloride    Tablet ER 20 milliEquivalent(s) Oral every 2 hours  QUEtiapine 100 milliGRAM(s) Oral at bedtime  risperiDONE   Solution 1 milliGRAM(s) Oral <User Schedule>  risperiDONE   Solution 3 milliGRAM(s) Oral at bedtime  sodium chloride 0.9% lock flush 3 milliLiter(s) IV Push every 8 hours    MEDICATIONS  (PRN):      CAPILLARY BLOOD GLUCOSE        I&O's Summary    26 Jun 2024 07:01  -  27 Jun 2024 07:00  --------------------------------------------------------  IN: 1560 mL / OUT: 0 mL / NET: 1560 mL        PHYSICAL EXAM:  Vital Signs Last 24 Hrs  T(C): 37.3 (27 Jun 2024 09:00), Max: 37.3 (27 Jun 2024 09:00)  T(F): 99.1 (27 Jun 2024 09:00), Max: 99.1 (27 Jun 2024 09:00)  HR: 101 (27 Jun 2024 09:00) (90 - 112)  BP: 124/79 (27 Jun 2024 09:00) (114/83 - 144/1)  BP(mean): --  RR: 19 (27 Jun 2024 09:00) (18 - 20)  SpO2: 96% (27 Jun 2024 09:00) (96% - 100%)    Parameters below as of 27 Jun 2024 09:00  Patient On (Oxygen Delivery Method): room air    CONSTITUTIONAL: NAD  ENMT:  + Trach capped  RESPIRATORY: Normal respiratory effort; lungs are clear to auscultation bilaterally  CARDIOVASCULAR: Regular rate and rhythm, normal S1 and S2, no murmur/rub/gallop; No lower extremity edema; Peripheral pulses are 2+ bilaterally  ABDOMEN: Nontender to palpation, normoactive bowel sounds, no rebound/guarding; + PEG   MUSCULOSKELETAL:  LT UE graft site + Lt thigh graft site   NEUROLOGY: follows command moving all ext      LABS:                        9.8    4.35  )-----------( 452      ( 27 Jun 2024 07:10 )             29.1     06-27    137  |  101  |  14  ----------------------------<  149<H>  3.1<L>   |  23  |  0.58    Ca    8.6      27 Jun 2024 07:10  Phos  3.2     06-27  Mg     1.90     06-27            Urinalysis Basic - ( 27 Jun 2024 07:10 )    Color: x / Appearance: x / SG: x / pH: x  Gluc: 149 mg/dL / Ketone: x  / Bili: x / Urobili: x   Blood: x / Protein: x / Nitrite: x   Leuk Esterase: x / RBC: x / WBC x   Sq Epi: x / Non Sq Epi: x / Bacteria: x          RADIOLOGY & ADDITIONAL TESTS:  Results Reviewed:   Imaging Personally Reviewed:  Electrocardiogram Personally Reviewed:    COORDINATION OF CARE:  Care Discussed with Consultants/Other Providers [Y/N]:  Prior or Outpatient Records Reviewed [Y/N]:

## 2024-06-27 NOTE — PROGRESS NOTE ADULT - PROBLEM SELECTOR PLAN 1
-R partial glossectomy in the setting of SCCA, R SND I-IV, L RFFF, L thigh STSG, tracheostomy (6/6/24). Pts post-operative course c/b venous congestion of flap w/ RTOR on 6/11 for free flap salvage and revision of venous anastomosis. Flap now infected with E Coli also without flow. RTOR 6/14 for exploration of tongue free flap, muscle flap revision with right thigh flap, 7.5 trach exchanged to 7.0 cuffed trach.  -OR wound cx (6/11, 6/14) grew E.coli, s/p Unsyn 3g q6h   -management per ENT/plastic sx  - s/p PEG   - feeds as per nutrition  - trach care as per primary   - tolerated capping trial  - LT neck wound dehiscence noted by plastic plan for RTOR

## 2024-06-27 NOTE — PROGRESS NOTE ADULT - ASSESSMENT
64F w/ PMH of depression and schizophrenia s/p R partial glossectomy in the setting of SCCA, R SND I-IV, L RFFF, L thigh STSG, tracheostomy (6/6/24). Pts post-operative course c/b venous congestion of flap w/ RTOR on 6/11/24 for free flap salvage and revision of venous anastamosis, RTOR on 6/14/24 for debridement of necrotic radial forearm flap and replacement with right ALT flap, with 7.5 trach exchanged to 7.0 cuffed trach. Pt stepped down to the floor (6/17/24), Pt is progressing well.     Plan:  - start full PEG feeds   - decannulate trach later this AM  - Strict I&O's  - Aquacell placement on neck   - reposition the patient Q4-5h intervals, clean/bath the patient daily for improvement of the heat rash  - engage CM for rehab placement   - Aquaphor to left thigh donor site daily    Xavier Gabriel  Oral and Maxillofacial Surgery  [facility] Pager #[phone]  [facility] Pager: [phone]  [facility] Pager: [phone]  Available on Teams

## 2024-06-27 NOTE — PROGRESS NOTE ADULT - PROBLEM SELECTOR PLAN 2
- bladder scan noted 351   - last BM 6/25  - IVF started by primary   - repeat Post void   - consider AXR eval stool burden if retaining post void bladder scans

## 2024-06-27 NOTE — PROVIDER CONTACT NOTE (OTHER) - ASSESSMENT
Pt has not voided since 10pm, over 8hrs. Pt denies feeling of urgency to void. Pt denies discomfort or feeling of retention. Diaper on Pt clean and dry. Bladder scan done showing 351.

## 2024-06-27 NOTE — PROGRESS NOTE ADULT - SUBJECTIVE AND OBJECTIVE BOX
64F w/ PMH of depression and schizophrenia s/p R partial glossectomy in the setting of SCCA, R SND I-IV, L RFFF, L thigh STSG, tracheostomy (6/6/24). Pts post-operative course c/b venous congestion of flap w/ RTOR on 6/11/24 for free flap salvage and revision of venous anastamosis, RTOR on 6/14/24 for debridement of necrotic radial forearm flap and replacement with right ALT flap, with 7.5 trach exchanged to 7.0 cuffed trach.     Interval Events:   -NAEON, NAD  -Pt tolerating TF well  -NGT removed  -s/p PEG (6/25)       General: aaox3. well-appearing. no apparent distress.     HEENT:  Head: normocephalic  E: normal hearing  E: PERRL, no conjunctival hemmorage noted  N: nares clear, minimal heme consistent with post-operative bleeding  T: neck soft and supple, no sings of indurations. no LAD    Vital Signs Last 24 Hrs  T(C): 36.9 (27 Jun 2024 02:43), Max: 37 (26 Jun 2024 13:56)  T(F): 98.4 (27 Jun 2024 02:43), Max: 98.6 (26 Jun 2024 13:56)  HR: 98 (27 Jun 2024 03:22) (88 - 115)  BP: 114/83 (27 Jun 2024 02:43) (114/83 - 145/95)  BP(mean): --  RR: 18 (27 Jun 2024 02:43) (18 - 20)  SpO2: 97% (27 Jun 2024 03:22) (96% - 100%)    Parameters below as of 27 Jun 2024 03:22  Patient On (Oxygen Delivery Method): room air    I&O's Detail    25 Jun 2024 07:01  -  26 Jun 2024 07:00  --------------------------------------------------------  IN:    Lactated Ringers: 500 mL  Total IN: 500 mL    OUT:    Voided (mL): 200 mL  Total OUT: 200 mL    Total NET: 300 mL    26 Jun 2024 07:01  -  27 Jun 2024 06:48  --------------------------------------------------------  IN:    Enteral Tube Flush: 450 mL    Jevity 1.5: 510 mL  Total IN: 960 mL    OUT:  Total OUT: 0 mL    Total NET: 960 mL    MEDICATIONS  (STANDING):  acetaminophen   Oral Liquid .. 975 milliGRAM(s) Oral every 6 hours  acetylcysteine 10%  Inhalation 4 milliLiter(s) Inhalation every 6 hours  albuterol/ipratropium for Nebulization 3 milliLiter(s) Nebulizer every 6 hours  amLODIPine   Tablet 10 milliGRAM(s) Oral daily  AQUAPHOR (petrolatum Ointment) 1 Application(s) Topical daily  AQUAPHOR (petrolatum Ointment) 1 Application(s) Topical daily  buPROPion . 150 milliGRAM(s) Oral at bedtime  buPROPion . 300 milliGRAM(s) Oral <User Schedule>  chlorhexidine 0.12% Liquid 15 milliLiter(s) Swish and Spit <User Schedule>  chlorhexidine 2% Cloths 1 Application(s) Topical daily  enoxaparin Injectable 40 milliGRAM(s) SubCutaneous every 24 hours  pantoprazole  Injectable 40 milliGRAM(s) IV Push daily  QUEtiapine 100 milliGRAM(s) Oral at bedtime  risperiDONE   Solution 3 milliGRAM(s) Oral at bedtime  risperiDONE   Solution 1 milliGRAM(s) Oral <User Schedule>  sodium chloride 0.9% lock flush 3 milliLiter(s) IV Push every 8 hours    MEDICATIONS  (PRN):    Labs:                        10.7   5.10  )-----------( 523      ( 26 Jun 2024 06:20 )             31.9     06-26    137  |  100  |  9   ----------------------------<  96  3.6   |  23  |  0.51    Ca    8.4      26 Jun 2024 06:20  Phos  3.4     06-26  Mg     1.80     06-26

## 2024-06-27 NOTE — PROGRESS NOTE ADULT - SUBJECTIVE AND OBJECTIVE BOX
Plastic Surgery Progress Note (pg [facility]: [id number], NS: [phone])    SUBJECTIVE  The patient was seen and examined. Neck dressing changed this AM to WTD.     OBJECTIVE  ___________________________________________________  VITAL SIGNS / I&O's   Vital Signs Last 24 Hrs  T(C): 36.9 (27 Jun 2024 06:00), Max: 37 (26 Jun 2024 13:56)  T(F): 98.4 (27 Jun 2024 06:00), Max: 98.6 (26 Jun 2024 13:56)  HR: 104 (27 Jun 2024 06:00) (88 - 115)  BP: 144/1 (27 Jun 2024 06:00) (114/83 - 145/95)  BP(mean): --  ABP: --  ABP(mean): --  RR: 18 (27 Jun 2024 06:00) (18 - 20)  SpO2: 97% (27 Jun 2024 06:00) (96% - 100%)    O2 Parameters below as of 27 Jun 2024 06:00  Patient On (Oxygen Delivery Method): room air    ___________________________________________________  PHYSICAL EXAM    -- CONSTITUTIONAL: NAD, lying in bed  -- NEURO: Awake, alert  -- HEENT: NC/AT, mucous membranes moist  -- NECK: Neck soft no palpable fluid collection, rash improving. 5x2cm wound dehiscence to down to level of muscle with fibrinous exudate  -- PULM: Non-labored respirations, equal chest rise bilaterally  -- ABDOMEN: Nondistended  -- RLE: thigh soft, incision cdi  -- LUE: skin graft with good take  -- PSYCH: Affect normal, A&Ox3    ___________________________________________________  LABS                           9.8    4.35  )-----------( 452      ( 27 Jun 2024 07:10 )             29.1   06-27    137  |  101  |  x   ----------------------------<  x   3.1<L>   |  x   |  x     Ca    8.4      26 Jun 2024 06:20  Phos  3.2     06-27  Mg     1.90     06-27    ___________________________________________________  MICRO  Recent Cultures:    ___________________________________________________  MEDICATIONS  (STANDING):  acetaminophen   Oral Liquid .. 975 milliGRAM(s) Oral every 6 hours  acetylcysteine 10%  Inhalation 4 milliLiter(s) Inhalation every 6 hours  albuterol/ipratropium for Nebulization 3 milliLiter(s) Nebulizer every 6 hours  amLODIPine   Tablet 10 milliGRAM(s) Oral daily  AQUAPHOR (petrolatum Ointment) 1 Application(s) Topical daily  buPROPion . 150 milliGRAM(s) Oral at bedtime  buPROPion . 300 milliGRAM(s) Oral <User Schedule>  chlorhexidine 0.12% Liquid 15 milliLiter(s) Swish and Spit <User Schedule>  chlorhexidine 2% Cloths 1 Application(s) Topical daily  diphenhydrAMINE 25 milliGRAM(s) Oral once  enoxaparin Injectable 40 milliGRAM(s) SubCutaneous every 24 hours  pantoprazole  Injectable 40 milliGRAM(s) IV Push daily  QUEtiapine 100 milliGRAM(s) Oral at bedtime  risperiDONE   Solution 1 milliGRAM(s) Oral <User Schedule>  risperiDONE   Solution 3 milliGRAM(s) Oral at bedtime  sodium chloride 0.9% lock flush 3 milliLiter(s) IV Push every 8 hours    MEDICATIONS  (PRN):

## 2024-06-27 NOTE — PROVIDER CONTACT NOTE (OTHER) - ACTION/TREATMENT ORDERED:
Provider notified. Provider states will increase free water through Peg tube. As of right now, no straight cath needed. Provider states will notify RN with any new orders/interventions.

## 2024-06-27 NOTE — CHART NOTE - NSCHARTNOTEFT_GEN_A_CORE
Source: [X] Patient       [x] EMR        [X] RN        [] Family at bedside       [X] Other: FS    Nutrition Follow Up Note. Per chart review, 65 y/o female with schizophrenia and depression s/p R partial glossectomy in the setting of SCCA, R SND I-IV, L RFFF, L thigh STSG, tracheostomy (6/6/24). Pts post-operative course c/b venous congestion of flap w/ RTOR on 6/11 for free flap salvage and revision of venous anastamosis. Flap now infected with E Coli also without flow. RTOR 6/14 for exploration of tongue free flap, muscle flap revision with right thigh flap, 7.5 trach exchanged to 7.0 cuffed trach. S/p SICU stay for flap checks.  Pt stepped down to the floor (8S)(6/18/24).    S/p gastrostomy tube placement on 6/25. Patient currently maintained on Jevity1.5cal bolus feeds of 340mL [id number] provides total volume of 1360mL, 2040kcal, 87g pro and 1034mL free water. Patient is tolerating tube feeds well. No nausea/vomiting/diarrhea/constipation reported. Patient is receiving bolus tube feeds via pump due to TF order "Bolus Feed Duration (in Hours): 1" as discussed with RN. RD spoke to OMFS team to change order to 0 in hours to start trial of bolus feeds via syringe and to monitor for tolerance.      Diet Prescription: Diet, NPO with Tube Feed:   Tube Feeding Modality: Gastrostomy  Jevity 1.5 Boubacar (JEVITY1.5RTH)  Total Volume for 24 Hours (mL): 1360  Bolus  Total Volume of Bolus (mL):  340  Tube Feed Frequency: Every 6 hours   Tube Feed Start Time: 06:47  Bolus Feed Rate (mL per Hour): 340   Bolus Feed Duration (in Hours): 1  Free Water Flush  Bolus   Total Volume per Flush (mL): 260   Frequency: Every 6 Hours (06-27-24 @ 06:47)    Food Allergy: NKFA    Pertinent Medications: MEDICATIONS  (STANDING):  acetaminophen   Oral Liquid .. 975 milliGRAM(s) Oral every 6 hours  acetylcysteine 10%  Inhalation 4 milliLiter(s) Inhalation every 6 hours  albuterol/ipratropium for Nebulization 3 milliLiter(s) Nebulizer every 6 hours  amLODIPine   Tablet 10 milliGRAM(s) Oral daily  AQUAPHOR (petrolatum Ointment) 1 Application(s) Topical daily  AQUAPHOR (petrolatum Ointment) 1 Application(s) Topical daily  buPROPion . 150 milliGRAM(s) Oral at bedtime  buPROPion . 300 milliGRAM(s) Oral <User Schedule>  chlorhexidine 0.12% Liquid 15 milliLiter(s) Swish and Spit <User Schedule>  chlorhexidine 2% Cloths 1 Application(s) Topical daily  enoxaparin Injectable 40 milliGRAM(s) SubCutaneous every 24 hours  pantoprazole  Injectable 40 milliGRAM(s) IV Push daily  potassium chloride    Tablet ER 20 milliEquivalent(s) Oral every 2 hours  QUEtiapine 100 milliGRAM(s) Oral at bedtime  risperiDONE   Solution 3 milliGRAM(s) Oral at bedtime  risperiDONE   Solution 1 milliGRAM(s) Oral <User Schedule>  sodium chloride 0.9% lock flush 3 milliLiter(s) IV Push every 8 hours    MEDICATIONS  (PRN):    Pertinent Labs: 06-27 Na137 mmol/L Glu 149 mg/dL<H> K+ 3.1 mmol/L<L> Cr  0.58 mg/dL BUN 14 mg/dL 06-27 Phos 3.2 mg/dL      Weight (kg): dosing weights: 59 (06-14 @ 06:41)  59kg (6/6)  59kg (5/30)  Fluctuating daily weights documented during the course of admission-?question accuracy  Daily weights (kg): 6/17- 69.8kg   6/16-68.4kg  6/15-68.4kg  6/14-71.2kg  6/9-65.2kg  6/6-72.3kg   Weight Assessment:  Height: 162.6cm  IBW: 54.5 kg +/-10%  BMI: 22.3kg/m^2        Edema: no edema per nursing flowsheets.   Pressure Injury: No pressure ulcers/DTI noted in flowsheets.       Estimated Needs:     [X] No change since previous assessment, based on dosing weight 59kg    Estimated Energy Needs (kcal/kg):   30-35 = 1770-2065kcal/d  Estimated Protein Needs(g/kg):      1.2-1.5 = 71-89g pro/d    Previous Nutrition Diagnosis:      [X ] Increased Nutrient Needs   Nutrition Diagnosis is [X ] ongoing  [ ] resolved [ ] not applicable     [X]  Inadequate protein energy intake, related to unable to tolerate po intake As evidenced by dysphagia    Nutrition Diagnosis is [ ] ongoing  [ ] resolved [X ] not applicable     Nutrition Interventions:   1) Continue TF regimen as mentioned above and discussed with OMFS team. Additional fluids per MD discretion.  2) Monitor weights, labs, BM's, skin integrity, tolerance to EN.  3) Further adjustments to rate/volume/duration/free water provision of enteral feeds dependant on long term monitoring of patient's tolerance, weight trends and needs   4) RN to obtain new weight and weekly weights.      RD remains available.      Helio Wright, RD, MS, CDN pager [phone]  Also available on Microsoft Teams

## 2024-06-27 NOTE — PROGRESS NOTE ADULT - ASSESSMENT
63 y/o female with schizophrenia and depression s/p R partial glossectomy in the setting of SCCA, R SND I-IV, L RFFF, L thigh STSG, tracheostomy (6/6/24). Pts post-operative course c/b venous congestion of flap w/ RTOR on 6/11 for free flap salvage and revision of venous anastamosis. Flap now infected with E Coli also without flow. RTOR 6/14 for exploration of tongue free flap, muscle flap revision with right thigh flap.

## 2024-06-27 NOTE — PROGRESS NOTE ADULT - ASSESSMENT
LELA LOWERY is a 64yFemale s/p right ALT flap for revision hemiglossal recon 6/14    - neck rash improved; neck now with wound  -Plan to RTOR on friday 6/28, please make NPO at [place], preop  - Wound care: WTD dressing to neck wound  - Aquaphor to left thigh and LUE donor site daily  - Diet: TFs  - PEG placed 6/25  - Continue peridex  - Appreciate care per primary    [facility]  Plastic Surgery  #[phone] [facility] pager  [phone] [facility] pager  Available on teams

## 2024-06-28 ENCOUNTER — APPOINTMENT (OUTPATIENT)
Dept: PLASTIC SURGERY | Facility: HOSPITAL | Age: 64
End: 2024-06-28

## 2024-06-28 LAB
ANION GAP SERPL CALC-SCNC: 13 MMOL/L — SIGNIFICANT CHANGE UP (ref 7–14)
APTT BLD: 22.8 SEC — LOW (ref 24.5–35.6)
BUN SERPL-MCNC: 13 MG/DL — SIGNIFICANT CHANGE UP (ref 7–23)
CALCIUM SERPL-MCNC: 8.7 MG/DL — SIGNIFICANT CHANGE UP (ref 8.4–10.5)
CHLORIDE SERPL-SCNC: 103 MMOL/L — SIGNIFICANT CHANGE UP (ref 98–107)
CO2 SERPL-SCNC: 24 MMOL/L — SIGNIFICANT CHANGE UP (ref 22–31)
CREAT SERPL-MCNC: 0.67 MG/DL — SIGNIFICANT CHANGE UP (ref 0.5–1.3)
EGFR: 98 ML/MIN/1.73M2 — SIGNIFICANT CHANGE UP
GLUCOSE SERPL-MCNC: 86 MG/DL — SIGNIFICANT CHANGE UP (ref 70–99)
HCT VFR BLD CALC: 30.2 % — LOW (ref 34.5–45)
HGB BLD-MCNC: 9.8 G/DL — LOW (ref 11.5–15.5)
INR BLD: 1.07 RATIO — SIGNIFICANT CHANGE UP (ref 0.85–1.18)
MAGNESIUM SERPL-MCNC: 2 MG/DL — SIGNIFICANT CHANGE UP (ref 1.6–2.6)
MCHC RBC-ENTMCNC: 30 PG — SIGNIFICANT CHANGE UP (ref 27–34)
MCHC RBC-ENTMCNC: 32.5 GM/DL — SIGNIFICANT CHANGE UP (ref 32–36)
MCV RBC AUTO: 92.4 FL — SIGNIFICANT CHANGE UP (ref 80–100)
NRBC # BLD: 0 /100 WBCS — SIGNIFICANT CHANGE UP (ref 0–0)
NRBC # FLD: 0 K/UL — SIGNIFICANT CHANGE UP (ref 0–0)
PHOSPHATE SERPL-MCNC: 3.6 MG/DL — SIGNIFICANT CHANGE UP (ref 2.5–4.5)
PLATELET # BLD AUTO: 432 K/UL — HIGH (ref 150–400)
POTASSIUM SERPL-MCNC: 4 MMOL/L — SIGNIFICANT CHANGE UP (ref 3.5–5.3)
POTASSIUM SERPL-SCNC: 4 MMOL/L — SIGNIFICANT CHANGE UP (ref 3.5–5.3)
PROTHROM AB SERPL-ACNC: 12 SEC — SIGNIFICANT CHANGE UP (ref 9.5–13)
RBC # BLD: 3.27 M/UL — LOW (ref 3.8–5.2)
RBC # FLD: 15.9 % — HIGH (ref 10.3–14.5)
SODIUM SERPL-SCNC: 140 MMOL/L — SIGNIFICANT CHANGE UP (ref 135–145)
WBC # BLD: 2.95 K/UL — LOW (ref 3.8–10.5)
WBC # FLD AUTO: 2.95 K/UL — LOW (ref 3.8–10.5)

## 2024-06-28 PROCEDURE — 99232 SBSQ HOSP IP/OBS MODERATE 35: CPT

## 2024-06-28 PROCEDURE — 99231 SBSQ HOSP IP/OBS SF/LOW 25: CPT

## 2024-06-28 PROCEDURE — 14301 TIS TRNFR ANY 30.1-60 SQ CM: CPT | Mod: 78

## 2024-06-28 PROCEDURE — 15004 WOUND PREP F/N/HF/G: CPT | Mod: 78

## 2024-06-28 PROCEDURE — 14302 TIS TRNFR ADDL 30 SQ CM: CPT | Mod: 78

## 2024-06-28 RX ORDER — FENTANYL CITRATE 50 UG/ML
50 INJECTION, SOLUTION INTRAMUSCULAR; INTRAVENOUS ONCE
Refills: 0 | Status: DISCONTINUED | OUTPATIENT
Start: 2024-06-28 | End: 2024-06-28

## 2024-06-28 RX ORDER — FENTANYL CITRATE 50 UG/ML
25 INJECTION, SOLUTION INTRAMUSCULAR; INTRAVENOUS
Refills: 0 | Status: DISCONTINUED | OUTPATIENT
Start: 2024-06-28 | End: 2024-06-28

## 2024-06-28 RX ORDER — SENNOSIDES 8.6 MG
2 TABLET ORAL AT BEDTIME
Refills: 0 | Status: DISCONTINUED | OUTPATIENT
Start: 2024-06-28 | End: 2024-07-06

## 2024-06-28 RX ORDER — ALBUTEROL 90 MCG
1 AEROSOL REFILL (GRAM) INHALATION ONCE
Refills: 0 | Status: DISCONTINUED | OUTPATIENT
Start: 2024-06-28 | End: 2024-07-10

## 2024-06-28 RX ORDER — ONDANSETRON HYDROCHLORIDE 2 MG/ML
4 INJECTION INTRAMUSCULAR; INTRAVENOUS ONCE
Refills: 0 | Status: DISCONTINUED | OUTPATIENT
Start: 2024-06-28 | End: 2024-06-28

## 2024-06-28 RX ORDER — ALBUTEROL 90 MCG
2.5 AEROSOL REFILL (GRAM) INHALATION ONCE
Refills: 0 | Status: COMPLETED | OUTPATIENT
Start: 2024-06-28 | End: 2024-06-28

## 2024-06-28 RX ORDER — OXYCODONE HYDROCHLORIDE 100 MG/5ML
5 SOLUTION ORAL EVERY 4 HOURS
Refills: 0 | Status: DISCONTINUED | OUTPATIENT
Start: 2024-06-28 | End: 2024-07-05

## 2024-06-28 RX ORDER — OXYCODONE HYDROCHLORIDE 100 MG/5ML
2.5 SOLUTION ORAL EVERY 4 HOURS
Refills: 0 | Status: DISCONTINUED | OUTPATIENT
Start: 2024-06-28 | End: 2024-06-28

## 2024-06-28 RX ADMIN — ENOXAPARIN SODIUM 40 MILLIGRAM(S): 100 INJECTION SUBCUTANEOUS at 15:15

## 2024-06-28 RX ADMIN — Medication 1 APPLICATION(S): at 12:46

## 2024-06-28 RX ADMIN — Medication 975 MILLIGRAM(S): at 00:17

## 2024-06-28 RX ADMIN — Medication 15 MILLILITER(S): at 05:48

## 2024-06-28 RX ADMIN — OXYCODONE HYDROCHLORIDE 5 MILLIGRAM(S): 100 SOLUTION ORAL at 17:51

## 2024-06-28 RX ADMIN — PANTOPRAZOLE SODIUM 40 MILLIGRAM(S): 40 INJECTION, POWDER, FOR SOLUTION INTRAVENOUS at 12:45

## 2024-06-28 RX ADMIN — Medication 975 MILLIGRAM(S): at 12:46

## 2024-06-28 RX ADMIN — PETROLATUM 1 APPLICATION(S): 42 OINTMENT TOPICAL at 12:46

## 2024-06-28 RX ADMIN — Medication 975 MILLIGRAM(S): at 00:45

## 2024-06-28 RX ADMIN — Medication 15 MILLILITER(S): at 16:04

## 2024-06-28 RX ADMIN — OXYCODONE HYDROCHLORIDE 5 MILLIGRAM(S): 100 SOLUTION ORAL at 18:21

## 2024-06-28 RX ADMIN — BUPROPION HYDROCHLORIDE 300 MILLIGRAM(S): 150 TABLET, EXTENDED RELEASE ORAL at 05:48

## 2024-06-28 RX ADMIN — ACETYLCYSTEINE 4 MILLILITER(S): 200 SOLUTION ORAL; RESPIRATORY (INHALATION) at 20:31

## 2024-06-28 RX ADMIN — PETROLATUM 1 APPLICATION(S): 42 OINTMENT TOPICAL at 12:45

## 2024-06-28 RX ADMIN — Medication 3 MILLILITER(S): at 20:32

## 2024-06-28 RX ADMIN — AMLODIPINE BESYLATE 10 MILLIGRAM(S): 2.5 TABLET ORAL at 05:48

## 2024-06-28 RX ADMIN — ACETYLCYSTEINE 4 MILLILITER(S): 200 SOLUTION ORAL; RESPIRATORY (INHALATION) at 15:46

## 2024-06-28 RX ADMIN — Medication 3 MILLILITER(S): at 13:45

## 2024-06-28 RX ADMIN — IPRATROPIUM BROMIDE AND ALBUTEROL SULFATE 3 MILLILITER(S): .5; 3 SOLUTION RESPIRATORY (INHALATION) at 20:31

## 2024-06-28 RX ADMIN — Medication 975 MILLIGRAM(S): at 18:20

## 2024-06-28 RX ADMIN — Medication 2.5 MILLIGRAM(S): at 12:35

## 2024-06-28 RX ADMIN — DEXTROSE MONOHYDRATE AND SODIUM CHLORIDE 80 MILLILITER(S): 5; .3 INJECTION, SOLUTION INTRAVENOUS at 00:17

## 2024-06-28 RX ADMIN — Medication 15 MILLILITER(S): at 12:46

## 2024-06-28 RX ADMIN — ACETYLCYSTEINE 4 MILLILITER(S): 200 SOLUTION ORAL; RESPIRATORY (INHALATION) at 03:27

## 2024-06-28 RX ADMIN — IPRATROPIUM BROMIDE AND ALBUTEROL SULFATE 3 MILLILITER(S): .5; 3 SOLUTION RESPIRATORY (INHALATION) at 15:46

## 2024-06-28 RX ADMIN — Medication 975 MILLIGRAM(S): at 13:16

## 2024-06-28 RX ADMIN — Medication 975 MILLIGRAM(S): at 17:50

## 2024-06-28 RX ADMIN — IPRATROPIUM BROMIDE AND ALBUTEROL SULFATE 3 MILLILITER(S): .5; 3 SOLUTION RESPIRATORY (INHALATION) at 03:27

## 2024-06-28 RX ADMIN — Medication 3 MILLILITER(S): at 05:35

## 2024-06-28 RX ADMIN — Medication 15 MILLILITER(S): at 19:22

## 2024-06-28 RX ADMIN — RISPERIDONE 1 MILLIGRAM(S): 0.5 TABLET ORAL at 05:49

## 2024-06-28 NOTE — PROGRESS NOTE ADULT - ASSESSMENT
63 y/o female with schizophrenia and depression s/p R partial glossectomy in the setting of SCCA, R SND I-IV, L RFFF, L thigh STSG, tracheostomy (6/6/24). Pts post-operative course c/b venous congestion of flap w/ RTOR on 6/11 for free flap salvage and revision of venous anastamosis. Flap now infected with E Coli also without flow. RTOR 6/14 for exploration of tongue free flap, muscle flap revision with right thigh flap. c/b neck wound dehiscence and s/p OR 6/28

## 2024-06-28 NOTE — PROGRESS NOTE ADULT - PROBLEM SELECTOR PLAN 4
- Trend HR   - OR wound E coli, s/p antibiotics   - Pain control   - TTE reviewed, normal EF 60-65%  - PE ruled out  - elevated TSH - 9.8 with normal FT4; repeat TFT in 6 weeks - Trend HR   - OR wound E coli, s/p antibiotics   - Pain control   - TTE reviewed, normal EF 60-65%  - PE ruled out  - elevated TSH - 9.8 with normal FT4; repeat TFT in 6 weeks  - improved

## 2024-06-28 NOTE — PROGRESS NOTE ADULT - SUBJECTIVE AND OBJECTIVE BOX
Plastic Surgery Progress Note (pg [facility]: [id number], NS: [phone])    SUBJECTIVE  The patient was sent down to pre-op. Plan for OR today.    OBJECTIVE  ___________________________________________________  VITAL SIGNS / I&O's   Vital Signs Last 24 Hrs  T(C): 36.7 (28 Jun 2024 05:55), Max: 37.3 (27 Jun 2024 09:00)  T(F): 98.1 (28 Jun 2024 05:55), Max: 99.1 (27 Jun 2024 09:00)  HR: 86 (28 Jun 2024 05:55) (86 - 121)  BP: 134/89 (28 Jun 2024 05:55) (117/87 - 134/89)  BP(mean): --  ABP: --  ABP(mean): --  RR: 17 (28 Jun 2024 05:55) (17 - 19)  SpO2: 98% (28 Jun 2024 05:55) (96% - 99%)    O2 Parameters below as of 28 Jun 2024 05:55  Patient On (Oxygen Delivery Method): room air        ___________________________________________________  PHYSICAL EXAM    -- CONSTITUTIONAL: NAD, lying in bed  -- NEURO: Awake, alert  -- HEENT: NC/AT, mucous membranes moist  -- NECK: Neck soft no palpable fluid collection, rash improving. 5x2cm wound dehiscence to down to level of muscle with fibrinous exudate  -- PULM: Non-labored respirations, equal chest rise bilaterally  -- ABDOMEN: Nondistended  -- RLE: thigh soft, incision cdi  -- LUE: skin graft with good take  -- PSYCH: Affect normal, A&Ox3    ___________________________________________________  LABS                           9.8    2.95  )-----------( 432      ( 28 Jun 2024 03:42 )             30.2     06-28    140  |  103  |  13  ----------------------------<  86  4.0   |  24  |  0.67    Ca    8.7      28 Jun 2024 03:42  Phos  3.6     06-28  Mg     2.00     06-28        ___________________________________________________  MICRO  Recent Cultures:    ___________________________________________________  MEDICATIONS  (STANDING):  acetaminophen   Oral Liquid .. 975 milliGRAM(s) Oral every 6 hours  acetylcysteine 10%  Inhalation 4 milliLiter(s) Inhalation every 6 hours  albuterol/ipratropium for Nebulization 3 milliLiter(s) Nebulizer every 6 hours  amLODIPine   Tablet 10 milliGRAM(s) Oral daily  AQUAPHOR (petrolatum Ointment) 1 Application(s) Topical daily  buPROPion . 150 milliGRAM(s) Oral at bedtime  buPROPion . 300 milliGRAM(s) Oral <User Schedule>  chlorhexidine 0.12% Liquid 15 milliLiter(s) Swish and Spit <User Schedule>  chlorhexidine 2% Cloths 1 Application(s) Topical daily  diphenhydrAMINE 25 milliGRAM(s) Oral once  enoxaparin Injectable 40 milliGRAM(s) SubCutaneous every 24 hours  pantoprazole  Injectable 40 milliGRAM(s) IV Push daily  QUEtiapine 100 milliGRAM(s) Oral at bedtime  risperiDONE   Solution 1 milliGRAM(s) Oral <User Schedule>  risperiDONE   Solution 3 milliGRAM(s) Oral at bedtime  sodium chloride 0.9% lock flush 3 milliLiter(s) IV Push every 8 hours    MEDICATIONS  (PRN):

## 2024-06-28 NOTE — BRIEF OPERATIVE NOTE - NSICDXBRIEFPREOP_GEN_ALL_CORE_FT
PRE-OP DIAGNOSIS:  Mouth cancer 06-Jun-2024 17:58:31  Rayo Partida  

## 2024-06-28 NOTE — PROGRESS NOTE ADULT - ASSESSMENT
65 yo woman with psych disorder, tongue cancer, s/p glossectomy partial 6/6 with flap reconstruction, trach    flap thrombosed and RTOR 6/11 and again 6/14 for revision      OR wound cultures from both 6/11 and 6/14 growing E coli  which is pansensitive     switched to unasyn 6/11 6/21 developed diffuse rash- likely drug rash  - suspect unasyn   improving   had already completed 11 days post 6/11 revision and  8 days post 6/14 revision      now s/p OR today for closure of dehiscence right neck wound  brief operative note fibrinous  no purulence noted      if additional antibiotics needed would give flagyl and levofloxacin  with QT monitoring     ID service available over weekend

## 2024-06-28 NOTE — PROGRESS NOTE ADULT - PROBLEM SELECTOR PLAN 2
- bladder scan noted 351   - last BM 6/25-- started on senna   - IVF started by primary   - voided 800 post op; repeat Post void discussed with RN   - consider AXR eval stool burden if retaining post void bladder scans

## 2024-06-28 NOTE — PROGRESS NOTE ADULT - PROBLEM SELECTOR PLAN 1
-R partial glossectomy in the setting of SCCA, R SND I-IV, L RFFF, L thigh STSG, tracheostomy (6/6/24). Pts post-operative course c/b venous congestion of flap w/ RTOR on 6/11 for free flap salvage and revision of venous anastomosis. Flap now infected with E Coli also without flow. RTOR 6/14 for exploration of tongue free flap, muscle flap revision with right thigh flap, 7.5 trach exchanged to 7.0 cuffed trach.  -OR wound cx (6/11, 6/14) grew E.coli, s/p Unsyn 3g q6h   -management per ENT/plastic sx  - s/p PEG   - feeds as per nutrition  - trach care as per primary   - tolerated capping trial  - LT neck wound dehiscence s/p OR with plastics with neck complex closure and irrigation/debridement

## 2024-06-28 NOTE — BRIEF OPERATIVE NOTE - NSICDXBRIEFPOSTOP_GEN_ALL_CORE_FT
POST-OP DIAGNOSIS:  Mouth cancer 06-Jun-2024 17:58:35  Rayo Partida  

## 2024-06-28 NOTE — PROGRESS NOTE ADULT - ASSESSMENT
LELA LOWERY is a 64yFemale s/p right ALT flap for revision hemiglossal recon 6/14    - neck rash improved; neck now with wound  -Plan to RTOR today  -NPO, pre-op'd  -consented  - Wound care: WTD dressing to neck wound  - Aquaphor to left thigh and LUE donor site daily  - Diet: TFs  - PEG placed 6/25  - Continue peridex  - Appreciate care per primary    [facility]  Plastic Surgery  #[id number] [facility] pager  [phone] [facility] pager  Available on teams

## 2024-06-28 NOTE — PROGRESS NOTE ADULT - ASSESSMENT
64F w/ PMH of depression and schizophrenia s/p R partial glossectomy in the setting of SCCA, R SND I-IV, L RFFF, L thigh STSG, tracheostomy (6/6/24). Pts post-operative course c/b venous congestion of flap w/ RTOR on 6/11/24 for free flap salvage and revision of venous anastamosis, RTOR on 6/14/24 for debridement of necrotic radial forearm flap and replacement with right ALT flap, with 7.5 trach exchanged to 7.0 cuffed trach. Pt stepped down to the floor (6/17/24), Pt is progressing well. Pt to be taken to OR today for neck washout and closure.     Plan:  - OR today for neck washout with plastics  - Strict I&O's  - Aquacell placement on neck   - reposition the patient Q4-5h intervals, clean/bath the patient daily for improvement of the heat rash  - engage CM for rehab placement   - Aquaphor to left thigh donor site daily      Minh Fitzgerald DDS  [facility] Hospital[facility]ESS[facility] Pager: [phone]  [place] Pager: [phone]  [facility] Pager: [phone]  Avaliable on Microsoft Teams (PLEASE USE RESIDENT)

## 2024-06-28 NOTE — PROCEDURE NOTE - ADDITIONAL PROCEDURE DETAILS
The patient was placed in supine position with neck extended using shoulder roll. 6UN tracheostomy was removed from stoma without difficulty. Stoma visualized to be well healing with no signs of ulceration or bleeding. A Shiley 6CN was inserted into the stoma without difficulty.   Next a flexible endoscope was introduced into the tracheostomy and confirmed positioning of the trach, trach patent to coby.   The tracheostomy tube was secured using 2-0 silk sutures.

## 2024-06-28 NOTE — BRIEF OPERATIVE NOTE - ANTIBIOTIC PROTOCOL
Dr. Ethan Lazaro calls and states will see patient this afternoon, will probably not need surgery, but keep him NPO for now. Elevate leg on three pillows, no pressure on ankle.
Followed protocol
Order faxed to  for camboot
Pt received from ER via cart, able to transfer to bed. RLE elevated on pillows.   Wife at bedside, oriented to room and call bell, reviewed POC, pt indicates understanding
Followed protocol

## 2024-06-28 NOTE — BRIEF OPERATIVE NOTE - OPERATION/FINDINGS
following right partial glossectomy and right neck dissection, reconstruction with left radial forearm free flap, donor site reconstruction with split thickness skin graft.   Artery hand sewn, vein  used. 3.0mm and 1.5mm used. 
Right neck exploration, significant clot found throughout cephalic vein, mechanical thrombectomy, tPA administration (non-systemic), revision of VC venous anastamosis
Right neck complex closure and irrigation/debridement
debridement of necrotic radial forearm flap and replacement with right ALT flap.

## 2024-06-28 NOTE — PACU DISCHARGE NOTE - COMMENTS
No residual anesthetic issues or complications noted.
on trach collar  no anesthesia complications.
no anesthesia complications.

## 2024-06-28 NOTE — PROGRESS NOTE ADULT - SUBJECTIVE AND OBJECTIVE BOX
Follow Up:      Interval History/ROS:   s/p OR for closure right neck wound dehiscence   sleepy   mother and brother at bedside    Allergies  penicillins (Unknown)        ANTIMICROBIALS:   unasyn stopped  6/21 due to rash         MEDICATIONS  (STANDING):  acetaminophen   Oral Liquid .. 975 every 6 hours  acetylcysteine 10%  Inhalation 4 every 6 hours  albuterol/ipratropium for Nebulization 3 every 6 hours  amLODIPine   Tablet 10 daily  buPROPion . 300 <User Schedule>  buPROPion . 150 at bedtime  pantoprazole  Injectable 40 daily  QUEtiapine 100 at bedtime  risperiDONE   Solution 1 <User Schedule>  risperiDONE   Solution 3 at bedtime      Vital Signs Last 24 Hrs  T(F): 98.4 (06-28-24 @ 15:15), Max: 98.6 (06-27-24 @ 17:45)  HR: 115 (06-28-24 @ 15:46)  BP: 138/92 (06-28-24 @ 15:15)  RR: 18 (06-28-24 @ 15:15)  SpO2: 98% (06-28-24 @ 15:46) (96% - 100%)    PHYSICAL EXAM:  Constitutional: Non toxic no acute distress  Eyes: No icterus.  NGT  Neck: trach, bulky dressing neck right  RS: no respiratory distress  CVS: s1s2  Abdomen: Soft.   : no paez  Extremities: dressing left forearm, right  leg   rash mac/ pap back trunk extremities faded                             9.8    2.95  )-----------( 432      ( 28 Jun 2024 03:42 )             30.2 06-28    140  |  103  |  13  ----------------------------<  86  4.0   |  24  |  0.67  Ca    8.7      28 Jun 2024 03:42Phos  3.6     06-28Mg     2.00     06-28          MICROBIOLOGY:    .Smear RIGHT NECK CLT  06-14-24   Culture is being performed. Fungal cultures are held for 4 weeks.  --     Culture - Surgical Swab (06.14.24 @ 12:07)   - Ertapenem: S <=0.5  - Gentamicin: S <=2  - Imipenem: S <=1  - Levofloxacin: S <=0.5  - Meropenem: S <=1  - Piperacillin/Tazobactam: S <=8  - Tobramycin: S <=2  - Trimethoprim/Sulfamethoxazole: S <=0.5/9.5  - Amoxicillin/Clavulanic Acid: S <=8/4  - Ampicillin: S <=8 These ampicillin results predict results for amoxicillin  - Ampicillin/Sulbactam: S <=4/2  - Aztreonam: S <=4  - Cefazolin: S <=2  - Cefepime: S <=2  - Cefoxitin: S <=8  - Ceftriaxone: S <=1  - Ciprofloxacin: S <=0.25  Specimen Source: .Surgical Swab RIGHT NECK CLT  Culture Results:   Growth in fluid media only Escherichia coli  Organism Identification: Escherichia coli  Organism: Escherichia coli  Method Type: MINESH      .Blood Blood-Peripheral  06-12-24   No growth at 5 days  --  --      .Blood Blood-Peripheral  06-12-24   No growth at 5 days  --  --      .Surgical Swab RIGHT NECK WOUND  06-11-24   Few Escherichia coli  --  Escherichia coli      .Blood Blood-Peripheral  06-08-24   No growth at 5 days  --  --      .Blood Blood-Venous  06-08-24   No growth at 5 days  --            RADIOLOGY:

## 2024-06-28 NOTE — PROGRESS NOTE ADULT - SUBJECTIVE AND OBJECTIVE BOX
[facility] of [facility] Medicine  Yulia Condon MD  Pager ([phone], [phone]): [phone]  Other Times:  [phone]    Patient is a 64y old  Female who presents with a chief complaint of s/p surgery (21 Jun 2024 11:06)      SUBJECTIVE / OVERNIGHT EVENTS: s/p OR for Lt neck dehiscence. d/w RN void 800 ml post OR     MEDICATIONS  (STANDING):  acetaminophen   Oral Liquid .. 975 milliGRAM(s) Oral every 6 hours  acetylcysteine 10%  Inhalation 4 milliLiter(s) Inhalation every 6 hours  albuterol    0.083% 2.5 milliGRAM(s) Nebulizer once  albuterol    90 MICROgram(s) HFA Inhaler 1 Puff(s) Inhalation once  albuterol/ipratropium for Nebulization 3 milliLiter(s) Nebulizer every 6 hours  amLODIPine   Tablet 10 milliGRAM(s) Oral daily  AQUAPHOR (petrolatum Ointment) 1 Application(s) Topical daily  AQUAPHOR (petrolatum Ointment) 1 Application(s) Topical daily  buPROPion . 300 milliGRAM(s) Oral <User Schedule>  buPROPion . 150 milliGRAM(s) Oral at bedtime  chlorhexidine 0.12% Liquid 15 milliLiter(s) Swish and Spit <User Schedule>  chlorhexidine 2% Cloths 1 Application(s) Topical daily  enoxaparin Injectable 40 milliGRAM(s) SubCutaneous every 24 hours  pantoprazole  Injectable 40 milliGRAM(s) IV Push daily  QUEtiapine 100 milliGRAM(s) Oral at bedtime  risperiDONE   Solution 1 milliGRAM(s) Oral <User Schedule>  risperiDONE   Solution 3 milliGRAM(s) Oral at bedtime  sodium chloride 0.9% lock flush 3 milliLiter(s) IV Push every 8 hours    MEDICATIONS  (PRN):      CAPILLARY BLOOD GLUCOSE        I&O's Summary    27 Jun 2024 07:01  -  28 Jun 2024 07:00  --------------------------------------------------------  IN: 1970 mL / OUT: 200 mL / NET: 1770 mL    28 Jun 2024 07:01  -  28 Jun 2024 12:04  --------------------------------------------------------  IN: 0 mL / OUT: 800 mL / NET: -800 mL        PHYSICAL EXAM:  Vital Signs Last 24 Hrs  T(C): 36.7 (28 Jun 2024 11:20), Max: 37 (27 Jun 2024 17:45)  T(F): 98 (28 Jun 2024 11:20), Max: 98.6 (27 Jun 2024 17:45)  HR: 92 (28 Jun 2024 11:20) (85 - 121)  BP: 126/90 (28 Jun 2024 11:20) (115/76 - 134/89)  BP(mean): 87 (28 Jun 2024 10:00) (85 - 89)  RR: 18 (28 Jun 2024 11:20) (14 - 18)  SpO2: 96% (28 Jun 2024 11:20) (96% - 100%)    Parameters below as of 28 Jun 2024 11:20  Patient On (Oxygen Delivery Method): tracheostomy collar    CONSTITUTIONAL: NAD  ENMT:  + Trach capped  RESPIRATORY: Normal respiratory effort; lungs are clear to auscultation bilaterally  CARDIOVASCULAR: Regular rate and rhythm, normal S1 and S2, no murmur/rub/gallop; No lower extremity edema; Peripheral pulses are 2+ bilaterally  ABDOMEN: Nontender to palpation, normoactive bowel sounds, no rebound/guarding; + PEG   MUSCULOSKELETAL:  LT UE graft site + Lt thigh graft site   NEUROLOGY: follows command moving all ext      LABS:                        9.8    2.95  )-----------( 432      ( 28 Jun 2024 03:42 )             30.2     06-28    140  |  103  |  13  ----------------------------<  86  4.0   |  24  |  0.67    Ca    8.7      28 Jun 2024 03:42  Phos  3.6     06-28  Mg     2.00     06-28      PT/INR - ( 28 Jun 2024 03:42 )   PT: 12.0 sec;   INR: 1.07 ratio         PTT - ( 28 Jun 2024 03:42 )  PTT:22.8 sec      Urinalysis Basic - ( 28 Jun 2024 03:42 )    Color: x / Appearance: x / SG: x / pH: x  Gluc: 86 mg/dL / Ketone: x  / Bili: x / Urobili: x   Blood: x / Protein: x / Nitrite: x   Leuk Esterase: x / RBC: x / WBC x   Sq Epi: x / Non Sq Epi: x / Bacteria: x          RADIOLOGY & ADDITIONAL TESTS:  Results Reviewed:   Imaging Personally Reviewed:  Electrocardiogram Personally Reviewed:    COORDINATION OF CARE:  Care Discussed with Consultants/Other Providers [Y/N]:  Prior or Outpatient Records Reviewed [Y/N]:

## 2024-06-28 NOTE — BRIEF OPERATIVE NOTE - NSICDXBRIEFPROCEDURE_GEN_ALL_CORE_FT
PROCEDURES:  Creation, free flap, forearm, radial aspect 06-Jun-2024 17:57:46  Rayo Partida  Split-thickness skin graft to left upper extremity 06-Jun-2024 17:59:16  Rayo Partida  Anterolateral thigh flap procedure 14-Jun-2024 17:24:33  Rayo Partida  
PROCEDURES:  Exploration, vein 11-Jun-2024 08:13:07  Trent Berger  
PROCEDURES:  Plastic repair of neck 28-Jun-2024 09:36:20  Trent Berger  
PROCEDURES:  Creation, free flap, forearm, radial aspect 06-Jun-2024 17:57:46  Rayo Partida  Split-thickness skin graft to left upper extremity 06-Jun-2024 17:59:16  Rayo Partida

## 2024-06-28 NOTE — PROGRESS NOTE ADULT - SUBJECTIVE AND OBJECTIVE BOX
LELA LOWERY  MRN-[id number]  [facility]      64F w/ PMH of depression and schizophrenia s/p R partial glossectomy in the setting of SCCA, R SND I-IV, L RFFF, L thigh STSG, tracheostomy (6/6/24). Pts post-operative course c/b venous congestion of flap w/ RTOR on 6/11/24 for free flap salvage and revision of venous anastamosis, RTOR on 6/14/24 for debridement of necrotic radial forearm flap and replacement with right ALT flap, with 7.5 trach exchanged to 7.0 cuffed trach.     Interval Events:   -NAEON, NAD  -Pt tolerating TF well  -NGT removed  -s/p PEG (6/25)     PHYSICAL EXAM:  GEN: NAD  NEURO: AAOx3, drowsy   HEENT: neck incision clean, dry, and intact. flap warm and well perfused, good color  RLE: thigh soft, incision c/d/i.   LUE: Incision c/d/i.    Vitals:    Vital Signs Last 24 Hrs  T(C): 36.7 (28 Jun 2024 05:55), Max: 37.3 (27 Jun 2024 09:00)  T(F): 98.1 (28 Jun 2024 05:55), Max: 99.1 (27 Jun 2024 09:00)  HR: 86 (28 Jun 2024 05:55) (86 - 121)  BP: 134/89 (28 Jun 2024 05:55) (117/87 - 134/89)  BP(mean): --  RR: 17 (28 Jun 2024 05:55) (17 - 19)  SpO2: 98% (28 Jun 2024 05:55) (96% - 99%)    Parameters below as of 28 Jun 2024 05:55  Patient On (Oxygen Delivery Method): room air        I&O's Detail    26 Jun 2024 07:01  -  27 Jun 2024 07:00  --------------------------------------------------------  IN:    Enteral Tube Flush: 710 mL    Jevity 1.5: 850 mL  Total IN: 1560 mL    OUT:  Total OUT: 0 mL    Total NET: 1560 mL      27 Jun 2024 07:01  -  28 Jun 2024 06:31  --------------------------------------------------------  IN:    Enteral Tube Flush: 670 mL    Jevity 1.5: 740 mL    Lactated Ringers: 560 mL  Total IN: 1970 mL    OUT:    IV PiggyBack: 0 mL    Voided (mL): 200 mL  Total OUT: 200 mL    Total NET: 1770 mL            Medications:    MEDICATIONS  (STANDING):  acetaminophen   Oral Liquid .. 975 milliGRAM(s) Oral every 6 hours  acetylcysteine 10%  Inhalation 4 milliLiter(s) Inhalation every 6 hours  albuterol/ipratropium for Nebulization 3 milliLiter(s) Nebulizer every 6 hours  amLODIPine   Tablet 10 milliGRAM(s) Oral daily  AQUAPHOR (petrolatum Ointment) 1 Application(s) Topical daily  AQUAPHOR (petrolatum Ointment) 1 Application(s) Topical daily  buPROPion . 150 milliGRAM(s) Oral at bedtime  buPROPion . 300 milliGRAM(s) Oral <User Schedule>  chlorhexidine 0.12% Liquid 15 milliLiter(s) Swish and Spit <User Schedule>  chlorhexidine 2% Cloths 1 Application(s) Topical daily  enoxaparin Injectable 40 milliGRAM(s) SubCutaneous every 24 hours  lactated ringers. 1000 milliLiter(s) (80 mL/Hr) IV Continuous <Continuous>  pantoprazole  Injectable 40 milliGRAM(s) IV Push daily  QUEtiapine 100 milliGRAM(s) Oral at bedtime  risperiDONE   Solution 3 milliGRAM(s) Oral at bedtime  risperiDONE   Solution 1 milliGRAM(s) Oral <User Schedule>  sodium chloride 0.9% lock flush 3 milliLiter(s) IV Push every 8 hours    MEDICATIONS  (PRN):        Labs:                          9.8    2.95  )-----------( 432      ( 28 Jun 2024 03:42 )             30.2       06-28    140  |  103  |  13  ----------------------------<  86  4.0   |  24  |  0.67    Ca    8.7      28 Jun 2024 03:42  Phos  3.6     06-28  Mg     2.00     06-28        BMI: BMI (kg/m2): 22.3 (06-14-24 @ 06:41)  HbA1c: A1C with Estimated Average Glucose Result: 4.9 % (06-06-24 @ 18:30)    Glucose: POCT Blood Glucose.: 91 mg/dL (06-09-24 @ 08:35)    BP: 134/89 (06-28-24 @ 05:55) (114/83 - 162/92)Vital Signs Last 24 Hrs  T(C): 36.7 (06-28-24 @ 05:55), Max: 37.3 (06-27-24 @ 09:00)  T(F): 98.1 (06-28-24 @ 05:55), Max: 99.1 (06-27-24 @ 09:00)  HR: 86 (06-28-24 @ 05:55) (86 - 121)  BP: 134/89 (06-28-24 @ 05:55) (117/87 - 134/89)  BP(mean): --  RR: 17 (06-28-24 @ 05:55) (17 - 19)  SpO2: 98% (06-28-24 @ 05:55) (96% - 99%)      Lipid Panel:     PT/INR - ( 28 Jun 2024 03:42 )   PT: 12.0 sec;   INR: 1.07 ratio         PTT - ( 28 Jun 2024 03:42 )  PTT:22.8 sec    CAPILLARY BLOOD GLUCOSE              Urinalysis Basic - ( 28 Jun 2024 03:42 )    Color: x / Appearance: x / SG: x / pH: x  Gluc: 86 mg/dL / Ketone: x  / Bili: x / Urobili: x   Blood: x / Protein: x / Nitrite: x   Leuk Esterase: x / RBC: x / WBC x   Sq Epi: x / Non Sq Epi: x / Bacteria: x

## 2024-06-29 LAB
ANION GAP SERPL CALC-SCNC: 12 MMOL/L — SIGNIFICANT CHANGE UP (ref 7–14)
BUN SERPL-MCNC: 18 MG/DL — SIGNIFICANT CHANGE UP (ref 7–23)
CALCIUM SERPL-MCNC: 8.3 MG/DL — LOW (ref 8.4–10.5)
CHLORIDE SERPL-SCNC: 103 MMOL/L — SIGNIFICANT CHANGE UP (ref 98–107)
CO2 SERPL-SCNC: 24 MMOL/L — SIGNIFICANT CHANGE UP (ref 22–31)
CREAT SERPL-MCNC: 0.56 MG/DL — SIGNIFICANT CHANGE UP (ref 0.5–1.3)
EGFR: 102 ML/MIN/1.73M2 — SIGNIFICANT CHANGE UP
GLUCOSE BLDC GLUCOMTR-MCNC: 79 MG/DL — SIGNIFICANT CHANGE UP (ref 70–99)
GLUCOSE BLDC GLUCOMTR-MCNC: 80 MG/DL — SIGNIFICANT CHANGE UP (ref 70–99)
GLUCOSE BLDC GLUCOMTR-MCNC: 86 MG/DL — SIGNIFICANT CHANGE UP (ref 70–99)
GLUCOSE BLDC GLUCOMTR-MCNC: 90 MG/DL — SIGNIFICANT CHANGE UP (ref 70–99)
GLUCOSE SERPL-MCNC: 83 MG/DL — SIGNIFICANT CHANGE UP (ref 70–99)
HCT VFR BLD CALC: 28.2 % — LOW (ref 34.5–45)
HGB BLD-MCNC: 9 G/DL — LOW (ref 11.5–15.5)
MAGNESIUM SERPL-MCNC: 2 MG/DL — SIGNIFICANT CHANGE UP (ref 1.6–2.6)
MCHC RBC-ENTMCNC: 29.4 PG — SIGNIFICANT CHANGE UP (ref 27–34)
MCHC RBC-ENTMCNC: 31.9 GM/DL — LOW (ref 32–36)
MCV RBC AUTO: 92.2 FL — SIGNIFICANT CHANGE UP (ref 80–100)
NRBC # BLD: 0 /100 WBCS — SIGNIFICANT CHANGE UP (ref 0–0)
NRBC # FLD: 0 K/UL — SIGNIFICANT CHANGE UP (ref 0–0)
PHOSPHATE SERPL-MCNC: 3 MG/DL — SIGNIFICANT CHANGE UP (ref 2.5–4.5)
PLATELET # BLD AUTO: 413 K/UL — HIGH (ref 150–400)
POTASSIUM SERPL-MCNC: 3.7 MMOL/L — SIGNIFICANT CHANGE UP (ref 3.5–5.3)
POTASSIUM SERPL-SCNC: 3.7 MMOL/L — SIGNIFICANT CHANGE UP (ref 3.5–5.3)
RBC # BLD: 3.06 M/UL — LOW (ref 3.8–5.2)
RBC # FLD: 15.9 % — HIGH (ref 10.3–14.5)
SODIUM SERPL-SCNC: 139 MMOL/L — SIGNIFICANT CHANGE UP (ref 135–145)
WBC # BLD: 3.39 K/UL — LOW (ref 3.8–10.5)
WBC # FLD AUTO: 3.39 K/UL — LOW (ref 3.8–10.5)

## 2024-06-29 PROCEDURE — 99232 SBSQ HOSP IP/OBS MODERATE 35: CPT

## 2024-06-29 RX ADMIN — Medication 975 MILLIGRAM(S): at 00:40

## 2024-06-29 RX ADMIN — Medication 15 MILLILITER(S): at 00:39

## 2024-06-29 RX ADMIN — IPRATROPIUM BROMIDE AND ALBUTEROL SULFATE 3 MILLILITER(S): .5; 3 SOLUTION RESPIRATORY (INHALATION) at 15:50

## 2024-06-29 RX ADMIN — Medication 975 MILLIGRAM(S): at 07:42

## 2024-06-29 RX ADMIN — OXYCODONE HYDROCHLORIDE 5 MILLIGRAM(S): 100 SOLUTION ORAL at 00:40

## 2024-06-29 RX ADMIN — BUPROPION HYDROCHLORIDE 300 MILLIGRAM(S): 150 TABLET, EXTENDED RELEASE ORAL at 07:43

## 2024-06-29 RX ADMIN — IPRATROPIUM BROMIDE AND ALBUTEROL SULFATE 3 MILLILITER(S): .5; 3 SOLUTION RESPIRATORY (INHALATION) at 03:43

## 2024-06-29 RX ADMIN — IPRATROPIUM BROMIDE AND ALBUTEROL SULFATE 3 MILLILITER(S): .5; 3 SOLUTION RESPIRATORY (INHALATION) at 21:14

## 2024-06-29 RX ADMIN — ACETYLCYSTEINE 4 MILLILITER(S): 200 SOLUTION ORAL; RESPIRATORY (INHALATION) at 21:14

## 2024-06-29 RX ADMIN — PETROLATUM 1 APPLICATION(S): 42 OINTMENT TOPICAL at 13:17

## 2024-06-29 RX ADMIN — IPRATROPIUM BROMIDE AND ALBUTEROL SULFATE 3 MILLILITER(S): .5; 3 SOLUTION RESPIRATORY (INHALATION) at 11:28

## 2024-06-29 RX ADMIN — OXYCODONE HYDROCHLORIDE 5 MILLIGRAM(S): 100 SOLUTION ORAL at 08:11

## 2024-06-29 RX ADMIN — Medication 975 MILLIGRAM(S): at 08:12

## 2024-06-29 RX ADMIN — Medication 100 MILLIGRAM(S): at 00:39

## 2024-06-29 RX ADMIN — BUPROPION HYDROCHLORIDE 150 MILLIGRAM(S): 150 TABLET, EXTENDED RELEASE ORAL at 00:39

## 2024-06-29 RX ADMIN — Medication 15 MILLILITER(S): at 07:41

## 2024-06-29 RX ADMIN — Medication 15 MILLILITER(S): at 17:19

## 2024-06-29 RX ADMIN — Medication 15 MILLILITER(S): at 10:07

## 2024-06-29 RX ADMIN — ACETYLCYSTEINE 4 MILLILITER(S): 200 SOLUTION ORAL; RESPIRATORY (INHALATION) at 15:50

## 2024-06-29 RX ADMIN — Medication 975 MILLIGRAM(S): at 12:37

## 2024-06-29 RX ADMIN — PANTOPRAZOLE SODIUM 40 MILLIGRAM(S): 40 INJECTION, POWDER, FOR SOLUTION INTRAVENOUS at 12:08

## 2024-06-29 RX ADMIN — Medication 2 TABLET(S): at 00:59

## 2024-06-29 RX ADMIN — ACETYLCYSTEINE 4 MILLILITER(S): 200 SOLUTION ORAL; RESPIRATORY (INHALATION) at 03:43

## 2024-06-29 RX ADMIN — Medication 3 MILLILITER(S): at 22:41

## 2024-06-29 RX ADMIN — Medication 1 APPLICATION(S): at 11:08

## 2024-06-29 RX ADMIN — Medication 3 MILLILITER(S): at 13:54

## 2024-06-29 RX ADMIN — RISPERIDONE 1 MILLIGRAM(S): 0.5 TABLET ORAL at 07:50

## 2024-06-29 RX ADMIN — Medication 975 MILLIGRAM(S): at 17:20

## 2024-06-29 RX ADMIN — RISPERIDONE 3 MILLIGRAM(S): 0.5 TABLET ORAL at 00:39

## 2024-06-29 RX ADMIN — ACETYLCYSTEINE 4 MILLILITER(S): 200 SOLUTION ORAL; RESPIRATORY (INHALATION) at 11:28

## 2024-06-29 RX ADMIN — Medication 15 MILLILITER(S): at 13:17

## 2024-06-29 RX ADMIN — Medication 975 MILLIGRAM(S): at 12:07

## 2024-06-29 RX ADMIN — Medication 975 MILLIGRAM(S): at 01:37

## 2024-06-29 RX ADMIN — ENOXAPARIN SODIUM 40 MILLIGRAM(S): 100 INJECTION SUBCUTANEOUS at 14:18

## 2024-06-29 RX ADMIN — OXYCODONE HYDROCHLORIDE 5 MILLIGRAM(S): 100 SOLUTION ORAL at 01:38

## 2024-06-29 RX ADMIN — OXYCODONE HYDROCHLORIDE 5 MILLIGRAM(S): 100 SOLUTION ORAL at 07:41

## 2024-06-29 RX ADMIN — AMLODIPINE BESYLATE 10 MILLIGRAM(S): 2.5 TABLET ORAL at 07:43

## 2024-06-29 NOTE — PROGRESS NOTE ADULT - SUBJECTIVE AND OBJECTIVE BOX
Dr. Shannan Vargas  Pager [phone]    PROGRESS NOTE:     Patient is a 64y old  Female who presents with a chief complaint of s/p surgery (21 Jun 2024 11:06)      SUBJECTIVE / OVERNIGHT EVENTS: pt has been voiding via primafit per nurse  ADDITIONAL REVIEW OF SYSTEMS: afebrile, no chest pain/sob    MEDICATIONS  (STANDING):  acetaminophen   Oral Liquid .. 975 milliGRAM(s) Oral every 6 hours  acetylcysteine 10%  Inhalation 4 milliLiter(s) Inhalation every 6 hours  albuterol    90 MICROgram(s) HFA Inhaler 1 Puff(s) Inhalation once  albuterol/ipratropium for Nebulization 3 milliLiter(s) Nebulizer every 6 hours  amLODIPine   Tablet 10 milliGRAM(s) Oral daily  AQUAPHOR (petrolatum Ointment) 1 Application(s) Topical daily  AQUAPHOR (petrolatum Ointment) 1 Application(s) Topical daily  buPROPion . 300 milliGRAM(s) Oral <User Schedule>  buPROPion . 150 milliGRAM(s) Oral at bedtime  chlorhexidine 0.12% Liquid 15 milliLiter(s) Swish and Spit <User Schedule>  chlorhexidine 2% Cloths 1 Application(s) Topical daily  enoxaparin Injectable 40 milliGRAM(s) SubCutaneous every 24 hours  pantoprazole  Injectable 40 milliGRAM(s) IV Push daily  QUEtiapine 100 milliGRAM(s) Oral at bedtime  risperiDONE   Solution 1 milliGRAM(s) Oral <User Schedule>  risperiDONE   Solution 3 milliGRAM(s) Oral at bedtime  senna 2 Tablet(s) Oral at bedtime  sodium chloride 0.9% lock flush 3 milliLiter(s) IV Push every 8 hours    MEDICATIONS  (PRN):  oxyCODONE    Solution 5 milliGRAM(s) Enteral Tube every 4 hours PRN Severe Pain (7 - 10)  oxyCODONE    Solution 2.5 milliGRAM(s) Oral every 4 hours PRN Moderate Pain (4 - 6)      CAPILLARY BLOOD GLUCOSE      POCT Blood Glucose.: 79 mg/dL (29 Jun 2024 07:11)  POCT Blood Glucose.: 86 mg/dL (29 Jun 2024 01:00)    I&O's Summary    28 Jun 2024 07:01  -  29 Jun 2024 07:00  --------------------------------------------------------  IN: 1880 mL / OUT: 2500 mL / NET: -620 mL    29 Jun 2024 07:01  -  29 Jun 2024 15:07  --------------------------------------------------------  IN: 590 mL / OUT: 0 mL / NET: 590 mL        PHYSICAL EXAM:  Vital Signs Last 24 Hrs  T(C): 36.5 (29 Jun 2024 09:22), Max: 36.9 (28 Jun 2024 15:15)  T(F): 97.7 (29 Jun 2024 09:22), Max: 98.5 (28 Jun 2024 17:30)  HR: 104 (29 Jun 2024 09:22) (89 - 115)  BP: 109/63 (29 Jun 2024 09:22) (104/66 - 138/92)  BP(mean): --  RR: 18 (29 Jun 2024 09:22) (18 - 18)  SpO2: 97% (29 Jun 2024 09:22) (97% - 100%)    Parameters below as of 29 Jun 2024 09:22  Patient On (Oxygen Delivery Method): tracheostomy collar      CONSTITUTIONAL: NAD  ENMT:  + Trach capped  RESPIRATORY: Normal respiratory effort; lungs are clear to auscultation bilaterally  CARDIOVASCULAR: Regular rate and rhythm, normal S1 and S2, no murmur/rub/gallop; No lower extremity edema; Peripheral pulses are 2+ bilaterally  ABDOMEN: Nontender to palpation, normoactive bowel sounds, no rebound/guarding; + PEG   MUSCULOSKELETAL:  LT UE graft site + Lt thigh graft site   NEUROLOGY: follows command moving all ext    LABS:                        9.0    3.39  )-----------( 413      ( 29 Jun 2024 06:15 )             28.2     06-29    139  |  103  |  18  ----------------------------<  83  3.7   |  24  |  0.56    Ca    8.3<L>      29 Jun 2024 06:15  Phos  3.0     06-29  Mg     2.00     06-29      PT/INR - ( 28 Jun 2024 03:42 )   PT: 12.0 sec;   INR: 1.07 ratio         PTT - ( 28 Jun 2024 03:42 )  PTT:22.8 sec      Urinalysis Basic - ( 29 Jun 2024 06:15 )    Color: x / Appearance: x / SG: x / pH: x  Gluc: 83 mg/dL / Ketone: x  / Bili: x / Urobili: x   Blood: x / Protein: x / Nitrite: x   Leuk Esterase: x / RBC: x / WBC x   Sq Epi: x / Non Sq Epi: x / Bacteria: x          RADIOLOGY & ADDITIONAL TESTS:  Results Reviewed:   Imaging Personally Reviewed:    Electrocardiogram Personally Reviewed:    COORDINATION OF CARE:  Care Discussed with Consultants/Other Providers [Y/N]:  Prior or Outpatient Records Reviewed [Y/N]:

## 2024-06-29 NOTE — PROGRESS NOTE ADULT - SUBJECTIVE AND OBJECTIVE BOX
Plastic Surgery Progress Note (pg [facility]: [id number], [facility]: [phone])    SUBJECTIVE  Pt comfortable in bed. Pain well controlled, no complaints.    OBJECTIVE  ___________________________________________________  VITAL SIGNS / I&O's   Vital Signs Last 24 Hrs  T(C): 36.4 (29 Jun 2024 06:00), Max: 36.9 (28 Jun 2024 15:15)  T(F): 97.6 (29 Jun 2024 06:00), Max: 98.5 (28 Jun 2024 17:30)  HR: 89 (29 Jun 2024 06:00) (85 - 115)  BP: 104/66 (29 Jun 2024 06:00) (104/66 - 138/92)  BP(mean): 87 (28 Jun 2024 10:00) (85 - 89)  RR: 18 (29 Jun 2024 06:00) (14 - 18)  SpO2: 100% (29 Jun 2024 06:00) (96% - 100%)    Parameters below as of 29 Jun 2024 06:00  Patient On (Oxygen Delivery Method): tracheostomy collar  O2 Flow (L/min): 7  O2 Concentration (%): 30      28 Jun 2024 07:01  -  29 Jun 2024 07:00  --------------------------------------------------------  IN:    Enteral Tube Flush: 520 mL    Jevity 1.5: 1360 mL  Total IN: 1880 mL    OUT:    Voided (mL): 2500 mL  Total OUT: 2500 mL    Total NET: -620 mL        ___________________________________________________  PHYSICAL EXAM    -- CONSTITUTIONAL: NAD, lying in bed  -- NEURO: Awake, alert  -- HEENT: NC/AT, mucous membranes moist  -- NECK: Neck soft no palpable fluid collection, incision cdi  -- PULM: Non-labored respirations, equal chest rise bilaterally  -- ABDOMEN: Nondistended  -- RLE: thigh soft, incision cdi  -- LUE: skin graft with good take  -- PSYCH: Affect normal, A&Ox3    ___________________________________________________  LABS                        9.0    3.39  )-----------( 413      ( 29 Jun 2024 06:15 )             28.2     29 Jun 2024 06:15    139    |  103    |  18     ----------------------------<  83     3.7     |  24     |  0.56     Ca    8.3        29 Jun 2024 06:15  Phos  3.0       29 Jun 2024 06:15  Mg     2.00      29 Jun 2024 06:15      PT/INR - ( 28 Jun 2024 03:42 )   PT: 12.0 sec;   INR: 1.07 ratio         PTT - ( 28 Jun 2024 03:42 )  PTT:22.8 sec  CAPILLARY BLOOD GLUCOSE      POCT Blood Glucose.: 79 mg/dL (29 Jun 2024 07:11)  POCT Blood Glucose.: 86 mg/dL (29 Jun 2024 01:00)        Urinalysis Basic - ( 29 Jun 2024 06:15 )    Color: x / Appearance: x / SG: x / pH: x  Gluc: 83 mg/dL / Ketone: x  / Bili: x / Urobili: x   Blood: x / Protein: x / Nitrite: x   Leuk Esterase: x / RBC: x / WBC x   Sq Epi: x / Non Sq Epi: x / Bacteria: x      ___________________________________________________  MICRO  Recent Cultures:    ___________________________________________________  MEDICATIONS  (STANDING):  acetaminophen   Oral Liquid .. 975 milliGRAM(s) Oral every 6 hours  acetylcysteine 10%  Inhalation 4 milliLiter(s) Inhalation every 6 hours  albuterol    90 MICROgram(s) HFA Inhaler 1 Puff(s) Inhalation once  albuterol/ipratropium for Nebulization 3 milliLiter(s) Nebulizer every 6 hours  amLODIPine   Tablet 10 milliGRAM(s) Oral daily  AQUAPHOR (petrolatum Ointment) 1 Application(s) Topical daily  AQUAPHOR (petrolatum Ointment) 1 Application(s) Topical daily  buPROPion . 150 milliGRAM(s) Oral at bedtime  buPROPion . 300 milliGRAM(s) Oral <User Schedule>  chlorhexidine 0.12% Liquid 15 milliLiter(s) Swish and Spit <User Schedule>  chlorhexidine 2% Cloths 1 Application(s) Topical daily  enoxaparin Injectable 40 milliGRAM(s) SubCutaneous every 24 hours  pantoprazole  Injectable 40 milliGRAM(s) IV Push daily  QUEtiapine 100 milliGRAM(s) Oral at bedtime  risperiDONE   Solution 1 milliGRAM(s) Oral <User Schedule>  risperiDONE   Solution 3 milliGRAM(s) Oral at bedtime  senna 2 Tablet(s) Oral at bedtime  sodium chloride 0.9% lock flush 3 milliLiter(s) IV Push every 8 hours    MEDICATIONS  (PRN):  oxyCODONE    Solution 5 milliGRAM(s) Enteral Tube every 4 hours PRN Severe Pain (7 - 10)  oxyCODONE    Solution 2.5 milliGRAM(s) Oral every 4 hours PRN Moderate Pain (4 - 6)

## 2024-06-29 NOTE — PROGRESS NOTE ADULT - PROBLEM SELECTOR PLAN 4
- Trend HR   - OR wound E coli, s/p antibiotics   - Pain control   - TTE reviewed, normal EF 60-65%  - PE ruled out  - elevated TSH - 9.8 with normal FT4; repeat TFT in 6 weeks  - improved

## 2024-06-29 NOTE — CHART NOTE - NSCHARTNOTEFT_GEN_A_CORE
OMFS procedure:    trach change.    patient's trach changed to shiley 4 uncuffed. capped.  patient saturating well. no complications. patient stable.     - decannulate tomorrow     Minh Fitzgerald DDS  [facility] Pager: [phone]  [place] Pager: [phone]  [facility] Pager: [phone]  Avaliable on [employer] Teams (PLEASE USE RESIDENT)

## 2024-06-29 NOTE — PROGRESS NOTE ADULT - ASSESSMENT
64F w/ PMH of depression and schizophrenia s/p R partial glossectomy in the setting of SCCA, R SND I-IV, L RFFF, L thigh STSG, tracheostomy (6/6/24). Pts post-operative course c/b venous congestion of flap w/ RTOR on 6/11/24 for free flap salvage and revision of venous anastamosis, RTOR on 6/14/24 for debridement of necrotic radial forearm flap and replacement with right ALT flap, with 7.5 trach exchanged to 7.0 cuffed trach. Pt stepped down to the floor (6/17/24), Pt is progressing well.    Plan:  - trach downsized to 4uncuffed and capped  - decannulate tomororw   - Strict I&O's  - Aquacell placement on neck   - reposition the patient Q4-5h intervals, clean/bath the patient daily for improvement of the heat rash  - engage CM for rehab placement   - Aquaphor to left thigh donor site daily      Minh Fitzgerald DDS  [facility]  [place] Pager: [phone]  [place] Pager: [phone]  [facility] Pager: [phone]  Avaliable on Microsoft Teams (PLEASE USE RESIDENT)

## 2024-06-29 NOTE — PROGRESS NOTE ADULT - SUBJECTIVE AND OBJECTIVE BOX
LELA LOWERY  MRN-[id number]  [facility]    64F w/ PMH of depression and schizophrenia s/p R partial glossectomy in the setting of SCCA, R SND I-IV, L RFFF, L thigh STSG, tracheostomy (6/6/24). Pts post-operative course c/b venous congestion of flap w/ RTOR on 6/11/24 for free flap salvage and revision of venous anastamosis, RTOR on 6/14/24 for debridement of necrotic radial forearm flap and replacement with right ALT flap, with 7.5 trach exchanged to 7.0 cuffed trach. Patinet now 1 day s/p right neck debridement and closure. patient progressing well.     - trach downsized this morning to 4uncuffed and capped. patient tolerated well.     Interval Events:   -NAEON, NAD  -Pt tolerating TF well  -NGT removed  -s/p PEG (6/25)     patient visited bedside. no acute events overnight. patient afebrile, vitals stable.        PHYSICAL EXAM:  GEN: NAD  NEURO: AAOx3, drowsy   HEENT: neck incision clean, dry, and intact. flap warm and well perfused, good color  RLE: thigh soft, incision c/d/i.   LUE: Incision c/d/i.      Vitals:    Vital Signs Last 24 Hrs  T(C): 36.4 (29 Jun 2024 06:00), Max: 36.9 (28 Jun 2024 15:15)  T(F): 97.6 (29 Jun 2024 06:00), Max: 98.5 (28 Jun 2024 17:30)  HR: 89 (29 Jun 2024 06:00) (85 - 115)  BP: 104/66 (29 Jun 2024 06:00) (104/66 - 138/92)  BP(mean): 87 (28 Jun 2024 10:00) (85 - 89)  RR: 18 (29 Jun 2024 06:00) (14 - 18)  SpO2: 100% (29 Jun 2024 06:00) (96% - 100%)    Parameters below as of 29 Jun 2024 06:00  Patient On (Oxygen Delivery Method): tracheostomy collar  O2 Flow (L/min): 7  O2 Concentration (%): 30    I&O's Detail    28 Jun 2024 07:01  -  29 Jun 2024 07:00  --------------------------------------------------------  IN:    Enteral Tube Flush: 520 mL    Jevity 1.5: 1360 mL  Total IN: 1880 mL    OUT:    Voided (mL): 2500 mL  Total OUT: 2500 mL    Total NET: -620 mL            Medications:    MEDICATIONS  (STANDING):  acetaminophen   Oral Liquid .. 975 milliGRAM(s) Oral every 6 hours  acetylcysteine 10%  Inhalation 4 milliLiter(s) Inhalation every 6 hours  albuterol    90 MICROgram(s) HFA Inhaler 1 Puff(s) Inhalation once  albuterol/ipratropium for Nebulization 3 milliLiter(s) Nebulizer every 6 hours  amLODIPine   Tablet 10 milliGRAM(s) Oral daily  AQUAPHOR (petrolatum Ointment) 1 Application(s) Topical daily  AQUAPHOR (petrolatum Ointment) 1 Application(s) Topical daily  buPROPion . 150 milliGRAM(s) Oral at bedtime  buPROPion . 300 milliGRAM(s) Oral <User Schedule>  chlorhexidine 0.12% Liquid 15 milliLiter(s) Swish and Spit <User Schedule>  chlorhexidine 2% Cloths 1 Application(s) Topical daily  enoxaparin Injectable 40 milliGRAM(s) SubCutaneous every 24 hours  pantoprazole  Injectable 40 milliGRAM(s) IV Push daily  QUEtiapine 100 milliGRAM(s) Oral at bedtime  risperiDONE   Solution 1 milliGRAM(s) Oral <User Schedule>  risperiDONE   Solution 3 milliGRAM(s) Oral at bedtime  senna 2 Tablet(s) Oral at bedtime  sodium chloride 0.9% lock flush 3 milliLiter(s) IV Push every 8 hours    MEDICATIONS  (PRN):  oxyCODONE    Solution 2.5 milliGRAM(s) Oral every 4 hours PRN Moderate Pain (4 - 6)  oxyCODONE    Solution 5 milliGRAM(s) Enteral Tube every 4 hours PRN Severe Pain (7 - 10)        Labs:                          9.0    3.39  )-----------( 413      ( 29 Jun 2024 06:15 )             28.2       06-29    139  |  103  |  18  ----------------------------<  83  3.7   |  24  |  0.56    Ca    8.3<L>      29 Jun 2024 06:15  Phos  3.0     06-29  Mg     2.00     06-29        BMI: BMI (kg/m2): 22.3 (06-14-24 @ 06:41)  HbA1c: A1C with Estimated Average Glucose Result: 4.9 % (06-06-24 @ 18:30)    Glucose: POCT Blood Glucose.: 79 mg/dL (06-29-24 @ 07:11)    BP: 104/66 (06-29-24 @ 06:00) (104/66 - 145/95)Vital Signs Last 24 Hrs  T(C): 36.4 (06-29-24 @ 06:00), Max: 36.9 (06-28-24 @ 15:15)  T(F): 97.6 (06-29-24 @ 06:00), Max: 98.5 (06-28-24 @ 17:30)  HR: 89 (06-29-24 @ 06:00) (85 - 115)  BP: 104/66 (06-29-24 @ 06:00) (104/66 - 138/92)  BP(mean): 87 (06-28-24 @ 10:00) (85 - 89)  RR: 18 (06-29-24 @ 06:00) (14 - 18)  SpO2: 100% (06-29-24 @ 06:00) (96% - 100%)      Lipid Panel:     PT/INR - ( 28 Jun 2024 03:42 )   PT: 12.0 sec;   INR: 1.07 ratio         PTT - ( 28 Jun 2024 03:42 )  PTT:22.8 sec    CAPILLARY BLOOD GLUCOSE      POCT Blood Glucose.: 79 mg/dL (29 Jun 2024 07:11)  POCT Blood Glucose.: 86 mg/dL (29 Jun 2024 01:00)          Urinalysis Basic - ( 29 Jun 2024 06:15 )    Color: x / Appearance: x / SG: x / pH: x  Gluc: 83 mg/dL / Ketone: x  / Bili: x / Urobili: x   Blood: x / Protein: x / Nitrite: x   Leuk Esterase: x / RBC: x / WBC x   Sq Epi: x / Non Sq Epi: x / Bacteria: x

## 2024-06-29 NOTE — PROGRESS NOTE ADULT - ASSESSMENT
LELA LOWERY is a 64yFemale s/p right ALT flap for revision hemiglossal recon 6/14, and neck dehiscence closure 6/28. Recovering well.    - Leave nylons in, we will remove after atleast 4 weeks  - Aquaphor to left thigh and LUE donor site daily  - Diet: TFs  - PEG placed 6/25  - Continue peridex  - Appreciate care per primary    Plastic Surgery  #[phone] [facility] pager  [phone] [facility] pager  Available on teams

## 2024-06-29 NOTE — PROGRESS NOTE ADULT - PROBLEM SELECTOR PLAN 1
-R partial glossectomy in the setting of SCCA, R SND I-IV, L RFFF, L thigh STSG, tracheostomy (6/6/24). Pts post-operative course c/b venous congestion of flap w/ RTOR on 6/11 for free flap salvage and revision of venous anastomosis. Flap now infected with E Coli also without flow. RTOR 6/14 for exploration of tongue free flap, muscle flap revision with right thigh flap, 7.5 trach exchanged to 7.0 cuffed trach.  -OR wound cx (6/11, 6/14) grew E.coli, s/p Unsyn 3g q6h   -management per ENT/plastic sx  - s/p PEG   - feeds as per nutrition  - trach care as per primary , trach changed today, plan for decannulation tomorrow   - tolerated capping trial  - LT neck wound dehiscence s/p OR with plastics with neck complex closure and irrigation/debridement -R partial glossectomy in the setting of SCCA, R SND I-IV, L RFFF, L thigh STSG, tracheostomy (6/6/24). Pts post-operative course c/b venous congestion of flap w/ RTOR on 6/11 for free flap salvage and revision of venous anastomosis. Flap now infected with E Coli also without flow. RTOR 6/14 for exploration of tongue free flap, muscle flap revision with right thigh flap, 7.5 trach exchanged to 7.0 cuffed trach.  -OR wound cx (6/11, 6/14) grew E.coli, s/p Unsyn 3g q6h   -management per ENT/plastic sx  - s/p PEG   - feeds as per nutrition  - trach care as per primary , trach changed today, plan for decannulation tomorrow   - tolerated capping trial  - LT neck wound dehiscence s/p OR with plastics with neck complex closure and irrigation/debridement on 6/28

## 2024-06-30 LAB
ANION GAP SERPL CALC-SCNC: 12 MMOL/L — SIGNIFICANT CHANGE UP (ref 7–14)
BASOPHILS # BLD AUTO: 0.02 K/UL — SIGNIFICANT CHANGE UP (ref 0–0.2)
BASOPHILS NFR BLD AUTO: 0.7 % — SIGNIFICANT CHANGE UP (ref 0–2)
BUN SERPL-MCNC: 16 MG/DL — SIGNIFICANT CHANGE UP (ref 7–23)
CALCIUM SERPL-MCNC: 8.5 MG/DL — SIGNIFICANT CHANGE UP (ref 8.4–10.5)
CHLORIDE SERPL-SCNC: 105 MMOL/L — SIGNIFICANT CHANGE UP (ref 98–107)
CO2 SERPL-SCNC: 23 MMOL/L — SIGNIFICANT CHANGE UP (ref 22–31)
CREAT SERPL-MCNC: 0.55 MG/DL — SIGNIFICANT CHANGE UP (ref 0.5–1.3)
EGFR: 102 ML/MIN/1.73M2 — SIGNIFICANT CHANGE UP
EOSINOPHIL # BLD AUTO: 0.1 K/UL — SIGNIFICANT CHANGE UP (ref 0–0.5)
EOSINOPHIL NFR BLD AUTO: 3.3 % — SIGNIFICANT CHANGE UP (ref 0–6)
GLUCOSE BLDC GLUCOMTR-MCNC: 139 MG/DL — HIGH (ref 70–99)
GLUCOSE SERPL-MCNC: 132 MG/DL — HIGH (ref 70–99)
HCT VFR BLD CALC: 31.2 % — LOW (ref 34.5–45)
HGB BLD-MCNC: 10.3 G/DL — LOW (ref 11.5–15.5)
IANC: 1.64 K/UL — LOW (ref 1.8–7.4)
IMM GRANULOCYTES NFR BLD AUTO: 0.3 % — SIGNIFICANT CHANGE UP (ref 0–0.9)
LYMPHOCYTES # BLD AUTO: 0.86 K/UL — LOW (ref 1–3.3)
LYMPHOCYTES # BLD AUTO: 28.5 % — SIGNIFICANT CHANGE UP (ref 13–44)
MAGNESIUM SERPL-MCNC: 1.7 MG/DL — SIGNIFICANT CHANGE UP (ref 1.6–2.6)
MCHC RBC-ENTMCNC: 30.6 PG — SIGNIFICANT CHANGE UP (ref 27–34)
MCHC RBC-ENTMCNC: 33 GM/DL — SIGNIFICANT CHANGE UP (ref 32–36)
MCV RBC AUTO: 92.6 FL — SIGNIFICANT CHANGE UP (ref 80–100)
MONOCYTES # BLD AUTO: 0.39 K/UL — SIGNIFICANT CHANGE UP (ref 0–0.9)
MONOCYTES NFR BLD AUTO: 12.9 % — SIGNIFICANT CHANGE UP (ref 2–14)
NEUTROPHILS # BLD AUTO: 1.64 K/UL — LOW (ref 1.8–7.4)
NEUTROPHILS NFR BLD AUTO: 54.3 % — SIGNIFICANT CHANGE UP (ref 43–77)
NRBC # BLD: 0 /100 WBCS — SIGNIFICANT CHANGE UP (ref 0–0)
NRBC # FLD: 0 K/UL — SIGNIFICANT CHANGE UP (ref 0–0)
PHOSPHATE SERPL-MCNC: 2.8 MG/DL — SIGNIFICANT CHANGE UP (ref 2.5–4.5)
PLATELET # BLD AUTO: 417 K/UL — HIGH (ref 150–400)
POTASSIUM SERPL-MCNC: 3.7 MMOL/L — SIGNIFICANT CHANGE UP (ref 3.5–5.3)
POTASSIUM SERPL-SCNC: 3.7 MMOL/L — SIGNIFICANT CHANGE UP (ref 3.5–5.3)
RBC # BLD: 3.37 M/UL — LOW (ref 3.8–5.2)
RBC # FLD: 16 % — HIGH (ref 10.3–14.5)
SODIUM SERPL-SCNC: 140 MMOL/L — SIGNIFICANT CHANGE UP (ref 135–145)
WBC # BLD: 3.02 K/UL — LOW (ref 3.8–10.5)
WBC # FLD AUTO: 3.02 K/UL — LOW (ref 3.8–10.5)

## 2024-06-30 PROCEDURE — 99232 SBSQ HOSP IP/OBS MODERATE 35: CPT

## 2024-06-30 RX ADMIN — OXYCODONE HYDROCHLORIDE 5 MILLIGRAM(S): 100 SOLUTION ORAL at 05:23

## 2024-06-30 RX ADMIN — PETROLATUM 1 APPLICATION(S): 42 OINTMENT TOPICAL at 11:51

## 2024-06-30 RX ADMIN — Medication 975 MILLIGRAM(S): at 18:36

## 2024-06-30 RX ADMIN — Medication 975 MILLIGRAM(S): at 01:09

## 2024-06-30 RX ADMIN — RISPERIDONE 1 MILLIGRAM(S): 0.5 TABLET ORAL at 05:15

## 2024-06-30 RX ADMIN — Medication 975 MILLIGRAM(S): at 11:51

## 2024-06-30 RX ADMIN — Medication 15 MILLILITER(S): at 18:17

## 2024-06-30 RX ADMIN — Medication 975 MILLIGRAM(S): at 08:25

## 2024-06-30 RX ADMIN — Medication 100 MILLIGRAM(S): at 22:39

## 2024-06-30 RX ADMIN — OXYCODONE HYDROCHLORIDE 5 MILLIGRAM(S): 100 SOLUTION ORAL at 15:37

## 2024-06-30 RX ADMIN — Medication 15 MILLILITER(S): at 15:07

## 2024-06-30 RX ADMIN — IPRATROPIUM BROMIDE AND ALBUTEROL SULFATE 3 MILLILITER(S): .5; 3 SOLUTION RESPIRATORY (INHALATION) at 21:35

## 2024-06-30 RX ADMIN — BUPROPION HYDROCHLORIDE 300 MILLIGRAM(S): 150 TABLET, EXTENDED RELEASE ORAL at 05:15

## 2024-06-30 RX ADMIN — ACETYLCYSTEINE 4 MILLILITER(S): 200 SOLUTION ORAL; RESPIRATORY (INHALATION) at 03:16

## 2024-06-30 RX ADMIN — Medication 15 MILLILITER(S): at 01:11

## 2024-06-30 RX ADMIN — IPRATROPIUM BROMIDE AND ALBUTEROL SULFATE 3 MILLILITER(S): .5; 3 SOLUTION RESPIRATORY (INHALATION) at 03:15

## 2024-06-30 RX ADMIN — Medication 15 MILLILITER(S): at 22:38

## 2024-06-30 RX ADMIN — Medication 975 MILLIGRAM(S): at 05:14

## 2024-06-30 RX ADMIN — Medication 975 MILLIGRAM(S): at 18:06

## 2024-06-30 RX ADMIN — AMLODIPINE BESYLATE 10 MILLIGRAM(S): 2.5 TABLET ORAL at 05:15

## 2024-06-30 RX ADMIN — Medication 2 TABLET(S): at 22:39

## 2024-06-30 RX ADMIN — ENOXAPARIN SODIUM 40 MILLIGRAM(S): 100 INJECTION SUBCUTANEOUS at 15:07

## 2024-06-30 RX ADMIN — Medication 975 MILLIGRAM(S): at 12:21

## 2024-06-30 RX ADMIN — RISPERIDONE 3 MILLIGRAM(S): 0.5 TABLET ORAL at 01:11

## 2024-06-30 RX ADMIN — Medication 15 MILLILITER(S): at 18:06

## 2024-06-30 RX ADMIN — OXYCODONE HYDROCHLORIDE 5 MILLIGRAM(S): 100 SOLUTION ORAL at 08:26

## 2024-06-30 RX ADMIN — OXYCODONE HYDROCHLORIDE 5 MILLIGRAM(S): 100 SOLUTION ORAL at 10:48

## 2024-06-30 RX ADMIN — OXYCODONE HYDROCHLORIDE 5 MILLIGRAM(S): 100 SOLUTION ORAL at 15:07

## 2024-06-30 RX ADMIN — Medication 3 MILLILITER(S): at 05:33

## 2024-06-30 RX ADMIN — BUPROPION HYDROCHLORIDE 150 MILLIGRAM(S): 150 TABLET, EXTENDED RELEASE ORAL at 22:39

## 2024-06-30 RX ADMIN — Medication 975 MILLIGRAM(S): at 01:40

## 2024-06-30 RX ADMIN — OXYCODONE HYDROCHLORIDE 5 MILLIGRAM(S): 100 SOLUTION ORAL at 11:18

## 2024-06-30 RX ADMIN — Medication 15 MILLILITER(S): at 10:49

## 2024-06-30 RX ADMIN — BUPROPION HYDROCHLORIDE 150 MILLIGRAM(S): 150 TABLET, EXTENDED RELEASE ORAL at 01:11

## 2024-06-30 RX ADMIN — Medication 15 MILLILITER(S): at 05:14

## 2024-06-30 RX ADMIN — PANTOPRAZOLE SODIUM 40 MILLIGRAM(S): 40 INJECTION, POWDER, FOR SOLUTION INTRAVENOUS at 11:50

## 2024-06-30 RX ADMIN — Medication 2 TABLET(S): at 01:12

## 2024-06-30 RX ADMIN — ACETYLCYSTEINE 4 MILLILITER(S): 200 SOLUTION ORAL; RESPIRATORY (INHALATION) at 21:34

## 2024-06-30 RX ADMIN — RISPERIDONE 3 MILLIGRAM(S): 0.5 TABLET ORAL at 22:43

## 2024-06-30 RX ADMIN — OXYCODONE HYDROCHLORIDE 5 MILLIGRAM(S): 100 SOLUTION ORAL at 01:09

## 2024-06-30 RX ADMIN — Medication 3 MILLILITER(S): at 22:25

## 2024-06-30 RX ADMIN — Medication 100 MILLIGRAM(S): at 01:10

## 2024-06-30 RX ADMIN — Medication 1 APPLICATION(S): at 11:51

## 2024-06-30 RX ADMIN — Medication 3 MILLILITER(S): at 13:02

## 2024-06-30 RX ADMIN — OXYCODONE HYDROCHLORIDE 5 MILLIGRAM(S): 100 SOLUTION ORAL at 01:41

## 2024-06-30 NOTE — PROGRESS NOTE ADULT - SUBJECTIVE AND OBJECTIVE BOX
Plastic Surgery Progress Note (pg [facility]: [id number], [facility]: [phone])    SUBJECTIVE  Pt comfortable in bed. Pain well controlled, no complaints.    OBJECTIVE  ___________________________________________________  VITAL SIGNS / I&O's   Vital Signs Last 24 Hrs  T(C): 36.7 (30 Jun 2024 06:00), Max: 36.8 (29 Jun 2024 13:37)  T(F): 98.1 (30 Jun 2024 06:00), Max: 98.3 (29 Jun 2024 13:37)  HR: 98 (30 Jun 2024 06:00) (86 - 104)  BP: 115/71 (30 Jun 2024 06:00) (109/63 - 138/92)  BP(mean): --  RR: 16 (30 Jun 2024 06:00) (16 - 19)  SpO2: 98% (30 Jun 2024 06:00) (96% - 99%)    Parameters below as of 30 Jun 2024 06:00  Patient On (Oxygen Delivery Method): room air          29 Jun 2024 07:01  -  30 Jun 2024 07:00  --------------------------------------------------------  IN:    Enteral Tube Flush: 770 mL    Jevity 1.5: 1020 mL  Total IN: 1790 mL    OUT:    Voided (mL): 1550 mL  Total OUT: 1550 mL    Total NET: 240 mL        ___________________________________________________  PHYSICAL EXAM    -- CONSTITUTIONAL: NAD, lying in bed  -- NEURO: Awake, alert  -- HEENT: NC/AT, mucous membranes moist  -- NECK: Neck soft no palpable fluid collection, incision cdi  -- PULM: Non-labored respirations, equal chest rise bilaterally  -- ABDOMEN: Nondistended  -- RLE: thigh soft, incision cdi  -- LUE: skin graft with good take  -- PSYCH: Affect normal, A&Ox3    ___________________________________________________  LABS                        10.3   3.02  )-----------( 417      ( 30 Jun 2024 07:43 )             31.2     30 Jun 2024 07:43    140    |  105    |  16     ----------------------------<  132    3.7     |  23     |  0.55     Ca    8.5        30 Jun 2024 07:43  Phos  2.8       30 Jun 2024 07:43  Mg     1.70      30 Jun 2024 07:43        CAPILLARY BLOOD GLUCOSE      POCT Blood Glucose.: 139 mg/dL (30 Jun 2024 05:32)  POCT Blood Glucose.: 90 mg/dL (29 Jun 2024 23:30)  POCT Blood Glucose.: 80 mg/dL (29 Jun 2024 18:18)        Urinalysis Basic - ( 30 Jun 2024 07:43 )    Color: x / Appearance: x / SG: x / pH: x  Gluc: 132 mg/dL / Ketone: x  / Bili: x / Urobili: x   Blood: x / Protein: x / Nitrite: x   Leuk Esterase: x / RBC: x / WBC x   Sq Epi: x / Non Sq Epi: x / Bacteria: x      ___________________________________________________  MICRO  Recent Cultures:    ___________________________________________________  MEDICATIONS  (STANDING):  acetaminophen   Oral Liquid .. 975 milliGRAM(s) Oral every 6 hours  acetylcysteine 10%  Inhalation 4 milliLiter(s) Inhalation every 6 hours  albuterol    90 MICROgram(s) HFA Inhaler 1 Puff(s) Inhalation once  albuterol/ipratropium for Nebulization 3 milliLiter(s) Nebulizer every 6 hours  amLODIPine   Tablet 10 milliGRAM(s) Oral daily  AQUAPHOR (petrolatum Ointment) 1 Application(s) Topical daily  AQUAPHOR (petrolatum Ointment) 1 Application(s) Topical daily  buPROPion . 150 milliGRAM(s) Oral at bedtime  buPROPion . 300 milliGRAM(s) Oral <User Schedule>  chlorhexidine 0.12% Liquid 15 milliLiter(s) Swish and Spit <User Schedule>  chlorhexidine 2% Cloths 1 Application(s) Topical daily  enoxaparin Injectable 40 milliGRAM(s) SubCutaneous every 24 hours  pantoprazole  Injectable 40 milliGRAM(s) IV Push daily  QUEtiapine 100 milliGRAM(s) Oral at bedtime  risperiDONE   Solution 3 milliGRAM(s) Oral at bedtime  risperiDONE   Solution 1 milliGRAM(s) Oral <User Schedule>  senna 2 Tablet(s) Oral at bedtime  sodium chloride 0.9% lock flush 3 milliLiter(s) IV Push every 8 hours    MEDICATIONS  (PRN):  oxyCODONE    Solution 2.5 milliGRAM(s) Oral every 4 hours PRN Moderate Pain (4 - 6)  oxyCODONE    Solution 5 milliGRAM(s) Enteral Tube every 4 hours PRN Severe Pain (7 - 10)

## 2024-06-30 NOTE — PROGRESS NOTE ADULT - ASSESSMENT
64F w/ PMH of depression and schizophrenia s/p R partial glossectomy in the setting of SCCA, R SND I-IV, L RFFF, L thigh STSG, tracheostomy (6/6/24). Pts post-operative course c/b venous congestion of flap w/ RTOR on 6/11/24 for free flap salvage and revision of venous anastamosis, RTOR on 6/14/24 for debridement of necrotic radial forearm flap and replacement with right ALT flap, with 7.5 trach exchanged to 7.0 cuffed trach. Pt stepped down to the floor (6/17/24), Pt is progressing well.    Plan:  - trach decannulated  - Strict I&O's  - Aquacell placement on neck   - reposition the patient Q4-5h intervals, clean/bath the patient daily for improvement of the heat rash  - engage CM for rehab placement, planned for d/c tomorrow to rehab  - Aquaphor to left thigh donor site daily    Xavier Gabriel  Oral and Maxillofacial Surgery  [facility] Pager #[phone]  [facility] Pager: [phone]  [facility] Pager: [phone]  Available on Teams

## 2024-06-30 NOTE — PROGRESS NOTE ADULT - ASSESSMENT
LELA LOWERY is a 64yFemale s/p right ALT flap for revision hemiglossal recon 6/14, and neck dehiscence closure 6/28. Recovering well.    - Leave nylons in, we will remove after atleast 4 weeks  - Aquaphor to left thigh and LUE donor site daily  - Diet: TFs  - PEG placed 6/25  - Continue peridex  - Appreciate care per primary    Plastic Surgery  #[phone] [facility] pager  [phone] [facility] pager  Available on teams

## 2024-06-30 NOTE — PROGRESS NOTE ADULT - PROBLEM SELECTOR PLAN 1
-R partial glossectomy in the setting of SCCA, R SND I-IV, L RFFF, L thigh STSG, tracheostomy (6/6/24). Pts post-operative course c/b venous congestion of flap w/ RTOR on 6/11 for free flap salvage and revision of venous anastomosis. Flap now infected with E Coli also without flow. RTOR 6/14 for exploration of tongue free flap, muscle flap revision with right thigh flap, 7.5 trach exchanged to 7.0 cuffed trach.  -OR wound cx (6/11, 6/14) grew E.coli, s/p Unsyn 3g q6h   -management per ENT/plastic sx  - s/p PEG   - feeds as per nutrition  -s/p trach decannulation 6/30   - tolerated capping trial  - LT neck wound dehiscence s/p OR with plastics with neck complex closure and irrigation/debridement on 6/28 -R partial glossectomy in the setting of SCCA, R SND I-IV, L RFFF, L thigh STSG, tracheostomy (6/6/24). Pts post-operative course c/b venous congestion of flap w/ RTOR on 6/11 for free flap salvage and revision of venous anastomosis. Flap now infected with E Coli also without flow. RTOR 6/14 for exploration of tongue free flap, muscle flap revision with right thigh flap, 7.5 trach exchanged to 7.0 cuffed trach.  -OR wound cx (6/11, 6/14) grew E.coli, s/p Unsyn 3g q6h   -management per ENT/plastic sx  - s/p PEG 6/25  - tube feeds as per nutrition  -LT neck wound dehiscence s/p OR with plastics with neck complex closure and irrigation/debridement on 6/28  -s/p trach decannulation 6/30     -

## 2024-06-30 NOTE — PROGRESS NOTE ADULT - PROBLEM SELECTOR PLAN 2
- bladder scan noted 351   - last BM 6/25-- started on senna   - bladder scan prn  - consider AXR eval stool burden if retaining post void bladder scans

## 2024-06-30 NOTE — CHART NOTE - NSCHARTNOTEFT_GEN_A_CORE
OMFS procedure:    trach decannulation    patient's trach decannulated  patient saturating well. no complications. patient stable.     Xavier Gabriel  Oral and Maxillofacial Surgery  [facility] OMFS Pager #[phone]  [facility] Pager: [phone]  [facility] Pager: [phone]  Available on Teams

## 2024-06-30 NOTE — PROGRESS NOTE ADULT - SUBJECTIVE AND OBJECTIVE BOX
64F w/ PMH of depression and schizophrenia s/p R partial glossectomy in the setting of SCCA, R SND I-IV, L RFFF, L thigh STSG, tracheostomy (6/6/24). Pts post-operative course c/b venous congestion of flap w/ RTOR on 6/11/24 for free flap salvage and revision of venous anastamosis, RTOR on 6/14/24 for debridement of necrotic radial forearm flap and replacement with right ALT flap, with 7.5 trach exchanged to 7.0 cuffed trach. patient now 2 days s/p right neck debridement and closure. patient progressing well.     - trach decannulated. patient tolerated well.     Interval Events:   -NAEON, NAD  -Pt tolerating TF well  -NGT removed  -s/p PEG (6/25)     patient visited bedside. no acute events overnight. patient afebrile, vitals stable.    Vitals:     Vital Signs Last 24 Hrs  T(C): 36.7 (30 Jun 2024 06:00), Max: 36.8 (29 Jun 2024 13:37)  T(F): 98.1 (30 Jun 2024 06:00), Max: 98.3 (29 Jun 2024 13:37)  HR: 98 (30 Jun 2024 06:00) (86 - 104)  BP: 115/71 (30 Jun 2024 06:00) (109/63 - 138/92)  BP(mean): --  RR: 16 (30 Jun 2024 06:00) (16 - 19)  SpO2: 98% (30 Jun 2024 06:00) (96% - 99%)    Parameters below as of 30 Jun 2024 06:00  Patient On (Oxygen Delivery Method): room air        I&O's Detail    29 Jun 2024 07:01  -  30 Jun 2024 07:00  --------------------------------------------------------  IN:    Enteral Tube Flush: 770 mL    Jevity 1.5: 1020 mL  Total IN: 1790 mL    OUT:    Voided (mL): 1550 mL  Total OUT: 1550 mL    Total NET: 240 mL          Medications:    MEDICATIONS  (STANDING):  acetaminophen   Oral Liquid .. 975 milliGRAM(s) Oral every 6 hours  acetylcysteine 10%  Inhalation 4 milliLiter(s) Inhalation every 6 hours  albuterol    90 MICROgram(s) HFA Inhaler 1 Puff(s) Inhalation once  albuterol/ipratropium for Nebulization 3 milliLiter(s) Nebulizer every 6 hours  amLODIPine   Tablet 10 milliGRAM(s) Oral daily  AQUAPHOR (petrolatum Ointment) 1 Application(s) Topical daily  AQUAPHOR (petrolatum Ointment) 1 Application(s) Topical daily  buPROPion . 300 milliGRAM(s) Oral <User Schedule>  buPROPion . 150 milliGRAM(s) Oral at bedtime  chlorhexidine 0.12% Liquid 15 milliLiter(s) Swish and Spit <User Schedule>  chlorhexidine 2% Cloths 1 Application(s) Topical daily  enoxaparin Injectable 40 milliGRAM(s) SubCutaneous every 24 hours  pantoprazole  Injectable 40 milliGRAM(s) IV Push daily  QUEtiapine 100 milliGRAM(s) Oral at bedtime  risperiDONE   Solution 3 milliGRAM(s) Oral at bedtime  risperiDONE   Solution 1 milliGRAM(s) Oral <User Schedule>  senna 2 Tablet(s) Oral at bedtime  sodium chloride 0.9% lock flush 3 milliLiter(s) IV Push every 8 hours    MEDICATIONS  (PRN):  oxyCODONE    Solution 2.5 milliGRAM(s) Oral every 4 hours PRN Moderate Pain (4 - 6)  oxyCODONE    Solution 5 milliGRAM(s) Enteral Tube every 4 hours PRN Severe Pain (7 - 10)      Labs:                          9.0    3.39  )-----------( 413      ( 29 Jun 2024 06:15 )             28.2       06-29    139  |  103  |  18  ----------------------------<  83  3.7   |  24  |  0.56    Ca    8.3<L>      29 Jun 2024 06:15  Phos  3.0     06-29  Mg     2.00     06-29    PHYSICAL EXAM:  GEN: NAD  NEURO: AAOx3, drowsy   HEENT: neck incision clean, dry, and intact. flap warm and well perfused, good color  RLE: thigh soft, incision c/d/i.   LUE: Incision c/d/i.

## 2024-06-30 NOTE — PROGRESS NOTE ADULT - SUBJECTIVE AND OBJECTIVE BOX
Dr. Shannan Vargas  Pager [phone]    PROGRESS NOTE:     Patient is a 64y old  Female who presents with a chief complaint of s/p surgery (29 Jun 2024 15:07)      SUBJECTIVE / OVERNIGHT EVENTS: decannulated this am, denies chest pain/sob  ADDITIONAL REVIEW OF SYSTEMS: afebrile     MEDICATIONS  (STANDING):  acetaminophen   Oral Liquid .. 975 milliGRAM(s) Oral every 6 hours  acetylcysteine 10%  Inhalation 4 milliLiter(s) Inhalation every 6 hours  albuterol    90 MICROgram(s) HFA Inhaler 1 Puff(s) Inhalation once  albuterol/ipratropium for Nebulization 3 milliLiter(s) Nebulizer every 6 hours  amLODIPine   Tablet 10 milliGRAM(s) Oral daily  AQUAPHOR (petrolatum Ointment) 1 Application(s) Topical daily  AQUAPHOR (petrolatum Ointment) 1 Application(s) Topical daily  buPROPion . 300 milliGRAM(s) Oral <User Schedule>  buPROPion . 150 milliGRAM(s) Oral at bedtime  chlorhexidine 0.12% Liquid 15 milliLiter(s) Swish and Spit <User Schedule>  chlorhexidine 2% Cloths 1 Application(s) Topical daily  enoxaparin Injectable 40 milliGRAM(s) SubCutaneous every 24 hours  pantoprazole  Injectable 40 milliGRAM(s) IV Push daily  QUEtiapine 100 milliGRAM(s) Oral at bedtime  risperiDONE   Solution 1 milliGRAM(s) Oral <User Schedule>  risperiDONE   Solution 3 milliGRAM(s) Oral at bedtime  senna 2 Tablet(s) Oral at bedtime  sodium chloride 0.9% lock flush 3 milliLiter(s) IV Push every 8 hours    MEDICATIONS  (PRN):  oxyCODONE    Solution 2.5 milliGRAM(s) Oral every 4 hours PRN Moderate Pain (4 - 6)  oxyCODONE    Solution 5 milliGRAM(s) Enteral Tube every 4 hours PRN Severe Pain (7 - 10)      CAPILLARY BLOOD GLUCOSE      POCT Blood Glucose.: 139 mg/dL (30 Jun 2024 05:32)  POCT Blood Glucose.: 90 mg/dL (29 Jun 2024 23:30)  POCT Blood Glucose.: 80 mg/dL (29 Jun 2024 18:18)    I&O's Summary    29 Jun 2024 07:01  -  30 Jun 2024 07:00  --------------------------------------------------------  IN: 1790 mL / OUT: 1550 mL / NET: 240 mL        PHYSICAL EXAM:  Vital Signs Last 24 Hrs  T(C): 36.3 (30 Jun 2024 09:12), Max: 36.8 (29 Jun 2024 13:37)  T(F): 97.4 (30 Jun 2024 09:12), Max: 98.3 (29 Jun 2024 13:37)  HR: 98 (30 Jun 2024 10:30) (86 - 111)  BP: 119/82 (30 Jun 2024 09:12) (110/65 - 138/92)  BP(mean): --  RR: 18 (30 Jun 2024 10:30) (16 - 19)  SpO2: 98% (30 Jun 2024 10:30) (96% - 99%)    Parameters below as of 30 Jun 2024 10:30  Patient On (Oxygen Delivery Method): room air      CONSTITUTIONAL: NAD  ENMT:  decannulated, right neck wound dressing in place  RESPIRATORY: Normal respiratory effort; lungs are clear to auscultation bilaterally  CARDIOVASCULAR: Regular rate and rhythm, normal S1 and S2, no murmur/rub/gallop; No lower extremity edema; Peripheral pulses are 2+ bilaterally  ABDOMEN: Nontender to palpation, normoactive bowel sounds, no rebound/guarding; + PEG   MUSCULOSKELETAL:  LT UE graft site + Lt thigh graft site   NEUROLOGY: follows command moving all ext    LABS:                        10.3   3.02  )-----------( 417      ( 30 Jun 2024 07:43 )             31.2     06-30    140  |  105  |  16  ----------------------------<  132<H>  3.7   |  23  |  0.55    Ca    8.5      30 Jun 2024 07:43  Phos  2.8     06-30  Mg     1.70     06-30        Urinalysis Basic - ( 30 Jun 2024 07:43 )    Color: x / Appearance: x / SG: x / pH: x  Gluc: 132 mg/dL / Ketone: x  / Bili: x / Urobili: x   Blood: x / Protein: x / Nitrite: x   Leuk Esterase: x / RBC: x / WBC x   Sq Epi: x / Non Sq Epi: x / Bacteria: x      RADIOLOGY & ADDITIONAL TESTS:  Results Reviewed:   Imaging Personally Reviewed:    Electrocardiogram Personally Reviewed:    COORDINATION OF CARE:  Care Discussed with Consultants/Other Providers [Y/N]:  Prior or Outpatient Records Reviewed [Y/N]:

## 2024-07-01 LAB
ANION GAP SERPL CALC-SCNC: 10 MMOL/L — SIGNIFICANT CHANGE UP (ref 7–14)
BUN SERPL-MCNC: 14 MG/DL — SIGNIFICANT CHANGE UP (ref 7–23)
CALCIUM SERPL-MCNC: 8.8 MG/DL — SIGNIFICANT CHANGE UP (ref 8.4–10.5)
CHLORIDE SERPL-SCNC: 104 MMOL/L — SIGNIFICANT CHANGE UP (ref 98–107)
CO2 SERPL-SCNC: 25 MMOL/L — SIGNIFICANT CHANGE UP (ref 22–31)
CREAT SERPL-MCNC: 0.52 MG/DL — SIGNIFICANT CHANGE UP (ref 0.5–1.3)
EGFR: 104 ML/MIN/1.73M2 — SIGNIFICANT CHANGE UP
GLUCOSE SERPL-MCNC: 77 MG/DL — SIGNIFICANT CHANGE UP (ref 70–99)
HCT VFR BLD CALC: 32.9 % — LOW (ref 34.5–45)
HGB BLD-MCNC: 10.8 G/DL — LOW (ref 11.5–15.5)
MAGNESIUM SERPL-MCNC: 2 MG/DL — SIGNIFICANT CHANGE UP (ref 1.6–2.6)
MCHC RBC-ENTMCNC: 30.2 PG — SIGNIFICANT CHANGE UP (ref 27–34)
MCHC RBC-ENTMCNC: 32.8 GM/DL — SIGNIFICANT CHANGE UP (ref 32–36)
MCV RBC AUTO: 91.9 FL — SIGNIFICANT CHANGE UP (ref 80–100)
NRBC # BLD: 0 /100 WBCS — SIGNIFICANT CHANGE UP (ref 0–0)
NRBC # FLD: 0 K/UL — SIGNIFICANT CHANGE UP (ref 0–0)
PHOSPHATE SERPL-MCNC: 3.4 MG/DL — SIGNIFICANT CHANGE UP (ref 2.5–4.5)
PLATELET # BLD AUTO: 357 K/UL — SIGNIFICANT CHANGE UP (ref 150–400)
POTASSIUM SERPL-MCNC: 4.2 MMOL/L — SIGNIFICANT CHANGE UP (ref 3.5–5.3)
POTASSIUM SERPL-SCNC: 4.2 MMOL/L — SIGNIFICANT CHANGE UP (ref 3.5–5.3)
RBC # BLD: 3.58 M/UL — LOW (ref 3.8–5.2)
RBC # FLD: 16.2 % — HIGH (ref 10.3–14.5)
SODIUM SERPL-SCNC: 139 MMOL/L — SIGNIFICANT CHANGE UP (ref 135–145)
WBC # BLD: 1.9 K/UL — LOW (ref 3.8–10.5)
WBC # FLD AUTO: 1.9 K/UL — LOW (ref 3.8–10.5)

## 2024-07-01 PROCEDURE — 99232 SBSQ HOSP IP/OBS MODERATE 35: CPT

## 2024-07-01 RX ADMIN — Medication 3 MILLILITER(S): at 22:37

## 2024-07-01 RX ADMIN — Medication 15 MILLILITER(S): at 18:27

## 2024-07-01 RX ADMIN — Medication 3 MILLILITER(S): at 06:10

## 2024-07-01 RX ADMIN — Medication 1 APPLICATION(S): at 11:41

## 2024-07-01 RX ADMIN — Medication 15 MILLILITER(S): at 19:18

## 2024-07-01 RX ADMIN — Medication 15 MILLILITER(S): at 14:44

## 2024-07-01 RX ADMIN — PETROLATUM 1 APPLICATION(S): 42 OINTMENT TOPICAL at 11:41

## 2024-07-01 RX ADMIN — BUPROPION HYDROCHLORIDE 150 MILLIGRAM(S): 150 TABLET, EXTENDED RELEASE ORAL at 22:41

## 2024-07-01 RX ADMIN — OXYCODONE HYDROCHLORIDE 5 MILLIGRAM(S): 100 SOLUTION ORAL at 10:49

## 2024-07-01 RX ADMIN — OXYCODONE HYDROCHLORIDE 5 MILLIGRAM(S): 100 SOLUTION ORAL at 10:19

## 2024-07-01 RX ADMIN — ACETYLCYSTEINE 4 MILLILITER(S): 200 SOLUTION ORAL; RESPIRATORY (INHALATION) at 03:22

## 2024-07-01 RX ADMIN — OXYCODONE HYDROCHLORIDE 5 MILLIGRAM(S): 100 SOLUTION ORAL at 19:48

## 2024-07-01 RX ADMIN — PANTOPRAZOLE SODIUM 40 MILLIGRAM(S): 40 INJECTION, POWDER, FOR SOLUTION INTRAVENOUS at 11:40

## 2024-07-01 RX ADMIN — AMLODIPINE BESYLATE 10 MILLIGRAM(S): 2.5 TABLET ORAL at 06:50

## 2024-07-01 RX ADMIN — Medication 15 MILLILITER(S): at 22:41

## 2024-07-01 RX ADMIN — IPRATROPIUM BROMIDE AND ALBUTEROL SULFATE 3 MILLILITER(S): .5; 3 SOLUTION RESPIRATORY (INHALATION) at 03:22

## 2024-07-01 RX ADMIN — Medication 975 MILLIGRAM(S): at 22:41

## 2024-07-01 RX ADMIN — RISPERIDONE 1 MILLIGRAM(S): 0.5 TABLET ORAL at 06:50

## 2024-07-01 RX ADMIN — Medication 975 MILLIGRAM(S): at 12:11

## 2024-07-01 RX ADMIN — OXYCODONE HYDROCHLORIDE 5 MILLIGRAM(S): 100 SOLUTION ORAL at 19:18

## 2024-07-01 RX ADMIN — ENOXAPARIN SODIUM 40 MILLIGRAM(S): 100 INJECTION SUBCUTANEOUS at 14:44

## 2024-07-01 RX ADMIN — RISPERIDONE 3 MILLIGRAM(S): 0.5 TABLET ORAL at 22:41

## 2024-07-01 RX ADMIN — Medication 3 MILLILITER(S): at 13:18

## 2024-07-01 RX ADMIN — Medication 100 MILLIGRAM(S): at 22:41

## 2024-07-01 RX ADMIN — OXYCODONE HYDROCHLORIDE 5 MILLIGRAM(S): 100 SOLUTION ORAL at 15:14

## 2024-07-01 RX ADMIN — BUPROPION HYDROCHLORIDE 300 MILLIGRAM(S): 150 TABLET, EXTENDED RELEASE ORAL at 06:49

## 2024-07-01 RX ADMIN — Medication 975 MILLIGRAM(S): at 23:11

## 2024-07-01 RX ADMIN — OXYCODONE HYDROCHLORIDE 5 MILLIGRAM(S): 100 SOLUTION ORAL at 14:44

## 2024-07-01 RX ADMIN — Medication 975 MILLIGRAM(S): at 18:26

## 2024-07-01 RX ADMIN — Medication 15 MILLILITER(S): at 06:50

## 2024-07-01 RX ADMIN — Medication 975 MILLIGRAM(S): at 07:20

## 2024-07-01 RX ADMIN — Medication 975 MILLIGRAM(S): at 11:41

## 2024-07-01 RX ADMIN — Medication 975 MILLIGRAM(S): at 18:56

## 2024-07-01 RX ADMIN — Medication 975 MILLIGRAM(S): at 06:50

## 2024-07-01 RX ADMIN — Medication 15 MILLILITER(S): at 10:19

## 2024-07-01 NOTE — PROGRESS NOTE ADULT - PROBLEM SELECTOR PLAN 1
-R partial glossectomy in the setting of SCCA, R SND I-IV, L RFFF, L thigh STSG, tracheostomy (6/6/24). Pts post-operative course c/b venous congestion of flap w/ RTOR on 6/11 for free flap salvage and revision of venous anastomosis. Flap now infected with E Coli also without flow. RTOR 6/14 for exploration of tongue free flap, muscle flap revision with right thigh flap, 7.5 trach exchanged to 7.0 cuffed trach.  -OR wound cx (6/11, 6/14) grew E.coli, s/p Unsyn 3g q6h   -management per ENT/plastic sx  - s/p PEG 6/25  - tube feeds as per nutrition  -LT neck wound dehiscence s/p OR with plastics with neck complex closure and irrigation/debridement on 6/28  -s/p trach decannulation 6/30

## 2024-07-01 NOTE — PROGRESS NOTE ADULT - ASSESSMENT
65 y/o female with schizophrenia and depression s/p R partial glossectomy in the setting of SCCA, R SND I-IV, L RFFF, L thigh STSG, tracheostomy (6/6/24). Pts post-operative course c/b venous congestion of flap w/ RTOR on 6/11 for free flap salvage and revision of venous anastamosis. Flap now infected with E Coli also without flow. RTOR 6/14 for exploration of tongue free flap, muscle flap revision with right thigh flap. c/b neck wound dehiscence and s/p OR 6/28 and now s/p decannulation

## 2024-07-01 NOTE — PROGRESS NOTE ADULT - ASSESSMENT
63 y/o F HD25 POD24 R partial Glossectomy in the setting of SSC, L RFFF, SND I-IV, L thigh STSFG, Trachostomy 6/6/24. Pts post-operative course c/b venous congestion of flap w/ RTOR on 6/11/24 for free flap salvage and revision of venous anastamosis, RTOR on 6/14/24 for debridement of necrotic radial forearm flap and replacement with right ALT flap, with 7.5 trach exchanged to 7.0 cuffed trach. Patient has been decannulated since 6/30 and has Saturating 100% on RA with no DAGO.  Patient endorses feeling well and ready to go home and denies any f/c/n/v. Plan to DC to rehab today    Plan  - Strict I&O's  - Aquacell placement on neck   - reposition the patient Q4-5h intervals, clean/bath the patient daily for improvement of the heat rash  - engage CM for rehab placement, planned for d/c today to rehab  - Aquaphor to left thigh donor site daily    Enzo Alonso  Oral and Maxillofacial Surgery  [facility] Pager #[phone]  [facility] Pager: [phone]  Available on Teams

## 2024-07-01 NOTE — PROGRESS NOTE ADULT - SUBJECTIVE AND OBJECTIVE BOX
Plastic Surgery Progress Note (pg [facility]: [id number], [facility]: [phone])    SUBJECTIVE  The patient was seen and examined. No acute events overnight. Pain controlled, afebrile w/ stable vitals.     OBJECTIVE  ___________________________________________________  VITAL SIGNS / I&O's   Vital Signs Last 24 Hrs  T(C): 36.6 (01 Jul 2024 06:00), Max: 36.9 (30 Jun 2024 18:00)  T(F): 97.9 (01 Jul 2024 06:00), Max: 98.4 (30 Jun 2024 18:00)  HR: 79 (01 Jul 2024 06:00) (79 - 111)  BP: 140/80 (01 Jul 2024 06:00) (119/78 - 140/80)  BP(mean): --  RR: 18 (01 Jul 2024 06:00) (17 - 18)  SpO2: 100% (01 Jul 2024 06:00) (96% - 100%)    Parameters below as of 01 Jul 2024 06:00  Patient On (Oxygen Delivery Method): room air          30 Jun 2024 07:01  -  01 Jul 2024 07:00  --------------------------------------------------------  IN:    Enteral Tube Flush: 1020 mL    Jevity 1.5: 1360 mL  Total IN: 2380 mL    OUT:    Voided (mL): 850 mL  Total OUT: 850 mL    Total NET: 1530 mL        ___________________________________________________  PHYSICAL EXAM    -- CONSTITUTIONAL: NAD, lying in bed  -- NEURO: Awake, alert  -- HEENT: NC/AT, mucous membranes moist  -- NECK: Neck soft no palpable fluid collection, incision cdi. Penrose drain removed  -- PULM: Non-labored respirations, equal chest rise bilaterally  -- ABDOMEN: Nondistended  -- RLE: thigh soft, incision cdi  -- LUE: skin graft with good take  -- PSYCH: Affect normal, A&Ox3    ___________________________________________________  LABS                        10.8   1.90  )-----------( 357      ( 01 Jul 2024 05:08 )             32.9     01 Jul 2024 05:08    139    |  104    |  14     ----------------------------<  77     4.2     |  25     |  0.52     Ca    8.8        01 Jul 2024 05:08  Phos  3.4       01 Jul 2024 05:08  Mg     2.00      01 Jul 2024 05:08        CAPILLARY BLOOD GLUCOSE            Urinalysis Basic - ( 01 Jul 2024 05:08 )    Color: x / Appearance: x / SG: x / pH: x  Gluc: 77 mg/dL / Ketone: x  / Bili: x / Urobili: x   Blood: x / Protein: x / Nitrite: x   Leuk Esterase: x / RBC: x / WBC x   Sq Epi: x / Non Sq Epi: x / Bacteria: x      ___________________________________________________  MICRO  Recent Cultures:    ___________________________________________________  MEDICATIONS  (STANDING):  acetaminophen   Oral Liquid .. 975 milliGRAM(s) Oral every 6 hours  acetylcysteine 10%  Inhalation 4 milliLiter(s) Inhalation every 6 hours  albuterol    90 MICROgram(s) HFA Inhaler 1 Puff(s) Inhalation once  albuterol/ipratropium for Nebulization 3 milliLiter(s) Nebulizer every 6 hours  amLODIPine   Tablet 10 milliGRAM(s) Oral daily  AQUAPHOR (petrolatum Ointment) 1 Application(s) Topical daily  AQUAPHOR (petrolatum Ointment) 1 Application(s) Topical daily  buPROPion . 150 milliGRAM(s) Oral at bedtime  buPROPion . 300 milliGRAM(s) Oral <User Schedule>  chlorhexidine 0.12% Liquid 15 milliLiter(s) Swish and Spit <User Schedule>  chlorhexidine 2% Cloths 1 Application(s) Topical daily  enoxaparin Injectable 40 milliGRAM(s) SubCutaneous every 24 hours  pantoprazole  Injectable 40 milliGRAM(s) IV Push daily  QUEtiapine 100 milliGRAM(s) Oral at bedtime  risperiDONE   Solution 3 milliGRAM(s) Oral at bedtime  risperiDONE   Solution 1 milliGRAM(s) Oral <User Schedule>  senna 2 Tablet(s) Oral at bedtime  sodium chloride 0.9% lock flush 3 milliLiter(s) IV Push every 8 hours    MEDICATIONS  (PRN):  oxyCODONE    Solution 2.5 milliGRAM(s) Oral every 4 hours PRN Moderate Pain (4 - 6)  oxyCODONE    Solution 5 milliGRAM(s) Enteral Tube every 4 hours PRN Severe Pain (7 - 10)

## 2024-07-01 NOTE — PROGRESS NOTE ADULT - SUBJECTIVE AND OBJECTIVE BOX
[facility] Division of Hospital Medicine  Yulia Condon MD  Pager (A-F, 7S-6L): [phone]  Other Times:  [phone]    Patient is a 64y old  Female who presents with a chief complaint of s/p surgery (30 Jun 2024 13:05)      SUBJECTIVE / OVERNIGHT EVENTS: Pt in NAD no acute events ON       MEDICATIONS  (STANDING):  acetaminophen   Oral Liquid .. 975 milliGRAM(s) Oral every 6 hours  acetylcysteine 10%  Inhalation 4 milliLiter(s) Inhalation every 6 hours  albuterol    90 MICROgram(s) HFA Inhaler 1 Puff(s) Inhalation once  albuterol/ipratropium for Nebulization 3 milliLiter(s) Nebulizer every 6 hours  amLODIPine   Tablet 10 milliGRAM(s) Oral daily  AQUAPHOR (petrolatum Ointment) 1 Application(s) Topical daily  AQUAPHOR (petrolatum Ointment) 1 Application(s) Topical daily  buPROPion . 300 milliGRAM(s) Oral <User Schedule>  buPROPion . 150 milliGRAM(s) Oral at bedtime  chlorhexidine 0.12% Liquid 15 milliLiter(s) Swish and Spit <User Schedule>  chlorhexidine 2% Cloths 1 Application(s) Topical daily  enoxaparin Injectable 40 milliGRAM(s) SubCutaneous every 24 hours  pantoprazole  Injectable 40 milliGRAM(s) IV Push daily  QUEtiapine 100 milliGRAM(s) Oral at bedtime  risperiDONE   Solution 3 milliGRAM(s) Oral at bedtime  risperiDONE   Solution 1 milliGRAM(s) Oral <User Schedule>  senna 2 Tablet(s) Oral at bedtime  sodium chloride 0.9% lock flush 3 milliLiter(s) IV Push every 8 hours    MEDICATIONS  (PRN):  oxyCODONE    Solution 2.5 milliGRAM(s) Oral every 4 hours PRN Moderate Pain (4 - 6)  oxyCODONE    Solution 5 milliGRAM(s) Enteral Tube every 4 hours PRN Severe Pain (7 - 10)      CAPILLARY BLOOD GLUCOSE        I&O's Summary    30 Jun 2024 07:01  -  01 Jul 2024 07:00  --------------------------------------------------------  IN: 2380 mL / OUT: 850 mL / NET: 1530 mL    01 Jul 2024 07:01  -  01 Jul 2024 11:06  --------------------------------------------------------  IN: 0 mL / OUT: 200 mL / NET: -200 mL        PHYSICAL EXAM:  Vital Signs Last 24 Hrs  T(C): 36.7 (01 Jul 2024 09:49), Max: 36.9 (30 Jun 2024 18:00)  T(F): 98 (01 Jul 2024 09:49), Max: 98.4 (30 Jun 2024 18:00)  HR: 105 (01 Jul 2024 09:49) (79 - 105)  BP: 115/89 (01 Jul 2024 09:49) (115/89 - 140/80)  BP(mean): --  RR: 17 (01 Jul 2024 09:49) (17 - 18)  SpO2: 97% (01 Jul 2024 09:49) (96% - 100%)    Parameters below as of 01 Jul 2024 09:49  Patient On (Oxygen Delivery Method): room air      CONSTITUTIONAL: NAD  ENMT:  decannulated, neck wound c/d/i sutures in place   RESPIRATORY: Normal respiratory effort; lungs are clear to auscultation bilaterally  CARDIOVASCULAR: Regular rate and rhythm, normal S1 and S2, no murmur/rub/gallop; No lower extremity edema; Peripheral pulses are 2+ bilaterally  ABDOMEN: Nontender to palpation, normoactive bowel sounds, no rebound/guarding; + PEG   MUSCULOSKELETAL:  LT UE graft site + Lt thigh graft site   NEUROLOGY: follows command moving all ext    LABS:                        10.8   1.90  )-----------( 357      ( 01 Jul 2024 05:08 )             32.9     07-01    139  |  104  |  14  ----------------------------<  77  4.2   |  25  |  0.52    Ca    8.8      01 Jul 2024 05:08  Phos  3.4     07-01  Mg     2.00     07-01            Urinalysis Basic - ( 01 Jul 2024 05:08 )    Color: x / Appearance: x / SG: x / pH: x  Gluc: 77 mg/dL / Ketone: x  / Bili: x / Urobili: x   Blood: x / Protein: x / Nitrite: x   Leuk Esterase: x / RBC: x / WBC x   Sq Epi: x / Non Sq Epi: x / Bacteria: x          RADIOLOGY & ADDITIONAL TESTS:  Results Reviewed:   Imaging Personally Reviewed:  Electrocardiogram Personally Reviewed:    COORDINATION OF CARE:  Care Discussed with Consultants/Other Providers [Y/N]:  Prior or Outpatient Records Reviewed [Y/N]:

## 2024-07-01 NOTE — PROGRESS NOTE ADULT - SUBJECTIVE AND OBJECTIVE BOX
Follow Up:      Interval History/ROS:   s/p OR for closure right neck wound dehiscence 6/28  trach de cannulated   paez out     Allergies  penicillins (Unknown)        ANTIMICROBIALS:   unasyn stopped  6/21 due to rash     MEDICATIONS  (STANDING):  acetaminophen   Oral Liquid .. 975 every 6 hours  acetylcysteine 10%  Inhalation 4 every 6 hours  albuterol    90 MICROgram(s) HFA Inhaler 1 once  albuterol/ipratropium for Nebulization 3 every 6 hours  amLODIPine   Tablet 10 daily  buPROPion . 300 <User Schedule>  buPROPion . 150 at bedtime  enoxaparin Injectable 40 every 24 hours  oxyCODONE    Solution 2.5 every 4 hours PRN  oxyCODONE    Solution 5 every 4 hours PRN  pantoprazole  Injectable 40 daily  QUEtiapine 100 at bedtime  risperiDONE   Solution 3 at bedtime  risperiDONE   Solution 1 <User Schedule>  senna 2 at bedtime      Vital Signs Last 24 Hrs  T(F): 98.2 (07-01-24 @ 14:40), Max: 98.4 (06-30-24 @ 18:00)  HR: 102 (07-01-24 @ 14:40)  BP: 118/76 (07-01-24 @ 14:40)  RR: 18 (07-01-24 @ 14:40)  SpO2: 98% (07-01-24 @ 14:40) (96% - 100%)    PHYSICAL EXAM:  Constitutional: Non toxic no acute distress  Eyes: No icterus.  Neck:  wound right neck closed no erythema, no drainage  dressing over trach site   RS: no respiratory distress  CVS: s1s2  Abdomen: Soft.   : no paez  Extremities: dressing left forearm, right  leg   rash mac/ pap back trunk extremities faded                                        10.8   1.90  )-----------( 357      ( 01 Jul 2024 05:08 )             32.9 07-01    139  |  104  |  14  ----------------------------<  77  4.2   |  25  |  0.52  Ca    8.8      01 Jul 2024 05:08Phos  3.4     07-01Mg     2.00     07-01            MICROBIOLOGY:    .Smear RIGHT NECK CLT  06-14-24   Culture is being performed. Fungal cultures are held for 4 weeks.  --     Culture - Surgical Swab (06.14.24 @ 12:07)   - Ertapenem: S <=0.5  - Gentamicin: S <=2  - Imipenem: S <=1  - Levofloxacin: S <=0.5  - Meropenem: S <=1  - Piperacillin/Tazobactam: S <=8  - Tobramycin: S <=2  - Trimethoprim/Sulfamethoxazole: S <=0.5/9.5  - Amoxicillin/Clavulanic Acid: S <=8/4  - Ampicillin: S <=8 These ampicillin results predict results for amoxicillin  - Ampicillin/Sulbactam: S <=4/2  - Aztreonam: S <=4  - Cefazolin: S <=2  - Cefepime: S <=2  - Cefoxitin: S <=8  - Ceftriaxone: S <=1  - Ciprofloxacin: S <=0.25  Specimen Source: .Surgical Swab RIGHT NECK CLT  Culture Results:   Growth in fluid media only Escherichia coli  Organism Identification: Escherichia coli  Organism: Escherichia coli  Method Type: MINESH      .Blood Blood-Peripheral  06-12-24   No growth at 5 days  --  --      .Blood Blood-Peripheral  06-12-24   No growth at 5 days  --  --      .Surgical Swab RIGHT NECK WOUND  06-11-24   Few Escherichia coli  --  Escherichia coli      .Blood Blood-Peripheral  06-08-24   No growth at 5 days  --  --      .Blood Blood-Venous  06-08-24   No growth at 5 days  --            RADIOLOGY:

## 2024-07-01 NOTE — PROGRESS NOTE ADULT - SUBJECTIVE AND OBJECTIVE BOX
65 y/o F  HD25 POD24 R partial Glossectomy in the setting of SSC, L RFFF, SND I-IV, L thigh STSFG, Trachostomy 6/6/24. Pts post-operative course c/b venous congestion of flap w/ RTOR on 6/11/24 for free flap salvage and revision of venous anastamosis, RTOR on 6/14/24 for debridement of necrotic radial forearm flap and replacement with right ALT flap, with 7.5 trach exchanged to 7.0 cuffed trach. Patient has been decannulated since 6/30 and has Saturating 100% on RA with no DAGO.  Patient endorses feeling well and ready to go home and denies any f/c/n/v. Plan to DC to rehab today    Interval events  CHAITANYA, LEXY  Patient tolerating decannulation well  Tolerating Tube feeds well    Vitals:     Vital Signs Last 24 Hrs  T(C): 36.6 (01 Jul 2024 06:00), Max: 36.9 (30 Jun 2024 18:00)  T(F): 97.9 (01 Jul 2024 06:00), Max: 98.4 (30 Jun 2024 18:00)  HR: 79 (01 Jul 2024 06:00) (79 - 111)  BP: 140/80 (01 Jul 2024 06:00) (119/78 - 140/80)  BP(mean): --  RR: 18 (01 Jul 2024 06:00) (17 - 18)  SpO2: 100% (01 Jul 2024 06:00) (96% - 100%)    Parameters below as of 01 Jul 2024 06:00  Patient On (Oxygen Delivery Method): room air        I&O's Detail    30 Jun 2024 07:01  -  01 Jul 2024 07:00  --------------------------------------------------------  IN:    Enteral Tube Flush: 1020 mL    Jevity 1.5: 1360 mL  Total IN: 2380 mL    OUT:    Voided (mL): 850 mL  Total OUT: 850 mL    Total NET: 1530 mL          Medications:    MEDICATIONS  (STANDING):  acetaminophen   Oral Liquid .. 975 milliGRAM(s) Oral every 6 hours  acetylcysteine 10%  Inhalation 4 milliLiter(s) Inhalation every 6 hours  albuterol    90 MICROgram(s) HFA Inhaler 1 Puff(s) Inhalation once  albuterol/ipratropium for Nebulization 3 milliLiter(s) Nebulizer every 6 hours  amLODIPine   Tablet 10 milliGRAM(s) Oral daily  AQUAPHOR (petrolatum Ointment) 1 Application(s) Topical daily  AQUAPHOR (petrolatum Ointment) 1 Application(s) Topical daily  buPROPion . 150 milliGRAM(s) Oral at bedtime  buPROPion . 300 milliGRAM(s) Oral <User Schedule>  chlorhexidine 0.12% Liquid 15 milliLiter(s) Swish and Spit <User Schedule>  chlorhexidine 2% Cloths 1 Application(s) Topical daily  enoxaparin Injectable 40 milliGRAM(s) SubCutaneous every 24 hours  pantoprazole  Injectable 40 milliGRAM(s) IV Push daily  QUEtiapine 100 milliGRAM(s) Oral at bedtime  risperiDONE   Solution 3 milliGRAM(s) Oral at bedtime  risperiDONE   Solution 1 milliGRAM(s) Oral <User Schedule>  senna 2 Tablet(s) Oral at bedtime  sodium chloride 0.9% lock flush 3 milliLiter(s) IV Push every 8 hours    MEDICATIONS  (PRN):  oxyCODONE    Solution 5 milliGRAM(s) Enteral Tube every 4 hours PRN Severe Pain (7 - 10)  oxyCODONE    Solution 2.5 milliGRAM(s) Oral every 4 hours PRN Moderate Pain (4 - 6)      Labs:                          10.3   3.02  )-----------( 417      ( 30 Jun 2024 07:43 )             31.2       06-30    140  |  105  |  16  ----------------------------<  132<H>  3.7   |  23  |  0.55    Ca    8.5      30 Jun 2024 07:43  Phos  2.8     06-30  \    Mg     1.70     06-30      Physical Exam:   General: AAOx3, NAD  GEN: NAD  NEURO: AAOx3, drowsy   HEENT: neck incision clean, dry, and intact. flap warm and well perfused, good color, Drain intact  RLE: thigh soft, incision c/d/i, healing well  LUE: Incision c/d/i.

## 2024-07-01 NOTE — PROGRESS NOTE ADULT - ASSESSMENT
LELA LOWERY is a 64yFemale s/p right ALT flap for revision hemiglossal recon 6/14, and neck dehiscence closure 6/28. Recovering well.    - Leave nylons in, we will remove after atleast 4 weeks  - Aquaphor to left thigh and LUE donor site daily  - Diet: TFs  - PEG placed 6/25  - Continue peridex  - Appreciate care per primary    Plastic Surgery  #[phone] [facility] pager  [phone] [facility] pager  Available on teams  LELA LOWERY is a 64yFemale s/p right ALT flap for revision hemiglossal recon 6/14, and neck dehiscence closure 6/28. Recovering well.    - Leave nylons in, we will remove after atleast 4 weeks  - Aquaphor to left thigh and LUE donor site daily  - Diet: TFs  - PEG placed 6/25  - Continue peridex  - Appreciate care per primary  - DC recs: continue Aquaphor to LUE skin graft donor site daily, prineo R thigh to remain on until fu, nylons to remain until fu, penrose sites open to air apply gauze if drainage    Plastic Surgery  #[phone] [facility] pager  [phone] [facility] pager  Available on teams

## 2024-07-01 NOTE — PROGRESS NOTE ADULT - ASSESSMENT
63 yo woman with psych disorder, tongue cancer, s/p glossectomy partial 6/6 with flap reconstruction, trach    flap thrombosed and RTOR 6/11 and again 6/14 for revision      OR wound cultures from both 6/11 and 6/14 growing E coli  which is pansensitive     switched to unasyn 6/11 6/21 developed diffuse rash- likely drug rash  - suspect unasyn   improving   had already completed 11 days post 6/11 revision and  8 days post 6/14 revision      now s/p OR 6/28 for closure of dehiscence right neck wound  brief operative note fibrinous  no purulence noted      wound w/o signs of infection     WBC today 1.9      afebrile     Leucopenia     would check WBC differential with next blood work- check ANC    assess medications for potential side effect leucopenia     ?viral  check RVP      I will be away --> 7/15  ID service available

## 2024-07-02 PROCEDURE — 99232 SBSQ HOSP IP/OBS MODERATE 35: CPT

## 2024-07-02 RX ADMIN — Medication 15 MILLILITER(S): at 23:51

## 2024-07-02 RX ADMIN — Medication 975 MILLIGRAM(S): at 11:42

## 2024-07-02 RX ADMIN — Medication 15 MILLILITER(S): at 14:21

## 2024-07-02 RX ADMIN — Medication 3 MILLILITER(S): at 23:51

## 2024-07-02 RX ADMIN — Medication 1 APPLICATION(S): at 11:43

## 2024-07-02 RX ADMIN — BUPROPION HYDROCHLORIDE 150 MILLIGRAM(S): 150 TABLET, EXTENDED RELEASE ORAL at 23:52

## 2024-07-02 RX ADMIN — Medication 3 MILLILITER(S): at 05:28

## 2024-07-02 RX ADMIN — PANTOPRAZOLE SODIUM 40 MILLIGRAM(S): 40 INJECTION, POWDER, FOR SOLUTION INTRAVENOUS at 11:43

## 2024-07-02 RX ADMIN — Medication 975 MILLIGRAM(S): at 17:19

## 2024-07-02 RX ADMIN — RISPERIDONE 3 MILLIGRAM(S): 0.5 TABLET ORAL at 23:51

## 2024-07-02 RX ADMIN — Medication 975 MILLIGRAM(S): at 23:52

## 2024-07-02 RX ADMIN — OXYCODONE HYDROCHLORIDE 5 MILLIGRAM(S): 100 SOLUTION ORAL at 12:13

## 2024-07-02 RX ADMIN — PETROLATUM 1 APPLICATION(S): 42 OINTMENT TOPICAL at 11:42

## 2024-07-02 RX ADMIN — BUPROPION HYDROCHLORIDE 300 MILLIGRAM(S): 150 TABLET, EXTENDED RELEASE ORAL at 06:16

## 2024-07-02 RX ADMIN — Medication 3 MILLILITER(S): at 13:22

## 2024-07-02 RX ADMIN — OXYCODONE HYDROCHLORIDE 5 MILLIGRAM(S): 100 SOLUTION ORAL at 11:43

## 2024-07-02 RX ADMIN — Medication 975 MILLIGRAM(S): at 06:45

## 2024-07-02 RX ADMIN — OXYCODONE HYDROCHLORIDE 5 MILLIGRAM(S): 100 SOLUTION ORAL at 22:16

## 2024-07-02 RX ADMIN — Medication 15 MILLILITER(S): at 17:19

## 2024-07-02 RX ADMIN — PETROLATUM 1 APPLICATION(S): 42 OINTMENT TOPICAL at 11:43

## 2024-07-02 RX ADMIN — ENOXAPARIN SODIUM 40 MILLIGRAM(S): 100 INJECTION SUBCUTANEOUS at 14:21

## 2024-07-02 RX ADMIN — Medication 975 MILLIGRAM(S): at 17:49

## 2024-07-02 RX ADMIN — AMLODIPINE BESYLATE 10 MILLIGRAM(S): 2.5 TABLET ORAL at 06:16

## 2024-07-02 RX ADMIN — OXYCODONE HYDROCHLORIDE 5 MILLIGRAM(S): 100 SOLUTION ORAL at 15:58

## 2024-07-02 RX ADMIN — Medication 975 MILLIGRAM(S): at 12:12

## 2024-07-02 RX ADMIN — OXYCODONE HYDROCHLORIDE 5 MILLIGRAM(S): 100 SOLUTION ORAL at 16:28

## 2024-07-02 RX ADMIN — Medication 100 MILLIGRAM(S): at 23:51

## 2024-07-02 RX ADMIN — Medication 975 MILLIGRAM(S): at 06:15

## 2024-07-02 RX ADMIN — OXYCODONE HYDROCHLORIDE 5 MILLIGRAM(S): 100 SOLUTION ORAL at 21:46

## 2024-07-02 RX ADMIN — Medication 15 MILLILITER(S): at 19:22

## 2024-07-02 RX ADMIN — Medication 15 MILLILITER(S): at 06:17

## 2024-07-02 RX ADMIN — RISPERIDONE 1 MILLIGRAM(S): 0.5 TABLET ORAL at 06:16

## 2024-07-02 NOTE — PROGRESS NOTE ADULT - ASSESSMENT
63 y/o F HD26 POD25 R partial Glossectomy in the setting of SSC, L RFFF, SND I-IV, L thigh STSFG, Trachostomy 6/6/24. Pts post-operative course c/b venous congestion of flap w/ RTOR on 6/11/24 for free flap salvage and revision of venous anastamosis, RTOR on 6/14/24 for debridement of necrotic radial forearm flap and replacement with right ALT flap, with 7.5 trach exchanged to 7.0 cuffed trach. Patient has been decannulated since 6/30 and has Saturating 100% on RA with no DAGO.  Patient endorses feeling well and ready to go home and denies any f/c/n/v.     Plan  - Strict I&O's  - reposition the patient Q4-5h intervals, clean/bath the patient daily for improvement of the heat rash  - Leave nylons in, we will remove after at least 4 weeks  - Aquaphor to left thigh and LUE donor site daily    - CBC with differential aydin AM labs for leukopenia per ID. Possible drug rx   - f/u CM for rehab. d/c pending final psych eval  - F/U appointment with Dr. Levy Vivar   Oral and Maxillofacial Surgery  [facility] Pager #[phone]  [facility] Pager: [phone]  Available on Teams

## 2024-07-02 NOTE — CHART NOTE - NSCHARTNOTEFT_GEN_A_CORE
Source: [X] Patient       [x] EMR        [X] RN        [] Family at bedside       [] Other: OMFS team during interdisciplinary rounds    -If unable to interview patient: [] Trach/Vent/BiPAP  [] Disoriented/confused/inappropriate to interview as per chart     Nutrition Follow Up Note.  Per chart review, 65 y/o female with schizophrenia and depression s/p R partial glossectomy in the setting of SCCA, R SND I-IV, L RFFF, L thigh STSG, tracheostomy (6/6/24). Pts post-operative course c/b venous congestion of flap w/ RTOR on 6/11 for free flap salvage and revision of venous anastamosis. Flap now infected with E Coli also without flow. RTOR 6/14 for exploration of tongue free flap, muscle flap revision with right thigh flap, 7.5 trach exchanged to 7.0 cuffed trach. S/p SICU stay for flap checks.  Pt stepped down to the floor (8S)(6/18/24). S/p gastrostomy tube placement on 6/25.    Patient currently maintained on bolus feeds of Jevity1.5cal 340mL n3ofbir provides total volume of 1360mL, 2040kcal, 87g pro and 1034mL free water. Patient reports tolerating tube feeds- confirmed with No nausea/vomiting/diarrhea/constipation reported. Last bowel movement 7/1. Plan for dispo to rehab.        Diet Prescription: Diet, NPO with Tube Feed:   Tube Feeding Modality: Gastrostomy  Jevity 1.5 Boubacar (JEVITY1.5RTH)  Total Volume for 24 Hours (mL): 1360  Bolus  Total Volume of Bolus (mL):  340  Tube Feed Frequency: Every 6 hours   Tube Feed Start Time: 09:53  Free Water Flush  Bolus   Total Volume per Flush (mL): 260   Frequency: Every 6 Hours (06-27-24 @ 09:53)    Food Allergy: NKFA    Pertinent Medications: MEDICATIONS  (STANDING):  acetaminophen   Oral Liquid .. 975 milliGRAM(s) Oral every 6 hours  acetylcysteine 10%  Inhalation 4 milliLiter(s) Inhalation every 6 hours  albuterol    90 MICROgram(s) HFA Inhaler 1 Puff(s) Inhalation once  albuterol/ipratropium for Nebulization 3 milliLiter(s) Nebulizer every 6 hours  amLODIPine   Tablet 10 milliGRAM(s) Oral daily  AQUAPHOR (petrolatum Ointment) 1 Application(s) Topical daily  AQUAPHOR (petrolatum Ointment) 1 Application(s) Topical daily  buPROPion . 150 milliGRAM(s) Oral at bedtime  buPROPion . 300 milliGRAM(s) Oral <User Schedule>  chlorhexidine 0.12% Liquid 15 milliLiter(s) Swish and Spit <User Schedule>  chlorhexidine 2% Cloths 1 Application(s) Topical daily  enoxaparin Injectable 40 milliGRAM(s) SubCutaneous every 24 hours  pantoprazole  Injectable 40 milliGRAM(s) IV Push daily  QUEtiapine 100 milliGRAM(s) Oral at bedtime  risperiDONE   Solution 3 milliGRAM(s) Oral at bedtime  risperiDONE   Solution 1 milliGRAM(s) Oral <User Schedule>  senna 2 Tablet(s) Oral at bedtime  sodium chloride 0.9% lock flush 3 milliLiter(s) IV Push every 8 hours    MEDICATIONS  (PRN):  oxyCODONE    Solution 2.5 milliGRAM(s) Oral every 4 hours PRN Moderate Pain (4 - 6)  oxyCODONE    Solution 5 milliGRAM(s) Enteral Tube every 4 hours PRN Severe Pain (7 - 10)    Pertinent Labs: 07-01 Na139 mmol/L Glu 77 mg/dL K+ 4.2 mmol/L Cr  0.52 mg/dL BUN 14 mg/dL 07-01 Phos 3.4 mg/dL      Weight (kg): dosing weights: 59 (06-14 @ 06:41)  59kg (6/6)  59kg (5/30)  Fluctuating daily weights documented during the course of admission-? question accuracy  Daily weights (kg): 6/17- 69.8kg   6/16-68.4kg  6/15-68.4kg  6/14-71.2kg  6/9-65.2kg  6/6-72.3kg   Height: 162.6cm  IBW: 54.5 kg +/-10%  BMI: 22.3kg/m^2 based on dosing weight of 59kg        Edema: 1+ generalized edema per nursing flowsheets.   Pressure Injury: No pressure ulcers/DTI noted in flowsheets.       Estimated Needs:   [X] No change since previous assessment, based on dosing weight 59kg    Estimated Energy Needs (kcal/kg):   30-35 = 1770-2065kcal/d  Estimated Protein Needs(g/kg):      1.2-1.5 = 71-89g pro/d    [ ] recalculated, with consideration for, based on weight    Previous Nutrition Diagnosis:      [X ] Increased Nutrient Needs   Nutrition Diagnosis is [X ] ongoing  [ ] resolved [ ] not applicable     [X]  Inadequate protein energy intake, related to unable to tolerate po intake As evidenced by dysphagia    Nutrition Diagnosis is [ ] ongoing  [ ] resolved [X ] not applicable       Education:  [ ] Provided  [ ] Provided on previous assessment by RD  [ X] Not warranted  [ ] Pt refused       Nutrition Interventions:   1) Continue TF regimen as mentioned above. Additional fluids per MD discretion.  2) Monitor weights, labs, BM's, skin integrity, tolerance to EN.  3) Further adjustments to rate/volume/duration/free water provision of enteral feeds dependant on long term monitoring of patient's tolerance, weight trends and needs   4) RN to obtain new weight and weekly weights.      RD remains available.    Helio Wright RD, MS, CDN pager [phone]  Also available on [employer] Teams

## 2024-07-02 NOTE — PROGRESS NOTE ADULT - ASSESSMENT
63 y/o female with schizophrenia and depression s/p R partial glossectomy in the setting of SCCA, R SND I-IV, L RFFF, L thigh STSG, tracheostomy (6/6/24). Pts post-operative course c/b venous congestion of flap w/ RTOR on 6/11 for free flap salvage and revision of venous anastamosis. Flap now infected with E Coli also without flow. RTOR 6/14 for exploration of tongue free flap, muscle flap revision with right thigh flap. c/b neck wound dehiscence and s/p OR 6/28 and now s/p decannulation

## 2024-07-02 NOTE — PROGRESS NOTE ADULT - SUBJECTIVE AND OBJECTIVE BOX
[facility] Division of Hospital Medicine  Yulia Condon MD  Pager (-F, 3S-0D): [phone]  Other Times:  [phone]    Patient is a 64y old  Female who presents with a chief complaint of s/p surgery (30 Jun 2024 13:05)      SUBJECTIVE / OVERNIGHT EVENTS: afebrile; BP stable; NAEON      MEDICATIONS  (STANDING):  acetaminophen   Oral Liquid .. 975 milliGRAM(s) Oral every 6 hours  acetylcysteine 10%  Inhalation 4 milliLiter(s) Inhalation every 6 hours  albuterol    90 MICROgram(s) HFA Inhaler 1 Puff(s) Inhalation once  albuterol/ipratropium for Nebulization 3 milliLiter(s) Nebulizer every 6 hours  amLODIPine   Tablet 10 milliGRAM(s) Oral daily  AQUAPHOR (petrolatum Ointment) 1 Application(s) Topical daily  AQUAPHOR (petrolatum Ointment) 1 Application(s) Topical daily  buPROPion . 150 milliGRAM(s) Oral at bedtime  buPROPion . 300 milliGRAM(s) Oral <User Schedule>  chlorhexidine 0.12% Liquid 15 milliLiter(s) Swish and Spit <User Schedule>  chlorhexidine 2% Cloths 1 Application(s) Topical daily  enoxaparin Injectable 40 milliGRAM(s) SubCutaneous every 24 hours  pantoprazole  Injectable 40 milliGRAM(s) IV Push daily  QUEtiapine 100 milliGRAM(s) Oral at bedtime  risperiDONE   Solution 1 milliGRAM(s) Oral <User Schedule>  risperiDONE   Solution 3 milliGRAM(s) Oral at bedtime  senna 2 Tablet(s) Oral at bedtime  sodium chloride 0.9% lock flush 3 milliLiter(s) IV Push every 8 hours    MEDICATIONS  (PRN):  oxyCODONE    Solution 2.5 milliGRAM(s) Oral every 4 hours PRN Moderate Pain (4 - 6)  oxyCODONE    Solution 5 milliGRAM(s) Enteral Tube every 4 hours PRN Severe Pain (7 - 10)      CAPILLARY BLOOD GLUCOSE        I&O's Summary    01 Jul 2024 07:01  -  02 Jul 2024 07:00  --------------------------------------------------------  IN: 2360 mL / OUT: 1500 mL / NET: 860 mL        PHYSICAL EXAM:  Vital Signs Last 24 Hrs  T(C): 36.8 (02 Jul 2024 06:00), Max: 36.9 (01 Jul 2024 18:00)  T(F): 98.2 (02 Jul 2024 06:00), Max: 98.4 (01 Jul 2024 18:00)  HR: 85 (02 Jul 2024 06:00) (74 - 102)  BP: 148/91 (02 Jul 2024 06:00) (118/76 - 148/91)  BP(mean): --  RR: 18 (02 Jul 2024 06:00) (18 - 18)  SpO2: 100% (02 Jul 2024 06:00) (97% - 100%)    Parameters below as of 02 Jul 2024 06:00  Patient On (Oxygen Delivery Method): room air    CONSTITUTIONAL: NAD  ENMT:  decannulated, neck wound c/d/i sutures in place   RESPIRATORY: Normal respiratory effort; lungs are clear to auscultation bilaterally  CARDIOVASCULAR: Regular rate and rhythm, normal S1 and S2, no murmur/rub/gallop; No lower extremity edema; Peripheral pulses are 2+ bilaterally  ABDOMEN: Nontender to palpation, normoactive bowel sounds, no rebound/guarding; + PEG   MUSCULOSKELETAL:  LT UE graft site + Lt thigh graft site   NEUROLOGY: follows command moving all       LABS:                        10.8   1.90  )-----------( 357      ( 01 Jul 2024 05:08 )             32.9     07-01    139  |  104  |  14  ----------------------------<  77  4.2   |  25  |  0.52    Ca    8.8      01 Jul 2024 05:08  Phos  3.4     07-01  Mg     2.00     07-01            Urinalysis Basic - ( 01 Jul 2024 05:08 )    Color: x / Appearance: x / SG: x / pH: x  Gluc: 77 mg/dL / Ketone: x  / Bili: x / Urobili: x   Blood: x / Protein: x / Nitrite: x   Leuk Esterase: x / RBC: x / WBC x   Sq Epi: x / Non Sq Epi: x / Bacteria: x          RADIOLOGY & ADDITIONAL TESTS:  Results Reviewed:   Imaging Personally Reviewed:  Electrocardiogram Personally Reviewed:    COORDINATION OF CARE:  Care Discussed with Consultants/Other Providers [Y/N]:  Prior or Outpatient Records Reviewed [Y/N]:

## 2024-07-02 NOTE — PROVIDER CONTACT NOTE (OTHER) - ASSESSMENT
pt;s right sided neck seems more swollem/firm than this morning. pt had penrose drain removed in the morning by plastics team as per OMFS. pt denies of resp distress; a/o x 4; vitals stable/afebrile.

## 2024-07-02 NOTE — PROGRESS NOTE ADULT - PROBLEM SELECTOR PLAN 1
- R partial glossectomy in the setting of SCCA, R SND I-IV, L RFFF, L thigh STSG, tracheostomy (6/6/24). Pts post-operative course c/b venous congestion of flap w/ RTOR on 6/11 for free flap salvage and revision of venous anastomosis. Flap now infected with E Coli also without flow. RTOR 6/14 for exploration of tongue free flap, muscle flap revision with right thigh flap, 7.5 trach exchanged to 7.0 cuffed trach.  - OR wound cx (6/11, 6/14) grew E.coli, s/p Unsyn 3g q6h   -management per ENT/plastic sx  - s/p PEG 6/25  - tube feeds as per nutrition  - LT neck wound dehiscence s/p OR with plastics with neck complex closure and irrigation/debridement on 6/28  - s/p trach decannulation 6/30  - repeat CBC check ANC

## 2024-07-02 NOTE — PROGRESS NOTE ADULT - SUBJECTIVE AND OBJECTIVE BOX
65 y/o F  HD26 POD25 R partial Glossectomy in the setting of SSC, L RFFF, SND I-IV, L thigh STSFG, Trachostomy 6/6/24. Pts post-operative course c/b venous congestion of flap w/ RTOR on 6/11/24 for free flap salvage and revision of venous anastamosis, RTOR on 6/14/24 for debridement of necrotic radial forearm flap and replacement with right ALT flap, with 7.5 trach exchanged to 7.0 cuffed trach. Patient has been decannulated since 6/30 and has Saturating 100% on RA with no DAGO.  Patient endorses feeling well and ready to go home and denies any f/c/n/v. Plan to DC to rehab tomorrow    Interval events  LEXY TAVARES  Patient tolerating decannulation well  Patient reports tolerating Tube feeds well, ambulating, and voiding both urine and stool    Vitals:     Vital Signs Last 24 Hrs  T(C): 36.8 (02 Jul 2024 06:00), Max: 36.9 (01 Jul 2024 18:00)  T(F): 98.2 (02 Jul 2024 06:00), Max: 98.4 (01 Jul 2024 18:00)  HR: 85 (02 Jul 2024 06:00) (74 - 105)  BP: 148/91 (02 Jul 2024 06:00) (115/89 - 148/91)  BP(mean): --  RR: 18 (02 Jul 2024 06:00) (17 - 18)  SpO2: 100% (02 Jul 2024 06:00) (97% - 100%)    Parameters below as of 02 Jul 2024 06:00  Patient On (Oxygen Delivery Method): room air    I&O's Detail    01 Jul 2024 07:01  -  02 Jul 2024 07:00  --------------------------------------------------------  IN:    Enteral Tube Flush: 1000 mL    Jevity 1.5: 1360 mL  Total IN: 2360 mL    OUT:    Voided (mL): 1500 mL  Total OUT: 1500 mL    Total NET: 860 mL    Medications:    MEDICATIONS  (STANDING):  acetaminophen   Oral Liquid .. 975 milliGRAM(s) Oral every 6 hours  acetylcysteine 10%  Inhalation 4 milliLiter(s) Inhalation every 6 hours  albuterol    90 MICROgram(s) HFA Inhaler 1 Puff(s) Inhalation once  albuterol/ipratropium for Nebulization 3 milliLiter(s) Nebulizer every 6 hours  amLODIPine   Tablet 10 milliGRAM(s) Oral daily  AQUAPHOR (petrolatum Ointment) 1 Application(s) Topical daily  AQUAPHOR (petrolatum Ointment) 1 Application(s) Topical daily  buPROPion . 150 milliGRAM(s) Oral at bedtime  buPROPion . 300 milliGRAM(s) Oral <User Schedule>  chlorhexidine 0.12% Liquid 15 milliLiter(s) Swish and Spit <User Schedule>  chlorhexidine 2% Cloths 1 Application(s) Topical daily  enoxaparin Injectable 40 milliGRAM(s) SubCutaneous every 24 hours  pantoprazole  Injectable 40 milliGRAM(s) IV Push daily  QUEtiapine 100 milliGRAM(s) Oral at bedtime  risperiDONE   Solution 3 milliGRAM(s) Oral at bedtime  risperiDONE   Solution 1 milliGRAM(s) Oral <User Schedule>  senna 2 Tablet(s) Oral at bedtime  sodium chloride 0.9% lock flush 3 milliLiter(s) IV Push every 8 hours    MEDICATIONS  (PRN):  oxyCODONE    Solution 2.5 milliGRAM(s) Oral every 4 hours PRN Moderate Pain (4 - 6)  oxyCODONE    Solution 5 milliGRAM(s) Enteral Tube every 4 hours PRN Severe Pain (7 - 10)      Labs:                          10.8   1.90  )-----------( 357      ( 01 Jul 2024 05:08 )             32.9           07-01    139  |  104  |  14  ----------------------------<  77  4.2   |  25  |  0.52    Ca    8.8      01 Jul 2024 05:08  Phos  3.4     07-01  Mg     2.00     07-01        Physical Exam:   General: AAOx3, NAD  NEURO: AAOx3, drowsy   HEENT: neck incision clean, dry, and intact. flap warm and well perfused, good color  RLE: thigh soft, incision c/d/i, healing well  LUE: Incision c/d/i.

## 2024-07-03 DIAGNOSIS — D70.9 NEUTROPENIA, UNSPECIFIED: ICD-10-CM

## 2024-07-03 LAB
ANION GAP SERPL CALC-SCNC: 11 MMOL/L — SIGNIFICANT CHANGE UP (ref 7–14)
BASOPHILS # BLD AUTO: 0.02 K/UL — SIGNIFICANT CHANGE UP (ref 0–0.2)
BASOPHILS NFR BLD AUTO: 1 % — SIGNIFICANT CHANGE UP (ref 0–2)
BUN SERPL-MCNC: 16 MG/DL — SIGNIFICANT CHANGE UP (ref 7–23)
CALCIUM SERPL-MCNC: 8.7 MG/DL — SIGNIFICANT CHANGE UP (ref 8.4–10.5)
CHLORIDE SERPL-SCNC: 103 MMOL/L — SIGNIFICANT CHANGE UP (ref 98–107)
CO2 SERPL-SCNC: 23 MMOL/L — SIGNIFICANT CHANGE UP (ref 22–31)
CREAT SERPL-MCNC: 0.53 MG/DL — SIGNIFICANT CHANGE UP (ref 0.5–1.3)
EGFR: 103 ML/MIN/1.73M2 — SIGNIFICANT CHANGE UP
EOSINOPHIL # BLD AUTO: 0.07 K/UL — SIGNIFICANT CHANGE UP (ref 0–0.5)
EOSINOPHIL NFR BLD AUTO: 3.6 % — SIGNIFICANT CHANGE UP (ref 0–6)
GLUCOSE SERPL-MCNC: 85 MG/DL — SIGNIFICANT CHANGE UP (ref 70–99)
HCT VFR BLD CALC: 34.1 % — LOW (ref 34.5–45)
HGB BLD-MCNC: 11.4 G/DL — LOW (ref 11.5–15.5)
IANC: 0.74 K/UL — LOW (ref 1.8–7.4)
IMM GRANULOCYTES NFR BLD AUTO: 0.5 % — SIGNIFICANT CHANGE UP (ref 0–0.9)
LYMPHOCYTES # BLD AUTO: 0.8 K/UL — LOW (ref 1–3.3)
LYMPHOCYTES # BLD AUTO: 41 % — SIGNIFICANT CHANGE UP (ref 13–44)
MAGNESIUM SERPL-MCNC: 1.9 MG/DL — SIGNIFICANT CHANGE UP (ref 1.6–2.6)
MCHC RBC-ENTMCNC: 30.5 PG — SIGNIFICANT CHANGE UP (ref 27–34)
MCHC RBC-ENTMCNC: 33.4 GM/DL — SIGNIFICANT CHANGE UP (ref 32–36)
MCV RBC AUTO: 91.2 FL — SIGNIFICANT CHANGE UP (ref 80–100)
MONOCYTES # BLD AUTO: 0.31 K/UL — SIGNIFICANT CHANGE UP (ref 0–0.9)
MONOCYTES NFR BLD AUTO: 15.9 % — HIGH (ref 2–14)
NEUTROPHILS # BLD AUTO: 0.74 K/UL — LOW (ref 1.8–7.4)
NEUTROPHILS NFR BLD AUTO: 38 % — LOW (ref 43–77)
NRBC # BLD: 0 /100 WBCS — SIGNIFICANT CHANGE UP (ref 0–0)
NRBC # FLD: 0 K/UL — SIGNIFICANT CHANGE UP (ref 0–0)
PHOSPHATE SERPL-MCNC: 4.1 MG/DL — SIGNIFICANT CHANGE UP (ref 2.5–4.5)
PLATELET # BLD AUTO: 326 K/UL — SIGNIFICANT CHANGE UP (ref 150–400)
POTASSIUM SERPL-MCNC: 4.1 MMOL/L — SIGNIFICANT CHANGE UP (ref 3.5–5.3)
POTASSIUM SERPL-SCNC: 4.1 MMOL/L — SIGNIFICANT CHANGE UP (ref 3.5–5.3)
RBC # BLD: 3.74 M/UL — LOW (ref 3.8–5.2)
RBC # FLD: 16 % — HIGH (ref 10.3–14.5)
SODIUM SERPL-SCNC: 137 MMOL/L — SIGNIFICANT CHANGE UP (ref 135–145)
WBC # BLD: 1.95 K/UL — LOW (ref 3.8–10.5)
WBC # FLD AUTO: 1.95 K/UL — LOW (ref 3.8–10.5)

## 2024-07-03 PROCEDURE — 99232 SBSQ HOSP IP/OBS MODERATE 35: CPT

## 2024-07-03 RX ADMIN — Medication 975 MILLIGRAM(S): at 18:45

## 2024-07-03 RX ADMIN — Medication 975 MILLIGRAM(S): at 12:12

## 2024-07-03 RX ADMIN — OXYCODONE HYDROCHLORIDE 5 MILLIGRAM(S): 100 SOLUTION ORAL at 23:28

## 2024-07-03 RX ADMIN — Medication 975 MILLIGRAM(S): at 12:42

## 2024-07-03 RX ADMIN — BUPROPION HYDROCHLORIDE 300 MILLIGRAM(S): 150 TABLET, EXTENDED RELEASE ORAL at 06:46

## 2024-07-03 RX ADMIN — OXYCODONE HYDROCHLORIDE 5 MILLIGRAM(S): 100 SOLUTION ORAL at 18:45

## 2024-07-03 RX ADMIN — Medication 1 APPLICATION(S): at 12:13

## 2024-07-03 RX ADMIN — RISPERIDONE 3 MILLIGRAM(S): 0.5 TABLET ORAL at 22:29

## 2024-07-03 RX ADMIN — Medication 15 MILLILITER(S): at 10:21

## 2024-07-03 RX ADMIN — AMLODIPINE BESYLATE 10 MILLIGRAM(S): 2.5 TABLET ORAL at 06:47

## 2024-07-03 RX ADMIN — Medication 975 MILLIGRAM(S): at 18:15

## 2024-07-03 RX ADMIN — OXYCODONE HYDROCHLORIDE 5 MILLIGRAM(S): 100 SOLUTION ORAL at 18:18

## 2024-07-03 RX ADMIN — Medication 15 MILLILITER(S): at 18:13

## 2024-07-03 RX ADMIN — PETROLATUM 1 APPLICATION(S): 42 OINTMENT TOPICAL at 12:13

## 2024-07-03 RX ADMIN — Medication 2 TABLET(S): at 23:32

## 2024-07-03 RX ADMIN — Medication 15 MILLILITER(S): at 06:45

## 2024-07-03 RX ADMIN — RISPERIDONE 1 MILLIGRAM(S): 0.5 TABLET ORAL at 06:46

## 2024-07-03 RX ADMIN — Medication 3 MILLILITER(S): at 06:46

## 2024-07-03 RX ADMIN — Medication 15 MILLILITER(S): at 12:14

## 2024-07-03 RX ADMIN — Medication 100 MILLIGRAM(S): at 23:30

## 2024-07-03 RX ADMIN — PANTOPRAZOLE SODIUM 40 MILLIGRAM(S): 40 INJECTION, POWDER, FOR SOLUTION INTRAVENOUS at 12:13

## 2024-07-03 RX ADMIN — Medication 975 MILLIGRAM(S): at 23:33

## 2024-07-03 RX ADMIN — Medication 3 MILLILITER(S): at 14:00

## 2024-07-03 RX ADMIN — ENOXAPARIN SODIUM 40 MILLIGRAM(S): 100 INJECTION SUBCUTANEOUS at 18:13

## 2024-07-03 RX ADMIN — BUPROPION HYDROCHLORIDE 150 MILLIGRAM(S): 150 TABLET, EXTENDED RELEASE ORAL at 23:31

## 2024-07-03 RX ADMIN — Medication 3 MILLILITER(S): at 21:07

## 2024-07-03 RX ADMIN — OXYCODONE HYDROCHLORIDE 5 MILLIGRAM(S): 100 SOLUTION ORAL at 01:59

## 2024-07-03 RX ADMIN — OXYCODONE HYDROCHLORIDE 5 MILLIGRAM(S): 100 SOLUTION ORAL at 02:29

## 2024-07-03 RX ADMIN — Medication 975 MILLIGRAM(S): at 06:48

## 2024-07-03 NOTE — PROGRESS NOTE ADULT - ASSESSMENT
63 y/o female with schizophrenia and depression s/p R partial glossectomy in the setting of SCCA, R SND I-IV, L RFFF, L thigh STSG, tracheostomy (6/6/24). Pts post-operative course c/b venous congestion of flap w/ RTOR on 6/11 for free flap salvage and revision of venous anastamosis. Flap now infected with E Coli also without flow. RTOR 6/14 for exploration of tongue free flap, muscle flap revision with right thigh flap. c/b neck wound dehiscence and s/p OR 6/28 and now s/p decannulation. c/b neutropenia

## 2024-07-03 NOTE — PROGRESS NOTE ADULT - ASSESSMENT
65 y/o F HD27 POD26 R partial Glossectomy in the setting of SSC, L RFFF, SND I-IV, L thigh STSFG, Trachostomy 6/6/24. Pts post-operative course c/b venous congestion of flap w/ RTOR on 6/11/24 for free flap salvage and revision of venous anastamosis, RTOR on 6/14/24 for debridement of necrotic radial forearm flap and replacement with right ALT flap, with 7.5 trach exchanged to 7.0 cuffed trach. Patient has been decannulated since 6/30 and has Saturating 100% on RA with no DAGO.  Patient endorses feeling well and ready to go home and denies any f/c/n/v.     Plan  - Strict I&O's  - reposition the patient Q4-5h intervals, clean/bath the patient daily for improvement of the heat rash  - Leave nylons in, we will remove after at least 4 weeks  - Aquaphor to left thigh and LUE donor site daily  - CBC with differential today labs for leukopenia per ID. Possible drug rx   - f/u CM for rehab. d/c pending final psych eval  - F/U appointment with Dr. Levy Godinez, PGY-1  Oral and Maxillofacial Surgery  [facility] Pager #[phone]  [facility] Pager: [phone]  Available on Teams Sent Luciana to [place] in [facility] 63 y/o F HD27 POD26 R partial Glossectomy in the setting of SSC, L RFFF, SND I-IV, L thigh STSFG, Trachostomy 6/6/24. Pts post-operative course c/b venous congestion of flap w/ RTOR on 6/11/24 for free flap salvage and revision of venous anastamosis, RTOR on 6/14/24 for debridement of necrotic radial forearm flap and replacement with right ALT flap, with 7.5 trach exchanged to 7.0 cuffed trach. Patient has been decannulated since 6/30 and has Saturating 100% on RA with no DAGO.  Patient endorses feeling well and ready to go home and denies any f/c/n/v.     Plan  - Strict I&O's  - reposition the patient Q4-5h intervals, clean/bath the patient daily for improvement of the heat rash  - Leave nylons in, PRS to remove after at least 4 weeks  - Aquaphor to left thigh and LUE donor site daily  - f/u CBC with differential today labs for leukopenia per ID. Possible drug rx   - f/u CM for rehab. d/c pending final psych eval  - F/U appointment with Dr. Lockett 7/9 at 9am      Janell Godinez, PGY-1  Oral and Maxillofacial Surgery  [facility] Pager #[phone]  [facility] Pager: [phone]  Available on Teams

## 2024-07-03 NOTE — PROGRESS NOTE ADULT - SUBJECTIVE AND OBJECTIVE BOX
63 y/o F  HD27 POD26 R partial Glossectomy in the setting of SSC, L RFFF, SND I-IV, L thigh STSFG, Trachostomy 6/6/24. Pts post-operative course c/b venous congestion of flap w/ RTOR on 6/11/24 for free flap salvage and revision of venous anastamosis, RTOR on 6/14/24 for debridement of necrotic radial forearm flap and replacement with right ALT flap, with 7.5 trach exchanged to 7.0 cuffed trach. Patient has been decannulated since 6/30 and has Saturating 100% on RA with no DAGO.  Patient endorses feeling well and ready to go home and denies any f/c/n/v. Plan to DC to rehab     Interval events  LEXY TAVARES  Patient tolerating decannulation well  Patient reports tolerating Tube feeds well, ambulating, and voiding both urine and stool    Vitals:   Vital Signs Last 24 Hrs  T(C): 36.4 (03 Jul 2024 02:00), Max: 37 (02 Jul 2024 14:29)  T(F): 97.6 (03 Jul 2024 02:00), Max: 98.6 (02 Jul 2024 14:29)  HR: 99 (03 Jul 2024 02:00) (92 - 99)  BP: 124/84 (03 Jul 2024 02:00) (102/78 - 135/94)  BP(mean): --  RR: 18 (03 Jul 2024 02:00) (17 - 18)  SpO2: 100% (03 Jul 2024 02:00) (98% - 100%)    Parameters below as of 03 Jul 2024 02:00  Patient On (Oxygen Delivery Method): room air    I&O's Detail    02 Jul 2024 07:01  -  03 Jul 2024 07:00  --------------------------------------------------------  IN:    Enteral Tube Flush: 660 mL    Jevity 1.5: 1020 mL  Total IN: 1680 mL    OUT:    Voided (mL): 1350 mL  Total OUT: 1350 mL    Total NET: 330 mL      MEDICATIONS  (STANDING):  acetaminophen   Oral Liquid .. 975 milliGRAM(s) Oral every 6 hours  acetylcysteine 10%  Inhalation 4 milliLiter(s) Inhalation every 6 hours  albuterol    90 MICROgram(s) HFA Inhaler 1 Puff(s) Inhalation once  albuterol/ipratropium for Nebulization 3 milliLiter(s) Nebulizer every 6 hours  amLODIPine   Tablet 10 milliGRAM(s) Oral daily  AQUAPHOR (petrolatum Ointment) 1 Application(s) Topical daily  AQUAPHOR (petrolatum Ointment) 1 Application(s) Topical daily  buPROPion . 300 milliGRAM(s) Oral <User Schedule>  buPROPion . 150 milliGRAM(s) Oral at bedtime  chlorhexidine 0.12% Liquid 15 milliLiter(s) Swish and Spit <User Schedule>  chlorhexidine 2% Cloths 1 Application(s) Topical daily  enoxaparin Injectable 40 milliGRAM(s) SubCutaneous every 24 hours  pantoprazole  Injectable 40 milliGRAM(s) IV Push daily  QUEtiapine 100 milliGRAM(s) Oral at bedtime  risperiDONE   Solution 1 milliGRAM(s) Oral <User Schedule>  risperiDONE   Solution 3 milliGRAM(s) Oral at bedtime  senna 2 Tablet(s) Oral at bedtime  sodium chloride 0.9% lock flush 3 milliLiter(s) IV Push every 8 hours      MEDICATIONS  (PRN):  oxyCODONE    Solution 5 milliGRAM(s) Enteral Tube every 4 hours PRN Severe Pain (7 - 10)  oxyCODONE    Solution 2.5 milliGRAM(s) Oral every 4 hours PRN Moderate Pain (4 - 6)          Labs:                          10.8   1.90  )-----------( 357      ( 01 Jul 2024 05:08 )             32.9         07-01    139  |  104  |  14  ----------------------------<  77  4.2   |  25  |  0.52    Ca    8.8      01 Jul 2024 05:08  Phos  3.4     07-01  Mg     2.00     07-01      Physical Exam:   General: AAOx3, NAD  NEURO: AAOx3, drowsy   HEENT: neck incision clean, dry, and intact. flap warm and well perfused, good color  RLE: thigh soft, incision c/d/i, healing well  LUE: Incision c/d/i.             65 y/o F  HD27 POD26 R partial Glossectomy in the setting of SSC, L RFFF, SND I-IV, L thigh STSFG, Trachostomy 6/6/24. Pts post-operative course c/b venous congestion of flap w/ RTOR on 6/11/24 for free flap salvage and revision of venous anastamosis, RTOR on 6/14/24 for debridement of necrotic radial forearm flap and replacement with right ALT flap, with 7.5 trach exchanged to 7.0 cuffed trach. Patient has been decannulated since 6/30 and has Saturating 100% on RA with no DAGO.  Patient endorses feeling well and ready to go home and denies any f/c/n/v. Plan to DC to rehab     Interval events  LEXY TAVARES  Patient tolerating decannulation well  Patient reports tolerating Tube feeds well, ambulating, and voiding both urine and stool    Vitals:   Vital Signs Last 24 Hrs  T(C): 36.4 (03 Jul 2024 02:00), Max: 37 (02 Jul 2024 14:29)  T(F): 97.6 (03 Jul 2024 02:00), Max: 98.6 (02 Jul 2024 14:29)  HR: 99 (03 Jul 2024 02:00) (92 - 99)  BP: 124/84 (03 Jul 2024 02:00) (102/78 - 135/94)  BP(mean): --  RR: 18 (03 Jul 2024 02:00) (17 - 18)  SpO2: 100% (03 Jul 2024 02:00) (98% - 100%)    Parameters below as of 03 Jul 2024 02:00  Patient On (Oxygen Delivery Method): room air    I&O's Detail    02 Jul 2024 07:01  -  03 Jul 2024 07:00  --------------------------------------------------------  IN:    Enteral Tube Flush: 660 mL    Jevity 1.5: 1020 mL  Total IN: 1680 mL    OUT:    Voided (mL): 1350 mL  Total OUT: 1350 mL    Total NET: 330 mL      MEDICATIONS  (STANDING):  acetaminophen   Oral Liquid .. 975 milliGRAM(s) Oral every 6 hours  acetylcysteine 10%  Inhalation 4 milliLiter(s) Inhalation every 6 hours  albuterol    90 MICROgram(s) HFA Inhaler 1 Puff(s) Inhalation once  albuterol/ipratropium for Nebulization 3 milliLiter(s) Nebulizer every 6 hours  amLODIPine   Tablet 10 milliGRAM(s) Oral daily  AQUAPHOR (petrolatum Ointment) 1 Application(s) Topical daily  AQUAPHOR (petrolatum Ointment) 1 Application(s) Topical daily  buPROPion . 300 milliGRAM(s) Oral <User Schedule>  buPROPion . 150 milliGRAM(s) Oral at bedtime  chlorhexidine 0.12% Liquid 15 milliLiter(s) Swish and Spit <User Schedule>  chlorhexidine 2% Cloths 1 Application(s) Topical daily  enoxaparin Injectable 40 milliGRAM(s) SubCutaneous every 24 hours  pantoprazole  Injectable 40 milliGRAM(s) IV Push daily  QUEtiapine 100 milliGRAM(s) Oral at bedtime  risperiDONE   Solution 1 milliGRAM(s) Oral <User Schedule>  risperiDONE   Solution 3 milliGRAM(s) Oral at bedtime  senna 2 Tablet(s) Oral at bedtime  sodium chloride 0.9% lock flush 3 milliLiter(s) IV Push every 8 hours      MEDICATIONS  (PRN):  oxyCODONE    Solution 5 milliGRAM(s) Enteral Tube every 4 hours PRN Severe Pain (7 - 10)  oxyCODONE    Solution 2.5 milliGRAM(s) Oral every 4 hours PRN Moderate Pain (4 - 6)        Labs:                   11.4   1.95  )-----------( 326      ( 03 Jul 2024 06:22 )             34.1       07-01    139  |  104  |  14  ----------------------------<  77  4.2   |  25  |  0.52    Ca    8.8      01 Jul 2024 05:08  Phos  3.4     07-01  Mg     2.00     07-01      Physical Exam:   General: AAOx3, NAD  NEURO: AAOx3, drowsy   HEENT: neck incision clean, dry, and intact. flap warm and well perfused, good color  RLE: thigh soft, incision c/d/i, healing well  LUE: Incision c/d/i.

## 2024-07-03 NOTE — PROGRESS NOTE ADULT - SUBJECTIVE AND OBJECTIVE BOX
[facility] Division of Hospital Medicine  Yulia Condon MD  Pager (Y-F, 0A-7C): [phone]  Other Times:  [phone]    Patient is a 64y old  Female who presents with a chief complaint of s/p surgery (30 Jun 2024 13:05)      SUBJECTIVE / OVERNIGHT EVENTS: pt in NAD no acute events ON       MEDICATIONS  (STANDING):  acetaminophen   Oral Liquid .. 975 milliGRAM(s) Oral every 6 hours  acetylcysteine 10%  Inhalation 4 milliLiter(s) Inhalation every 6 hours  albuterol    90 MICROgram(s) HFA Inhaler 1 Puff(s) Inhalation once  albuterol/ipratropium for Nebulization 3 milliLiter(s) Nebulizer every 6 hours  amLODIPine   Tablet 10 milliGRAM(s) Oral daily  AQUAPHOR (petrolatum Ointment) 1 Application(s) Topical daily  AQUAPHOR (petrolatum Ointment) 1 Application(s) Topical daily  buPROPion . 150 milliGRAM(s) Oral at bedtime  buPROPion . 300 milliGRAM(s) Oral <User Schedule>  chlorhexidine 0.12% Liquid 15 milliLiter(s) Swish and Spit <User Schedule>  chlorhexidine 2% Cloths 1 Application(s) Topical daily  enoxaparin Injectable 40 milliGRAM(s) SubCutaneous every 24 hours  pantoprazole  Injectable 40 milliGRAM(s) IV Push daily  QUEtiapine 100 milliGRAM(s) Oral at bedtime  risperiDONE   Solution 3 milliGRAM(s) Oral at bedtime  risperiDONE   Solution 1 milliGRAM(s) Oral <User Schedule>  senna 2 Tablet(s) Oral at bedtime  sodium chloride 0.9% lock flush 3 milliLiter(s) IV Push every 8 hours    MEDICATIONS  (PRN):  oxyCODONE    Solution 2.5 milliGRAM(s) Oral every 4 hours PRN Moderate Pain (4 - 6)  oxyCODONE    Solution 5 milliGRAM(s) Enteral Tube every 4 hours PRN Severe Pain (7 - 10)      CAPILLARY BLOOD GLUCOSE        I&O's Summary    02 Jul 2024 07:01  -  03 Jul 2024 07:00  --------------------------------------------------------  IN: 2280 mL / OUT: 2050 mL / NET: 230 mL        PHYSICAL EXAM:  Vital Signs Last 24 Hrs  T(C): 36.8 (03 Jul 2024 09:58), Max: 37 (02 Jul 2024 14:29)  T(F): 98.2 (03 Jul 2024 09:58), Max: 98.6 (02 Jul 2024 14:29)  HR: 103 (03 Jul 2024 09:58) (80 - 103)  BP: 114/73 (03 Jul 2024 09:58) (102/78 - 135/94)  BP(mean): --  RR: 18 (03 Jul 2024 09:58) (17 - 18)  SpO2: 99% (03 Jul 2024 09:58) (98% - 100%)    Parameters below as of 03 Jul 2024 09:58  Patient On (Oxygen Delivery Method): room air    CONSTITUTIONAL: NAD  ENMT:  decannulated, neck wound c/d/i sutures in place   RESPIRATORY: Normal respiratory effort; lungs are clear to auscultation bilaterally  CARDIOVASCULAR: Regular rate and rhythm, normal S1 and S2, no murmur/rub/gallop; No lower extremity edema; Peripheral pulses are 2+ bilaterally  ABDOMEN: Nontender to palpation, normoactive bowel sounds, no rebound/guarding; + PEG   MUSCULOSKELETAL:  LT UE graft site + Lt thigh graft site   NEUROLOGY: follows command moving all       LABS:                        11.4   1.95  )-----------( 326      ( 03 Jul 2024 06:22 )             34.1     07-03    137  |  103  |  16  ----------------------------<  85  4.1   |  23  |  0.53    Ca    8.7      03 Jul 2024 06:22  Phos  4.1     07-03  Mg     1.90     07-03            Urinalysis Basic - ( 03 Jul 2024 06:22 )    Color: x / Appearance: x / SG: x / pH: x  Gluc: 85 mg/dL / Ketone: x  / Bili: x / Urobili: x   Blood: x / Protein: x / Nitrite: x   Leuk Esterase: x / RBC: x / WBC x   Sq Epi: x / Non Sq Epi: x / Bacteria: x          RADIOLOGY & ADDITIONAL TESTS:  Results Reviewed:   Imaging Personally Reviewed:  Electrocardiogram Personally Reviewed:    COORDINATION OF CARE:  Care Discussed with Consultants/Other Providers [Y/N]:  Prior or Outpatient Records Reviewed [Y/N]:

## 2024-07-04 LAB
BASOPHILS # BLD AUTO: 0.01 K/UL — SIGNIFICANT CHANGE UP (ref 0–0.2)
BASOPHILS NFR BLD AUTO: 0.4 % — SIGNIFICANT CHANGE UP (ref 0–2)
EOSINOPHIL # BLD AUTO: 0.04 K/UL — SIGNIFICANT CHANGE UP (ref 0–0.5)
EOSINOPHIL NFR BLD AUTO: 1.7 % — SIGNIFICANT CHANGE UP (ref 0–6)
FLUAV AG NPH QL: SIGNIFICANT CHANGE UP
FLUBV AG NPH QL: SIGNIFICANT CHANGE UP
HCT VFR BLD CALC: 37.2 % — SIGNIFICANT CHANGE UP (ref 34.5–45)
HGB BLD-MCNC: 12.5 G/DL — SIGNIFICANT CHANGE UP (ref 11.5–15.5)
IANC: 1.27 K/UL — LOW (ref 1.8–7.4)
IMM GRANULOCYTES NFR BLD AUTO: 0.8 % — SIGNIFICANT CHANGE UP (ref 0–0.9)
LYMPHOCYTES # BLD AUTO: 0.71 K/UL — LOW (ref 1–3.3)
LYMPHOCYTES # BLD AUTO: 29.5 % — SIGNIFICANT CHANGE UP (ref 13–44)
MCHC RBC-ENTMCNC: 30.6 PG — SIGNIFICANT CHANGE UP (ref 27–34)
MCHC RBC-ENTMCNC: 33.6 GM/DL — SIGNIFICANT CHANGE UP (ref 32–36)
MCV RBC AUTO: 91.2 FL — SIGNIFICANT CHANGE UP (ref 80–100)
MONOCYTES # BLD AUTO: 0.36 K/UL — SIGNIFICANT CHANGE UP (ref 0–0.9)
MONOCYTES NFR BLD AUTO: 14.9 % — HIGH (ref 2–14)
NEUTROPHILS # BLD AUTO: 1.27 K/UL — LOW (ref 1.8–7.4)
NEUTROPHILS NFR BLD AUTO: 52.7 % — SIGNIFICANT CHANGE UP (ref 43–77)
NRBC # BLD: 0 /100 WBCS — SIGNIFICANT CHANGE UP (ref 0–0)
NRBC # FLD: 0 K/UL — SIGNIFICANT CHANGE UP (ref 0–0)
PLATELET # BLD AUTO: 340 K/UL — SIGNIFICANT CHANGE UP (ref 150–400)
RBC # BLD: 4.08 M/UL — SIGNIFICANT CHANGE UP (ref 3.8–5.2)
RBC # FLD: 15.9 % — HIGH (ref 10.3–14.5)
RSV RNA NPH QL NAA+NON-PROBE: SIGNIFICANT CHANGE UP
SARS-COV-2 RNA SPEC QL NAA+PROBE: SIGNIFICANT CHANGE UP
WBC # BLD: 2.41 K/UL — LOW (ref 3.8–10.5)
WBC # FLD AUTO: 2.41 K/UL — LOW (ref 3.8–10.5)

## 2024-07-04 PROCEDURE — 99232 SBSQ HOSP IP/OBS MODERATE 35: CPT

## 2024-07-04 RX ADMIN — Medication 15 MILLILITER(S): at 06:44

## 2024-07-04 RX ADMIN — Medication 3 MILLILITER(S): at 13:34

## 2024-07-04 RX ADMIN — BUPROPION HYDROCHLORIDE 150 MILLIGRAM(S): 150 TABLET, EXTENDED RELEASE ORAL at 21:55

## 2024-07-04 RX ADMIN — Medication 975 MILLIGRAM(S): at 17:30

## 2024-07-04 RX ADMIN — PETROLATUM 1 APPLICATION(S): 42 OINTMENT TOPICAL at 11:27

## 2024-07-04 RX ADMIN — Medication 15 MILLILITER(S): at 18:34

## 2024-07-04 RX ADMIN — BUPROPION HYDROCHLORIDE 300 MILLIGRAM(S): 150 TABLET, EXTENDED RELEASE ORAL at 06:46

## 2024-07-04 RX ADMIN — Medication 15 MILLILITER(S): at 09:58

## 2024-07-04 RX ADMIN — Medication 1 APPLICATION(S): at 11:27

## 2024-07-04 RX ADMIN — OXYCODONE HYDROCHLORIDE 5 MILLIGRAM(S): 100 SOLUTION ORAL at 00:34

## 2024-07-04 RX ADMIN — RISPERIDONE 3 MILLIGRAM(S): 0.5 TABLET ORAL at 21:56

## 2024-07-04 RX ADMIN — Medication 3 MILLILITER(S): at 22:10

## 2024-07-04 RX ADMIN — OXYCODONE HYDROCHLORIDE 5 MILLIGRAM(S): 100 SOLUTION ORAL at 12:30

## 2024-07-04 RX ADMIN — Medication 100 MILLIGRAM(S): at 21:56

## 2024-07-04 RX ADMIN — ENOXAPARIN SODIUM 40 MILLIGRAM(S): 100 INJECTION SUBCUTANEOUS at 14:23

## 2024-07-04 RX ADMIN — Medication 15 MILLILITER(S): at 21:55

## 2024-07-04 RX ADMIN — Medication 975 MILLIGRAM(S): at 11:27

## 2024-07-04 RX ADMIN — Medication 975 MILLIGRAM(S): at 00:03

## 2024-07-04 RX ADMIN — RISPERIDONE 1 MILLIGRAM(S): 0.5 TABLET ORAL at 06:45

## 2024-07-04 RX ADMIN — OXYCODONE HYDROCHLORIDE 5 MILLIGRAM(S): 100 SOLUTION ORAL at 11:30

## 2024-07-04 RX ADMIN — Medication 975 MILLIGRAM(S): at 06:57

## 2024-07-04 RX ADMIN — Medication 975 MILLIGRAM(S): at 06:45

## 2024-07-04 RX ADMIN — OXYCODONE HYDROCHLORIDE 5 MILLIGRAM(S): 100 SOLUTION ORAL at 17:30

## 2024-07-04 RX ADMIN — PANTOPRAZOLE SODIUM 40 MILLIGRAM(S): 40 INJECTION, POWDER, FOR SOLUTION INTRAVENOUS at 11:28

## 2024-07-04 RX ADMIN — Medication 3 MILLILITER(S): at 06:02

## 2024-07-04 NOTE — PROGRESS NOTE ADULT - SUBJECTIVE AND OBJECTIVE BOX
63 y/o F  HD26 POD26 R partial Glossectomy in the setting of SSC, L RFFF, SND I-IV, L thigh STSFG, Trachostomy 6/6/24. Pts post-operative course c/b venous congestion of flap w/ RTOR on 6/11/24 for free flap salvage and revision of venous anastamosis, RTOR on 6/14/24 for debridement of necrotic radial forearm flap and replacement with right ALT flap, with 7.5 trach exchanged to 7.0 cuffed trach. Patient has been decannulated since 6/30 and has Saturating 100% on RA with no DAGO.  Patient endorses feeling well and ready to go home and denies any f/c/n/v. Spoke with ID regarding leukopenia. No current concerns as per ID. Plan to DC to rehab    Interval events  LEXY TAVARES  Patient reports tolerating Tube feeds well, ambulating, and voiding both urine and stool    Physical Exam:   General: AAOx3, NAD  NEURO: AAOx3, drowsy   HEENT: neck incision clean, dry, and intact. flap warm and well perfused, good color  RLE: thigh soft, incision c/d/i, healing well  LUE: Incision c/d/i.    Vitals:     Vital Signs Last 24 Hrs  T(C): 36.4 (04 Jul 2024 06:00), Max: 36.9 (03 Jul 2024 14:00)  T(F): 97.5 (04 Jul 2024 06:00), Max: 98.4 (03 Jul 2024 14:00)  HR: 92 (04 Jul 2024 06:00) (89 - 105)  BP: 105/83 (04 Jul 2024 06:00) (101/71 - 146/90)  BP(mean): --  RR: 18 (04 Jul 2024 06:00) (18 - 18)  SpO2: 99% (04 Jul 2024 06:00) (97% - 100%)    Parameters below as of 04 Jul 2024 06:00  Patient On (Oxygen Delivery Method): room air        I&O's Detail    03 Jul 2024 07:01  -  04 Jul 2024 07:00  --------------------------------------------------------  IN:    Enteral Tube Flush: 780 mL    Jevity 1.5: 920 mL  Total IN: 1700 mL    OUT:    Voided (mL): 900 mL  Total OUT: 900 mL    Total NET: 800 mL          Medications:    MEDICATIONS  (STANDING):  acetaminophen   Oral Liquid .. 975 milliGRAM(s) Oral every 6 hours  acetylcysteine 10%  Inhalation 4 milliLiter(s) Inhalation every 6 hours  albuterol    90 MICROgram(s) HFA Inhaler 1 Puff(s) Inhalation once  albuterol/ipratropium for Nebulization 3 milliLiter(s) Nebulizer every 6 hours  amLODIPine   Tablet 10 milliGRAM(s) Oral daily  AQUAPHOR (petrolatum Ointment) 1 Application(s) Topical daily  AQUAPHOR (petrolatum Ointment) 1 Application(s) Topical daily  buPROPion . 300 milliGRAM(s) Oral <User Schedule>  buPROPion . 150 milliGRAM(s) Oral at bedtime  chlorhexidine 0.12% Liquid 15 milliLiter(s) Swish and Spit <User Schedule>  chlorhexidine 2% Cloths 1 Application(s) Topical daily  enoxaparin Injectable 40 milliGRAM(s) SubCutaneous every 24 hours  pantoprazole  Injectable 40 milliGRAM(s) IV Push daily  QUEtiapine 100 milliGRAM(s) Oral at bedtime  risperiDONE   Solution 1 milliGRAM(s) Oral <User Schedule>  risperiDONE   Solution 3 milliGRAM(s) Oral at bedtime  senna 2 Tablet(s) Oral at bedtime  sodium chloride 0.9% lock flush 3 milliLiter(s) IV Push every 8 hours    MEDICATIONS  (PRN):  oxyCODONE    Solution 5 milliGRAM(s) Enteral Tube every 4 hours PRN Severe Pain (7 - 10)  oxyCODONE    Solution 2.5 milliGRAM(s) Oral every 4 hours PRN Moderate Pain (4 - 6)      Labs:                          11.4   1.95  )-----------( 326      ( 03 Jul 2024 06:22 )             34.1       07-03    137  |  103  |  16  ----------------------------<  85  4.1   |  23  |  0.53    Ca    8.7      03 Jul 2024 06:22  Phos  4.1     07-03  Mg     1.90     07-03

## 2024-07-04 NOTE — PROGRESS NOTE ADULT - ASSESSMENT
63 y/o F HD26 POD26 R partial Glossectomy in the setting of SSC, L RFFF, SND I-IV, L thigh STSFG, Trachostomy 6/6/24. Pts post-operative course c/b venous congestion of flap w/ RTOR on 6/11/24 for free flap salvage and revision of venous anastamosis, RTOR on 6/14/24 for debridement of necrotic radial forearm flap and replacement with right ALT flap, with 7.5 trach exchanged to 7.0 cuffed trach. Patient has been decannulated since 6/30 and has Saturating 100% on RA with no DAGO.  Patient endorses feeling well and ready to go home and denies any f/c/n/v. Spoke with ID regarding leukopenia. No current concerns as per ID. Plan to DC to rehab.     Plan:   - Strict I&O's  - reposition the patient Q4-5h intervals, clean/bath the patient daily for improvement of the heat rash  - Leave nylons in, PRS to remove after at least 4 weeks  - Aquaphor to left thigh and LUE donor site daily  - f/u CM for rehab. d/c pending final psych eval  - F/U appointment with Dr. Lockett 7/9 at 9am    Kirk Santana  Oral and Maxillofacial Surgery  [facility] Pager #[phone]  [facility] Pager: [phone]  [facility] Pager: [phone]  Available on Teams

## 2024-07-04 NOTE — PROGRESS NOTE ADULT - PROBLEM SELECTOR PLAN 8
- lovenox    Dispo: rehab    attempted to call [facility] resident to discuss unable to reach via teams 7/4 - lovenox    Dispo: rehab    d/w with [facility] resident Kirk Rico

## 2024-07-04 NOTE — PROGRESS NOTE ADULT - ASSESSMENT
65 y/o female with schizophrenia and depression s/p R partial glossectomy in the setting of SCCA, R SND I-IV, L RFFF, L thigh STSG, tracheostomy (6/6/24). Pts post-operative course c/b venous congestion of flap w/ RTOR on 6/11 for free flap salvage and revision of venous anastamosis. Flap now infected with E Coli also without flow. RTOR 6/14 for exploration of tongue free flap, muscle flap revision with right thigh flap. c/b neck wound dehiscence and s/p OR 6/28 and now s/p decannulation. c/b neutropenia

## 2024-07-04 NOTE — PROGRESS NOTE ADULT - PROBLEM SELECTOR PLAN 1
-   - check RVP   - recently exposed to ABx ? etiology of neutropenia.   - if neg RVP would recommend Heme eval  - monitor ANC

## 2024-07-04 NOTE — PROGRESS NOTE ADULT - SUBJECTIVE AND OBJECTIVE BOX
[facility] of [facility] Medicine  Yulia Condon MD  Pager (L-F, 7D-2R): [phone]  Other Times:  [phone]    Patient is a 64y old  Female who presents with a chief complaint of s/p surgery (30 Jun 2024 13:05)      SUBJECTIVE / OVERNIGHT EVENTS:  ADDITIONAL REVIEW OF SYSTEMS:    MEDICATIONS  (STANDING):  acetaminophen   Oral Liquid .. 975 milliGRAM(s) Oral every 6 hours  acetylcysteine 10%  Inhalation 4 milliLiter(s) Inhalation every 6 hours  albuterol    90 MICROgram(s) HFA Inhaler 1 Puff(s) Inhalation once  albuterol/ipratropium for Nebulization 3 milliLiter(s) Nebulizer every 6 hours  amLODIPine   Tablet 10 milliGRAM(s) Oral daily  AQUAPHOR (petrolatum Ointment) 1 Application(s) Topical daily  AQUAPHOR (petrolatum Ointment) 1 Application(s) Topical daily  buPROPion . 150 milliGRAM(s) Oral at bedtime  buPROPion . 300 milliGRAM(s) Oral <User Schedule>  chlorhexidine 0.12% Liquid 15 milliLiter(s) Swish and Spit <User Schedule>  chlorhexidine 2% Cloths 1 Application(s) Topical daily  enoxaparin Injectable 40 milliGRAM(s) SubCutaneous every 24 hours  pantoprazole  Injectable 40 milliGRAM(s) IV Push daily  QUEtiapine 100 milliGRAM(s) Oral at bedtime  risperiDONE   Solution 1 milliGRAM(s) Oral <User Schedule>  risperiDONE   Solution 3 milliGRAM(s) Oral at bedtime  senna 2 Tablet(s) Oral at bedtime  sodium chloride 0.9% lock flush 3 milliLiter(s) IV Push every 8 hours    MEDICATIONS  (PRN):  oxyCODONE    Solution 2.5 milliGRAM(s) Oral every 4 hours PRN Moderate Pain (4 - 6)  oxyCODONE    Solution 5 milliGRAM(s) Enteral Tube every 4 hours PRN Severe Pain (7 - 10)      CAPILLARY BLOOD GLUCOSE        I&O's Summary    03 Jul 2024 07:01  -  04 Jul 2024 07:00  --------------------------------------------------------  IN: 1700 mL / OUT: 900 mL / NET: 800 mL        PHYSICAL EXAM:  Vital Signs Last 24 Hrs  T(C): 36.8 (04 Jul 2024 09:00), Max: 36.9 (03 Jul 2024 14:00)  T(F): 98.2 (04 Jul 2024 09:00), Max: 98.4 (03 Jul 2024 14:00)  HR: 102 (04 Jul 2024 09:00) (89 - 105)  BP: 121/80 (04 Jul 2024 09:00) (101/71 - 146/90)  BP(mean): --  RR: 18 (04 Jul 2024 09:00) (18 - 18)  SpO2: 98% (04 Jul 2024 09:00) (97% - 100%)    Parameters below as of 04 Jul 2024 09:00  Patient On (Oxygen Delivery Method): room air    CONSTITUTIONAL: NAD  ENMT:  decannulated, neck wound c/d/i sutures in place   RESPIRATORY: Normal respiratory effort; lungs are clear to auscultation bilaterally  CARDIOVASCULAR: Regular rate and rhythm, normal S1 and S2, no murmur/rub/gallop; No lower extremity edema; Peripheral pulses are 2+ bilaterally  ABDOMEN: Nontender to palpation, normoactive bowel sounds, no rebound/guarding; + PEG   MUSCULOSKELETAL:  LT UE graft site + Lt thigh graft site   NEUROLOGY: follows command moving all       LABS:                        11.4   1.95  )-----------( 326      ( 03 Jul 2024 06:22 )             34.1     07-03    137  |  103  |  16  ----------------------------<  85  4.1   |  23  |  0.53    Ca    8.7      03 Jul 2024 06:22  Phos  4.1     07-03  Mg     1.90     07-03            Urinalysis Basic - ( 03 Jul 2024 06:22 )    Color: x / Appearance: x / SG: x / pH: x  Gluc: 85 mg/dL / Ketone: x  / Bili: x / Urobili: x   Blood: x / Protein: x / Nitrite: x   Leuk Esterase: x / RBC: x / WBC x   Sq Epi: x / Non Sq Epi: x / Bacteria: x          RADIOLOGY & ADDITIONAL TESTS:  Results Reviewed:   Imaging Personally Reviewed:  Electrocardiogram Personally Reviewed:    COORDINATION OF CARE:  Care Discussed with Consultants/Other Providers [Y/N]:  Prior or Outpatient Records Reviewed [Y/N]:   [facility]  Yulia Condon MD  Pager (O-F, 8B-9D): [phone]  Other Times:  [phone]    Patient is a 64y old  Female who presents with a chief complaint of s/p surgery (30 Jun 2024 13:05)      SUBJECTIVE / OVERNIGHT EVENTS: NAEON      MEDICATIONS  (STANDING):  acetaminophen   Oral Liquid .. 975 milliGRAM(s) Oral every 6 hours  acetylcysteine 10%  Inhalation 4 milliLiter(s) Inhalation every 6 hours  albuterol    90 MICROgram(s) HFA Inhaler 1 Puff(s) Inhalation once  albuterol/ipratropium for Nebulization 3 milliLiter(s) Nebulizer every 6 hours  amLODIPine   Tablet 10 milliGRAM(s) Oral daily  AQUAPHOR (petrolatum Ointment) 1 Application(s) Topical daily  AQUAPHOR (petrolatum Ointment) 1 Application(s) Topical daily  buPROPion . 150 milliGRAM(s) Oral at bedtime  buPROPion . 300 milliGRAM(s) Oral <User Schedule>  chlorhexidine 0.12% Liquid 15 milliLiter(s) Swish and Spit <User Schedule>  chlorhexidine 2% Cloths 1 Application(s) Topical daily  enoxaparin Injectable 40 milliGRAM(s) SubCutaneous every 24 hours  pantoprazole  Injectable 40 milliGRAM(s) IV Push daily  QUEtiapine 100 milliGRAM(s) Oral at bedtime  risperiDONE   Solution 1 milliGRAM(s) Oral <User Schedule>  risperiDONE   Solution 3 milliGRAM(s) Oral at bedtime  senna 2 Tablet(s) Oral at bedtime  sodium chloride 0.9% lock flush 3 milliLiter(s) IV Push every 8 hours    MEDICATIONS  (PRN):  oxyCODONE    Solution 2.5 milliGRAM(s) Oral every 4 hours PRN Moderate Pain (4 - 6)  oxyCODONE    Solution 5 milliGRAM(s) Enteral Tube every 4 hours PRN Severe Pain (7 - 10)      CAPILLARY BLOOD GLUCOSE        I&O's Summary    03 Jul 2024 07:01  -  04 Jul 2024 07:00  --------------------------------------------------------  IN: 1700 mL / OUT: 900 mL / NET: 800 mL        PHYSICAL EXAM:  Vital Signs Last 24 Hrs  T(C): 36.8 (04 Jul 2024 09:00), Max: 36.9 (03 Jul 2024 14:00)  T(F): 98.2 (04 Jul 2024 09:00), Max: 98.4 (03 Jul 2024 14:00)  HR: 102 (04 Jul 2024 09:00) (89 - 105)  BP: 121/80 (04 Jul 2024 09:00) (101/71 - 146/90)  BP(mean): --  RR: 18 (04 Jul 2024 09:00) (18 - 18)  SpO2: 98% (04 Jul 2024 09:00) (97% - 100%)    Parameters below as of 04 Jul 2024 09:00  Patient On (Oxygen Delivery Method): room air    CONSTITUTIONAL: NAD  ENMT:  decannulated, neck wound c/d/i sutures in place   RESPIRATORY: Normal respiratory effort; lungs are clear to auscultation bilaterally  CARDIOVASCULAR: Regular rate and rhythm, normal S1 and S2, no murmur/rub/gallop; No lower extremity edema; Peripheral pulses are 2+ bilaterally  ABDOMEN: Nontender to palpation, normoactive bowel sounds, no rebound/guarding; + PEG   MUSCULOSKELETAL:  LT UE graft site + Lt thigh graft site   NEUROLOGY: follows command moving all       LABS:                        11.4   1.95  )-----------( 326      ( 03 Jul 2024 06:22 )             34.1     07-03    137  |  103  |  16  ----------------------------<  85  4.1   |  23  |  0.53    Ca    8.7      03 Jul 2024 06:22  Phos  4.1     07-03  Mg     1.90     07-03            Urinalysis Basic - ( 03 Jul 2024 06:22 )    Color: x / Appearance: x / SG: x / pH: x  Gluc: 85 mg/dL / Ketone: x  / Bili: x / Urobili: x   Blood: x / Protein: x / Nitrite: x   Leuk Esterase: x / RBC: x / WBC x   Sq Epi: x / Non Sq Epi: x / Bacteria: x          RADIOLOGY & ADDITIONAL TESTS:  Results Reviewed:   Imaging Personally Reviewed:  Electrocardiogram Personally Reviewed:    COORDINATION OF CARE:  Care Discussed with Consultants/Other Providers [Y/N]:  Prior or Outpatient Records Reviewed [Y/N]:

## 2024-07-04 NOTE — PROGRESS NOTE ADULT - PROBLEM SELECTOR PLAN 5
- Trend HR   - OR wound E coli, s/p antibiotics   - Pain control   - TTE reviewed, normal EF 60-65%  - PE ruled out  - elevated TSH - 9.8 with normal FT4; repeat TFT in 6 weeks  - improved - Trend HR   - OR wound E coli, s/p antibiotics   - Pain control   - TTE reviewed, normal EF 60-65%  - PE ruled out  - elevated TSH - 9.8 with normal FT4; repeat TFT in 6 weeks

## 2024-07-05 PROCEDURE — 99232 SBSQ HOSP IP/OBS MODERATE 35: CPT

## 2024-07-05 RX ADMIN — Medication 975 MILLIGRAM(S): at 17:15

## 2024-07-05 RX ADMIN — OXYCODONE HYDROCHLORIDE 5 MILLIGRAM(S): 100 SOLUTION ORAL at 12:43

## 2024-07-05 RX ADMIN — Medication 1 APPLICATION(S): at 11:43

## 2024-07-05 RX ADMIN — Medication 3 MILLILITER(S): at 13:54

## 2024-07-05 RX ADMIN — RISPERIDONE 3 MILLIGRAM(S): 0.5 TABLET ORAL at 22:11

## 2024-07-05 RX ADMIN — OXYCODONE HYDROCHLORIDE 5 MILLIGRAM(S): 100 SOLUTION ORAL at 08:09

## 2024-07-05 RX ADMIN — BUPROPION HYDROCHLORIDE 150 MILLIGRAM(S): 150 TABLET, EXTENDED RELEASE ORAL at 22:11

## 2024-07-05 RX ADMIN — OXYCODONE HYDROCHLORIDE 5 MILLIGRAM(S): 100 SOLUTION ORAL at 00:14

## 2024-07-05 RX ADMIN — PANTOPRAZOLE SODIUM 40 MILLIGRAM(S): 40 INJECTION, POWDER, FOR SOLUTION INTRAVENOUS at 11:43

## 2024-07-05 RX ADMIN — Medication 15 MILLILITER(S): at 06:58

## 2024-07-05 RX ADMIN — PETROLATUM 1 APPLICATION(S): 42 OINTMENT TOPICAL at 11:43

## 2024-07-05 RX ADMIN — Medication 975 MILLIGRAM(S): at 00:14

## 2024-07-05 RX ADMIN — OXYCODONE HYDROCHLORIDE 5 MILLIGRAM(S): 100 SOLUTION ORAL at 07:39

## 2024-07-05 RX ADMIN — OXYCODONE HYDROCHLORIDE 5 MILLIGRAM(S): 100 SOLUTION ORAL at 00:44

## 2024-07-05 RX ADMIN — Medication 100 MILLIGRAM(S): at 22:11

## 2024-07-05 RX ADMIN — Medication 15 MILLILITER(S): at 22:11

## 2024-07-05 RX ADMIN — Medication 975 MILLIGRAM(S): at 23:39

## 2024-07-05 RX ADMIN — AMLODIPINE BESYLATE 10 MILLIGRAM(S): 2.5 TABLET ORAL at 05:42

## 2024-07-05 RX ADMIN — Medication 3 MILLILITER(S): at 06:00

## 2024-07-05 RX ADMIN — OXYCODONE HYDROCHLORIDE 5 MILLIGRAM(S): 100 SOLUTION ORAL at 11:43

## 2024-07-05 RX ADMIN — Medication 975 MILLIGRAM(S): at 12:59

## 2024-07-05 RX ADMIN — RISPERIDONE 1 MILLIGRAM(S): 0.5 TABLET ORAL at 06:57

## 2024-07-05 RX ADMIN — Medication 3 MILLILITER(S): at 22:27

## 2024-07-05 RX ADMIN — OXYCODONE HYDROCHLORIDE 5 MILLIGRAM(S): 100 SOLUTION ORAL at 17:13

## 2024-07-05 RX ADMIN — BUPROPION HYDROCHLORIDE 300 MILLIGRAM(S): 150 TABLET, EXTENDED RELEASE ORAL at 06:58

## 2024-07-05 RX ADMIN — ENOXAPARIN SODIUM 40 MILLIGRAM(S): 100 INJECTION SUBCUTANEOUS at 15:27

## 2024-07-05 RX ADMIN — OXYCODONE HYDROCHLORIDE 5 MILLIGRAM(S): 100 SOLUTION ORAL at 23:39

## 2024-07-05 RX ADMIN — OXYCODONE HYDROCHLORIDE 5 MILLIGRAM(S): 100 SOLUTION ORAL at 18:13

## 2024-07-05 RX ADMIN — Medication 975 MILLIGRAM(S): at 05:42

## 2024-07-05 NOTE — PROGRESS NOTE ADULT - SUBJECTIVE AND OBJECTIVE BOX
63 y/o F  HD29 POD28 R partial Glossectomy in the setting of SSC, L RFFF, SND I-IV, L thigh STSFG, Tracheostomy 6/6/24. Pts post-operative course c/b venous congestion of flap w/ RTOR on 6/11/24 for free flap salvage and revision of venous anastomosis RTOR on 6/14/24 for debridement of necrotic radial forearm flap and replacement with right ALT flap, with 7.5 trach exchanged to 7.0 cuffed trach. Patient has been decannulated since 6/30 and has Saturating 100% on RA with no DAGO.  Patient endorses feeling well and ready to go home and denies any f/c/n/v. Spoke with ID regarding leukopenia. No current concerns as per ID. Given slight improvement in WBC, will hold off on heme onc evaluation and re-evaluate after CBC with differential taken today. Plan to DC to rehab pending case management discussion.    Interval events  NAEON, VSS  Patient reports tolerating Tube feeds well, ambulating, and voiding both urine and stool    Physical Exam:   General: AAOx3, NAD  NEURO: AAOx3  HEENT: neck incision clean, dry, and intact. flap warm and well perfused, good color  RLE: thigh soft, incision c/d/i, healing well  LUE: Incision c/d/i.    Vitals:     Vital Signs Last 24 Hrs  T(C): 36.4 (05 Jul 2024 05:42), Max: 36.9 (05 Jul 2024 02:05)  T(F): 97.5 (05 Jul 2024 05:42), Max: 98.4 (05 Jul 2024 02:05)  HR: 80 (05 Jul 2024 05:42) (80 - 104)  BP: 130/93 (05 Jul 2024 05:42) (110/76 - 130/93)  BP(mean): --  RR: 18 (05 Jul 2024 05:42) (18 - 18)  SpO2: 99% (05 Jul 2024 05:42) (98% - 99%)    Parameters below as of 05 Jul 2024 05:42  Patient On (Oxygen Delivery Method): room air        I&O's Detail    04 Jul 2024 07:01  -  05 Jul 2024 07:00  --------------------------------------------------------  IN:    Enteral Tube Flush: 780 mL    Jevity 1.5: 1020 mL  Total IN: 1800 mL    OUT:    Voided (mL): 400 mL  Total OUT: 400 mL    Total NET: 1400 mL          Medications:    MEDICATIONS  (STANDING):  acetaminophen   Oral Liquid .. 975 milliGRAM(s) Oral every 6 hours  acetylcysteine 10%  Inhalation 4 milliLiter(s) Inhalation every 6 hours  albuterol    90 MICROgram(s) HFA Inhaler 1 Puff(s) Inhalation once  albuterol/ipratropium for Nebulization 3 milliLiter(s) Nebulizer every 6 hours  amLODIPine   Tablet 10 milliGRAM(s) Oral daily  AQUAPHOR (petrolatum Ointment) 1 Application(s) Topical daily  AQUAPHOR (petrolatum Ointment) 1 Application(s) Topical daily  buPROPion . 150 milliGRAM(s) Oral at bedtime  buPROPion . 300 milliGRAM(s) Oral <User Schedule>  chlorhexidine 0.12% Liquid 15 milliLiter(s) Swish and Spit <User Schedule>  chlorhexidine 2% Cloths 1 Application(s) Topical daily  enoxaparin Injectable 40 milliGRAM(s) SubCutaneous every 24 hours  pantoprazole  Injectable 40 milliGRAM(s) IV Push daily  QUEtiapine 100 milliGRAM(s) Oral at bedtime  risperiDONE   Solution 1 milliGRAM(s) Oral <User Schedule>  risperiDONE   Solution 3 milliGRAM(s) Oral at bedtime  senna 2 Tablet(s) Oral at bedtime  sodium chloride 0.9% lock flush 3 milliLiter(s) IV Push every 8 hours    MEDICATIONS  (PRN):  oxyCODONE    Solution 2.5 milliGRAM(s) Oral every 4 hours PRN Moderate Pain (4 - 6)  oxyCODONE    Solution 5 milliGRAM(s) Enteral Tube every 4 hours PRN Severe Pain (7 - 10)      Labs:                          12.5   2.41  )-----------( 340      ( 04 Jul 2024 10:54 )             37.2

## 2024-07-05 NOTE — PROGRESS NOTE ADULT - SUBJECTIVE AND OBJECTIVE BOX
[facility]  Yulia Condon MD  Pager (O-F, 0E-2E): [phone]  Other Times:  [phone]    Patient is a 64y old  Female who presents with a chief complaint of s/p surgery (30 Jun 2024 13:05)      SUBJECTIVE / OVERNIGHT EVENTS: afebrile; no acute events ON       MEDICATIONS  (STANDING):  acetaminophen   Oral Liquid .. 975 milliGRAM(s) Oral every 6 hours  acetylcysteine 10%  Inhalation 4 milliLiter(s) Inhalation every 6 hours  albuterol    90 MICROgram(s) HFA Inhaler 1 Puff(s) Inhalation once  albuterol/ipratropium for Nebulization 3 milliLiter(s) Nebulizer every 6 hours  amLODIPine   Tablet 10 milliGRAM(s) Oral daily  AQUAPHOR (petrolatum Ointment) 1 Application(s) Topical daily  AQUAPHOR (petrolatum Ointment) 1 Application(s) Topical daily  buPROPion . 150 milliGRAM(s) Oral at bedtime  buPROPion . 300 milliGRAM(s) Oral <User Schedule>  chlorhexidine 0.12% Liquid 15 milliLiter(s) Swish and Spit <User Schedule>  chlorhexidine 2% Cloths 1 Application(s) Topical daily  enoxaparin Injectable 40 milliGRAM(s) SubCutaneous every 24 hours  pantoprazole  Injectable 40 milliGRAM(s) IV Push daily  QUEtiapine 100 milliGRAM(s) Oral at bedtime  risperiDONE   Solution 1 milliGRAM(s) Oral <User Schedule>  risperiDONE   Solution 3 milliGRAM(s) Oral at bedtime  senna 2 Tablet(s) Oral at bedtime  sodium chloride 0.9% lock flush 3 milliLiter(s) IV Push every 8 hours    MEDICATIONS  (PRN):  oxyCODONE    Solution 5 milliGRAM(s) Enteral Tube every 4 hours PRN Severe Pain (7 - 10)  oxyCODONE    Solution 2.5 milliGRAM(s) Oral every 4 hours PRN Moderate Pain (4 - 6)      CAPILLARY BLOOD GLUCOSE        I&O's Summary    04 Jul 2024 07:01  -  05 Jul 2024 07:00  --------------------------------------------------------  IN: 1800 mL / OUT: 400 mL / NET: 1400 mL        PHYSICAL EXAM:  Vital Signs Last 24 Hrs  T(C): 36.4 (05 Jul 2024 05:42), Max: 36.9 (05 Jul 2024 02:05)  T(F): 97.5 (05 Jul 2024 05:42), Max: 98.4 (05 Jul 2024 02:05)  HR: 80 (05 Jul 2024 05:42) (80 - 104)  BP: 130/93 (05 Jul 2024 05:42) (110/76 - 130/93)  BP(mean): --  RR: 18 (05 Jul 2024 05:42) (18 - 18)  SpO2: 99% (05 Jul 2024 05:42) (99% - 99%)    Parameters below as of 05 Jul 2024 05:42  Patient On (Oxygen Delivery Method): room air    CONSTITUTIONAL: NAD  ENMT:  decannulated, neck wound c/d/i sutures in place   RESPIRATORY: Normal respiratory effort; lungs are clear to auscultation bilaterally  CARDIOVASCULAR: Regular rate and rhythm, normal S1 and S2, no murmur/rub/gallop; No lower extremity edema; Peripheral pulses are 2+ bilaterally  ABDOMEN: Nontender to palpation, normoactive bowel sounds, no rebound/guarding; + PEG   MUSCULOSKELETAL:  LT UE graft site + Lt thigh graft site   NEUROLOGY: follows command moving all       LABS:                        12.5   2.41  )-----------( 340      ( 04 Jul 2024 10:54 )             37.2                       RADIOLOGY & ADDITIONAL TESTS:  Results Reviewed:   Imaging Personally Reviewed:  Electrocardiogram Personally Reviewed:    COORDINATION OF CARE:  Care Discussed with Consultants/Other Providers [Y/N]:  Prior or Outpatient Records Reviewed [Y/N]:

## 2024-07-05 NOTE — PROGRESS NOTE ADULT - ASSESSMENT
65 y/o F HD29 POD28 R partial Glossectomy in the setting of SSC, L RFFF, SND I-IV, L thigh STSFG, Trachostomy 6/6/24. Pts post-operative course c/b venous congestion of flap w/ RTOR on 6/11/24 for free flap salvage and revision of venous anastomosis RTOR on 6/14/24 for debridement of necrotic radial forearm flap and replacement with right ALT flap, with 7.5 trach exchanged to 7.0 cuffed trach. Patient has been decannulated since 6/30 and has Saturating 100% on RA with no DAGO.  Patient endorses feeling well and ready to go home and denies any f/c/n/v. Spoke with ID regarding leukopenia. No current concerns as per ID. Plan to DC to rehab.     Plan:   - Strict I&O's  - reposition the patient Q4-5h intervals, clean/bath the patient daily for improvement of the heat rash  - Leave nylons in, PRS to remove after at least 4 weeks  - Aquaphor to left thigh and LUE donor site daily  - f/u CM for rehab. d/c pending final psych eval  - F/U appointment with Dr. Lockett 7/9 at 9am    Barrington Vivar  Oral and Maxillofacial Surgery  [facility] Pager #[phone]  [facility] Pager: [phone]  [facility] Pager: [phone]  Available on Teams

## 2024-07-05 NOTE — PROGRESS NOTE ADULT - PROBLEM SELECTOR PLAN 2
- R partial glossectomy in the setting of SCCA, R SND I-IV, L RFFF, L thigh STSG, tracheostomy (6/6/24). Pts post-operative course c/b venous congestion of flap w/ RTOR on 6/11 for free flap salvage and revision of venous anastomosis. Flap now infected with E Coli also without flow. RTOR 6/14 for exploration of tongue free flap, muscle flap revision with right thigh flap, 7.5 trach exchanged to 7.0 cuffed trach.  - OR wound cx (6/11, 6/14) grew E.coli, s/p Unsyn 3g q6h   -management per ENT/plastic sx  - s/p PEG 6/25  - tube feeds as per nutrition  - LT neck wound dehiscence s/p OR with plastics with neck complex closure and irrigation/debridement on 6/28  - s/p trach decannulation 6/30  - repeat CBC check ANC - R partial glossectomy in the setting of SCCA, R SND I-IV, L RFFF, L thigh STSG, tracheostomy (6/6/24). Pts post-operative course c/b venous congestion of flap w/ RTOR on 6/11 for free flap salvage and revision of venous anastomosis. Flap now infected with E Coli also without flow. RTOR 6/14 for exploration of tongue free flap, muscle flap revision with right thigh flap, 7.5 trach exchanged to 7.0 cuffed trach.  - OR wound cx (6/11, 6/14) grew E.coli, s/p Unsyn 3g q6h   -management per ENT/plastic sx  - s/p PEG 6/25  - tube feeds as per nutrition  - LT neck wound dehiscence s/p OR with plastics with neck complex closure and irrigation/debridement on 6/28  - s/p trach decannulation 6/30

## 2024-07-05 NOTE — PROGRESS NOTE ADULT - SUBJECTIVE AND OBJECTIVE BOX
Plastic Surgery Progress Note (pg [facility]: [id number], [place]: [phone])    SUBJECTIVE  The patient was seen and examined. No acute events overnight. Pain controlled, afebrile w/ stable vitals.     OBJECTIVE  ___________________________________________________  VITAL SIGNS / I&O's   Vital Signs Last 24 Hrs  T(C): 36.4 (05 Jul 2024 05:42), Max: 36.9 (05 Jul 2024 02:05)  T(F): 97.5 (05 Jul 2024 05:42), Max: 98.4 (05 Jul 2024 02:05)  HR: 80 (05 Jul 2024 05:42) (80 - 104)  BP: 130/93 (05 Jul 2024 05:42) (110/76 - 130/93)  BP(mean): --  RR: 18 (05 Jul 2024 05:42) (18 - 18)  SpO2: 99% (05 Jul 2024 05:42) (98% - 99%)    Parameters below as of 05 Jul 2024 05:42  Patient On (Oxygen Delivery Method): room air          04 Jul 2024 07:01  -  05 Jul 2024 07:00  --------------------------------------------------------  IN:    Enteral Tube Flush: 780 mL    Jevity 1.5: 1020 mL  Total IN: 1800 mL    OUT:    Voided (mL): 400 mL  Total OUT: 400 mL    Total NET: 1400 mL        ___________________________________________________  PHYSICAL EXAM    -- CONSTITUTIONAL: NAD, lying in bed  -- NEURO: Awake, alert  -- HEENT: NC/AT, mucous membranes moist. ALT flap to tongue mucosalizing well, edema significantly improved  -- NECK: Neck soft no palpable fluid collection, incision cdi. Penrose drain sites closing well  -- PULM: Non-labored respirations, equal chest rise bilaterally  -- ABDOMEN: Nondistended  -- RLE: thigh soft, incision cdi  -- LUE: skin graft with good take  -- PSYCH: Affect normal, A&Ox3    ___________________________________________________  LABS                        12.5   2.41  )-----------( 340      ( 04 Jul 2024 10:54 )             37.2             CAPILLARY BLOOD GLUCOSE              ___________________________________________________  MICRO  Recent Cultures:    ___________________________________________________  MEDICATIONS  (STANDING):  acetaminophen   Oral Liquid .. 975 milliGRAM(s) Oral every 6 hours  acetylcysteine 10%  Inhalation 4 milliLiter(s) Inhalation every 6 hours  albuterol    90 MICROgram(s) HFA Inhaler 1 Puff(s) Inhalation once  albuterol/ipratropium for Nebulization 3 milliLiter(s) Nebulizer every 6 hours  amLODIPine   Tablet 10 milliGRAM(s) Oral daily  AQUAPHOR (petrolatum Ointment) 1 Application(s) Topical daily  AQUAPHOR (petrolatum Ointment) 1 Application(s) Topical daily  buPROPion . 300 milliGRAM(s) Oral <User Schedule>  buPROPion . 150 milliGRAM(s) Oral at bedtime  chlorhexidine 0.12% Liquid 15 milliLiter(s) Swish and Spit <User Schedule>  chlorhexidine 2% Cloths 1 Application(s) Topical daily  enoxaparin Injectable 40 milliGRAM(s) SubCutaneous every 24 hours  pantoprazole  Injectable 40 milliGRAM(s) IV Push daily  QUEtiapine 100 milliGRAM(s) Oral at bedtime  risperiDONE   Solution 3 milliGRAM(s) Oral at bedtime  risperiDONE   Solution 1 milliGRAM(s) Oral <User Schedule>  senna 2 Tablet(s) Oral at bedtime  sodium chloride 0.9% lock flush 3 milliLiter(s) IV Push every 8 hours    MEDICATIONS  (PRN):  oxyCODONE    Solution 2.5 milliGRAM(s) Oral every 4 hours PRN Moderate Pain (4 - 6)  oxyCODONE    Solution 5 milliGRAM(s) Enteral Tube every 4 hours PRN Severe Pain (7 - 10)

## 2024-07-05 NOTE — PROGRESS NOTE ADULT - ASSESSMENT
LELA LOWERY is a 64yFemale s/p right ALT flap for revision hemiglossal recon 6/14, and neck dehiscence closure 6/28. Recovering well.    - Leave nylons in, we will remove after atleast 4 weeks  - Aquaphor to left thigh and LUE donor site daily  - Diet: TFs  - PEG placed 6/25  - Continue peridex  - Appreciate care per primary  - DC recs: continue Aquaphor to LUE skin graft donor site daily, prineo R thigh to remain on until fu, nylons to remain until fu, penrose sites open to air apply gauze if drainage    Plastic Surgery  #[id number] [facility] pager  [phone] [facility] pager  Available on teams

## 2024-07-06 LAB
ANION GAP SERPL CALC-SCNC: 12 MMOL/L — SIGNIFICANT CHANGE UP (ref 7–14)
BASOPHILS # BLD AUTO: 0 K/UL — SIGNIFICANT CHANGE UP (ref 0–0.2)
BASOPHILS NFR BLD AUTO: 0 % — SIGNIFICANT CHANGE UP (ref 0–2)
BUN SERPL-MCNC: 17 MG/DL — SIGNIFICANT CHANGE UP (ref 7–23)
CALCIUM SERPL-MCNC: 8.9 MG/DL — SIGNIFICANT CHANGE UP (ref 8.4–10.5)
CHLORIDE SERPL-SCNC: 101 MMOL/L — SIGNIFICANT CHANGE UP (ref 98–107)
CO2 SERPL-SCNC: 24 MMOL/L — SIGNIFICANT CHANGE UP (ref 22–31)
CREAT SERPL-MCNC: 0.48 MG/DL — LOW (ref 0.5–1.3)
EGFR: 106 ML/MIN/1.73M2 — SIGNIFICANT CHANGE UP
EOSINOPHIL # BLD AUTO: 0.02 K/UL — SIGNIFICANT CHANGE UP (ref 0–0.5)
EOSINOPHIL NFR BLD AUTO: 1 % — SIGNIFICANT CHANGE UP (ref 0–6)
GLUCOSE SERPL-MCNC: 82 MG/DL — SIGNIFICANT CHANGE UP (ref 70–99)
HCT VFR BLD CALC: 34 % — LOW (ref 34.5–45)
HGB BLD-MCNC: 11.2 G/DL — LOW (ref 11.5–15.5)
IANC: 0.94 K/UL — LOW (ref 1.8–7.4)
LYMPHOCYTES # BLD AUTO: 0.94 K/UL — LOW (ref 1–3.3)
LYMPHOCYTES # BLD AUTO: 42 % — SIGNIFICANT CHANGE UP (ref 13–44)
MAGNESIUM SERPL-MCNC: 1.9 MG/DL — SIGNIFICANT CHANGE UP (ref 1.6–2.6)
MANUAL SMEAR VERIFICATION: SIGNIFICANT CHANGE UP
MCHC RBC-ENTMCNC: 30.4 PG — SIGNIFICANT CHANGE UP (ref 27–34)
MCHC RBC-ENTMCNC: 32.9 GM/DL — SIGNIFICANT CHANGE UP (ref 32–36)
MCV RBC AUTO: 92.4 FL — SIGNIFICANT CHANGE UP (ref 80–100)
MONOCYTES # BLD AUTO: 0.36 K/UL — SIGNIFICANT CHANGE UP (ref 0–0.9)
MONOCYTES NFR BLD AUTO: 16 % — HIGH (ref 2–14)
NEUTROPHILS # BLD AUTO: 0.92 K/UL — LOW (ref 1.8–7.4)
NEUTROPHILS NFR BLD AUTO: 41 % — LOW (ref 43–77)
NRBC # BLD: 0 /100 WBCS — SIGNIFICANT CHANGE UP (ref 0–0)
PHOSPHATE SERPL-MCNC: 4 MG/DL — SIGNIFICANT CHANGE UP (ref 2.5–4.5)
PLAT MORPH BLD: NORMAL — SIGNIFICANT CHANGE UP
PLATELET # BLD AUTO: 273 K/UL — SIGNIFICANT CHANGE UP (ref 150–400)
PLATELET COUNT - ESTIMATE: NORMAL — SIGNIFICANT CHANGE UP
POTASSIUM SERPL-MCNC: 4.4 MMOL/L — SIGNIFICANT CHANGE UP (ref 3.5–5.3)
POTASSIUM SERPL-SCNC: 4.4 MMOL/L — SIGNIFICANT CHANGE UP (ref 3.5–5.3)
RBC # BLD: 3.68 M/UL — LOW (ref 3.8–5.2)
RBC # FLD: 16.2 % — HIGH (ref 10.3–14.5)
RBC BLD AUTO: NORMAL — SIGNIFICANT CHANGE UP
SODIUM SERPL-SCNC: 137 MMOL/L — SIGNIFICANT CHANGE UP (ref 135–145)
WBC # BLD: 2.24 K/UL — LOW (ref 3.8–10.5)
WBC # FLD AUTO: 2.24 K/UL — LOW (ref 3.8–10.5)

## 2024-07-06 PROCEDURE — 99232 SBSQ HOSP IP/OBS MODERATE 35: CPT

## 2024-07-06 RX ORDER — HYDROCORTISONE VALERATE 0.2 %
1 CREAM (GRAM) TOPICAL EVERY 12 HOURS
Refills: 0 | Status: DISCONTINUED | OUTPATIENT
Start: 2024-07-06 | End: 2024-07-10

## 2024-07-06 RX ORDER — OXYCODONE HYDROCHLORIDE 100 MG/5ML
5 SOLUTION ORAL EVERY 4 HOURS
Refills: 0 | Status: DISCONTINUED | OUTPATIENT
Start: 2024-07-06 | End: 2024-07-10

## 2024-07-06 RX ORDER — LOPERAMIDE HCL 2 MG
2 CAPSULE ORAL EVERY 4 HOURS
Refills: 0 | Status: DISCONTINUED | OUTPATIENT
Start: 2024-07-06 | End: 2024-07-07

## 2024-07-06 RX ORDER — PANTOPRAZOLE SODIUM 40 MG/10ML
40 INJECTION, POWDER, FOR SOLUTION INTRAVENOUS ONCE
Refills: 0 | Status: DISCONTINUED | OUTPATIENT
Start: 2024-07-06 | End: 2024-07-06

## 2024-07-06 RX ADMIN — Medication 975 MILLIGRAM(S): at 05:35

## 2024-07-06 RX ADMIN — PETROLATUM 1 APPLICATION(S): 42 OINTMENT TOPICAL at 11:22

## 2024-07-06 RX ADMIN — Medication 975 MILLIGRAM(S): at 11:22

## 2024-07-06 RX ADMIN — OXYCODONE HYDROCHLORIDE 5 MILLIGRAM(S): 100 SOLUTION ORAL at 13:53

## 2024-07-06 RX ADMIN — Medication 15 MILLILITER(S): at 07:09

## 2024-07-06 RX ADMIN — Medication 3 MILLILITER(S): at 06:19

## 2024-07-06 RX ADMIN — Medication 975 MILLIGRAM(S): at 18:39

## 2024-07-06 RX ADMIN — Medication 3 MILLILITER(S): at 13:48

## 2024-07-06 RX ADMIN — PANTOPRAZOLE SODIUM 40 MILLIGRAM(S): 40 INJECTION, POWDER, FOR SOLUTION INTRAVENOUS at 11:22

## 2024-07-06 RX ADMIN — OXYCODONE HYDROCHLORIDE 5 MILLIGRAM(S): 100 SOLUTION ORAL at 00:09

## 2024-07-06 RX ADMIN — RISPERIDONE 3 MILLIGRAM(S): 0.5 TABLET ORAL at 21:54

## 2024-07-06 RX ADMIN — Medication 2 MILLIGRAM(S): at 21:54

## 2024-07-06 RX ADMIN — Medication 1 APPLICATION(S): at 11:22

## 2024-07-06 RX ADMIN — OXYCODONE HYDROCHLORIDE 5 MILLIGRAM(S): 100 SOLUTION ORAL at 18:39

## 2024-07-06 RX ADMIN — BUPROPION HYDROCHLORIDE 150 MILLIGRAM(S): 150 TABLET, EXTENDED RELEASE ORAL at 21:53

## 2024-07-06 RX ADMIN — OXYCODONE HYDROCHLORIDE 5 MILLIGRAM(S): 100 SOLUTION ORAL at 19:39

## 2024-07-06 RX ADMIN — BUPROPION HYDROCHLORIDE 300 MILLIGRAM(S): 150 TABLET, EXTENDED RELEASE ORAL at 07:08

## 2024-07-06 RX ADMIN — OXYCODONE HYDROCHLORIDE 5 MILLIGRAM(S): 100 SOLUTION ORAL at 14:52

## 2024-07-06 RX ADMIN — Medication 15 MILLILITER(S): at 13:52

## 2024-07-06 RX ADMIN — ENOXAPARIN SODIUM 40 MILLIGRAM(S): 100 INJECTION SUBCUTANEOUS at 16:03

## 2024-07-06 RX ADMIN — Medication 3 MILLILITER(S): at 22:25

## 2024-07-06 RX ADMIN — Medication 15 MILLILITER(S): at 21:54

## 2024-07-06 RX ADMIN — Medication 975 MILLIGRAM(S): at 23:27

## 2024-07-06 RX ADMIN — RISPERIDONE 1 MILLIGRAM(S): 0.5 TABLET ORAL at 07:08

## 2024-07-06 RX ADMIN — AMLODIPINE BESYLATE 10 MILLIGRAM(S): 2.5 TABLET ORAL at 05:35

## 2024-07-06 RX ADMIN — Medication 100 MILLIGRAM(S): at 21:53

## 2024-07-06 NOTE — PROGRESS NOTE ADULT - SUBJECTIVE AND OBJECTIVE BOX
[facility] Division of Hospital Medicine  Yuila Condon MD  Pager (A-F, 6K-8Q): [phone]  Other Times:  [phone]    Patient is a 64y old  Female who presents with a chief complaint of s/p surgery (30 Jun 2024 13:05)      SUBJECTIVE / OVERNIGHT EVENTS: pt in NAD no acute events ON       MEDICATIONS  (STANDING):  acetaminophen   Oral Liquid .. 975 milliGRAM(s) Oral every 6 hours  acetylcysteine 10%  Inhalation 4 milliLiter(s) Inhalation every 6 hours  albuterol    90 MICROgram(s) HFA Inhaler 1 Puff(s) Inhalation once  albuterol/ipratropium for Nebulization 3 milliLiter(s) Nebulizer every 6 hours  amLODIPine   Tablet 10 milliGRAM(s) Oral daily  AQUAPHOR (petrolatum Ointment) 1 Application(s) Topical daily  AQUAPHOR (petrolatum Ointment) 1 Application(s) Topical daily  buPROPion . 150 milliGRAM(s) Oral at bedtime  buPROPion . 300 milliGRAM(s) Oral <User Schedule>  chlorhexidine 0.12% Liquid 15 milliLiter(s) Swish and Spit <User Schedule>  chlorhexidine 2% Cloths 1 Application(s) Topical daily  enoxaparin Injectable 40 milliGRAM(s) SubCutaneous every 24 hours  pantoprazole  Injectable 40 milliGRAM(s) IV Push daily  QUEtiapine 100 milliGRAM(s) Oral at bedtime  risperiDONE   Solution 3 milliGRAM(s) Oral at bedtime  risperiDONE   Solution 1 milliGRAM(s) Oral <User Schedule>  senna 2 Tablet(s) Oral at bedtime  sodium chloride 0.9% lock flush 3 milliLiter(s) IV Push every 8 hours    MEDICATIONS  (PRN):      CAPILLARY BLOOD GLUCOSE        I&O's Summary    05 Jul 2024 07:01  -  06 Jul 2024 07:00  --------------------------------------------------------  IN: 1200 mL / OUT: 800 mL / NET: 400 mL        PHYSICAL EXAM:  Vital Signs Last 24 Hrs  T(C): 36.8 (06 Jul 2024 05:35), Max: 36.9 (05 Jul 2024 23:00)  T(F): 98.3 (06 Jul 2024 05:35), Max: 98.4 (05 Jul 2024 23:00)  HR: 86 (06 Jul 2024 05:35) (86 - 102)  BP: 125/79 (06 Jul 2024 05:35) (114/83 - 149/91)  BP(mean): --  RR: 18 (06 Jul 2024 05:35) (18 - 18)  SpO2: 99% (06 Jul 2024 05:35) (98% - 99%)    Parameters below as of 06 Jul 2024 05:35  Patient On (Oxygen Delivery Method): room air        CONSTITUTIONAL: NAD  ENMT:  decannulated, neck wound c/d/i sutures in place   RESPIRATORY: Normal respiratory effort; lungs are clear to auscultation bilaterally  CARDIOVASCULAR: Regular rate and rhythm, normal S1 and S2, no murmur/rub/gallop; No lower extremity edema; Peripheral pulses are 2+ bilaterally  ABDOMEN: Nontender to palpation, normoactive bowel sounds, no rebound/guarding; + PEG   MUSCULOSKELETAL:  LT UE graft site + Lt thigh graft site   NEUROLOGY: follows command moving all     LABS:                        11.2   2.24  )-----------( 273      ( 06 Jul 2024 05:24 )             34.0     07-06    137  |  101  |  17  ----------------------------<  82  4.4   |  24  |  0.48<L>    Ca    8.9      06 Jul 2024 05:24  Phos  4.0     07-06  Mg     1.90     07-06            Urinalysis Basic - ( 06 Jul 2024 05:24 )    Color: x / Appearance: x / SG: x / pH: x  Gluc: 82 mg/dL / Ketone: x  / Bili: x / Urobili: x   Blood: x / Protein: x / Nitrite: x   Leuk Esterase: x / RBC: x / WBC x   Sq Epi: x / Non Sq Epi: x / Bacteria: x          RADIOLOGY & ADDITIONAL TESTS:  Results Reviewed:   Imaging Personally Reviewed:  Electrocardiogram Personally Reviewed:    COORDINATION OF CARE:  Care Discussed with Consultants/Other Providers [Y/N]:  Prior or Outpatient Records Reviewed [Y/N]:

## 2024-07-06 NOTE — PROGRESS NOTE ADULT - PROBLEM SELECTOR PLAN 2
- R partial glossectomy in the setting of SCCA, R SND I-IV, L RFFF, L thigh STSG, tracheostomy (6/6/24). Pts post-operative course c/b venous congestion of flap w/ RTOR on 6/11 for free flap salvage and revision of venous anastomosis. Flap now infected with E Coli also without flow. RTOR 6/14 for exploration of tongue free flap, muscle flap revision with right thigh flap, 7.5 trach exchanged to 7.0 cuffed trach.  - OR wound cx (6/11, 6/14) grew E.coli, s/p Unsyn 3g q6h   -management per ENT/plastic sx  - s/p PEG 6/25  - tube feeds as per nutrition  - LT neck wound dehiscence s/p OR with plastics with neck complex closure and irrigation/debridement on 6/28  - s/p trach decannulation 6/30

## 2024-07-06 NOTE — PROVIDER CONTACT NOTE (OTHER) - ASSESSMENT
refusing 6pm tube feeds; pt educated regarding importance of getting tube feeds however pt still refuses

## 2024-07-06 NOTE — PROGRESS NOTE ADULT - TIME BILLING
Reviewed lab data, radiology results, consultants' recommendations, documentation in [place], discussed with family, ACP, interdisciplinary staff and/or intervention were performed.
Reviewed lab data, radiology results, consultants' recommendations, documentation in [place], discussed with family, ACP, interdisciplinary staff and/or intervention were performed.
Reviewed lab data, radiology results, consultants' recommendations, documentation in [place], discussed with family, ACP, interdisciplinary staff and/or intervention were performed.
Reviewed lab data, radiology results, consultants' recommendations, documentation in [place], discussed with family, ACP, interdisciplinary staff and/or intervention were performed.
Reviewed lab data, radiology results, consultants' recommendations, documentation in [place], discussed with family, ACP, interdisciplinary staff and/or intervention were performed.
Reviewed lab data, radiology results, consultants' recommendations, documentation in [place], discussed with family, ACP, interdisciplinary staff and/or intervention were performed.
Reviewed lab data, radiology results, consultants' recommendations, documentation in [place], discussed with family, ACP, interdisciplinary staff and/or intervention were performed.
Reviewed lab data, radiology results, consultants' recommendations, documentation in [place], discussed with family, ACP, interdisciplinary staff and/or intervention were performed.
Reviewed lab data, radiology results, consultants' recommendations, documentation in [place], discussed with family, ACP, interdisciplinary staff and/or intervention were performed.
Reviewed lab data, radiology results, consultants' recommendations, documentation in [place], discussed with family, ACP, interdisciplinary staff and/or intervention were performed.
Reviewed lab data, radiology results, consultants' recommendations, documentation in [place], discussed with family, ACP, interdisciplinary staff and/or intervention were performed.

## 2024-07-06 NOTE — PROGRESS NOTE ADULT - SUBJECTIVE AND OBJECTIVE BOX
65 y/o F  HD30 POD29 R partial Glossectomy in the setting of SSC, L RFFF, SND I-IV, L thigh STSFG, Tracheostomy 6/6/24. Pts post-operative course c/b venous congestion of flap w/ RTOR on 6/11/24 for free flap salvage and revision of venous anastomosis RTOR on 6/14/24 for debridement of necrotic radial forearm flap and replacement with right ALT flap, with 7.5 trach exchanged to 7.0 cuffed trach. Patient has been decannulated since 6/30 and has Saturating 100% on RA with no DAGO.  Patient endorses feeling well and ready to go home and denies any f/c/n/v. Spoke with ID regarding leukopenia. No current concerns as per ID. Given slight improvement in WBC, will hold off on heme onc evaluation. Plan to DC to rehab pending case management discussion.    Interval events  NAEON, VSS  Patient reports tolerating Tube feeds well, ambulating, and voiding both urine and stool    Physical Examination  General: AAOx3, NAD  NEURO: AAOx3  HEENT: neck incision clean, dry, and intact. flap warm and well perfused, good color  RLE: thigh soft, incision c/d/i, healing well  LUE: Incision c/d/i.    Vitals:     Vital Signs Last 24 Hrs  T(C): 36.4 (05 Jul 2024 05:42), Max: 36.9 (05 Jul 2024 02:05)  T(F): 97.5 (05 Jul 2024 05:42), Max: 98.4 (05 Jul 2024 02:05)  HR: 80 (05 Jul 2024 05:42) (80 - 104)  BP: 130/93 (05 Jul 2024 05:42) (110/76 - 130/93)  BP(mean): --  RR: 18 (05 Jul 2024 05:42) (18 - 18)  SpO2: 99% (05 Jul 2024 05:42) (98% - 99%)    Parameters below as of 05 Jul 2024 05:42  Patient On (Oxygen Delivery Method): room air        I&O's Detail    04 Jul 2024 07:01  -  05 Jul 2024 07:00  --------------------------------------------------------  IN:    Enteral Tube Flush: 520 mL    Jevity 1.5: 680 mL  Total IN: 1200 mL    OUT:    Voided (mL): 800 mL  Total OUT: 800 mL    Total NET: 600 mL    MEDICATIONS  (STANDING):  acetaminophen   Oral Liquid .. 975 milliGRAM(s) Oral every 6 hours  acetylcysteine 10%  Inhalation 4 milliLiter(s) Inhalation every 6 hours  albuterol    90 MICROgram(s) HFA Inhaler 1 Puff(s) Inhalation once  albuterol/ipratropium for Nebulization 3 milliLiter(s) Nebulizer every 6 hours  amLODIPine   Tablet 10 milliGRAM(s) Oral daily  AQUAPHOR (petrolatum Ointment) 1 Application(s) Topical daily  AQUAPHOR (petrolatum Ointment) 1 Application(s) Topical daily  buPROPion . 300 milliGRAM(s) Oral <User Schedule>  buPROPion . 150 milliGRAM(s) Oral at bedtime  chlorhexidine 0.12% Liquid 15 milliLiter(s) Swish and Spit <User Schedule>  chlorhexidine 2% Cloths 1 Application(s) Topical daily  enoxaparin Injectable 40 milliGRAM(s) SubCutaneous every 24 hours  pantoprazole  Injectable 40 milliGRAM(s) IV Push daily  QUEtiapine 100 milliGRAM(s) Oral at bedtime  risperiDONE   Solution 3 milliGRAM(s) Oral at bedtime  risperiDONE   Solution 1 milliGRAM(s) Oral <User Schedule>  senna 2 Tablet(s) Oral at bedtime  sodium chloride 0.9% lock flush 3 milliLiter(s) IV Push every 8 hours      MEDICATIONS  (PRN):  oxyCODONE    Solution 2.5 milliGRAM(s) Oral every 4 hours PRN Moderate Pain (4 - 6)  oxyCODONE    Solution 5 milliGRAM(s) Enteral Tube every 4 hours PRN Severe Pain (7 - 10)      Labs:                          12.5   2.41  )-----------( 340      ( 04 Jul 2024 10:54 )             37.2              63 y/o F  HD30 POD29 R partial Glossectomy in the setting of SSC, L RFFF, SND I-IV, L thigh STSFG, Tracheostomy 6/6/24. Pts post-operative course c/b venous congestion of flap w/ RTOR on 6/11/24 for free flap salvage and revision of venous anastomosis RTOR on 6/14/24 for debridement of necrotic radial forearm flap and replacement with right ALT flap, with 7.5 trach exchanged to 7.0 cuffed trach. Patient has been decannulated since 6/30 and has Saturating 100% on RA with no DAGO.  Patient endorses feeling well and ready to go home and denies any f/c/n/v. Spoke with ID regarding leukopenia. No current concerns as per ID. Given slight improvement in WBC, will hold off on heme onc evaluation. Plan to DC to rehab pending case management discussion.    Interval events  NAEON, VSS  Patient reports tolerating Tube feeds well, ambulating, and voiding both urine and stool    Physical Examination  General: AAOx3, NAD  NEURO: AAOx3  HEENT: neck incision clean, dry, and intact. flap warm and well perfused, good color  RLE: thigh soft, incision c/d/i, healing well  LUE: Incision c/d/i.    Vitals:     Vital Signs Last 24 Hrs  T(C): 36.4 (05 Jul 2024 05:42), Max: 36.9 (05 Jul 2024 02:05)  T(F): 97.5 (05 Jul 2024 05:42), Max: 98.4 (05 Jul 2024 02:05)  HR: 80 (05 Jul 2024 05:42) (80 - 104)  BP: 130/93 (05 Jul 2024 05:42) (110/76 - 130/93)  BP(mean): --  RR: 18 (05 Jul 2024 05:42) (18 - 18)  SpO2: 99% (05 Jul 2024 05:42) (98% - 99%)    Parameters below as of 05 Jul 2024 05:42  Patient On (Oxygen Delivery Method): room air        I&O's Detail    04 Jul 2024 07:01  -  05 Jul 2024 07:00  --------------------------------------------------------  IN:    Enteral Tube Flush: 520 mL    Jevity 1.5: 680 mL  Total IN: 1200 mL    OUT:    Voided (mL): 800 mL  Total OUT: 800 mL    Total NET: 600 mL    MEDICATIONS  (STANDING):  acetaminophen   Oral Liquid .. 975 milliGRAM(s) Oral every 6 hours  acetylcysteine 10%  Inhalation 4 milliLiter(s) Inhalation every 6 hours  albuterol    90 MICROgram(s) HFA Inhaler 1 Puff(s) Inhalation once  albuterol/ipratropium for Nebulization 3 milliLiter(s) Nebulizer every 6 hours  amLODIPine   Tablet 10 milliGRAM(s) Oral daily  AQUAPHOR (petrolatum Ointment) 1 Application(s) Topical daily  AQUAPHOR (petrolatum Ointment) 1 Application(s) Topical daily  buPROPion . 300 milliGRAM(s) Oral <User Schedule>  buPROPion . 150 milliGRAM(s) Oral at bedtime  chlorhexidine 0.12% Liquid 15 milliLiter(s) Swish and Spit <User Schedule>  chlorhexidine 2% Cloths 1 Application(s) Topical daily  enoxaparin Injectable 40 milliGRAM(s) SubCutaneous every 24 hours  pantoprazole  Injectable 40 milliGRAM(s) IV Push daily  QUEtiapine 100 milliGRAM(s) Oral at bedtime  risperiDONE   Solution 3 milliGRAM(s) Oral at bedtime  risperiDONE   Solution 1 milliGRAM(s) Oral <User Schedule>  senna 2 Tablet(s) Oral at bedtime  sodium chloride 0.9% lock flush 3 milliLiter(s) IV Push every 8 hours      MEDICATIONS  (PRN):  oxyCODONE    Solution 2.5 milliGRAM(s) Oral every 4 hours PRN Moderate Pain (4 - 6)  oxyCODONE    Solution 5 milliGRAM(s) Enteral Tube every 4 hours PRN Severe Pain (7 - 10)      Labs:                          11.2   2.24  )-----------( 273      ( 06 Jul 2024 05:24 )             34.0

## 2024-07-06 NOTE — PROGRESS NOTE ADULT - ASSESSMENT
63 y/o F HD29 POD28 R partial Glossectomy in the setting of SSC, L RFFF, SND I-IV, L thigh STSFG, Trachostomy 6/6/24. Pts post-operative course c/b venous congestion of flap w/ RTOR on 6/11/24 for free flap salvage and revision of venous anastomosis RTOR on 6/14/24 for debridement of necrotic radial forearm flap and replacement with right ALT flap, with 7.5 trach exchanged to 7.0 cuffed trach. Patient has been decannulated since 6/30 and has Saturating 100% on RA with no DAGO.  Patient endorses feeling well and ready to go home and denies any f/c/n/v. Spoke with ID regarding leukopenia. No current concerns as per ID. Plan to DC to rehab.     Plan:   - Strict I&O's  - reposition the patient Q4-5h intervals, clean/bath the patient daily for improvement of the heat rash  - Leave nylons in, PRS to remove after at least 4 weeks  - Aquaphor to left thigh and LUE donor site daily  - f/u CM for rehab. d/c pending final psych eval  - F/U appointment with Dr. Lockett 7/9 at 9am    Barrington Vivar  Oral and Maxillofacial Surgery  [facility] Pager #[phone]  [facility] Pager: [phone]  [facility] Pager: [phone]  Available on Teams 65 y/o F HD29 POD28 R partial Glossectomy in the setting of SSC, L RFFF, SND I-IV, L thigh STSFG, Trachostomy 6/6/24. Pts post-operative course c/b venous congestion of flap w/ RTOR on 6/11/24 for free flap salvage and revision of venous anastomosis RTOR on 6/14/24 for debridement of necrotic radial forearm flap and replacement with right ALT flap, with 7.5 trach exchanged to 7.0 cuffed trach. Patient has been decannulated since 6/30 and has Saturating 100% on RA with no DAGO.  Patient endorses feeling well and ready to go home and denies any f/c/n/v. Spoke with ID regarding leukopenia. No current concerns as per ID. Plan to DC to rehab.     Plan:   - Strict I&O's  - reposition the patient Q4-5h intervals  - clean/bath the patient daily for improvement of the heat rash  - Leave nylons in, PRS to remove after at least 4 weeks  - Aquaphor to left thigh and LUE donor site daily  - f/u CM for rehab.   - F/U appointment with Dr. Lockett 7/9 at 9am    [facility]  Oral and Maxillofacial Surgery  [facility] Pager #[phone]  [facility] Pager: [phone]  [facility] Pager: [phone]  Available on Teams 65 y/o F HD30 POD29 R partial Glossectomy in the setting of SSC, L RFFF, SND I-IV, L thigh STSFG, Trachostomy 6/6/24. Pts post-operative course c/b venous congestion of flap w/ RTOR on 6/11/24 for free flap salvage and revision of venous anastomosis RTOR on 6/14/24 for debridement of necrotic radial forearm flap and replacement with right ALT flap, with 7.5 trach exchanged to 7.0 cuffed trach. Patient has been decannulated since 6/30 and has Saturating 100% on RA with no DAGO.  Patient endorses feeling well and ready to go home and denies any f/c/n/v. Spoke with ID regarding leukopenia. No current concerns as per ID. Plan to DC to rehab.     Plan:   - Strict I&O's  - reposition the patient Q4-5h intervals  - clean/bath the patient daily for improvement of the heat rash  - Leave nylons in, PRS to remove after at least 4 weeks  - Aquaphor to left thigh and LUE donor site daily  - f/u CM for rehab.   - F/U appointment with Dr. Lockett 7/9 at 9am    [facility]  Oral and Maxillofacial Surgery  [facility] Pager #[phone]  [facility] Pager: [phone]  [facility] Pager: [phone]  Available on Teams

## 2024-07-07 DIAGNOSIS — R19.7 DIARRHEA, UNSPECIFIED: ICD-10-CM

## 2024-07-07 PROCEDURE — 99232 SBSQ HOSP IP/OBS MODERATE 35: CPT

## 2024-07-07 RX ADMIN — BUPROPION HYDROCHLORIDE 150 MILLIGRAM(S): 150 TABLET, EXTENDED RELEASE ORAL at 21:25

## 2024-07-07 RX ADMIN — Medication 3 MILLILITER(S): at 21:25

## 2024-07-07 RX ADMIN — RISPERIDONE 3 MILLIGRAM(S): 0.5 TABLET ORAL at 21:25

## 2024-07-07 RX ADMIN — Medication 15 MILLILITER(S): at 21:24

## 2024-07-07 RX ADMIN — Medication 975 MILLIGRAM(S): at 05:22

## 2024-07-07 RX ADMIN — Medication 2 MILLIGRAM(S): at 07:15

## 2024-07-07 RX ADMIN — ENOXAPARIN SODIUM 40 MILLIGRAM(S): 100 INJECTION SUBCUTANEOUS at 15:20

## 2024-07-07 RX ADMIN — Medication 15 MILLILITER(S): at 07:29

## 2024-07-07 RX ADMIN — Medication 1 APPLICATION(S): at 05:21

## 2024-07-07 RX ADMIN — Medication 1 APPLICATION(S): at 11:33

## 2024-07-07 RX ADMIN — PETROLATUM 1 APPLICATION(S): 42 OINTMENT TOPICAL at 11:33

## 2024-07-07 RX ADMIN — Medication 15 MILLILITER(S): at 18:08

## 2024-07-07 RX ADMIN — Medication 15 MILLILITER(S): at 13:12

## 2024-07-07 RX ADMIN — Medication 1 APPLICATION(S): at 18:01

## 2024-07-07 RX ADMIN — Medication 975 MILLIGRAM(S): at 18:00

## 2024-07-07 RX ADMIN — Medication 15 MILLILITER(S): at 16:31

## 2024-07-07 RX ADMIN — Medication 975 MILLIGRAM(S): at 12:02

## 2024-07-07 RX ADMIN — AMLODIPINE BESYLATE 10 MILLIGRAM(S): 2.5 TABLET ORAL at 05:21

## 2024-07-07 RX ADMIN — BUPROPION HYDROCHLORIDE 300 MILLIGRAM(S): 150 TABLET, EXTENDED RELEASE ORAL at 07:29

## 2024-07-07 RX ADMIN — Medication 975 MILLIGRAM(S): at 11:32

## 2024-07-07 RX ADMIN — Medication 3 MILLILITER(S): at 05:21

## 2024-07-07 RX ADMIN — Medication 975 MILLIGRAM(S): at 23:43

## 2024-07-07 RX ADMIN — Medication 975 MILLIGRAM(S): at 18:30

## 2024-07-07 RX ADMIN — RISPERIDONE 1 MILLIGRAM(S): 0.5 TABLET ORAL at 07:29

## 2024-07-07 RX ADMIN — Medication 15 MILLILITER(S): at 11:32

## 2024-07-07 RX ADMIN — Medication 100 MILLIGRAM(S): at 21:25

## 2024-07-07 RX ADMIN — Medication 3 MILLILITER(S): at 12:36

## 2024-07-07 NOTE — PROGRESS NOTE ADULT - SUBJECTIVE AND OBJECTIVE BOX
63 y/o F  HD31 POD30 R partial Glossectomy in the setting of SSC, L RFFF, SND I-IV, L thigh STSFG, Tracheostomy 6/6/24. Pts post-operative course c/b venous congestion of flap w/ RTOR on 6/11/24 for free flap salvage and revision of venous anastomosis RTOR on 6/14/24 for debridement of necrotic radial forearm flap and replacement with right ALT flap, with 7.5 trach exchanged to 7.0 cuffed trach. Patient has been decannulated since 6/30 and has Saturating 100% on RA with no DAGO.  Patient endorses feeling well and ready to go home and denies any f/c/n/v. Spoke with ID regarding leukopenia. No current concerns as per ID. Given slight improvement in WBC, will hold off on heme onc evaluation. Plan to DC to rehab pending case management discussion.    Interval events  Pt reported acute episode of diarrhea overnight, senna dc'd, loperamide added, VSS  Patient reports tolerating Tube feeds well, ambulating, and voiding both urine and stool    Physical Examination  General: AAOx3, NAD  NEURO: AAOx3  HEENT: neck incision clean, dry, and intact. flap warm and well perfused, good color  RLE: thigh soft, incision c/d/i, healing well  LUE: Incision c/d/i.    Vitals:     Vital Signs Last 24 Hrs  T(C): 36.6 (07 Jul 2024 05:21), Max: 37.2 (06 Jul 2024 09:00)  T(F): 97.9 (07 Jul 2024 05:21), Max: 99 (06 Jul 2024 09:00)  HR: 81 (07 Jul 2024 05:21) (81 - 112)  BP: 143/96 (07 Jul 2024 05:21) (114/74 - 143/96)  BP(mean): --  RR: 17 (07 Jul 2024 05:21) (17 - 18)  SpO2: 100% (07 Jul 2024 05:21) (99% - 100%)    Parameters below as of 07 Jul 2024 05:21  Patient On (Oxygen Delivery Method): room air        I&O's Detail    06 Jul 2024 07:01  -  07 Jul 2024 07:00  --------------------------------------------------------  IN:    Enteral Tube Flush: 780 mL    Jevity 1.5: 1020 mL  Total IN: 1800 mL    OUT:    Voided (mL): 1900 mL  Total OUT: 1900 mL    Total NET: -100 mL          Medications:    MEDICATIONS  (STANDING):  acetaminophen   Oral Liquid .. 975 milliGRAM(s) Oral every 6 hours  acetylcysteine 10%  Inhalation 4 milliLiter(s) Inhalation every 6 hours  albuterol    90 MICROgram(s) HFA Inhaler 1 Puff(s) Inhalation once  albuterol/ipratropium for Nebulization 3 milliLiter(s) Nebulizer every 6 hours  amLODIPine   Tablet 10 milliGRAM(s) Oral daily  AQUAPHOR (petrolatum Ointment) 1 Application(s) Topical daily  AQUAPHOR (petrolatum Ointment) 1 Application(s) Topical daily  buPROPion . 150 milliGRAM(s) Oral at bedtime  buPROPion . 300 milliGRAM(s) Oral <User Schedule>  chlorhexidine 0.12% Liquid 15 milliLiter(s) Swish and Spit <User Schedule>  chlorhexidine 2% Cloths 1 Application(s) Topical daily  enoxaparin Injectable 40 milliGRAM(s) SubCutaneous every 24 hours  hydrocortisone 1% Cream 1 Application(s) Topical every 12 hours  QUEtiapine 100 milliGRAM(s) Oral at bedtime  risperiDONE   Solution 3 milliGRAM(s) Oral at bedtime  risperiDONE   Solution 1 milliGRAM(s) Oral <User Schedule>  sodium chloride 0.9% lock flush 3 milliLiter(s) IV Push every 8 hours    MEDICATIONS  (PRN):  loperamide Liquid 2 milliGRAM(s) Oral every 4 hours PRN Diarrhea  oxyCODONE    Solution 5 milliGRAM(s) Oral every 4 hours PRN Moderate Pain (4 - 6)      Labs:                          11.2   2.24  )-----------( 273      ( 06 Jul 2024 05:24 )             34.0       07-06    137  |  101  |  17  ----------------------------<  82  4.4   |  24  |  0.48<L>    Ca    8.9      06 Jul 2024 05:24  Phos  4.0     07-06  Mg     1.90     07-06

## 2024-07-07 NOTE — PROGRESS NOTE ADULT - SUBJECTIVE AND OBJECTIVE BOX
[facility] Division of Hospital Medicine  Yulia Condon MD  Pager (O-F, 9G-5D): [phone]  Other Times:  [phone]    Patient is a 64y old  Female who presents with a chief complaint of s/p surgery (30 Jun 2024 13:05)      SUBJECTIVE / OVERNIGHT EVENTS: reported loose stool ON; bowel regimen stopped     MEDICATIONS  (STANDING):  acetaminophen   Oral Liquid .. 975 milliGRAM(s) Oral every 6 hours  acetylcysteine 10%  Inhalation 4 milliLiter(s) Inhalation every 6 hours  albuterol    90 MICROgram(s) HFA Inhaler 1 Puff(s) Inhalation once  albuterol/ipratropium for Nebulization 3 milliLiter(s) Nebulizer every 6 hours  amLODIPine   Tablet 10 milliGRAM(s) Oral daily  AQUAPHOR (petrolatum Ointment) 1 Application(s) Topical daily  AQUAPHOR (petrolatum Ointment) 1 Application(s) Topical daily  buPROPion . 150 milliGRAM(s) Oral at bedtime  buPROPion . 300 milliGRAM(s) Oral <User Schedule>  chlorhexidine 0.12% Liquid 15 milliLiter(s) Swish and Spit <User Schedule>  chlorhexidine 2% Cloths 1 Application(s) Topical daily  enoxaparin Injectable 40 milliGRAM(s) SubCutaneous every 24 hours  hydrocortisone 1% Cream 1 Application(s) Topical every 12 hours  QUEtiapine 100 milliGRAM(s) Oral at bedtime  risperiDONE   Solution 3 milliGRAM(s) Oral at bedtime  risperiDONE   Solution 1 milliGRAM(s) Oral <User Schedule>  sodium chloride 0.9% lock flush 3 milliLiter(s) IV Push every 8 hours    MEDICATIONS  (PRN):  loperamide Liquid 2 milliGRAM(s) Oral every 4 hours PRN Diarrhea  oxyCODONE    Solution 5 milliGRAM(s) Oral every 4 hours PRN Moderate Pain (4 - 6)      CAPILLARY BLOOD GLUCOSE        I&O's Summary    06 Jul 2024 07:01  -  07 Jul 2024 07:00  --------------------------------------------------------  IN: 1800 mL / OUT: 1900 mL / NET: -100 mL        PHYSICAL EXAM:  Vital Signs Last 24 Hrs  T(C): 37 (07 Jul 2024 09:00), Max: 37.2 (06 Jul 2024 13:31)  T(F): 98.6 (07 Jul 2024 09:00), Max: 98.9 (06 Jul 2024 13:31)  HR: 94 (07 Jul 2024 09:00) (81 - 110)  BP: 106/71 (07 Jul 2024 09:00) (106/71 - 143/96)  BP(mean): --  RR: 17 (07 Jul 2024 09:00) (17 - 18)  SpO2: 98% (07 Jul 2024 09:00) (98% - 100%)    Parameters below as of 07 Jul 2024 09:10  Patient On (Oxygen Delivery Method): room air      CONSTITUTIONAL: NAD  ENMT:  decannulated, neck wound c/d/i sutures in place   RESPIRATORY: Normal respiratory effort; lungs are clear to auscultation bilaterally  CARDIOVASCULAR: Regular rate and rhythm, normal S1 and S2, no murmur/rub/gallop; No lower extremity edema; Peripheral pulses are 2+ bilaterally  ABDOMEN: Nontender to palpation, normoactive bowel sounds, no rebound/guarding; + PEG   MUSCULOSKELETAL:  LT UE graft site + Lt thigh graft site   NEUROLOGY: follows command moving all     LABS:                        11.2   2.24  )-----------( 273      ( 06 Jul 2024 05:24 )             34.0     07-06    137  |  101  |  17  ----------------------------<  82  4.4   |  24  |  0.48<L>    Ca    8.9      06 Jul 2024 05:24  Phos  4.0     07-06  Mg     1.90     07-06            Urinalysis Basic - ( 06 Jul 2024 05:24 )    Color: x / Appearance: x / SG: x / pH: x  Gluc: 82 mg/dL / Ketone: x  / Bili: x / Urobili: x   Blood: x / Protein: x / Nitrite: x   Leuk Esterase: x / RBC: x / WBC x   Sq Epi: x / Non Sq Epi: x / Bacteria: x          RADIOLOGY & ADDITIONAL TESTS:  Results Reviewed:   Imaging Personally Reviewed:  Electrocardiogram Personally Reviewed:    COORDINATION OF CARE:  Care Discussed with Consultants/Other Providers [Y/N]:  Prior or Outpatient Records Reviewed [Y/N]:

## 2024-07-07 NOTE — PROGRESS NOTE ADULT - PROBLEM SELECTOR PLAN 1
- noted diarrhea ON; agree with discontinue bowel regimen  - would hold antidiarrheals and monitor off if recurrent with send off GI PCR

## 2024-07-07 NOTE — PROGRESS NOTE ADULT - ASSESSMENT
63 y/o F HD31 POD30 R partial Glossectomy in the setting of SSC, L RFFF, SND I-IV, L thigh STSFG, Trachostomy 6/6/24. Pts post-operative course c/b venous congestion of flap w/ RTOR on 6/11/24 for free flap salvage and revision of venous anastomosis RTOR on 6/14/24 for debridement of necrotic radial forearm flap and replacement with right ALT flap, with 7.5 trach exchanged to 7.0 cuffed trach. Patient has been decannulated since 6/30 and has Saturating 100% on RA with no DAGO.  Patient endorses feeling well and ready to go home and denies any f/c/n/v. Spoke with ID regarding leukopenia. No current concerns as per ID. Plan to DC to rehab.     Plan:   - Strict I&O's  - reposition the patient Q4-5h intervals  - clean/bath the patient daily for improvement of the heat rash  - Leave nylons in, PRS to remove after at least 4 weeks  - Aquaphor to left thigh and LUE donor site daily  - f/u CM for rehab.   - F/U appointment with Dr. Lockett 7/16 at 9am (rescheduled from 7/9)    Kirk Santana  Oral and Maxillofacial Surgery  [facility] Pager #[phone]  [facility] Pager: [phone]  [facility] Pager: [phone]  Available on Teams

## 2024-07-08 PROCEDURE — 99232 SBSQ HOSP IP/OBS MODERATE 35: CPT

## 2024-07-08 RX ORDER — LOPERAMIDE HCL 2 MG
2 CAPSULE ORAL EVERY 4 HOURS
Refills: 0 | Status: DISCONTINUED | OUTPATIENT
Start: 2024-07-08 | End: 2024-07-08

## 2024-07-08 RX ORDER — LOPERAMIDE HCL 2 MG
2 CAPSULE ORAL EVERY 4 HOURS
Refills: 0 | Status: DISCONTINUED | OUTPATIENT
Start: 2024-07-08 | End: 2024-07-09

## 2024-07-08 RX ORDER — LOPERAMIDE HCL 2 MG
CAPSULE ORAL
Refills: 0 | Status: DISCONTINUED | OUTPATIENT
Start: 2024-07-08 | End: 2024-07-08

## 2024-07-08 RX ADMIN — Medication 2 MILLIGRAM(S): at 21:48

## 2024-07-08 RX ADMIN — Medication 15 MILLILITER(S): at 06:01

## 2024-07-08 RX ADMIN — Medication 975 MILLIGRAM(S): at 11:15

## 2024-07-08 RX ADMIN — Medication 15 MILLILITER(S): at 14:00

## 2024-07-08 RX ADMIN — Medication 15 MILLILITER(S): at 16:24

## 2024-07-08 RX ADMIN — Medication 3 MILLILITER(S): at 21:48

## 2024-07-08 RX ADMIN — Medication 975 MILLIGRAM(S): at 17:23

## 2024-07-08 RX ADMIN — BUPROPION HYDROCHLORIDE 150 MILLIGRAM(S): 150 TABLET, EXTENDED RELEASE ORAL at 21:47

## 2024-07-08 RX ADMIN — AMLODIPINE BESYLATE 10 MILLIGRAM(S): 2.5 TABLET ORAL at 06:01

## 2024-07-08 RX ADMIN — PETROLATUM 1 APPLICATION(S): 42 OINTMENT TOPICAL at 11:15

## 2024-07-08 RX ADMIN — Medication 2 MILLIGRAM(S): at 13:59

## 2024-07-08 RX ADMIN — Medication 1 APPLICATION(S): at 06:02

## 2024-07-08 RX ADMIN — Medication 1 APPLICATION(S): at 17:24

## 2024-07-08 RX ADMIN — Medication 3 MILLILITER(S): at 06:02

## 2024-07-08 RX ADMIN — Medication 2 MILLIGRAM(S): at 17:23

## 2024-07-08 RX ADMIN — Medication 975 MILLIGRAM(S): at 11:45

## 2024-07-08 RX ADMIN — Medication 975 MILLIGRAM(S): at 17:53

## 2024-07-08 RX ADMIN — Medication 975 MILLIGRAM(S): at 06:01

## 2024-07-08 RX ADMIN — RISPERIDONE 3 MILLIGRAM(S): 0.5 TABLET ORAL at 21:48

## 2024-07-08 RX ADMIN — Medication 15 MILLILITER(S): at 11:14

## 2024-07-08 RX ADMIN — ENOXAPARIN SODIUM 40 MILLIGRAM(S): 100 INJECTION SUBCUTANEOUS at 14:00

## 2024-07-08 RX ADMIN — Medication 1 APPLICATION(S): at 11:14

## 2024-07-08 RX ADMIN — Medication 3 MILLILITER(S): at 13:08

## 2024-07-08 RX ADMIN — BUPROPION HYDROCHLORIDE 300 MILLIGRAM(S): 150 TABLET, EXTENDED RELEASE ORAL at 06:01

## 2024-07-08 RX ADMIN — Medication 100 MILLIGRAM(S): at 21:48

## 2024-07-08 RX ADMIN — RISPERIDONE 1 MILLIGRAM(S): 0.5 TABLET ORAL at 06:02

## 2024-07-08 NOTE — PROGRESS NOTE ADULT - SUBJECTIVE AND OBJECTIVE BOX
65 y/o F HD32 POD31 R partial Glossectomy in the setting of SSC, L RFFF, SND I-IV, L thigh STSFG, Tracheostomy 6/6/24. Pts post-operative course c/b venous congestion of flap w/ RTOR on 6/11/24 for free flap salvage and revision of venous anastomosis RTOR on 6/14/24 for debridement of necrotic radial forearm flap and replacement with right ALT flap, with 7.5 trach exchanged to 7.0 cuffed trach. Patient has been decannulated since 6/30 and has Saturating 100% on RA with no DGAO.  Patient endorses feeling well and ready to go home and denies any f/c/n/v. Spoke with ID regarding leukopenia. No current concerns as per ID. Patient reports having diarrhea yesterday. Plan to DC to rehab pending case management discussion.    Interval events  Pt reported no episodes of diarrhea overnight, bowel regimen dc'd, AVSS  Patient reports tolerating Tube feeds well, ambulating, and voiding both urine and stool    Vitals:     Vital Signs Last 24 Hrs  T(C): 36.3 (08 Jul 2024 05:24), Max: 37 (07 Jul 2024 09:00)  T(F): 97.3 (08 Jul 2024 05:24), Max: 98.6 (07 Jul 2024 09:00)  HR: 86 (08 Jul 2024 05:24) (86 - 99)  BP: 118/79 (08 Jul 2024 05:24) (106/71 - 135/95)  BP(mean): --  RR: 18 (08 Jul 2024 05:24) (17 - 18)  SpO2: 99% (08 Jul 2024 05:24) (98% - 99%)    Parameters below as of 08 Jul 2024 05:24  Patient On (Oxygen Delivery Method): room air        I&O's Detail    07 Jul 2024 07:01  -  08 Jul 2024 07:00  --------------------------------------------------------  IN:    Enteral Tube Flush: 520 mL    Jevity 1.5: 1200 mL  Total IN: 1720 mL    OUT:    Voided (mL): 1400 mL  Total OUT: 1400 mL    Total NET: 320 mL          Medications:    MEDICATIONS  (STANDING):  acetaminophen   Oral Liquid .. 975 milliGRAM(s) Oral every 6 hours  acetylcysteine 10%  Inhalation 4 milliLiter(s) Inhalation every 6 hours  albuterol    90 MICROgram(s) HFA Inhaler 1 Puff(s) Inhalation once  albuterol/ipratropium for Nebulization 3 milliLiter(s) Nebulizer every 6 hours  amLODIPine   Tablet 10 milliGRAM(s) Oral daily  AQUAPHOR (petrolatum Ointment) 1 Application(s) Topical daily  AQUAPHOR (petrolatum Ointment) 1 Application(s) Topical daily  buPROPion . 150 milliGRAM(s) Oral at bedtime  buPROPion . 300 milliGRAM(s) Oral <User Schedule>  chlorhexidine 0.12% Liquid 15 milliLiter(s) Swish and Spit <User Schedule>  chlorhexidine 2% Cloths 1 Application(s) Topical daily  enoxaparin Injectable 40 milliGRAM(s) SubCutaneous every 24 hours  hydrocortisone 1% Cream 1 Application(s) Topical every 12 hours  QUEtiapine 100 milliGRAM(s) Oral at bedtime  risperiDONE   Solution 3 milliGRAM(s) Oral at bedtime  risperiDONE   Solution 1 milliGRAM(s) Oral <User Schedule>  sodium chloride 0.9% lock flush 3 milliLiter(s) IV Push every 8 hours    MEDICATIONS  (PRN):  oxyCODONE    Solution 5 milliGRAM(s) Oral every 4 hours PRN Moderate Pain (4 - 6)           65 y/o F HD32 POD31 R partial Glossectomy in the setting of SSC, L RFFF, SND I-IV, L thigh STSFG, Tracheostomy 6/6/24. Pts post-operative course c/b venous congestion of flap w/ RTOR on 6/11/24 for free flap salvage and revision of venous anastomosis RTOR on 6/14/24 for debridement of necrotic radial forearm flap and replacement with right ALT flap, with 7.5 trach exchanged to 7.0 cuffed trach. Patient has been decannulated since 6/30 and has Saturating 100% on RA with no DAGO.  Patient endorses feeling well and ready to go home and denies any f/c/n/v. Spoke with ID regarding leukopenia. No current concerns as per ID. Patient reports having diarrhea yesterday. Plan to DC to rehab pending case management discussion.    Interval events  Diarrhea reported yesterday, bowel regimen dc'd, AVSS  Patient reports ambulating and voiding both urine and stool    Vitals:     Vital Signs Last 24 Hrs  T(C): 36.3 (08 Jul 2024 05:24), Max: 37 (07 Jul 2024 09:00)  T(F): 97.3 (08 Jul 2024 05:24), Max: 98.6 (07 Jul 2024 09:00)  HR: 86 (08 Jul 2024 05:24) (86 - 99)  BP: 118/79 (08 Jul 2024 05:24) (106/71 - 135/95)  BP(mean): --  RR: 18 (08 Jul 2024 05:24) (17 - 18)  SpO2: 99% (08 Jul 2024 05:24) (98% - 99%)    Parameters below as of 08 Jul 2024 05:24  Patient On (Oxygen Delivery Method): room air        I&O's Detail    07 Jul 2024 07:01  -  08 Jul 2024 07:00  --------------------------------------------------------  IN:    Enteral Tube Flush: 520 mL    Jevity 1.5: 1200 mL  Total IN: 1720 mL    OUT:    Voided (mL): 1400 mL  Total OUT: 1400 mL    Total NET: 320 mL          Medications:    MEDICATIONS  (STANDING):  acetaminophen   Oral Liquid .. 975 milliGRAM(s) Oral every 6 hours  acetylcysteine 10%  Inhalation 4 milliLiter(s) Inhalation every 6 hours  albuterol    90 MICROgram(s) HFA Inhaler 1 Puff(s) Inhalation once  albuterol/ipratropium for Nebulization 3 milliLiter(s) Nebulizer every 6 hours  amLODIPine   Tablet 10 milliGRAM(s) Oral daily  AQUAPHOR (petrolatum Ointment) 1 Application(s) Topical daily  AQUAPHOR (petrolatum Ointment) 1 Application(s) Topical daily  buPROPion . 150 milliGRAM(s) Oral at bedtime  buPROPion . 300 milliGRAM(s) Oral <User Schedule>  chlorhexidine 0.12% Liquid 15 milliLiter(s) Swish and Spit <User Schedule>  chlorhexidine 2% Cloths 1 Application(s) Topical daily  enoxaparin Injectable 40 milliGRAM(s) SubCutaneous every 24 hours  hydrocortisone 1% Cream 1 Application(s) Topical every 12 hours  QUEtiapine 100 milliGRAM(s) Oral at bedtime  risperiDONE   Solution 3 milliGRAM(s) Oral at bedtime  risperiDONE   Solution 1 milliGRAM(s) Oral <User Schedule>  sodium chloride 0.9% lock flush 3 milliLiter(s) IV Push every 8 hours    MEDICATIONS  (PRN):  oxyCODONE    Solution 5 milliGRAM(s) Oral every 4 hours PRN Moderate Pain (4 - 6)

## 2024-07-08 NOTE — PROGRESS NOTE ADULT - SUBJECTIVE AND OBJECTIVE BOX
[facility] Division of Hospital Medicine  Ken Sheikh MD  Pager [phone]    Patient is a 64y old  Female who presents with a chief complaint of s/p surgery (30 Jun 2024 13:05)      SUBJECTIVE / OVERNIGHT EVENTS: c/o diarrhea      MEDICATIONS  (STANDING):  acetaminophen   Oral Liquid .. 975 milliGRAM(s) Oral every 6 hours  acetylcysteine 10%  Inhalation 4 milliLiter(s) Inhalation every 6 hours  albuterol    90 MICROgram(s) HFA Inhaler 1 Puff(s) Inhalation once  albuterol/ipratropium for Nebulization 3 milliLiter(s) Nebulizer every 6 hours  amLODIPine   Tablet 10 milliGRAM(s) Oral daily  AQUAPHOR (petrolatum Ointment) 1 Application(s) Topical daily  AQUAPHOR (petrolatum Ointment) 1 Application(s) Topical daily  buPROPion . 150 milliGRAM(s) Oral at bedtime  buPROPion . 300 milliGRAM(s) Oral <User Schedule>  chlorhexidine 0.12% Liquid 15 milliLiter(s) Swish and Spit <User Schedule>  chlorhexidine 2% Cloths 1 Application(s) Topical daily  enoxaparin Injectable 40 milliGRAM(s) SubCutaneous every 24 hours  hydrocortisone 1% Cream 1 Application(s) Topical every 12 hours  loperamide 2 milliGRAM(s) Oral every 4 hours  QUEtiapine 100 milliGRAM(s) Oral at bedtime  risperiDONE   Solution 3 milliGRAM(s) Oral at bedtime  risperiDONE   Solution 1 milliGRAM(s) Oral <User Schedule>  sodium chloride 0.9% lock flush 3 milliLiter(s) IV Push every 8 hours    MEDICATIONS  (PRN):  oxyCODONE    Solution 5 milliGRAM(s) Oral every 4 hours PRN Moderate Pain (4 - 6)      CAPILLARY BLOOD GLUCOSE        I&O's Summary    07 Jul 2024 07:01  -  08 Jul 2024 07:00  --------------------------------------------------------  IN: 1380 mL / OUT: 1400 mL / NET: -20 mL    08 Jul 2024 07:01  -  08 Jul 2024 15:26  --------------------------------------------------------  IN: 0 mL / OUT: 600 mL / NET: -600 mL        PHYSICAL EXAM:  Vital Signs Last 24 Hrs  T(C): 36.8 (08 Jul 2024 13:35), Max: 37 (08 Jul 2024 10:08)  T(F): 98.2 (08 Jul 2024 13:35), Max: 98.6 (08 Jul 2024 10:08)  HR: 96 (08 Jul 2024 13:35) (86 - 99)  BP: 126/86 (08 Jul 2024 13:35) (113/74 - 135/95)  BP(mean): --  RR: 17 (08 Jul 2024 13:35) (17 - 18)  SpO2: 100% (08 Jul 2024 13:35) (98% - 100%)    Parameters below as of 08 Jul 2024 13:35  Patient On (Oxygen Delivery Method): room air      CONSTITUTIONAL: NAD  ENMT:  decannulated, neck wound c/d/i sutures in place   RESPIRATORY: Normal respiratory effort; lungs are clear to auscultation bilaterally  CARDIOVASCULAR: Regular rate and rhythm, normal S1 and S2, no murmur/rub/gallop; No lower extremity edema; Peripheral pulses are 2+ bilaterally  ABDOMEN: Nontender to palpation, normoactive bowel sounds, no rebound/guarding; + PEG   MUSCULOSKELETAL:  LT UE graft site + Lt thigh graft site   NEUROLOGY: follows command moving all       COORDINATION OF CARE:  Care Discussed with Consultants/Other Providers [Y/N]: OMFS

## 2024-07-08 NOTE — PROGRESS NOTE ADULT - PROBLEM SELECTOR PLAN 2
- ANC slight downtrend 940   - RVP neg   - poss secondary to recent antibiotics now improved  - follow CBC with diff

## 2024-07-08 NOTE — PROGRESS NOTE ADULT - PROBLEM SELECTOR PLAN 1
- noted diarrhea ON; agree with discontinue bowel regimen  - would hold antidiarrheals and monitor off if recurrent with send off GI PCR  - may benefot from adjustment of tube feed formula

## 2024-07-08 NOTE — PROGRESS NOTE ADULT - ASSESSMENT
63 y/o F  HD31 POD30 R partial Glossectomy in the setting of SSC, L RFFF, SND I-IV, L thigh STSFG, Tracheostomy 6/6/24. Pts post-operative course c/b venous congestion of flap w/ RTOR on 6/11/24 for free flap salvage and revision of venous anastomosis RTOR on 6/14/24 for debridement of necrotic radial forearm flap and replacement with right ALT flap, with 7.5 trach exchanged to 7.0 cuffed trach. Patient has been decannulated since 6/30 and has Saturating 100% on RA with no DAGO.  Patient endorses feeling well and ready to go home and denies any f/c/n/v. Spoke with ID regarding leukopenia. No current concerns as per ID. Plan to DC to rehab pending case management discussion.    Plan:      - Strict I&O's  - reposition the patient Q4-5h intervals  - clean/bath the patient daily for improvement of the heat rash  - Leave nylons in, PRS to remove after at least 4 weeks  - Aquaphor to left thigh and LUE donor site daily  - f/u CM for rehab and DC  - F/U appointment with Dr. Lockett 7/16 at 9am (rescheduled from 7/9)    Yair Wall  Oral and Maxillofacial Surgery  [facility] Pager #[phone]  [facility] Pager: [phone]  [facility] Pager: [phone]  Available on Teams     65 y/o F  HD31 POD30 R partial Glossectomy in the setting of SSC, L RFFF, SND I-IV, L thigh STSFG, Tracheostomy 6/6/24. Pts post-operative course c/b venous congestion of flap w/ RTOR on 6/11/24 for free flap salvage and revision of venous anastomosis RTOR on 6/14/24 for debridement of necrotic radial forearm flap and replacement with right ALT flap, with 7.5 trach exchanged to 7.0 cuffed trach. Patient has been decannulated since 6/30 and has Saturating 100% on RA with no DAGO.  Patient endorses feeling well and ready to go home and denies any f/c/n/v. Spoke with ID regarding leukopenia. No current concerns as per ID. Plan to DC to rehab pending case management discussion.    Plan:      - Strict I&O's  - reposition the patient Q4-5h intervals  - clean/bath the patient daily for improvement of the heat rash  - Leave nylons in, PRS to remove after at least 4 weeks  - Aquaphor to left thigh and LUE donor site daily  - f/u CM for rehab and DC  - F/U appointment with Dr. Lockett ... at 9am (rescheduled from 7/9)    Yair Wall  Oral and Maxillofacial Surgery  [facility] Pager #[phone]  [facility] Pager: [phone]  [facility] Pager: [phone]  Available on Teams

## 2024-07-08 NOTE — PROGRESS NOTE ADULT - SUBJECTIVE AND OBJECTIVE BOX
63 y/o F  HD31 POD30 R partial Glossectomy in the setting of SSC, L RFFF, SND I-IV, L thigh STSFG, Tracheostomy 6/6/24. Pts post-operative course c/b venous congestion of flap w/ RTOR on 6/11/24 for free flap salvage and revision of venous anastomosis RTOR on 6/14/24 for debridement of necrotic radial forearm flap and replacement with right ALT flap, with 7.5 trach exchanged to 7.0 cuffed trach. Patient has been decannulated since 6/30 and has Saturating 100% on RA with no DAGO.  Patient endorses feeling well and ready to go home and denies any f/c/n/v. Spoke with ID regarding leukopenia. No current concerns as per ID. Plan to DC to rehab pending case management discussion.    Interval events  Pt reported no episodes of diarrhea overnight, bowel regimen dc'd, AVSS  Patient reports tolerating Tube feeds well, ambulating, and voiding both urine and stool    Physical Examination  General: AAOx3, NAD  NEURO: AAOx3  HEENT: neck incision clean, dry, and intact. flap warm and well perfused, good color  RLE: thigh soft, incision c/d/i, healing well  LUE: Incision c/d/i.    Vital Signs Last 24 Hrs  T(C): 36.3 (08 Jul 2024 05:24), Max: 37 (07 Jul 2024 09:00)  T(F): 97.3 (08 Jul 2024 05:24), Max: 98.6 (07 Jul 2024 09:00)  HR: 86 (08 Jul 2024 05:24) (86 - 99)  BP: 118/79 (08 Jul 2024 05:24) (106/71 - 135/95)  BP(mean): --  RR: 18 (08 Jul 2024 05:24) (17 - 18)  SpO2: 99% (08 Jul 2024 05:24) (98% - 99%)    Parameters below as of 08 Jul 2024 05:24  Patient On (Oxygen Delivery Method): room air    I&O's Detail    07 Jul 2024 07:01  -  08 Jul 2024 07:00  --------------------------------------------------------  IN:    Enteral Tube Flush: 520 mL    Jevity 1.5: 1200 mL  Total IN: 1720 mL    OUT:    Voided (mL): 1400 mL  Total OUT: 1400 mL    Total NET: 320 mL      MEDICATIONS  (STANDING):  acetaminophen   Oral Liquid .. 975 milliGRAM(s) Oral every 6 hours  acetylcysteine 10%  Inhalation 4 milliLiter(s) Inhalation every 6 hours  albuterol    90 MICROgram(s) HFA Inhaler 1 Puff(s) Inhalation once  albuterol/ipratropium for Nebulization 3 milliLiter(s) Nebulizer every 6 hours  amLODIPine   Tablet 10 milliGRAM(s) Oral daily  AQUAPHOR (petrolatum Ointment) 1 Application(s) Topical daily  AQUAPHOR (petrolatum Ointment) 1 Application(s) Topical daily  buPROPion . 300 milliGRAM(s) Oral <User Schedule>  buPROPion . 150 milliGRAM(s) Oral at bedtime  chlorhexidine 0.12% Liquid 15 milliLiter(s) Swish and Spit <User Schedule>  chlorhexidine 2% Cloths 1 Application(s) Topical daily  enoxaparin Injectable 40 milliGRAM(s) SubCutaneous every 24 hours  hydrocortisone 1% Cream 1 Application(s) Topical every 12 hours  QUEtiapine 100 milliGRAM(s) Oral at bedtime  risperiDONE   Solution 3 milliGRAM(s) Oral at bedtime  risperiDONE   Solution 1 milliGRAM(s) Oral <User Schedule>  sodium chloride 0.9% lock flush 3 milliLiter(s) IV Push every 8 hours    MEDICATIONS  (PRN):  oxyCODONE    Solution 5 milliGRAM(s) Oral every 4 hours PRN Moderate Pain (4 - 6)

## 2024-07-08 NOTE — PROGRESS NOTE ADULT - REASON FOR ADMISSION
Glossectomy neck dissection trach on 6/6 post op flap had a thrombosis s/p TPA s/p exploration of tongue free flap now s/p muscle flap revision with right thigh flap
s/p surgery
SCC
s/p surgery
ENT /OMFS
ENT
ENT

## 2024-07-08 NOTE — PROGRESS NOTE ADULT - ASSESSMENT
65 y/o F HD32 POD31 R partial Glossectomy in the setting of SSC, L RFFF, SND I-IV, L thigh STSFG, Tracheostomy 6/6/24. Pts post-operative course c/b venous congestion of flap w/ RTOR on 6/11/24 for free flap salvage and revision of venous anastomosis RTOR on 6/14/24 for debridement of necrotic radial forearm flap and replacement with right ALT flap, with 7.5 trach exchanged to 7.0 cuffed trach. Patient has been decannulated since 6/30 and has Saturating 100% on RA with no DAGO.  Patient endorses feeling well and ready to go home and denies any f/c/n/v. Spoke with ID regarding leukopenia. No current concerns as per ID. Plan to DC to rehab pending case management discussion.    Plan:  - Strict I&O's  - reposition the patient Q4-5h intervals  - clean/bath the patient daily for improvement of the heat rash  - Leave nylons in, PRS to remove after at least 4 weeks  - Aquaphor to left thigh and LUE donor site daily  - f/u CM for rehab and DC  - F/U appointment with Dr. Lockett on either 7/16 or 7/17    Barrington Vivar  Oral and Maxillofacial Surgery  [facility] Pager #[phone]  [facility] Pager: [phone]  [facility] Pager: [phone]  Available on Teams   65 y/o F HD32 POD31 R partial Glossectomy in the setting of SSC, L RFFF, SND I-IV, L thigh STSFG, Tracheostomy 6/6/24. Pts post-operative course c/b venous congestion of flap w/ RTOR on 6/11/24 for free flap salvage and revision of venous anastomosis RTOR on 6/14/24 for debridement of necrotic radial forearm flap and replacement with right ALT flap, with 7.5 trach exchanged to 7.0 cuffed trach. Patient has been decannulated since 6/30 and has Saturating 100% on RA with no DAGO.  Patient endorses feeling well and ready to go home and denies any f/c/n/v. Spoke with ID regarding leukopenia. No current concerns as per ID. Plan to DC to rehab pending case management discussion.    Plan:  - Strict I&O's  - reposition the patient Q4-5h intervals  - clean/bath the patient daily for improvement of the heat rash  - Leave nylons in, PRS to remove after at least 4 weeks  - Aquaphor to left thigh and LUE donor site daily  - f/u CM for rehab and DC  - F/U appointment with Dr. Lockett on 7/24/24 at 11:00 AM    Barrington Vivar  Oral and Maxillofacial Surgery  [facility] Pager #[phone]  [facility] Pager: [phone]  [facility] Pager: [phone]  Available on Teams

## 2024-07-09 ENCOUNTER — APPOINTMENT (OUTPATIENT)
Age: 64
End: 2024-07-09

## 2024-07-09 LAB
BASOPHILS # BLD AUTO: 0.01 K/UL — SIGNIFICANT CHANGE UP (ref 0–0.2)
BASOPHILS NFR BLD AUTO: 0.4 % — SIGNIFICANT CHANGE UP (ref 0–2)
EOSINOPHIL # BLD AUTO: 0.27 K/UL — SIGNIFICANT CHANGE UP (ref 0–0.5)
EOSINOPHIL NFR BLD AUTO: 10.7 % — HIGH (ref 0–6)
GI PCR PANEL: SIGNIFICANT CHANGE UP
HCT VFR BLD CALC: 36 % — SIGNIFICANT CHANGE UP (ref 34.5–45)
HGB BLD-MCNC: 11.6 G/DL — SIGNIFICANT CHANGE UP (ref 11.5–15.5)
IANC: 1.21 K/UL — LOW (ref 1.8–7.4)
IMM GRANULOCYTES NFR BLD AUTO: 0 % — SIGNIFICANT CHANGE UP (ref 0–0.9)
LYMPHOCYTES # BLD AUTO: 0.69 K/UL — LOW (ref 1–3.3)
LYMPHOCYTES # BLD AUTO: 27.3 % — SIGNIFICANT CHANGE UP (ref 13–44)
MCHC RBC-ENTMCNC: 30.3 PG — SIGNIFICANT CHANGE UP (ref 27–34)
MCHC RBC-ENTMCNC: 32.2 GM/DL — SIGNIFICANT CHANGE UP (ref 32–36)
MCV RBC AUTO: 94 FL — SIGNIFICANT CHANGE UP (ref 80–100)
MONOCYTES # BLD AUTO: 0.35 K/UL — SIGNIFICANT CHANGE UP (ref 0–0.9)
MONOCYTES NFR BLD AUTO: 13.8 % — SIGNIFICANT CHANGE UP (ref 2–14)
NEUTROPHILS # BLD AUTO: 1.21 K/UL — LOW (ref 1.8–7.4)
NEUTROPHILS NFR BLD AUTO: 47.8 % — SIGNIFICANT CHANGE UP (ref 43–77)
NRBC # BLD: 0 /100 WBCS — SIGNIFICANT CHANGE UP (ref 0–0)
NRBC # FLD: 0 K/UL — SIGNIFICANT CHANGE UP (ref 0–0)
PLATELET # BLD AUTO: 234 K/UL — SIGNIFICANT CHANGE UP (ref 150–400)
RBC # BLD: 3.83 M/UL — SIGNIFICANT CHANGE UP (ref 3.8–5.2)
RBC # FLD: 16.8 % — HIGH (ref 10.3–14.5)
WBC # BLD: 2.53 K/UL — LOW (ref 3.8–10.5)
WBC # FLD AUTO: 2.53 K/UL — LOW (ref 3.8–10.5)

## 2024-07-09 PROCEDURE — 99232 SBSQ HOSP IP/OBS MODERATE 35: CPT

## 2024-07-09 RX ADMIN — Medication 2 MILLIGRAM(S): at 06:13

## 2024-07-09 RX ADMIN — Medication 1 APPLICATION(S): at 14:30

## 2024-07-09 RX ADMIN — Medication 975 MILLIGRAM(S): at 00:09

## 2024-07-09 RX ADMIN — RISPERIDONE 3 MILLIGRAM(S): 0.5 TABLET ORAL at 22:01

## 2024-07-09 RX ADMIN — Medication 1 APPLICATION(S): at 17:58

## 2024-07-09 RX ADMIN — Medication 3 MILLILITER(S): at 22:00

## 2024-07-09 RX ADMIN — Medication 15 MILLILITER(S): at 12:44

## 2024-07-09 RX ADMIN — Medication 1 APPLICATION(S): at 06:13

## 2024-07-09 RX ADMIN — BUPROPION HYDROCHLORIDE 150 MILLIGRAM(S): 150 TABLET, EXTENDED RELEASE ORAL at 22:00

## 2024-07-09 RX ADMIN — Medication 975 MILLIGRAM(S): at 17:15

## 2024-07-09 RX ADMIN — Medication 975 MILLIGRAM(S): at 12:29

## 2024-07-09 RX ADMIN — BUPROPION HYDROCHLORIDE 300 MILLIGRAM(S): 150 TABLET, EXTENDED RELEASE ORAL at 06:13

## 2024-07-09 RX ADMIN — Medication 975 MILLIGRAM(S): at 13:20

## 2024-07-09 RX ADMIN — Medication 3 MILLILITER(S): at 13:01

## 2024-07-09 RX ADMIN — Medication 15 MILLILITER(S): at 18:45

## 2024-07-09 RX ADMIN — Medication 975 MILLIGRAM(S): at 06:12

## 2024-07-09 RX ADMIN — Medication 975 MILLIGRAM(S): at 23:54

## 2024-07-09 RX ADMIN — Medication 975 MILLIGRAM(S): at 17:59

## 2024-07-09 RX ADMIN — Medication 3 MILLILITER(S): at 06:00

## 2024-07-09 RX ADMIN — Medication 100 MILLIGRAM(S): at 22:00

## 2024-07-09 RX ADMIN — Medication 15 MILLILITER(S): at 14:30

## 2024-07-09 RX ADMIN — ENOXAPARIN SODIUM 40 MILLIGRAM(S): 100 INJECTION SUBCUTANEOUS at 14:30

## 2024-07-09 RX ADMIN — RISPERIDONE 1 MILLIGRAM(S): 0.5 TABLET ORAL at 06:12

## 2024-07-09 RX ADMIN — Medication 15 MILLILITER(S): at 22:16

## 2024-07-09 RX ADMIN — Medication 15 MILLILITER(S): at 17:15

## 2024-07-09 RX ADMIN — PETROLATUM 1 APPLICATION(S): 42 OINTMENT TOPICAL at 12:29

## 2024-07-09 RX ADMIN — AMLODIPINE BESYLATE 10 MILLIGRAM(S): 2.5 TABLET ORAL at 06:13

## 2024-07-09 RX ADMIN — PETROLATUM 1 APPLICATION(S): 42 OINTMENT TOPICAL at 12:30

## 2024-07-09 NOTE — CHART NOTE - NSCHARTNOTESELECT_GEN_ALL_CORE
ENT/Event Note
Event Note
Nutrition Follow-up Note/Nutrition Services
OMFS/Event Note
Trach change/Event Note
Decannulation/Event Note
Event Note
IR Pre-Procedure/Event Note
Nutrition Follow-up Note/Nutrition Services
Post op check
Trach change/Event Note
Transfer - SICU/Transfer Note
Event Note

## 2024-07-09 NOTE — PROGRESS NOTE ADULT - SUBJECTIVE AND OBJECTIVE BOX
[facility] Division of Hospital Medicine  Ken Sheikh MD  Pager [phone]    Patient is a 64y old  Female who presents with a chief complaint of SCC (08 Jul 2024 15:21)      SUBJECTIVE / OVERNIGHT EVENTS: still having diarrhea      MEDICATIONS  (STANDING):  acetaminophen   Oral Liquid .. 975 milliGRAM(s) Oral every 6 hours  acetylcysteine 10%  Inhalation 4 milliLiter(s) Inhalation every 6 hours  albuterol    90 MICROgram(s) HFA Inhaler 1 Puff(s) Inhalation once  albuterol/ipratropium for Nebulization 3 milliLiter(s) Nebulizer every 6 hours  amLODIPine   Tablet 10 milliGRAM(s) Oral daily  AQUAPHOR (petrolatum Ointment) 1 Application(s) Topical daily  AQUAPHOR (petrolatum Ointment) 1 Application(s) Topical daily  buPROPion . 300 milliGRAM(s) Oral <User Schedule>  buPROPion . 150 milliGRAM(s) Oral at bedtime  chlorhexidine 0.12% Liquid 15 milliLiter(s) Swish and Spit <User Schedule>  chlorhexidine 2% Cloths 1 Application(s) Topical daily  enoxaparin Injectable 40 milliGRAM(s) SubCutaneous every 24 hours  hydrocortisone 1% Cream 1 Application(s) Topical every 12 hours  QUEtiapine 100 milliGRAM(s) Oral at bedtime  risperiDONE   Solution 3 milliGRAM(s) Oral at bedtime  risperiDONE   Solution 1 milliGRAM(s) Oral <User Schedule>  sodium chloride 0.9% lock flush 3 milliLiter(s) IV Push every 8 hours    MEDICATIONS  (PRN):  oxyCODONE    Solution 5 milliGRAM(s) Oral every 4 hours PRN Moderate Pain (4 - 6)      CAPILLARY BLOOD GLUCOSE        I&O's Summary    08 Jul 2024 07:01  -  09 Jul 2024 07:00  --------------------------------------------------------  IN: 1760 mL / OUT: 1850 mL / NET: -90 mL    09 Jul 2024 07:01  -  09 Jul 2024 13:53  --------------------------------------------------------  IN: 620 mL / OUT: 0 mL / NET: 620 mL        PHYSICAL EXAM:  Vital Signs Last 24 Hrs  T(C): 36.5 (09 Jul 2024 09:52), Max: 36.9 (09 Jul 2024 05:32)  T(F): 97.7 (09 Jul 2024 09:52), Max: 98.4 (09 Jul 2024 05:32)  HR: 88 (09 Jul 2024 09:52) (76 - 100)  BP: 114/79 (09 Jul 2024 09:52) (106/71 - 120/88)  BP(mean): --  RR: 18 (09 Jul 2024 09:52) (17 - 18)  SpO2: 99% (09 Jul 2024 09:52) (98% - 99%)    Parameters below as of 09 Jul 2024 09:52  Patient On (Oxygen Delivery Method): room air      CONSTITUTIONAL: NAD  EYES: EOMI, conjunctiva and sclera clear  ENMT: Moist oral mucosa  NECK:post opchanges  RESPIRATORY: Breathing unlabored, CTAB  CARDIOVASCULAR: S1S2 no MRG  ABDOMEN: Nontender to palpation, normoactive bowel sounds, no rebound/guarding  MUSCULOSKELETAL: no clubbing or cyanosis of digits  NEUROLOGY: No focal deficits   SKIN: No rashes or lesions    LABS:                        11.6   2.53  )-----------( 234      ( 09 Jul 2024 06:13 )             36.0

## 2024-07-09 NOTE — PROGRESS NOTE ADULT - PROBLEM SELECTOR PLAN 1
- noted diarrhea ON; agree with discontinue bowel regimen  - would hold antidiarrheals and monitor off if recurrent with send off GI PCR  - may benefit from adjustment of tube feed formula

## 2024-07-09 NOTE — CHART NOTE - NSCHARTNOTEFT_GEN_A_CORE
Source: [X] Patient       [x] EMR        [X] RN        [] Family at bedside       [X] Other: OMFS team       Nutrition Follow Up Note. 63 y/o F HD33 POD32 R partial Glossectomy in the setting of SSC, L RFFF, SND I-IV, L thigh STSFG, Tracheostomy 6/6/24. Pts post-operative course c/b venous congestion of flap w/ RTOR on 6/11/24 for free flap salvage and revision of venous anastomosis RTOR on 6/14/24 for debridement of necrotic radial forearm flap and replacement with right ALT flap, with 7.5 trach exchanged to 7.0 cuffed trach. Patient has been decannulated since 6/30 and has Saturating 100% on RA with no DAGO.  Patient endorses feeling well but reports having diarrhea at 1800 and 2000 yesterday. She denies any f/c/n/v. Spoke with ID regarding leukopenia. No current concerns as per ID. Plan to DC to rehab pending no continued diarrhea, will consult nutrition for alteration of tube feed.    Patient is familiar to this department during the course of admission. Patient has been tolerating tube feeds maintained on Jevity1.5cal bolus feeds of 340mL [id number] provides total volume of 1360mL, 2040kcal, 87g pro and 1034mL free water. Patient reported to have loose stool over last 2 days, bowel regimen was held and started on loperamide on 7/6. Therefore, she refused tube feeds, was provided 260mL bolus feed yesterday at 6pm and 340mL bolus feed at 6am. She missed 6am and 12pm 7/8 tube feeds. Case discussed with OMFS team, concern for possible c.diff.         Diet Prescription: Diet, NPO with Tube Feed:   Tube Feeding Modality: Gastrostomy  Jevity 1.5 [place] (JEVITY1.5RTH)  Total Volume for 24 Hours (mL): 1360  Bolus  Total Volume of Bolus (mL):  340  Tube Feed Frequency: Every 6 hours   Tube Feed Start Time: 09:53  Free Water Flush  Bolus   Total Volume per Flush (mL): 260   Frequency: Every 6 Hours (06-27-24 @ 09:53)    Food Allergy: NKFA    Pertinent Medications: MEDICATIONS  (STANDING):  acetaminophen   Oral Liquid .. 975 milliGRAM(s) Oral every 6 hours  acetylcysteine 10%  Inhalation 4 milliLiter(s) Inhalation every 6 hours  albuterol    90 MICROgram(s) HFA Inhaler 1 Puff(s) Inhalation once  albuterol/ipratropium for Nebulization 3 milliLiter(s) Nebulizer every 6 hours  amLODIPine   Tablet 10 milliGRAM(s) Oral daily  AQUAPHOR (petrolatum Ointment) 1 Application(s) Topical daily  AQUAPHOR (petrolatum Ointment) 1 Application(s) Topical daily  buPROPion . 150 milliGRAM(s) Oral at bedtime  buPROPion . 300 milliGRAM(s) Oral <User Schedule>  chlorhexidine 0.12% Liquid 15 milliLiter(s) Swish and Spit <User Schedule>  chlorhexidine 2% Cloths 1 Application(s) Topical daily  enoxaparin Injectable 40 milliGRAM(s) SubCutaneous every 24 hours  hydrocortisone 1% Cream 1 Application(s) Topical every 12 hours  loperamide 2 milliGRAM(s) Oral every 4 hours  QUEtiapine 100 milliGRAM(s) Oral at bedtime  risperiDONE   Solution 3 milliGRAM(s) Oral at bedtime  risperiDONE   Solution 1 milliGRAM(s) Oral <User Schedule>  sodium chloride 0.9% lock flush 3 milliLiter(s) IV Push every 8 hours    MEDICATIONS  (PRN):  oxyCODONE    Solution 5 milliGRAM(s) Oral every 4 hours PRN Moderate Pain (4 - 6)    Pertinent Labs:  07-06 Phos 4.0 mg/dL     CAPILLARY BLOOD GLUCOSE      Weight (kg): dosing weights: 59 (06-14 @ 06:41)  59kg (6/6)  59kg (5/30)  Fluctuating daily weights documented during the course of admission-? question accuracy  Daily weights (kg): 6/17- 69.8kg   6/16-68.4kg  6/15-68.4kg  6/14-71.2kg  6/9-65.2kg  6/6-72.3kg   Height: 162.6cm  IBW: 54.5 kg +/-10%  BMI: 22.3kg/m^2 based on dosing weight of 59kg      Edema: no edema per nursing flowsheets.   Pressure Injury: No pressure ulcers/DTI noted in flowsheets.       Estimated Needs:    [X] No change since previous assessment, based on dosing weight 59kg    Estimated Energy Needs (kcal/kg):   30-35 = 1770-2065kcal/d  Estimated Protein Needs(g/kg):      1.2-1.5 = 71-89g pro/d    [ ] recalculated, with consideration for, based on weight    Previous Nutrition Diagnosis:      [X ] Increased Nutrient Needs   Nutrition Diagnosis is [X ] ongoing  [ ] resolved [ ] not applicable     [X]  Inadequate protein energy intake, related to unable to tolerate po intake As evidenced by dysphagia    Nutrition Diagnosis is [ ] ongoing  [ ] resolved [X ] not applicable       Education:  [ ] Provided  [ ] Provided on previous assessment by RD  [ X] Not warranted  [ ] Pt refused       Nutrition Interventions:   1) Continue TF regimen as tolerated Additional fluids per MD discretion.  2) Patient with no weight recorded >2 weeks. Nursing staff informed by RD to record same.  3) Monitor weights, labs, BM's, skin integrity, tolerance to EN. Concern for C.diff given frequent bowel movement as mentioned above.  4) Further adjustments to rate/volume/duration/free water provision of enteral feeds dependant on long term monitoring of patient's tolerance, weight trends and needs   5) RN to obtain new weight and weekly weights.   6) If no concern for C.diff or negative, may consider switching to [employer] [employer] Peptide 1.5 bolus feeds of 325mL [id number] provides total volume of 1300mL, 2000kcal, 96g pro and 912mL free water. Additional fluids per MD discretion.      RD remains available.      Helio Wright RD, MS, CDN pager [phone]  Also available on [employer] Teams

## 2024-07-09 NOTE — PROGRESS NOTE ADULT - PROBLEM SELECTOR PLAN 2
- ANC slight downtrend 940   - RVP neg   - poss secondary to recent antibiotics now improved  - Improving

## 2024-07-09 NOTE — PROGRESS NOTE ADULT - ASSESSMENT
65 y/o F HD33 POD32 R partial Glossectomy in the setting of SSC, L RFFF, SND I-IV, L thigh STSFG, Tracheostomy 6/6/24. Pts post-operative course c/b venous congestion of flap w/ RTOR on 6/11/24 for free flap salvage and revision of venous anastomosis RTOR on 6/14/24 for debridement of necrotic radial forearm flap and replacement with right ALT flap, with 7.5 trach exchanged to 7.0 cuffed trach. Patient has been decannulated since 6/30 and has Saturating 100% on RA with no DAGO.  Patient endorses feeling well but reports having diarrhea at 1800 and 2000 yesterday. She denies any f/c/n/v. Spoke with ID regarding leukopenia. No current concerns as per ID. Plan to DC to rehab pending no continued diarrhea, will consult nutrition for alteration of tube feed. If continued diarrhea occurs will do a GIPCR and C. difficile panel.    Plan:  - Strict I&O's  - reposition the patient Q4-5h intervals  - clean/bath the patient daily for improvement of the heat rash  - Leave nylons in, PRS to remove after at least 4 weeks  - Aquaphor to left thigh and LUE donor site daily  - Nutrition consult for alteration of tube feed  - If continued diarrhea occurs, will do a GIPCR and C. difficile panel.  - F/U appointment with Dr. Lockett on 7/24/24 at 11:00 AM    Barrington Vivar  Oral and Maxillofacial Surgery  [facility] Pager #[phone]  [facility] Pager: [phone]  [facility] Pager: [phone]  Available on Teams

## 2024-07-09 NOTE — PROGRESS NOTE ADULT - SUBJECTIVE AND OBJECTIVE BOX
65 y/o F HD33 POD32 R partial Glossectomy in the setting of SSC, L RFFF, SND I-IV, L thigh STSFG, Tracheostomy 6/6/24. Pts post-operative course c/b venous congestion of flap w/ RTOR on 6/11/24 for free flap salvage and revision of venous anastomosis RTOR on 6/14/24 for debridement of necrotic radial forearm flap and replacement with right ALT flap, with 7.5 trach exchanged to 7.0 cuffed trach. Patient has been decannulated since 6/30 and has Saturating 100% on RA with no DAGO.  Patient endorses feeling well and denies any f/c/n/v. Previously spoke with ID regarding leukopenia. No current concerns as per ID. Patient reports having diarrhea yesterday at 1800 and 2000 but reports no additional episodes of diarrhea. Plan to DC to rehab pending no continued episodes of diarrhea.    Interval events  Diarrhea reported yesterday at 1800 and 2000 AVSS  Patient reports ambulating and voiding both urine and stool    Vitals:     Vital Signs Last 24 Hrs  T(C): 36.9 (09 Jul 2024 05:32), Max: 37 (08 Jul 2024 10:08)  T(F): 98.4 (09 Jul 2024 05:32), Max: 98.6 (08 Jul 2024 10:08)  HR: 76 (09 Jul 2024 05:32) (76 - 100)  BP: 120/88 (09 Jul 2024 05:32) (106/71 - 126/86)  BP(mean): --  RR: 18 (09 Jul 2024 05:32) (17 - 18)  SpO2: 99% (09 Jul 2024 05:32) (98% - 100%)    Parameters below as of 09 Jul 2024 05:32  Patient On (Oxygen Delivery Method): room air        I&O's Detail    08 Jul 2024 07:01  -  09 Jul 2024 07:00  --------------------------------------------------------  IN:    Enteral Tube Flush: 780 mL    Jevity 1.5: 980 mL  Total IN: 1760 mL    OUT:    Voided (mL): 1850 mL  Total OUT: 1850 mL    Total NET: -90 mL          Medications:    MEDICATIONS  (STANDING):  acetaminophen   Oral Liquid .. 975 milliGRAM(s) Oral every 6 hours  acetylcysteine 10%  Inhalation 4 milliLiter(s) Inhalation every 6 hours  albuterol    90 MICROgram(s) HFA Inhaler 1 Puff(s) Inhalation once  albuterol/ipratropium for Nebulization 3 milliLiter(s) Nebulizer every 6 hours  amLODIPine   Tablet 10 milliGRAM(s) Oral daily  AQUAPHOR (petrolatum Ointment) 1 Application(s) Topical daily  AQUAPHOR (petrolatum Ointment) 1 Application(s) Topical daily  buPROPion . 150 milliGRAM(s) Oral at bedtime  buPROPion . 300 milliGRAM(s) Oral <User Schedule>  chlorhexidine 0.12% Liquid 15 milliLiter(s) Swish and Spit <User Schedule>  chlorhexidine 2% Cloths 1 Application(s) Topical daily  enoxaparin Injectable 40 milliGRAM(s) SubCutaneous every 24 hours  hydrocortisone 1% Cream 1 Application(s) Topical every 12 hours  loperamide 2 milliGRAM(s) Oral every 4 hours  QUEtiapine 100 milliGRAM(s) Oral at bedtime  risperiDONE   Solution 1 milliGRAM(s) Oral <User Schedule>  risperiDONE   Solution 3 milliGRAM(s) Oral at bedtime  sodium chloride 0.9% lock flush 3 milliLiter(s) IV Push every 8 hours    MEDICATIONS  (PRN):  oxyCODONE    Solution 5 milliGRAM(s) Oral every 4 hours PRN Moderate Pain (4 - 6)

## 2024-07-10 ENCOUNTER — TRANSCRIPTION ENCOUNTER (OUTPATIENT)
Age: 64
End: 2024-07-10

## 2024-07-10 VITALS
HEART RATE: 98 BPM | OXYGEN SATURATION: 99 % | DIASTOLIC BLOOD PRESSURE: 90 MMHG | TEMPERATURE: 98 F | RESPIRATION RATE: 18 BRPM | SYSTOLIC BLOOD PRESSURE: 120 MMHG

## 2024-07-10 LAB
ANION GAP SERPL CALC-SCNC: 13 MMOL/L — SIGNIFICANT CHANGE UP (ref 7–14)
BASOPHILS # BLD AUTO: 0.01 K/UL — SIGNIFICANT CHANGE UP (ref 0–0.2)
BASOPHILS NFR BLD AUTO: 0.3 % — SIGNIFICANT CHANGE UP (ref 0–2)
BUN SERPL-MCNC: 14 MG/DL — SIGNIFICANT CHANGE UP (ref 7–23)
CALCIUM SERPL-MCNC: 8.9 MG/DL — SIGNIFICANT CHANGE UP (ref 8.4–10.5)
CHLORIDE SERPL-SCNC: 103 MMOL/L — SIGNIFICANT CHANGE UP (ref 98–107)
CO2 SERPL-SCNC: 21 MMOL/L — LOW (ref 22–31)
CREAT SERPL-MCNC: 0.51 MG/DL — SIGNIFICANT CHANGE UP (ref 0.5–1.3)
EGFR: 104 ML/MIN/1.73M2 — SIGNIFICANT CHANGE UP
EOSINOPHIL # BLD AUTO: 0.37 K/UL — SIGNIFICANT CHANGE UP (ref 0–0.5)
EOSINOPHIL NFR BLD AUTO: 10.9 % — HIGH (ref 0–6)
GLUCOSE SERPL-MCNC: 102 MG/DL — HIGH (ref 70–99)
HCT VFR BLD CALC: 34.2 % — LOW (ref 34.5–45)
HGB BLD-MCNC: 11.4 G/DL — LOW (ref 11.5–15.5)
IANC: 1.73 K/UL — LOW (ref 1.8–7.4)
IMM GRANULOCYTES NFR BLD AUTO: 0.3 % — SIGNIFICANT CHANGE UP (ref 0–0.9)
LYMPHOCYTES # BLD AUTO: 0.9 K/UL — LOW (ref 1–3.3)
LYMPHOCYTES # BLD AUTO: 26.5 % — SIGNIFICANT CHANGE UP (ref 13–44)
MAGNESIUM SERPL-MCNC: 1.8 MG/DL — SIGNIFICANT CHANGE UP (ref 1.6–2.6)
MCHC RBC-ENTMCNC: 30.7 PG — SIGNIFICANT CHANGE UP (ref 27–34)
MCHC RBC-ENTMCNC: 33.3 GM/DL — SIGNIFICANT CHANGE UP (ref 32–36)
MCV RBC AUTO: 92.2 FL — SIGNIFICANT CHANGE UP (ref 80–100)
MONOCYTES # BLD AUTO: 0.37 K/UL — SIGNIFICANT CHANGE UP (ref 0–0.9)
MONOCYTES NFR BLD AUTO: 10.9 % — SIGNIFICANT CHANGE UP (ref 2–14)
NEUTROPHILS # BLD AUTO: 1.73 K/UL — LOW (ref 1.8–7.4)
NEUTROPHILS NFR BLD AUTO: 51.1 % — SIGNIFICANT CHANGE UP (ref 43–77)
NRBC # BLD: 0 /100 WBCS — SIGNIFICANT CHANGE UP (ref 0–0)
NRBC # FLD: 0 K/UL — SIGNIFICANT CHANGE UP (ref 0–0)
PHOSPHATE SERPL-MCNC: 4.4 MG/DL — SIGNIFICANT CHANGE UP (ref 2.5–4.5)
PLATELET # BLD AUTO: 219 K/UL — SIGNIFICANT CHANGE UP (ref 150–400)
POTASSIUM SERPL-MCNC: 3.7 MMOL/L — SIGNIFICANT CHANGE UP (ref 3.5–5.3)
POTASSIUM SERPL-SCNC: 3.7 MMOL/L — SIGNIFICANT CHANGE UP (ref 3.5–5.3)
RBC # BLD: 3.71 M/UL — LOW (ref 3.8–5.2)
RBC # FLD: 16.2 % — HIGH (ref 10.3–14.5)
SODIUM SERPL-SCNC: 137 MMOL/L — SIGNIFICANT CHANGE UP (ref 135–145)
WBC # BLD: 3.39 K/UL — LOW (ref 3.8–10.5)
WBC # FLD AUTO: 3.39 K/UL — LOW (ref 3.8–10.5)

## 2024-07-10 PROCEDURE — 99232 SBSQ HOSP IP/OBS MODERATE 35: CPT

## 2024-07-10 RX ORDER — AMLODIPINE BESYLATE 2.5 MG/1
1 TABLET ORAL
Qty: 0 | Refills: 0 | DISCHARGE

## 2024-07-10 RX ADMIN — Medication 3 MILLILITER(S): at 06:15

## 2024-07-10 RX ADMIN — Medication 1 APPLICATION(S): at 11:20

## 2024-07-10 RX ADMIN — PETROLATUM 1 APPLICATION(S): 42 OINTMENT TOPICAL at 11:53

## 2024-07-10 RX ADMIN — AMLODIPINE BESYLATE 10 MILLIGRAM(S): 2.5 TABLET ORAL at 06:14

## 2024-07-10 RX ADMIN — Medication 15 MILLILITER(S): at 06:14

## 2024-07-10 RX ADMIN — PETROLATUM 1 APPLICATION(S): 42 OINTMENT TOPICAL at 11:54

## 2024-07-10 RX ADMIN — RISPERIDONE 1 MILLIGRAM(S): 0.5 TABLET ORAL at 06:15

## 2024-07-10 RX ADMIN — Medication 975 MILLIGRAM(S): at 06:14

## 2024-07-10 RX ADMIN — Medication 975 MILLIGRAM(S): at 11:21

## 2024-07-10 RX ADMIN — Medication 1 APPLICATION(S): at 06:15

## 2024-07-10 RX ADMIN — BUPROPION HYDROCHLORIDE 300 MILLIGRAM(S): 150 TABLET, EXTENDED RELEASE ORAL at 06:18

## 2024-07-10 RX ADMIN — Medication 3 MILLILITER(S): at 13:07

## 2024-07-10 NOTE — PROGRESS NOTE ADULT - PROBLEM SELECTOR PROBLEM 3
Hypertension
Schizophrenia
Schizophrenia
Malignant neoplasm of tongue
Urinary retention
Schizophrenia
Atelectasis
Schizophrenia
Urinary retention
Hypertension
Malignant neoplasm of tongue
Malignant neoplasm of tongue
Hypertension
Schizophrenia
Urinary retention
Hypertension
Urinary retention
Hypertension
Schizophrenia
Hypertension
Tachycardia
Malignant neoplasm of tongue
Schizophrenia

## 2024-07-10 NOTE — PROGRESS NOTE ADULT - PROBLEM SELECTOR PROBLEM 8
Need for prophylactic measure
Anemia due to acute blood loss
Need for prophylactic measure
Anemia due to acute blood loss
Need for prophylactic measure
Anemia due to acute blood loss
Need for prophylactic measure
Anemia due to acute blood loss

## 2024-07-10 NOTE — PROGRESS NOTE ADULT - PROBLEM SELECTOR PLAN 9
- lovenox    Dispo: rehab

## 2024-07-10 NOTE — PROGRESS NOTE ADULT - PROBLEM SELECTOR PROBLEM 4
Anemia due to acute blood loss
Schizophrenia
Hypertension
Urinary retention
Tachycardia
Tachycardia
Squamous cell cancer of tongue
Tachycardia
Hypertension
Squamous cell cancer of tongue
Tachycardia
Schizophrenia
Hypertension
Squamous cell cancer of tongue
Squamous cell cancer of tongue
Tachycardia
Hypertension
Tachycardia
Urinary retention

## 2024-07-10 NOTE — PROGRESS NOTE ADULT - PROBLEM SELECTOR PROBLEM 6
Schizophrenia
Anemia due to acute blood loss
Anemia due to acute blood loss
Schizophrenia
Anemia due to acute blood loss
Tachycardia
Anemia due to acute blood loss
Anemia due to acute blood loss
Schizophrenia
Anemia due to acute blood loss
Anemia due to acute blood loss
Tachycardia
Anemia due to acute blood loss
Anemia due to acute blood loss
Need for prophylactic measure
Tachycardia
Anemia due to acute blood loss
Schizophrenia
Anemia due to acute blood loss
Anemia due to acute blood loss
Tachycardia

## 2024-07-10 NOTE — PROGRESS NOTE ADULT - PROBLEM SELECTOR PROBLEM 1
Tachycardia
Malignant neoplasm of tongue
Tachycardia
Diarrhea
Malignant neoplasm of tongue
Neutropenia
Diarrhea
Tachycardia
Diarrhea
Neutropenia
Tachycardia
Malignant neoplasm of tongue
Malignant neoplasm of tongue
Tachycardia
Diarrhea
Tachycardia
Malignant neoplasm of tongue
Neutropenia
Malignant neoplasm of tongue
Tachycardia
Neutropenia
Tachycardia
Malignant neoplasm of tongue
Tachycardia
Tachycardia

## 2024-07-10 NOTE — PROGRESS NOTE ADULT - ASSESSMENT
63 y/o F HD34 POD33 R partial Glossectomy in the setting of SSC, L RFFF, SND I-IV, L thigh STSFG, Tracheostomy 6/6/24. Pts post-operative course c/b venous congestion of flap w/ RTOR on 6/11/24 for free flap salvage and revision of venous anastomosis RTOR on 6/14/24 for debridement of necrotic radial forearm flap and replacement with right ALT flap, with 7.5 trach exchanged to 7.0 cuffed trach. Patient has been decannulated since 6/30 and has Saturating 100% on RA with no DAGO.  Pt progressing well and pending GI PCR due to repeated loose BM and once stable will be discharged to discharge.    Plan:  - Strict I&O's  - reposition the patient Q4-5h intervals  - clean/bath the patient daily for improvement of the heat rash  - Leave nylons in, PRS to remove after at least 4 weeks  - Aquaphor to left thigh and LUE donor site daily  - Nutrition consult for alteration of tube feed  - F/U appointment with Dr. Lockett on 7/24/24 at 11:00 AM    Xavier Gabriel  Oral and Maxillofacial Surgery  [facility] Pager #[phone]  [facility] Pager: [phone]  [facility] Pager: [phone]  Available on Teams

## 2024-07-10 NOTE — PROGRESS NOTE ADULT - PROVIDER SPECIALTY LIST ADULT
Hospitalist
Hospitalist
Infectious Disease
Infectious Disease
OMFS
Plastic Surgery
Anesthesia
Anesthesia
ENT
ENT
Infectious Disease
Infectious Disease
OMFS
Plastic Surgery
SICU
Infectious Disease
Infectious Disease
Intervent Radiology
OMFS
Plastic Surgery
SICU
Hospitalist
Airway patent, Nasal mucosa clear. Mouth with normal mucosa. Throat has no vesicles, no oropharyngeal exudates and uvula is midline.

## 2024-07-10 NOTE — DISCHARGE NOTE NURSING/CASE MANAGEMENT/SOCIAL WORK - NSPROEXTENSIONSOFSELF_GEN_A_NUR
full upper denture was left at home not a the bedside, reading glasses @ home/walker
full upper denture was left at home not a the bedside, reading glasses @ home/walker

## 2024-07-10 NOTE — PROVIDER CONTACT NOTE (OTHER) - SITUATION
Pt refused 6am bolus feed.
Tachycardic. Heart rate in 130's
Pt R neck bleeding
Patient 
Pt O2 saturation 85%
Pt R neck bleeding
pt complaining of difficulty breathing with inner cannula in place. Pt temp/ 100.5. serosanguinous drainage from right neck incision.
Patient is febrile 101.4 rectally  and tachycardic 116, ice packs applied and EKG performed. Patient received IV Tylenol at 6am and not due til 12pm
Pt has not voided since 10pm, over 8hrs
kft tube clogged
pt has generalized rash to her whole back region and spots to her B/L Arms, pt also has rash to her chest/abdomen region as well.
P routine vitals taken axillary temp 101.3 .
Pt tachycardiac to 135
Tachycardic. Heart rate in 130's  patient asymptomatic
hr elevated
kft tube visually dislodged; unable to flush; holding morning meds/feed
patient tachy to the 130's
pt refusing 12pm tube feeds
transport here to pick patient up, hr elevated
Pts ant neck incision is open
kft tube visually dislodged; unable to flush
patient trached capped however patient desats  at 85% and sustaining. Heart rate elevated at 140's
pt had 6+ loose/liquid bowel movements during shift. pt received senna last night and is on bolus tube feeds.
pt refusing 6pm tube feeds
pt's right sided neck seems more swollen/firm than this morning. pt denies of resp distress.
Pt Zamzam [place] has a axillary temp of 99, bp 160/100
pt has generalized rash to her whole back region and B/L Arms, R>L.

## 2024-07-10 NOTE — PROVIDER CONTACT NOTE (OTHER) - REASON
Pt O2 saturation 85%
hr elevated
tachy
Pt Zamzam Burris ant neck incision open
hr elevated
pt refusing 12pm tube feeds
pt still has rash to her whole back, spots to b/l arms and chest/abdomen region
kft tube clogged
pt complaining of difficulty breathing with inner cannula in place. Pt febrile/ 100.5. serosang Drainage from right neck incision
Pt tachycardiac to 135
R sided neck looks more swollen/firm;
pt refusing 6pm tube feeds
pt temp 99 axillary, bp 160/100
Pt R neck bleeding
Tachycardia
Tachycardic. Heart rate in 130's
kft tube visually dislodged
pt has generalized overall rash to back region and b/l arms, R>L
pt having multiple loose/liquid bowel movements
Patient is febrile 101.4 rectally  and tachycardic 116, ice packs applied and EKG performed. Patient denies chest pain or  SOB
Pt has not voided since 10pm, over 8hrs
Pt refused 6am bolus feed.
Tachycardic. Heart rate in 130's
Temp 101.3
kft tube visually dislodged holding 6am feed
Pt R neck bleeding
patient desat 85%  and sustaining patient is capped

## 2024-07-10 NOTE — PROGRESS NOTE ADULT - PROBLEM SELECTOR PLAN 1
- noted diarrhea ON; agree with discontinue bowel regimen  - would hold antidiarrheals and monitor off if recurrent with send off GI PCR  - may benefit from adjustment of tube feed formula  - improving

## 2024-07-10 NOTE — PROGRESS NOTE ADULT - SUBJECTIVE AND OBJECTIVE BOX
[facility] Division of Hospital Medicine  Ken Sheikh MD  Pager [phone]    Patient is a 64y old  Female who presents with a chief complaint of SCC (08 Jul 2024 15:21)      SUBJECTIVE / OVERNIGHT EVENTS: diarrhea improving      MEDICATIONS  (STANDING):  acetaminophen   Oral Liquid .. 975 milliGRAM(s) Oral every 6 hours  acetylcysteine 10%  Inhalation 4 milliLiter(s) Inhalation every 6 hours  albuterol    90 MICROgram(s) HFA Inhaler 1 Puff(s) Inhalation once  albuterol/ipratropium for Nebulization 3 milliLiter(s) Nebulizer every 6 hours  amLODIPine   Tablet 10 milliGRAM(s) Oral daily  AQUAPHOR (petrolatum Ointment) 1 Application(s) Topical daily  AQUAPHOR (petrolatum Ointment) 1 Application(s) Topical daily  buPROPion . 150 milliGRAM(s) Oral at bedtime  buPROPion . 300 milliGRAM(s) Oral <User Schedule>  chlorhexidine 0.12% Liquid 15 milliLiter(s) Swish and Spit <User Schedule>  chlorhexidine 2% Cloths 1 Application(s) Topical daily  enoxaparin Injectable 40 milliGRAM(s) SubCutaneous every 24 hours  hydrocortisone 1% Cream 1 Application(s) Topical every 12 hours  QUEtiapine 100 milliGRAM(s) Oral at bedtime  risperiDONE   Solution 3 milliGRAM(s) Oral at bedtime  risperiDONE   Solution 1 milliGRAM(s) Oral <User Schedule>  sodium chloride 0.9% lock flush 3 milliLiter(s) IV Push every 8 hours    MEDICATIONS  (PRN):  oxyCODONE    Solution 5 milliGRAM(s) Oral every 4 hours PRN Moderate Pain (4 - 6)      CAPILLARY BLOOD GLUCOSE        I&O's Summary    09 Jul 2024 07:01  -  10 Jul 2024 07:00  --------------------------------------------------------  IN: 2740 mL / OUT: 200 mL / NET: 2540 mL    10 Jul 2024 07:01  -  10 Jul 2024 14:32  --------------------------------------------------------  IN: 260 mL / OUT: 150 mL / NET: 110 mL        PHYSICAL EXAM:  Vital Signs Last 24 Hrs  T(C): 36.5 (10 Jul 2024 13:28), Max: 36.8 (10 Jul 2024 10:30)  T(F): 97.7 (10 Jul 2024 13:28), Max: 98.2 (10 Jul 2024 10:30)  HR: 98 (10 Jul 2024 13:28) (78 - 100)  BP: 120/90 (10 Jul 2024 13:28) (105/82 - 124/77)  BP(mean): --  RR: 18 (10 Jul 2024 13:28) (18 - 18)  SpO2: 99% (10 Jul 2024 13:28) (99% - 100%)    Parameters below as of 10 Jul 2024 13:28  Patient On (Oxygen Delivery Method): room air      CONSTITUTIONAL: NAD  EYES: EOMI, conjunctiva and sclera clear  ENMT: Moist oral mucosa  NECK: post op changes  RESPIRATORY: Breathing unlabored, CTAB  CARDIOVASCULAR: S1S2 no MRG  ABDOMEN: Nontender to palpation, normoactive bowel sounds, no rebound/guarding  MUSCULOSKELETAL: no clubbing or cyanosis of digits  NEUROLOGY: No focal deficits   SKIN: No rashes or lesions    LABS:                        11.4   3.39  )-----------( 219      ( 10 Jul 2024 06:37 )             34.2     07-10    137  |  103  |  14  ----------------------------<  102<H>  3.7   |  21<L>  |  0.51    Ca    8.9      10 Jul 2024 06:37  Phos  4.4     07-10  Mg     1.80     07-10            Urinalysis Basic - ( 10 Jul 2024 06:37 )    Color: x / Appearance: x / SG: x / pH: x  Gluc: 102 mg/dL / Ketone: x  / Bili: x / Urobili: x   Blood: x / Protein: x / Nitrite: x   Leuk Esterase: x / RBC: x / WBC x   Sq Epi: x / Non Sq Epi: x / Bacteria: x

## 2024-07-10 NOTE — PROGRESS NOTE ADULT - PROBLEM SELECTOR PLAN 7
- lovenox
- lovenox
per ENT
- lovenox
-likely secondary to post-op changes; pt hemodynamically stable;   trend CBC, transfuse prn to keep hgb>7
per ENT [facility]
- lovenox
per ENT
F/w [facility], on wellbutrin, risperidone, seroquel  trend ECG, hold antipsychotics if QTc>500ms.
per ENT [facility]
per ENT [facility]
- lovenox
- lovenox    Dispo: rehab
-likely secondary to post-op changes; pt hemodynamically stable;   trend CBC, transfuse prn to keep hgb>7
per ENT [facility]
-likely secondary to post-op changes; pt hemodynamically stable;   trend CBC, transfuse prn to keep hgb>7
F/w [facility], on wellbutrin, risperidone, seroquel  trend ECG, hold antipsychotics if QTc>500ms.
per ENT [facility]
per ENT [facility]
-likely secondary to post-op changes; pt hemodynamically stable;   trend CBC, transfuse prn to keep hgb>7
F/w [facility], on wellbutrin, risperidone, seroquel  trend ECG, hold antipsychotics if QTc>500ms.
F/w [facility], on wellbutrin, risperidone, seroquel  trend ECG, hold antipsychotics if QTc>500ms.

## 2024-07-10 NOTE — PROVIDER CONTACT NOTE (OTHER) - RECOMMENDATIONS
have provider come to bedside, discontinue heparin infusion, notify plastics team.
notify md
notify provider; stool samples?
Notify provider, discontinue heparin infusion.
Provider made aware
Provider notified. Straight cath?
notify provider
removed  from patient trach
Meds?
Notify MD/PA, EKG performed and ice packs applied
notify provider; allergic reaction?
Meds?
make oral team aware
notify md
notify md
notify provider
Provider aware
Provider notified
Provider notified. IVP medication?
make oral team aware
notify provider
notify provider
provider notified
IV bolus? labetalol IVP?
notify the team.
Provider notified.

## 2024-07-10 NOTE — PROVIDER CONTACT NOTE (OTHER) - NAME OF MD/NP/PA/DO NOTIFIED:
[facility] [place] [id number]
Marisa morales
oral [phone]
Cristian Jones
Hortencia Johnson
OMFS
Rayo Ha)/SICU team made aware
Cori PIMENTEL MD
Cristian Jones
Mihn Fitzgerald
OMFS
Xavier Gabriel
Yair Wall
[facility] [place] [id number]
[facility] [place] [id number]
Marisa PIMENTEL MD
oral [phone]
Frankie Patel
[place]
OMFS
[facility]
[place] [facility] [place]
Cristian Jones
Shay
Shay
Frankie Cunningham
Yair Wall

## 2024-07-10 NOTE — PROGRESS NOTE ADULT - PROBLEM SELECTOR PLAN 2
Hpi Title: Evaluation of Skin Lesions Have Your Spot(S) Been Treated In The Past?: has not been treated - RVP neg   - poss secondary to recent antibiotics now improved

## 2024-07-10 NOTE — PROGRESS NOTE ADULT - PROBLEM SELECTOR PROBLEM 5
Anemia due to acute blood loss
Atelectasis
Schizophrenia
Tachycardia
Hypertension
Schizophrenia
Tachycardia
Atelectasis
Atelectasis
Schizophrenia
Tachycardia
Atelectasis
Hypertension
Anemia due to acute blood loss
Atelectasis
Tachycardia
Atelectasis
Hypertension
Schizophrenia
Schizophrenia
Need for prophylactic measure
Schizophrenia
Atelectasis
Atelectasis
Hypertension

## 2024-07-10 NOTE — DISCHARGE NOTE NURSING/CASE MANAGEMENT/SOCIAL WORK - PATIENT PORTAL LINK FT
You can access the FollowMyHealth Patient Portal offered by [facility] by registering at the following website: [email or web address]. By joining [employer]’s FollowMyHealth portal, you will also be able to view your health information using other applications (apps) compatible with our system.
You can access the FollowMyHealth Patient Portal offered by [facility] by registering at the following website: [email or web address]. By joining [employer]’s FollowMyHealth portal, you will also be able to view your health information using other applications (apps) compatible with our system.

## 2024-07-10 NOTE — DISCHARGE NOTE NURSING/CASE MANAGEMENT/SOCIAL WORK - NSDCPEFALRISK_GEN_ALL_CORE
For information on Fall & Injury Prevention, visit: [email or web address] OR  [email or web address] OR  https://www.cdc.gov/steadi/patient.html
For information on Fall & Injury Prevention, visit: [email or web address] OR  [email or web address] OR  https://www.cdc.gov/steadi/patient.html

## 2024-07-10 NOTE — PROGRESS NOTE ADULT - PROBLEM/PLAN-3
DISPLAY PLAN FREE TEXT
complains of pain/discomfort
DISPLAY PLAN FREE TEXT
DISPLAY PLAN FREE TEXT

## 2024-07-10 NOTE — PROGRESS NOTE ADULT - SUBJECTIVE AND OBJECTIVE BOX
65 y/o F HD33 POD32 R partial Glossectomy in the setting of SSC, L RFFF, SND I-IV, L thigh STSFG, Tracheostomy 6/6/24. Pts post-operative course c/b venous congestion of flap w/ RTOR on 6/11/24 for free flap salvage and revision of venous anastomosis RTOR on 6/14/24 for debridement of necrotic radial forearm flap and replacement with right ALT flap, with 7.5 trach exchanged to 7.0 cuffed trach. Patient has been decannulated since 6/30 and has Saturating 100% on RA with no DAGO.  Patient endorses feeling well and denies any f/c/n/v. Previously spoke with ID regarding leukopenia. No current concerns as per ID. Plan to DC to rehab pending no continued episodes of diarrhea.    Interval events  RD re-engaged regarding TF     Vital Signs Last 24 Hrs  T(C): 36.7 (10 Jul 2024 06:00), Max: 36.7 (09 Jul 2024 18:00)  T(F): 98.1 (10 Jul 2024 06:00), Max: 98.1 (10 Jul 2024 06:00)  HR: 87 (10 Jul 2024 06:00) (78 - 96)  BP: 114/75 (10 Jul 2024 06:00) (113/73 - 124/77)  BP(mean): --  RR: 18 (10 Jul 2024 06:00) (17 - 18)  SpO2: 99% (10 Jul 2024 06:00) (99% - 100%)    Parameters below as of 10 Jul 2024 06:00  Patient On (Oxygen Delivery Method): room air        I&O's Detail    09 Jul 2024 07:01  -  10 Jul 2024 07:00  --------------------------------------------------------  IN:    Enteral Tube Flush: 1300 mL    Jevity 1.5: 1440 mL  Total IN: 2740 mL    OUT:    Voided (mL): 200 mL  Total OUT: 200 mL    Total NET: 2540 mL      MEDICATIONS  (STANDING):  acetaminophen   Oral Liquid .. 975 milliGRAM(s) Oral every 6 hours  acetylcysteine 10%  Inhalation 4 milliLiter(s) Inhalation every 6 hours  albuterol    90 MICROgram(s) HFA Inhaler 1 Puff(s) Inhalation once  albuterol/ipratropium for Nebulization 3 milliLiter(s) Nebulizer every 6 hours  amLODIPine   Tablet 10 milliGRAM(s) Oral daily  AQUAPHOR (petrolatum Ointment) 1 Application(s) Topical daily  AQUAPHOR (petrolatum Ointment) 1 Application(s) Topical daily  buPROPion . 300 milliGRAM(s) Oral <User Schedule>  buPROPion . 150 milliGRAM(s) Oral at bedtime  chlorhexidine 0.12% Liquid 15 milliLiter(s) Swish and Spit <User Schedule>  chlorhexidine 2% Cloths 1 Application(s) Topical daily  enoxaparin Injectable 40 milliGRAM(s) SubCutaneous every 24 hours  hydrocortisone 1% Cream 1 Application(s) Topical every 12 hours  QUEtiapine 100 milliGRAM(s) Oral at bedtime  risperiDONE   Solution 3 milliGRAM(s) Oral at bedtime  risperiDONE   Solution 1 milliGRAM(s) Oral <User Schedule>  sodium chloride 0.9% lock flush 3 milliLiter(s) IV Push every 8 hours    MEDICATIONS  (PRN):  oxyCODONE    Solution 5 milliGRAM(s) Oral every 4 hours PRN Moderate Pain (4 - 6)      Labs:                          11.6   2.53  )-----------( 234      ( 09 Jul 2024 06:13 )             36.0       Physical Exam:   General: AAOx3, NAD  NEURO: AAOx3, drowsy   HEENT: neck incision clean, dry, and intact. flap warm and well perfused, good color  RLE: thigh soft, incision c/d/i, healing well  LUE: Incision c/d/i.

## 2024-07-10 NOTE — PROGRESS NOTE ADULT - PROBLEM SELECTOR PROBLEM 7
Schizophrenia
Anemia due to acute blood loss
Schizophrenia
Need for prophylactic measure
Need for prophylactic measure
Anemia due to acute blood loss
Need for prophylactic measure
Schizophrenia
Need for prophylactic measure
Anemia due to acute blood loss
Need for prophylactic measure
Schizophrenia
Need for prophylactic measure
Anemia due to acute blood loss
Need for prophylactic measure
Need for prophylactic measure

## 2024-07-10 NOTE — PROGRESS NOTE ADULT - PROBLEM SELECTOR PROBLEM 2
Malignant neoplasm of tongue
Squamous cell cancer of tongue
Urinary retention
Urinary retention
Malignant neoplasm of tongue
Neutropenia
Malignant neoplasm of tongue
Neutropenia
Urinary retention
Malignant neoplasm of tongue
Urinary retention
Hypertension
Malignant neoplasm of tongue
Urinary retention
Malignant neoplasm of tongue
Neutropenia
Malignant neoplasm of tongue
Malignant neoplasm of tongue
Urinary retention
Malignant neoplasm of tongue
Neutropenia

## 2024-07-10 NOTE — PROGRESS NOTE ADULT - PROBLEM SELECTOR PLAN 6
- Trend HR   - OR wound E coli, s/p antibiotics   - Pain control   - TTE reviewed, normal EF 60-65%  - PE ruled out  - elevated TSH - 9.8 with normal FT4; repeat TFT in 6 weeks
-likely secondary to post-op changes; pt hemodynamically stable;   trend CBC, transfuse prn to keep hgb>7
- Trend HR   - OR wound E coli, s/p antibiotics   - Pain control   - TTE reviewed, normal EF 60-65%  - PE ruled out  - elevated TSH - 9.8 with normal FT4; repeat TFT in 6 weeks
-likely secondary to post-op changes; pt hemodynamically stable;   trend CBC, transfuse prn to keep hgb>7
-likely secondary to post-op changes; pt hemodynamically stable; will monitor CBC in am
-likely secondary to post-op changes; pt hemodynamically stable;   trend CBC, transfuse prn to keep hgb>7
F/w Behavioral Torres, on wellbutrin, risperidone, seroquel  trend ECG, hold antipsychotics if QTc>500ms.
-likely secondary to post-op changes; pt hemodynamically stable; will monitor CBC in am
-likely secondary to post-op changes; pt hemodynamically stable; will monitor CBC in am
F/w [facility]al Torres, on wellbutrin, risperidone, seroquel  trend ECG, hold antipsychotics if QTc>500ms.
F/w Behavioral Torres, on wellbutrin, risperidone, seroquel  trend ECG, hold antipsychotics if QTc>500ms.
- Trend HR   - OR wound E coli, s/p antibiotics   - Pain control   - TTE reviewed, normal EF 60-65%  - PE ruled out  - elevated TSH - 9.8 with normal FT4; repeat TFT in 6 weeks
- restart lovenox
-likely secondary to post-op changes; pt hemodynamically stable;   trend CBC, transfuse prn to keep hgb>7
-likely secondary to post-op changes; pt hemodynamically stable;   trend CBC, transfuse prn to keep hgb>7
F/w Behavioral Torres, on wellbutrin, risperidone, seroquel  trend ECG, hold antipsychotics if QTc>500ms.
- Trend HR   - OR wound E coli, s/p antibiotics   - Pain control   - TTE reviewed, normal EF 60-65%  - PE ruled out  - elevated TSH - 9.8 with normal FT4; repeat TFT in 6 weeks

## 2024-07-10 NOTE — PROVIDER CONTACT NOTE (OTHER) - DATE AND TIME:
10-José Miguel-2024 20:45
11-Jun-2024 03:30
21-Jun-2024 12:00
23-Jun-2024 18:22
26-Jun-2024 03:05
27-Jun-2024 06:30
09-Jun-2024 18:10
10-José Miguel-2024 18:29
10-José Miguel-2024 23:17
20-Jun-2024 06:54
21-Jun-2024 16:00
07-Jun-2024 08:25
06-Jul-2024 13:00
08-Jul-2024 06:17
10-José Miguel-2024 09:35
12-Jun-2024 13:30
26-Jun-2024 02:48
18-Jun-2024 12:00
22-Jun-2024 11:00
02-Jul-2024 16:00
06-Jul-2024 17:14
10-José Miguel-2024 10:08
10-Jul-2024 11:47
10-Jul-2024 16:59
11-Jun-2024 18:30
12-Jun-2024 01:14
20-Jun-2024 03:15

## 2024-07-13 LAB
CULTURE RESULTS: SIGNIFICANT CHANGE UP
SPECIMEN SOURCE: SIGNIFICANT CHANGE UP

## 2024-07-24 ENCOUNTER — APPOINTMENT (OUTPATIENT)
Age: 64
End: 2024-07-24
Payer: MEDICAID

## 2024-07-24 PROCEDURE — 99024 POSTOP FOLLOW-UP VISIT: CPT

## 2024-07-30 ENCOUNTER — APPOINTMENT (OUTPATIENT)
Age: 64
End: 2024-07-30

## 2024-07-30 ENCOUNTER — APPOINTMENT (OUTPATIENT)
Age: 64
End: 2024-07-30
Payer: MEDICAID

## 2024-07-30 PROCEDURE — 99024 POSTOP FOLLOW-UP VISIT: CPT

## 2024-08-07 ENCOUNTER — APPOINTMENT (OUTPATIENT)
Age: 64
End: 2024-08-07

## 2024-08-20 ENCOUNTER — APPOINTMENT (OUTPATIENT)
Dept: RADIATION ONCOLOGY | Facility: CLINIC | Age: 64
End: 2024-08-20
Payer: MEDICAID

## 2024-08-20 VITALS
OXYGEN SATURATION: 100 % | HEIGHT: 64 IN | DIASTOLIC BLOOD PRESSURE: 85 MMHG | RESPIRATION RATE: 16 BRPM | BODY MASS INDEX: 21.34 KG/M2 | HEART RATE: 117 BPM | WEIGHT: 125 LBS | TEMPERATURE: 96.98 F | SYSTOLIC BLOOD PRESSURE: 115 MMHG

## 2024-08-20 DIAGNOSIS — Z80.42 FAMILY HISTORY OF MALIGNANT NEOPLASM OF PROSTATE: ICD-10-CM

## 2024-08-20 DIAGNOSIS — Z87.898 PERSONAL HISTORY OF OTHER SPECIFIED CONDITIONS: ICD-10-CM

## 2024-08-20 DIAGNOSIS — I10 ESSENTIAL (PRIMARY) HYPERTENSION: ICD-10-CM

## 2024-08-20 DIAGNOSIS — C02.9 MALIGNANT NEOPLASM OF TONGUE, UNSPECIFIED: ICD-10-CM

## 2024-08-20 PROCEDURE — 99205 OFFICE O/P NEW HI 60 MIN: CPT | Mod: GC

## 2024-08-20 RX ORDER — RISPERIDONE 3 MG/1
3 TABLET, FILM COATED ORAL
Refills: 0 | Status: ACTIVE | COMMUNITY

## 2024-08-20 RX ORDER — BUPROPION HYDROCHLORIDE 150 MG/1
150 TABLET, FILM COATED, EXTENDED RELEASE ORAL
Refills: 0 | Status: ACTIVE | COMMUNITY

## 2024-08-20 RX ORDER — RISPERIDONE 1 MG/1
1 TABLET, FILM COATED ORAL
Refills: 0 | Status: ACTIVE | COMMUNITY

## 2024-08-20 RX ORDER — LEVOTHYROXINE SODIUM 0.05 MG/1
50 TABLET ORAL
Refills: 0 | Status: ACTIVE | COMMUNITY

## 2024-08-20 RX ORDER — AMLODIPINE BESYLATE 5 MG/1
5 TABLET ORAL
Refills: 0 | Status: ACTIVE | COMMUNITY

## 2024-08-20 NOTE — OB/GYN HISTORY
Last office visit: 06/28/2022    Upcoming scheduled visit: 09/15/2022    Last refill given: 06/28/2022    Last UDS/results: none    Last CSA: none    PDMP reviewed: yes     Medication refill request forwarded to MD for approval     [Menopause Age: ____] : patient was [unfilled] years old at menopause

## 2024-08-20 NOTE — VITALS
[Maximal Pain Intensity: 0/10] : 0/10 [Least Pain Intensity: 0/10] : 0/10 [80: Normal activity with effort; some signs or symptoms of disease.] : 80: Normal activity with effort; some signs or symptoms of disease.  [ECOG Performance Status: 1 - Restricted in physically strenuous activity but ambulatory and able to carry out work of a light or sedentary nature] : Performance Status: 1 - Restricted in physically strenuous activity but ambulatory and able to carry out work of a light or sedentary nature, e.g., light house work, office work [5 - Distress Level] : Distress Level: 5

## 2024-08-23 ENCOUNTER — OUTPATIENT (OUTPATIENT)
Dept: OUTPATIENT SERVICES | Facility: HOSPITAL | Age: 64
LOS: 1 days | Discharge: HOME | End: 2024-08-23
Payer: MEDICAID

## 2024-08-23 DIAGNOSIS — C02.9 MALIGNANT NEOPLASM OF TONGUE, UNSPECIFIED: Chronic | ICD-10-CM

## 2024-08-23 DIAGNOSIS — Z98.890 OTHER SPECIFIED POSTPROCEDURAL STATES: Chronic | ICD-10-CM

## 2024-08-23 DIAGNOSIS — Z98.49 CATARACT EXTRACTION STATUS, UNSPECIFIED EYE: Chronic | ICD-10-CM

## 2024-08-26 ENCOUNTER — NON-APPOINTMENT (OUTPATIENT)
Age: 64
End: 2024-08-26

## 2024-08-26 DIAGNOSIS — C02.2 MALIGNANT NEOPLASM OF VENTRAL SURFACE OF TONGUE: ICD-10-CM

## 2024-08-26 PROCEDURE — 77334 RADIATION TREATMENT AID(S): CPT | Mod: 26

## 2024-08-26 PROCEDURE — 77263 THER RADIOLOGY TX PLNG CPLX: CPT

## 2024-08-29 NOTE — PHYSICAL EXAM
[General Appearance - Well Developed] : well developed [General Appearance - Well Nourished] : well nourished [Sclera] : the sclera and conjunctiva were normal [Extraocular Movements] : extraocular movements were intact [] : no respiratory distress [Exaggerated Use Of Accessory Muscles For Inspiration] : no accessory muscle use [Skin Color & Pigmentation] : normal skin color and pigmentation [No Focal Deficits] : no focal deficits [Oriented To Time, Place, And Person] : oriented to person, place, and time [de-identified] : post-surgical changes with reconstruction of the right tongue [de-identified] : post surgical scar of the right neck [de-identified] : PEG-tube in place

## 2024-08-29 NOTE — HISTORY OF PRESENT ILLNESS
[FreeTextEntry1] : Ms. Zamzam Burris is a 63 yo F with a PMH of schizophrenia who presents with a SCC of the right tongue, pT3N0, s/p right hemiglossectomy and right neck dissection.  3/21/24: Right tongue biopsy SQUAMOUS CELL CARCINOMA, WELL TO MODERATELY DIFFERENTIATED, WITH MULTIFOCAL PERINEURAL INVASION AND INVASION INTO STRIATED MUSCLE TO THE DEPTH OF THE BIOPSY SPECIMEN.  6/6/2024: Right art-glossectomy and right neck dissection Final Diagnosis 1. Tongue, right superior margin, biopsy - Papillated tongue mucosa and underlying skeletal muscle 2. Tongue, right anterior margin, biopsy - Papillated tongue mucosa with underlying skeletal muscle 3. Tongue, right inferior/floor of mouth margin, biopsy - Oral mucosa with underlying minor salivary gland lobules and excretory duct 4. Tongue, right posterior margin, biopsy - Papillated tongue mucosa with underlying minor salivary gland lobules and skeletal muscle 5. Tongue, right deep margin, biopsy - Skeletal muscle 6. Tongue, right, hemiglossectomy - Squamous cell carcinoma, well to moderately differentiated, 2.5 x 1.7 x 1.1cm, 17mm depth of invasion - Perineural invasion is identified - All margins are negative for carcinoma, carcinoma is present 2 mm from the deep/medial specimen margin and 3 mm from the inferior specimen margin Level 1A, right 2, 3, 4, and 1B neck dissection (0/53 nodes positive)  8/20/24: Patient presents for initial consultation. She is doing well after surgery. She has a PEG tube in place and is limited to pureed foods only.

## 2024-08-29 NOTE — PHYSICAL EXAM
[General Appearance - Well Developed] : well developed [General Appearance - Well Nourished] : well nourished [Sclera] : the sclera and conjunctiva were normal [Extraocular Movements] : extraocular movements were intact [] : no respiratory distress [Exaggerated Use Of Accessory Muscles For Inspiration] : no accessory muscle use [Skin Color & Pigmentation] : normal skin color and pigmentation [No Focal Deficits] : no focal deficits [Oriented To Time, Place, And Person] : oriented to person, place, and time [de-identified] : post-surgical changes with reconstruction of the right tongue [de-identified] : post surgical scar of the right neck [de-identified] : PEG-tube in place

## 2024-08-29 NOTE — HISTORY OF PRESENT ILLNESS
[FreeTextEntry1] : Ms. Zamzam Burris is a 65 yo F with a PMH of schizophrenia who presents with a SCC of the right tongue, pT3N0, s/p right hemiglossectomy and right neck dissection.  3/21/24: Right tongue biopsy SQUAMOUS CELL CARCINOMA, WELL TO MODERATELY DIFFERENTIATED, WITH MULTIFOCAL PERINEURAL INVASION AND INVASION INTO STRIATED MUSCLE TO THE DEPTH OF THE BIOPSY SPECIMEN.  6/6/2024: Right art-glossectomy and right neck dissection Final Diagnosis 1. Tongue, right superior margin, biopsy - Papillated tongue mucosa and underlying skeletal muscle 2. Tongue, right anterior margin, biopsy - Papillated tongue mucosa with underlying skeletal muscle 3. Tongue, right inferior/floor of mouth margin, biopsy - Oral mucosa with underlying minor salivary gland lobules and excretory duct 4. Tongue, right posterior margin, biopsy - Papillated tongue mucosa with underlying minor salivary gland lobules and skeletal muscle 5. Tongue, right deep margin, biopsy - Skeletal muscle 6. Tongue, right, hemiglossectomy - Squamous cell carcinoma, well to moderately differentiated, 2.5 x 1.7 x 1.1cm, 17mm depth of invasion - Perineural invasion is identified - All margins are negative for carcinoma, carcinoma is present 2 mm from the deep/medial specimen margin and 3 mm from the inferior specimen margin Level 1A, right 2, 3, 4, and 1B neck dissection (0/53 nodes positive)  8/20/24: Patient presents for initial consultation. She is doing well after surgery. She has a PEG tube in place and is limited to pureed foods only.

## 2024-08-29 NOTE — REVIEW OF SYSTEMS
[Fatigue] : fatigue [Recent Change In Weight] : ~T recent weight change [Dysphagia] : dysphagia [Depression] : depression [Negative] : Allergic/Immunologic [Fever] : no fever [Chills] : no chills [Night Sweats] : no night sweats [Loss of Hearing] : no loss of hearing [Nosebleeds] : no nosebleeds [Hearing Disturbances] : no hearing disturbances [Suicidal] : not suicidal [Insomnia] : no insomnia [Anxiety] : no anxiety [FreeTextEntry7] : PEG

## 2024-08-29 NOTE — PHYSICAL EXAM
[General Appearance - Well Developed] : well developed [General Appearance - Well Nourished] : well nourished [Sclera] : the sclera and conjunctiva were normal [Extraocular Movements] : extraocular movements were intact [] : no respiratory distress [Exaggerated Use Of Accessory Muscles For Inspiration] : no accessory muscle use [Skin Color & Pigmentation] : normal skin color and pigmentation [No Focal Deficits] : no focal deficits [Oriented To Time, Place, And Person] : oriented to person, place, and time [de-identified] : post-surgical changes with reconstruction of the right tongue [de-identified] : post surgical scar of the right neck [de-identified] : PEG-tube in place

## 2024-08-30 ENCOUNTER — APPOINTMENT (OUTPATIENT)
Dept: NUCLEAR MEDICINE | Facility: IMAGING CENTER | Age: 64
End: 2024-08-30

## 2024-08-30 ENCOUNTER — OUTPATIENT (OUTPATIENT)
Dept: OUTPATIENT SERVICES | Facility: HOSPITAL | Age: 64
LOS: 1 days | End: 2024-08-30
Payer: MEDICAID

## 2024-08-30 DIAGNOSIS — C02.9 MALIGNANT NEOPLASM OF TONGUE, UNSPECIFIED: ICD-10-CM

## 2024-08-30 DIAGNOSIS — Z98.49 CATARACT EXTRACTION STATUS, UNSPECIFIED EYE: Chronic | ICD-10-CM

## 2024-08-30 DIAGNOSIS — Z98.890 OTHER SPECIFIED POSTPROCEDURAL STATES: Chronic | ICD-10-CM

## 2024-08-30 DIAGNOSIS — C02.9 MALIGNANT NEOPLASM OF TONGUE, UNSPECIFIED: Chronic | ICD-10-CM

## 2024-08-30 PROCEDURE — 78815 PET IMAGE W/CT SKULL-THIGH: CPT | Mod: 26,1L,PI

## 2024-08-30 PROCEDURE — A9552: CPT

## 2024-08-30 PROCEDURE — 78815 PET IMAGE W/CT SKULL-THIGH: CPT

## 2024-09-03 ENCOUNTER — NON-APPOINTMENT (OUTPATIENT)
Age: 64
End: 2024-09-03

## 2024-09-04 PROCEDURE — 77300 RADIATION THERAPY DOSE PLAN: CPT | Mod: 26

## 2024-09-04 PROCEDURE — 77301 RADIOTHERAPY DOSE PLAN IMRT: CPT | Mod: 26

## 2024-09-04 PROCEDURE — 77338 DESIGN MLC DEVICE FOR IMRT: CPT | Mod: 26

## 2024-09-18 PROCEDURE — 77280 THER RAD SIMULAJ FIELD SMPL: CPT | Mod: 26

## 2024-09-19 PROCEDURE — 77387B: CUSTOM | Mod: 26

## 2024-09-19 PROCEDURE — 77427 RADIATION TX MANAGEMENT X5: CPT

## 2024-09-20 PROCEDURE — 77387B: CUSTOM | Mod: 26

## 2024-09-23 PROCEDURE — 77387B: CUSTOM | Mod: 26

## 2024-09-24 PROCEDURE — 77387B: CUSTOM | Mod: 26

## 2024-09-25 ENCOUNTER — NON-APPOINTMENT (OUTPATIENT)
Age: 64
End: 2024-09-25

## 2024-09-25 VITALS
HEART RATE: 111 BPM | BODY MASS INDEX: 23.05 KG/M2 | RESPIRATION RATE: 16 BRPM | OXYGEN SATURATION: 100 % | TEMPERATURE: 96.98 F | WEIGHT: 135 LBS | HEIGHT: 64 IN | SYSTOLIC BLOOD PRESSURE: 128 MMHG | DIASTOLIC BLOOD PRESSURE: 89 MMHG

## 2024-09-25 PROCEDURE — 77014: CPT | Mod: 26

## 2024-09-25 NOTE — REVIEW OF SYSTEMS
[Fatigue: Grade 1 - Fatigue relieved by rest] : Fatigue: Grade 1 - Fatigue relieved by rest [Xerostomia: Grade 0] : Xerostomia: Grade 0 [Oral Pain: Grade 0] : Oral Pain: Grade 0 [Skin Hyperpigmentation: Grade 0] : Skin Hyperpigmentation: Grade 0 [Dermatitis Radiation: Grade 1 - Faint erythema or dry desquamation] : Dermatitis Radiation: Grade 1 - Faint erythema or dry desquamation [FreeTextEntry6] : PEG [de-identified] : PEG

## 2024-09-25 NOTE — DISEASE MANAGEMENT
[Pathological] : TNM Stage: p [III] : III [TTNM] : 3 [NTNM] : 0 [MTNM] : x [de-identified] : 1000 [de-identified] : [id number] [de-identified] : Oral cavity

## 2024-09-25 NOTE — REVIEW OF SYSTEMS
[Fatigue: Grade 1 - Fatigue relieved by rest] : Fatigue: Grade 1 - Fatigue relieved by rest [Xerostomia: Grade 0] : Xerostomia: Grade 0 [Oral Pain: Grade 0] : Oral Pain: Grade 0 [Skin Hyperpigmentation: Grade 0] : Skin Hyperpigmentation: Grade 0 [Dermatitis Radiation: Grade 1 - Faint erythema or dry desquamation] : Dermatitis Radiation: Grade 1 - Faint erythema or dry desquamation [FreeTextEntry6] : PEG [de-identified] : PEG

## 2024-09-25 NOTE — HISTORY OF PRESENT ILLNESS
[FreeTextEntry1] : Ms. Zamzam Burris is a 65 yo F with a PMH of schizophrenia who presents with a SCC of the right tongue, pT3N0, s/p right hemiglossectomy and right neck dissection.  3/21/24: Right tongue biopsy SQUAMOUS CELL CARCINOMA, WELL TO MODERATELY DIFFERENTIATED, WITH MULTIFOCAL PERINEURAL INVASION AND INVASION INTO STRIATED MUSCLE TO THE DEPTH OF THE BIOPSY SPECIMEN.  6/6/2024: Right art-glossectomy and right neck dissection Final Diagnosis 1. Tongue, right superior margin, biopsy - Papillated tongue mucosa and underlying skeletal muscle 2. Tongue, right anterior margin, biopsy - Papillated tongue mucosa with underlying skeletal muscle 3. Tongue, right inferior/floor of mouth margin, biopsy - Oral mucosa with underlying minor salivary gland lobules and excretory duct 4. Tongue, right posterior margin, biopsy - Papillated tongue mucosa with underlying minor salivary gland lobules and skeletal muscle 5. Tongue, right deep margin, biopsy - Skeletal muscle 6. Tongue, right, hemiglossectomy - Squamous cell carcinoma, well to moderately differentiated, 2.5 x 1.7 x 1.1cm, 17mm depth of invasion - Perineural invasion is identified - All margins are negative for carcinoma; carcinoma is present 2 mm from the deep/medial specimen margin and 3 mm from the inferior specimen margin Level 1A, right 2, 3, 4, and 1B neck dissection (0/53 nodes positive)  8/20/24: Patient presented for initial consultation. She is doing well after surgery. She has a PEG tube in place and is limited to pureed foods only.   9/25/2024 Presents today for OTV. Completed fx 5/33 to oral cavity. She reports tingling sensation in the tongue. Instructed on skin and mouth care with patient and her brother.

## 2024-09-25 NOTE — DISEASE MANAGEMENT
[Pathological] : TNM Stage: p [III] : III [TTNM] : 3 [NTNM] : 0 [MTNM] : x [de-identified] : 1000 [de-identified] : [id number] [de-identified] : Oral cavity

## 2024-09-25 NOTE — HISTORY OF PRESENT ILLNESS
[FreeTextEntry1] : Ms. Zamzam Burris is a 63 yo F with a PMH of schizophrenia who presents with a SCC of the right tongue, pT3N0, s/p right hemiglossectomy and right neck dissection.  3/21/24: Right tongue biopsy SQUAMOUS CELL CARCINOMA, WELL TO MODERATELY DIFFERENTIATED, WITH MULTIFOCAL PERINEURAL INVASION AND INVASION INTO STRIATED MUSCLE TO THE DEPTH OF THE BIOPSY SPECIMEN.  6/6/2024: Right art-glossectomy and right neck dissection Final Diagnosis 1. Tongue, right superior margin, biopsy - Papillated tongue mucosa and underlying skeletal muscle 2. Tongue, right anterior margin, biopsy - Papillated tongue mucosa with underlying skeletal muscle 3. Tongue, right inferior/floor of mouth margin, biopsy - Oral mucosa with underlying minor salivary gland lobules and excretory duct 4. Tongue, right posterior margin, biopsy - Papillated tongue mucosa with underlying minor salivary gland lobules and skeletal muscle 5. Tongue, right deep margin, biopsy - Skeletal muscle 6. Tongue, right, hemiglossectomy - Squamous cell carcinoma, well to moderately differentiated, 2.5 x 1.7 x 1.1cm, 17mm depth of invasion - Perineural invasion is identified - All margins are negative for carcinoma; carcinoma is present 2 mm from the deep/medial specimen margin and 3 mm from the inferior specimen margin Level 1A, right 2, 3, 4, and 1B neck dissection (0/53 nodes positive)  8/20/24: Patient presented for initial consultation. She is doing well after surgery. She has a PEG tube in place and is limited to pureed foods only.   9/25/2024 Presents today for OTV. Completed fx 5/33 to oral cavity. She reports tingling sensation in the tongue. Instructed on skin and mouth care with patient and her brother.

## 2024-09-26 PROCEDURE — 77014: CPT | Mod: 26

## 2024-09-26 PROCEDURE — 77427 RADIATION TX MANAGEMENT X5: CPT

## 2024-09-27 PROCEDURE — 77387B: CUSTOM | Mod: 26

## 2024-09-30 PROCEDURE — 77387B: CUSTOM | Mod: 26

## 2024-10-01 ENCOUNTER — NON-APPOINTMENT (OUTPATIENT)
Age: 64
End: 2024-10-01

## 2024-10-01 PROCEDURE — 77387B: CUSTOM | Mod: 26

## 2024-10-02 VITALS
BODY MASS INDEX: 23 KG/M2 | HEIGHT: 64 IN | OXYGEN SATURATION: 100 % | SYSTOLIC BLOOD PRESSURE: 122 MMHG | DIASTOLIC BLOOD PRESSURE: 83 MMHG | HEART RATE: 104 BPM | RESPIRATION RATE: 16 BRPM | TEMPERATURE: 97.1 F | WEIGHT: 134.7 LBS

## 2024-10-02 PROCEDURE — 77014: CPT | Mod: 26

## 2024-10-02 NOTE — REVIEW OF SYSTEMS
[Fatigue: Grade 1 - Fatigue relieved by rest] : Fatigue: Grade 1 - Fatigue relieved by rest [Xerostomia: Grade 0] : Xerostomia: Grade 0 [Oral Pain: Grade 0] : Oral Pain: Grade 0 [Skin Hyperpigmentation: Grade 0] : Skin Hyperpigmentation: Grade 0 [Dermatitis Radiation: Grade 1 - Faint erythema or dry desquamation] : Dermatitis Radiation: Grade 1 - Faint erythema or dry desquamation [FreeTextEntry6] : PEG [de-identified] : PEG

## 2024-10-02 NOTE — DISEASE MANAGEMENT
[Pathological] : TNM Stage: p [III] : III [TTNM] : 3 [NTNM] : 0 [MTNM] : x [de-identified] : 2000 [de-identified] : [id number] [de-identified] : Oral cavity

## 2024-10-02 NOTE — REVIEW OF SYSTEMS
[Fatigue: Grade 1 - Fatigue relieved by rest] : Fatigue: Grade 1 - Fatigue relieved by rest [Xerostomia: Grade 0] : Xerostomia: Grade 0 [Oral Pain: Grade 0] : Oral Pain: Grade 0 [Skin Hyperpigmentation: Grade 0] : Skin Hyperpigmentation: Grade 0 [Dermatitis Radiation: Grade 1 - Faint erythema or dry desquamation] : Dermatitis Radiation: Grade 1 - Faint erythema or dry desquamation [FreeTextEntry6] : PEG [de-identified] : PEG

## 2024-10-02 NOTE — HISTORY OF PRESENT ILLNESS
[FreeTextEntry1] : Ms. Zamzam Burris is a 63 yo F with a PMH of schizophrenia who presents with a SCC of the right tongue, pT3N0, s/p right hemiglossectomy and right neck dissection.  3/21/24: Right tongue biopsy SQUAMOUS CELL CARCINOMA, WELL TO MODERATELY DIFFERENTIATED, WITH MULTIFOCAL PERINEURAL INVASION AND INVASION INTO STRIATED MUSCLE TO THE DEPTH OF THE BIOPSY SPECIMEN.  6/6/2024: Right art-glossectomy and right neck dissection Final Diagnosis 1. Tongue, right superior margin, biopsy - Papillated tongue mucosa and underlying skeletal muscle 2. Tongue, right anterior margin, biopsy - Papillated tongue mucosa with underlying skeletal muscle 3. Tongue, right inferior/floor of mouth margin, biopsy - Oral mucosa with underlying minor salivary gland lobules and excretory duct 4. Tongue, right posterior margin, biopsy - Papillated tongue mucosa with underlying minor salivary gland lobules and skeletal muscle 5. Tongue, right deep margin, biopsy - Skeletal muscle 6. Tongue, right, hemiglossectomy - Squamous cell carcinoma, well to moderately differentiated, 2.5 x 1.7 x 1.1cm, 17mm depth of invasion - Perineural invasion is identified - All margins are negative for carcinoma; carcinoma is present 2 mm from the deep/medial specimen margin and 3 mm from the inferior specimen margin Level 1A, right 2, 3, 4, and 1B neck dissection (0/53 nodes positive)  8/20/24: Patient presented for initial consultation. She is doing well after surgery. She has a PEG tube in place and is limited to pureed foods only.   9/25/2024 Presents today for OTV. Completed fx 5/33 to oral cavity. She reports tingling sensation in the tongue. Instructed on skin and mouth care with patient and her brother.   10/2/2024 OTV. Completed fx 10/33. Generally, patient is feeling well. Skin and mouth care reviewed with her brother. Weight stable.

## 2024-10-03 PROCEDURE — 77427 RADIATION TX MANAGEMENT X5: CPT

## 2024-10-03 PROCEDURE — 77387B: CUSTOM | Mod: 26

## 2024-10-04 PROCEDURE — 77387B: CUSTOM | Mod: 26

## 2024-10-07 PROCEDURE — 77387B: CUSTOM | Mod: 26

## 2024-10-08 PROCEDURE — 77387B: CUSTOM | Mod: 26

## 2024-10-09 ENCOUNTER — NON-APPOINTMENT (OUTPATIENT)
Age: 64
End: 2024-10-09

## 2024-10-09 VITALS
HEART RATE: 117 BPM | OXYGEN SATURATION: 98 % | DIASTOLIC BLOOD PRESSURE: 93 MMHG | TEMPERATURE: 97.8 F | SYSTOLIC BLOOD PRESSURE: 130 MMHG | WEIGHT: 135.58 LBS | RESPIRATION RATE: 16 BRPM | HEIGHT: 64 IN | BODY MASS INDEX: 23.15 KG/M2

## 2024-10-09 PROCEDURE — 77387B: CUSTOM | Mod: 26

## 2024-10-09 RX ORDER — DIPHENHYDRAMINE HYDROCHLORIDE AND LIDOCAINE HYDROCHLORIDE AND ALUMINUM HYDROXIDE AND MAGNESIUM HYDRO
KIT
Qty: 1 | Refills: 4 | Status: ACTIVE | COMMUNITY
Start: 2024-10-09 | End: 1900-01-01

## 2024-10-10 PROCEDURE — 77427 RADIATION TX MANAGEMENT X5: CPT

## 2024-10-10 PROCEDURE — 77387B: CUSTOM | Mod: 26

## 2024-10-10 RX ORDER — DIPHENHYDRAMINE HYDROCHLORIDE AND LIDOCAINE HYDROCHLORIDE AND ALUMINUM HYDROXIDE AND MAGNESIUM HYDRO
KIT
Qty: 1 | Refills: 4 | Status: ACTIVE | COMMUNITY
Start: 2024-10-10 | End: 1900-01-01

## 2024-10-11 PROCEDURE — 77387B: CUSTOM | Mod: 26

## 2024-10-14 PROCEDURE — 77387B: CUSTOM | Mod: 26

## 2024-10-15 PROCEDURE — 77387B: CUSTOM | Mod: 26

## 2024-10-16 ENCOUNTER — NON-APPOINTMENT (OUTPATIENT)
Age: 64
End: 2024-10-16

## 2024-10-16 VITALS
DIASTOLIC BLOOD PRESSURE: 90 MMHG | HEART RATE: 114 BPM | SYSTOLIC BLOOD PRESSURE: 119 MMHG | TEMPERATURE: 96.98 F | RESPIRATION RATE: 16 BRPM | HEIGHT: 64 IN | OXYGEN SATURATION: 100 % | BODY MASS INDEX: 23.22 KG/M2 | WEIGHT: 136 LBS

## 2024-10-16 PROCEDURE — 77014: CPT | Mod: 26

## 2024-10-17 PROCEDURE — 77387B: CUSTOM | Mod: 26

## 2024-10-17 PROCEDURE — 77427 RADIATION TX MANAGEMENT X5: CPT

## 2024-10-18 PROCEDURE — 77387B: CUSTOM | Mod: 26

## 2024-10-21 PROCEDURE — 77387B: CUSTOM | Mod: 26

## 2024-10-22 PROCEDURE — 77387B: CUSTOM | Mod: 26

## 2024-10-23 ENCOUNTER — APPOINTMENT (OUTPATIENT)
Age: 64
End: 2024-10-23
Payer: MEDICAID

## 2024-10-23 ENCOUNTER — NON-APPOINTMENT (OUTPATIENT)
Age: 64
End: 2024-10-23

## 2024-10-23 VITALS
DIASTOLIC BLOOD PRESSURE: 87 MMHG | SYSTOLIC BLOOD PRESSURE: 118 MMHG | BODY MASS INDEX: 23.22 KG/M2 | HEIGHT: 64 IN | WEIGHT: 136 LBS | RESPIRATION RATE: 16 BRPM | HEART RATE: 115 BPM | OXYGEN SATURATION: 99 %

## 2024-10-23 PROCEDURE — 99213 OFFICE O/P EST LOW 20 MIN: CPT

## 2024-10-23 PROCEDURE — 77014: CPT | Mod: 26

## 2024-10-24 PROCEDURE — 77427 RADIATION TX MANAGEMENT X5: CPT

## 2024-10-24 PROCEDURE — 77387B: CUSTOM | Mod: 26

## 2024-10-25 PROCEDURE — 77387B: CUSTOM | Mod: 26

## 2024-10-28 PROCEDURE — 77387B: CUSTOM | Mod: 26

## 2024-10-29 PROCEDURE — 77387B: CUSTOM | Mod: 26

## 2024-10-30 ENCOUNTER — NON-APPOINTMENT (OUTPATIENT)
Age: 64
End: 2024-10-30

## 2024-10-30 VITALS
WEIGHT: 137 LBS | HEIGHT: 64 IN | HEART RATE: 120 BPM | OXYGEN SATURATION: 100 % | SYSTOLIC BLOOD PRESSURE: 121 MMHG | RESPIRATION RATE: 16 BRPM | DIASTOLIC BLOOD PRESSURE: 86 MMHG | BODY MASS INDEX: 23.39 KG/M2

## 2024-10-30 PROCEDURE — 77014: CPT | Mod: 26

## 2024-10-31 PROCEDURE — 77387B: CUSTOM | Mod: 26

## 2024-11-01 PROCEDURE — 77387B: CUSTOM | Mod: 26

## 2024-11-04 PROCEDURE — 77387B: CUSTOM | Mod: 26

## 2024-11-12 ENCOUNTER — NON-APPOINTMENT (OUTPATIENT)
Age: 64
End: 2024-11-12

## 2024-12-02 ENCOUNTER — APPOINTMENT (OUTPATIENT)
Dept: RADIATION ONCOLOGY | Facility: CLINIC | Age: 64
End: 2024-12-02
Payer: MEDICAID

## 2024-12-02 VITALS
BODY MASS INDEX: 23.64 KG/M2 | RESPIRATION RATE: 17 BRPM | WEIGHT: 138.45 LBS | OXYGEN SATURATION: 98 % | HEIGHT: 64 IN | HEART RATE: 121 BPM | DIASTOLIC BLOOD PRESSURE: 84 MMHG | SYSTOLIC BLOOD PRESSURE: 117 MMHG | TEMPERATURE: 96.98 F

## 2024-12-02 PROCEDURE — 99024 POSTOP FOLLOW-UP VISIT: CPT

## 2024-12-03 ENCOUNTER — NON-APPOINTMENT (OUTPATIENT)
Age: 64
End: 2024-12-03

## 2024-12-18 ENCOUNTER — APPOINTMENT (OUTPATIENT)
Age: 64
End: 2024-12-18

## 2024-12-18 PROCEDURE — 99213 OFFICE O/P EST LOW 20 MIN: CPT

## 2025-01-07 ENCOUNTER — NON-APPOINTMENT (OUTPATIENT)
Age: 65
End: 2025-01-07

## 2025-01-07 ENCOUNTER — APPOINTMENT (OUTPATIENT)
Dept: OTOLARYNGOLOGY | Facility: CLINIC | Age: 65
End: 2025-01-07

## 2025-01-07 ENCOUNTER — OUTPATIENT (OUTPATIENT)
Dept: OUTPATIENT SERVICES | Facility: HOSPITAL | Age: 65
LOS: 1 days | Discharge: HOME | End: 2025-01-07

## 2025-01-07 DIAGNOSIS — Z98.890 OTHER SPECIFIED POSTPROCEDURAL STATES: Chronic | ICD-10-CM

## 2025-01-07 DIAGNOSIS — C02.9 MALIGNANT NEOPLASM OF TONGUE, UNSPECIFIED: Chronic | ICD-10-CM

## 2025-01-07 DIAGNOSIS — Z98.49 CATARACT EXTRACTION STATUS, UNSPECIFIED EYE: Chronic | ICD-10-CM

## 2025-01-07 PROCEDURE — XXXXX: CPT | Mod: 1L

## 2025-01-07 PROCEDURE — 92610 EVALUATE SWALLOWING FUNCTION: CPT | Mod: NC,1L,GN

## 2025-01-28 ENCOUNTER — APPOINTMENT (OUTPATIENT)
Dept: OTOLARYNGOLOGY | Facility: CLINIC | Age: 65
End: 2025-01-28

## 2025-02-03 ENCOUNTER — APPOINTMENT (OUTPATIENT)
Dept: OTOLARYNGOLOGY | Facility: CLINIC | Age: 65
End: 2025-02-03

## 2025-02-04 ENCOUNTER — APPOINTMENT (OUTPATIENT)
Dept: NUCLEAR MEDICINE | Facility: IMAGING CENTER | Age: 65
End: 2025-02-04
Payer: MEDICAID

## 2025-02-04 ENCOUNTER — OUTPATIENT (OUTPATIENT)
Dept: OUTPATIENT SERVICES | Facility: HOSPITAL | Age: 65
LOS: 1 days | End: 2025-02-04
Payer: MEDICAID

## 2025-02-04 DIAGNOSIS — Z98.890 OTHER SPECIFIED POSTPROCEDURAL STATES: Chronic | ICD-10-CM

## 2025-02-04 DIAGNOSIS — Z00.8 ENCOUNTER FOR OTHER GENERAL EXAMINATION: ICD-10-CM

## 2025-02-04 DIAGNOSIS — Z98.49 CATARACT EXTRACTION STATUS, UNSPECIFIED EYE: Chronic | ICD-10-CM

## 2025-02-04 DIAGNOSIS — C02.9 MALIGNANT NEOPLASM OF TONGUE, UNSPECIFIED: ICD-10-CM

## 2025-02-04 DIAGNOSIS — C02.9 MALIGNANT NEOPLASM OF TONGUE, UNSPECIFIED: Chronic | ICD-10-CM

## 2025-02-04 PROCEDURE — 78815 PET IMAGE W/CT SKULL-THIGH: CPT

## 2025-02-04 PROCEDURE — 78815 PET IMAGE W/CT SKULL-THIGH: CPT | Mod: 26,PS

## 2025-02-04 PROCEDURE — A9552: CPT

## 2025-02-13 ENCOUNTER — APPOINTMENT (OUTPATIENT)
Dept: RADIATION ONCOLOGY | Facility: CLINIC | Age: 65
End: 2025-02-13

## 2025-02-13 VITALS
TEMPERATURE: 96.98 F | HEART RATE: 128 BPM | OXYGEN SATURATION: 98 % | BODY MASS INDEX: 23.56 KG/M2 | SYSTOLIC BLOOD PRESSURE: 124 MMHG | WEIGHT: 138 LBS | HEIGHT: 64 IN | DIASTOLIC BLOOD PRESSURE: 90 MMHG | RESPIRATION RATE: 17 BRPM

## 2025-02-13 PROCEDURE — 99205 OFFICE O/P NEW HI 60 MIN: CPT

## 2025-02-13 RX ORDER — NUTRITIONAL SUPPLEMENT/FIBER 0.06 G-1.4
LIQUID (ML) ORAL
Qty: 120 | Refills: 5 | Status: DISCONTINUED | COMMUNITY
Start: 2025-02-13 | End: 2025-02-13

## 2025-02-25 ENCOUNTER — APPOINTMENT (OUTPATIENT)
Dept: OTOLARYNGOLOGY | Facility: CLINIC | Age: 65
End: 2025-02-25
Payer: MEDICARE

## 2025-02-25 ENCOUNTER — APPOINTMENT (OUTPATIENT)
Age: 65
End: 2025-02-25

## 2025-02-25 PROCEDURE — 92526 ORAL FUNCTION THERAPY: CPT | Mod: GN

## 2025-02-25 PROCEDURE — 99213 OFFICE O/P EST LOW 20 MIN: CPT

## 2025-02-27 ENCOUNTER — APPOINTMENT (OUTPATIENT)
Dept: OTOLARYNGOLOGY | Facility: CLINIC | Age: 65
End: 2025-02-27
Payer: MEDICARE

## 2025-02-27 VITALS — BODY MASS INDEX: 23.56 KG/M2 | HEIGHT: 64 IN | WEIGHT: 138 LBS

## 2025-02-27 DIAGNOSIS — J98.09 OTHER DISEASES OF BRONCHUS, NOT ELSEWHERE CLASSIFIED: ICD-10-CM

## 2025-02-27 DIAGNOSIS — Z92.3 PERSONAL HISTORY OF IRRADIATION: ICD-10-CM

## 2025-02-27 DIAGNOSIS — Z85.810 PERSONAL HISTORY OF MALIGNANT NEOPLASM OF TONGUE: ICD-10-CM

## 2025-02-27 PROCEDURE — 31575 DIAGNOSTIC LARYNGOSCOPY: CPT

## 2025-02-27 PROCEDURE — 99204 OFFICE O/P NEW MOD 45 MIN: CPT | Mod: 25

## 2025-02-27 RX ORDER — QUETIAPINE 400 MG/1
TABLET, FILM COATED ORAL
Refills: 0 | Status: ACTIVE | COMMUNITY

## 2025-03-12 ENCOUNTER — APPOINTMENT (OUTPATIENT)
Dept: OTOLARYNGOLOGY | Facility: CLINIC | Age: 65
End: 2025-03-12

## 2025-03-12 PROCEDURE — 92526 ORAL FUNCTION THERAPY: CPT | Mod: GN

## 2025-03-14 ENCOUNTER — APPOINTMENT (OUTPATIENT)
Dept: OTOLARYNGOLOGY | Facility: CLINIC | Age: 65
End: 2025-03-14
Payer: MEDICARE

## 2025-03-14 VITALS
BODY MASS INDEX: 23.56 KG/M2 | SYSTOLIC BLOOD PRESSURE: 118 MMHG | HEART RATE: 93 BPM | RESPIRATION RATE: 17 BRPM | OXYGEN SATURATION: 97 % | WEIGHT: 138 LBS | DIASTOLIC BLOOD PRESSURE: 85 MMHG | HEIGHT: 64 IN

## 2025-03-14 DIAGNOSIS — Z98.890 OTHER SPECIFIED POSTPROCEDURAL STATES: ICD-10-CM

## 2025-03-14 DIAGNOSIS — Z92.3 PERSONAL HISTORY OF IRRADIATION: ICD-10-CM

## 2025-03-14 DIAGNOSIS — J04.0 ACUTE LARYNGITIS: ICD-10-CM

## 2025-03-14 DIAGNOSIS — Z85.810 PERSONAL HISTORY OF MALIGNANT NEOPLASM OF TONGUE: ICD-10-CM

## 2025-03-14 DIAGNOSIS — J98.09 OTHER DISEASES OF BRONCHUS, NOT ELSEWHERE CLASSIFIED: ICD-10-CM

## 2025-03-14 PROCEDURE — 31576 LARYNGOSCOPY WITH BIOPSY: CPT

## 2025-03-14 RX ORDER — BUDESONIDE 180 UG/1
180 AEROSOL, POWDER RESPIRATORY (INHALATION) TWICE DAILY
Qty: 1 | Refills: 2 | Status: ACTIVE | COMMUNITY
Start: 2025-03-14 | End: 1900-01-01

## 2025-03-18 LAB — CORE LAB BIOPSY: NORMAL

## 2025-03-19 ENCOUNTER — APPOINTMENT (OUTPATIENT)
Dept: OTOLARYNGOLOGY | Facility: CLINIC | Age: 65
End: 2025-03-19

## 2025-03-19 PROCEDURE — 92526 ORAL FUNCTION THERAPY: CPT | Mod: GN

## 2025-04-09 ENCOUNTER — APPOINTMENT (OUTPATIENT)
Dept: OTOLARYNGOLOGY | Facility: CLINIC | Age: 65
End: 2025-04-09

## 2025-04-09 PROCEDURE — 92526 ORAL FUNCTION THERAPY: CPT | Mod: GN

## 2025-04-22 ENCOUNTER — NON-APPOINTMENT (OUTPATIENT)
Age: 65
End: 2025-04-22

## 2025-04-23 ENCOUNTER — APPOINTMENT (OUTPATIENT)
Dept: OTOLARYNGOLOGY | Facility: CLINIC | Age: 65
End: 2025-04-23

## 2025-04-23 PROCEDURE — 92526 ORAL FUNCTION THERAPY: CPT | Mod: GN

## 2025-05-20 ENCOUNTER — APPOINTMENT (OUTPATIENT)
Dept: OTOLARYNGOLOGY | Facility: CLINIC | Age: 65
End: 2025-05-20
Payer: MEDICARE

## 2025-05-20 VITALS
BODY MASS INDEX: 23.56 KG/M2 | SYSTOLIC BLOOD PRESSURE: 110 MMHG | HEIGHT: 64 IN | WEIGHT: 138 LBS | HEART RATE: 124 BPM | OXYGEN SATURATION: 98 % | RESPIRATION RATE: 17 BRPM | DIASTOLIC BLOOD PRESSURE: 80 MMHG

## 2025-05-20 DIAGNOSIS — J98.09 OTHER DISEASES OF BRONCHUS, NOT ELSEWHERE CLASSIFIED: ICD-10-CM

## 2025-05-20 DIAGNOSIS — C02.9 MALIGNANT NEOPLASM OF TONGUE, UNSPECIFIED: ICD-10-CM

## 2025-05-20 DIAGNOSIS — Z92.3 PERSONAL HISTORY OF IRRADIATION: ICD-10-CM

## 2025-05-20 DIAGNOSIS — M31.2: ICD-10-CM

## 2025-05-20 DIAGNOSIS — Z85.810 PERSONAL HISTORY OF MALIGNANT NEOPLASM OF TONGUE: ICD-10-CM

## 2025-05-20 PROCEDURE — 31575 DIAGNOSTIC LARYNGOSCOPY: CPT

## 2025-05-20 PROCEDURE — 99214 OFFICE O/P EST MOD 30 MIN: CPT | Mod: 25

## 2025-05-20 RX ORDER — FAMOTIDINE 40 MG/1
40 TABLET, FILM COATED ORAL
Qty: 30 | Refills: 5 | Status: ACTIVE | COMMUNITY
Start: 2025-05-20 | End: 1900-01-01

## 2025-05-20 RX ORDER — BECLOMETHASONE DIPROPIONATE HFA 80 UG/1
80 AEROSOL, METERED RESPIRATORY (INHALATION) TWICE DAILY
Qty: 1 | Refills: 0 | Status: ACTIVE | COMMUNITY
Start: 2025-05-20 | End: 1900-01-01

## 2025-05-20 RX ORDER — LEVOTHYROXINE SODIUM 0.17 MG/1
TABLET ORAL
Refills: 0 | Status: ACTIVE | COMMUNITY

## 2025-05-20 RX ORDER — OMEPRAZOLE 40 MG/1
40 CAPSULE, DELAYED RELEASE ORAL
Qty: 30 | Refills: 10 | Status: ACTIVE | COMMUNITY
Start: 2025-05-20 | End: 1900-01-01

## 2025-05-22 ENCOUNTER — OUTPATIENT (OUTPATIENT)
Dept: OUTPATIENT SERVICES | Facility: HOSPITAL | Age: 65
LOS: 1 days | End: 2025-05-22
Payer: MEDICARE

## 2025-05-22 ENCOUNTER — APPOINTMENT (OUTPATIENT)
Dept: CT IMAGING | Facility: IMAGING CENTER | Age: 65
End: 2025-05-22
Payer: MEDICARE

## 2025-05-22 DIAGNOSIS — Z98.49 CATARACT EXTRACTION STATUS, UNSPECIFIED EYE: Chronic | ICD-10-CM

## 2025-05-22 DIAGNOSIS — Z98.890 OTHER SPECIFIED POSTPROCEDURAL STATES: Chronic | ICD-10-CM

## 2025-05-22 DIAGNOSIS — C02.9 MALIGNANT NEOPLASM OF TONGUE, UNSPECIFIED: Chronic | ICD-10-CM

## 2025-05-22 DIAGNOSIS — C02.9 MALIGNANT NEOPLASM OF TONGUE, UNSPECIFIED: ICD-10-CM

## 2025-05-22 PROCEDURE — 70491 CT SOFT TISSUE NECK W/DYE: CPT

## 2025-05-22 PROCEDURE — 70491 CT SOFT TISSUE NECK W/DYE: CPT | Mod: 26

## 2025-05-22 PROCEDURE — 71260 CT THORAX DX C+: CPT | Mod: 26

## 2025-05-22 PROCEDURE — 71260 CT THORAX DX C+: CPT

## 2025-05-28 ENCOUNTER — APPOINTMENT (OUTPATIENT)
Age: 65
End: 2025-05-28
Payer: MEDICAID

## 2025-05-28 PROCEDURE — 99213 OFFICE O/P EST LOW 20 MIN: CPT

## 2025-05-28 RX ORDER — SODIUM FLUORIDE 5 MG/ML
1.1 PASTE, DENTIFRICE DENTAL
Qty: 1 | Refills: 11 | Status: ACTIVE | COMMUNITY
Start: 2025-05-28 | End: 1900-01-01

## 2025-06-03 RX ORDER — MOMETASONE FUROATE 200 UG/1
200 AEROSOL RESPIRATORY (INHALATION) TWICE DAILY
Qty: 1 | Refills: 0 | Status: ACTIVE | COMMUNITY
Start: 2025-06-03 | End: 1900-01-01

## 2025-06-03 RX ORDER — CICLESONIDE 160 UG/1
160 AEROSOL, METERED RESPIRATORY (INHALATION) TWICE DAILY
Qty: 1 | Refills: 0 | Status: ACTIVE | COMMUNITY
Start: 2025-06-03 | End: 1900-01-01

## 2025-06-03 RX ORDER — FLUTICASONE PROPIONATE 220 UG/1
220 AEROSOL, METERED RESPIRATORY (INHALATION) TWICE DAILY
Qty: 1 | Refills: 4 | Status: ACTIVE | COMMUNITY
Start: 2025-06-03 | End: 1900-01-01

## 2025-06-05 ENCOUNTER — APPOINTMENT (OUTPATIENT)
Dept: RADIATION ONCOLOGY | Facility: CLINIC | Age: 65
End: 2025-06-05

## 2025-06-05 VITALS
DIASTOLIC BLOOD PRESSURE: 81 MMHG | HEART RATE: 123 BPM | RESPIRATION RATE: 17 BRPM | WEIGHT: 130 LBS | SYSTOLIC BLOOD PRESSURE: 110 MMHG | BODY MASS INDEX: 22.2 KG/M2 | HEIGHT: 64 IN | TEMPERATURE: 96.98 F | OXYGEN SATURATION: 97 %

## 2025-06-05 DIAGNOSIS — R13.10 DYSPHAGIA, UNSPECIFIED: ICD-10-CM

## 2025-06-05 PROCEDURE — 99215 OFFICE O/P EST HI 40 MIN: CPT | Mod: GC

## 2025-07-03 RX ORDER — BUDESONIDE 180 UG/1
180 AEROSOL, POWDER RESPIRATORY (INHALATION) TWICE DAILY
Qty: 1 | Refills: 0 | Status: ACTIVE | COMMUNITY
Start: 2025-07-03 | End: 1900-01-01

## 2025-07-03 RX ORDER — BUDESONIDE 0.5 MG/2ML
0.5 INHALANT ORAL TWICE DAILY
Qty: 2 | Refills: 4 | Status: ACTIVE | COMMUNITY
Start: 2025-07-03 | End: 1900-01-01

## 2025-07-07 RX ORDER — FLUTICASONE FUROATE 200 UG/1
200 POWDER RESPIRATORY (INHALATION) DAILY
Qty: 2 | Refills: 5 | Status: ACTIVE | COMMUNITY
Start: 2025-07-07 | End: 1900-01-01

## 2025-07-21 ENCOUNTER — APPOINTMENT (OUTPATIENT)
Dept: OTOLARYNGOLOGY | Facility: CLINIC | Age: 65
End: 2025-07-21
Payer: MEDICARE

## 2025-07-21 VITALS — BODY MASS INDEX: 22.2 KG/M2 | HEIGHT: 64 IN | WEIGHT: 130 LBS

## 2025-07-21 DIAGNOSIS — Z92.3 PERSONAL HISTORY OF IRRADIATION: ICD-10-CM

## 2025-07-21 DIAGNOSIS — Z98.890 OTHER SPECIFIED POSTPROCEDURAL STATES: ICD-10-CM

## 2025-07-21 DIAGNOSIS — J98.09 OTHER DISEASES OF BRONCHUS, NOT ELSEWHERE CLASSIFIED: ICD-10-CM

## 2025-07-21 DIAGNOSIS — Z85.810 PERSONAL HISTORY OF MALIGNANT NEOPLASM OF TONGUE: ICD-10-CM

## 2025-07-21 DIAGNOSIS — J39.8 OTHER SPECIFIED DISEASES OF UPPER RESPIRATORY TRACT: ICD-10-CM

## 2025-07-21 PROCEDURE — 99213 OFFICE O/P EST LOW 20 MIN: CPT | Mod: 25

## 2025-07-21 PROCEDURE — 31575 DIAGNOSTIC LARYNGOSCOPY: CPT

## 2025-08-25 ENCOUNTER — APPOINTMENT (OUTPATIENT)
Dept: CT IMAGING | Facility: IMAGING CENTER | Age: 65
End: 2025-08-25

## 2025-08-25 ENCOUNTER — OUTPATIENT (OUTPATIENT)
Dept: OUTPATIENT SERVICES | Facility: HOSPITAL | Age: 65
LOS: 1 days | End: 2025-08-25
Payer: MEDICARE

## 2025-08-25 DIAGNOSIS — C02.9 MALIGNANT NEOPLASM OF TONGUE, UNSPECIFIED: Chronic | ICD-10-CM

## 2025-08-25 DIAGNOSIS — Z98.890 OTHER SPECIFIED POSTPROCEDURAL STATES: Chronic | ICD-10-CM

## 2025-08-25 DIAGNOSIS — C02.9 MALIGNANT NEOPLASM OF TONGUE, UNSPECIFIED: ICD-10-CM

## 2025-08-25 DIAGNOSIS — Z98.49 CATARACT EXTRACTION STATUS, UNSPECIFIED EYE: Chronic | ICD-10-CM

## 2025-08-25 DIAGNOSIS — Z85.810 PERSONAL HISTORY OF MALIGNANT NEOPLASM OF TONGUE: ICD-10-CM

## 2025-08-25 PROCEDURE — 70491 CT SOFT TISSUE NECK W/DYE: CPT

## 2025-08-25 PROCEDURE — 70491 CT SOFT TISSUE NECK W/DYE: CPT | Mod: 26

## 2025-08-25 PROCEDURE — 71260 CT THORAX DX C+: CPT | Mod: 26

## 2025-08-25 PROCEDURE — 71260 CT THORAX DX C+: CPT

## (undated) DEVICE — SUT QUILL MONODERM 2-0 30CM 19MM

## (undated) DEVICE — CANISTER DISPOSABLE THIN WALL 3000CC

## (undated) DEVICE — SUT ETHILON 8-0 5" BV130-5

## (undated) DEVICE — SUT PROLENE 5-0 36" RB-1

## (undated) DEVICE — ELCTR GROUNDING PAD ADULT COVIDIEN

## (undated) DEVICE — POSITIONER STRAP ARMBOARD VELCRO TS-30

## (undated) DEVICE — SUT MONOCRYL 3-0 27" PS-2 UNDYED

## (undated) DEVICE — SUT ETHILON 9-0 5" BV100-4

## (undated) DEVICE — DRAIN PENROSE 1" X 18" LATEX

## (undated) DEVICE — ELCTR BOVIE PENCIL SMOKE EVACUATION

## (undated) DEVICE — TUBING SUCTION NONCONDUCTIVE 6MM X 12FT

## (undated) DEVICE — DRAPE 3/4 SHEET 52X76"

## (undated) DEVICE — CLAMP MICROVASCULAR DOUBLE  1-2MM

## (undated) DEVICE — SUT CHROMIC 3-0 27" RB-1

## (undated) DEVICE — SUT QUILL PDO 0 24X24CM

## (undated) DEVICE — PROTECTOR HEEL / ELBOW FLUFFY

## (undated) DEVICE — WARMING BLANKET LOWER ADULT

## (undated) DEVICE — SUT SILK 3-0 18" TIES

## (undated) DEVICE — DRSG KERLIX ROLL 4.5"

## (undated) DEVICE — SPEAR SURG EYE WECK-CELL CELOS

## (undated) DEVICE — PACK HEAD & NECK

## (undated) DEVICE — BIPOLAR FORCEP CORD WECK STANDARD 12FT

## (undated) DEVICE — HANDPIECE INTERPULSE W/ COAXIAL FAN SPRAY TIP

## (undated) DEVICE — Device

## (undated) DEVICE — VENODYNE/SCD SLEEVE CALF MEDIUM

## (undated) DEVICE — TOURNIQUET CUFF 18" DUAL PORT SINGLE BLADDER LUER LOCK (BLACK)

## (undated) DEVICE — ELCTR E/S NEEDLE 0.75"

## (undated) DEVICE — MERCIAN VISABILITY BACKROUND YELLOW

## (undated) DEVICE — TOURNIQUET CUFF 34" DUAL PORT W PLC

## (undated) DEVICE — PACK MAJOR ABDOMINAL WITH LAP

## (undated) DEVICE — WARMING BLANKET UPPER ADULT

## (undated) DEVICE — ELCTR BOVIE TIP BLADE INSULATED 2.8" EDGE WITH SAFETY

## (undated) DEVICE — NDL COUNTER FOAM AND MAGNET 20-40

## (undated) DEVICE — DRAPE INSTRUMENT POUCH 6.75" X 11"

## (undated) DEVICE — DRAPE FLUID WARMER 44 X 44"

## (undated) DEVICE — STAPLER SKIN VISI-STAT 35 WIDE

## (undated) DEVICE — CLAMP MICROVASCULAR SINGLE  1-2MM

## (undated) DEVICE — PACK FREE FLAP

## (undated) DEVICE — DRAPE ARMATEC HANDLE 5" X 10"

## (undated) DEVICE — DRSG ACE BANDAGE 4" NS

## (undated) DEVICE — DRAPE SPLIT SHEET 77" X 108"

## (undated) DEVICE — DRSG CURITY GAUZE SPONGE 4 X 4" 12-PLY

## (undated) DEVICE — TOURNIQUET CUFF 34" SINGLE PORT W PLC (BLACK)

## (undated) DEVICE — DRSG WEBRIL 6"

## (undated) DEVICE — SUT ETHILON 3-0 30" PSLX

## (undated) DEVICE — WARMING BLANKET FULL ADULT

## (undated) DEVICE — DRSG STOCKINETTE IMPERVIOUS LG

## (undated) DEVICE — TOURNIQUET CUFF 18" DUAL PORT SINGLE BLADDER W PLC  (BLACK)

## (undated) DEVICE — SUT PDS II 0 36" CT-1

## (undated) DEVICE — ULTRASOUND GEL 0.25L

## (undated) DEVICE — SOL IRR POUR NS 0.9% 1500ML

## (undated) DEVICE — SUCTION MICROMAT 3FR

## (undated) DEVICE — SOL IRR POUR H2O 1500ML

## (undated) DEVICE — WARMING BLANKET FULL UNDERBODY

## (undated) DEVICE — POSITIONER FOAM EGG CRATE ULNAR 2PCS (PINK)

## (undated) DEVICE — DOPPLER PROBE  CABLE

## (undated) DEVICE — NDL HYPO SAFE 25G X 5/8" (ORANGE)

## (undated) DEVICE — GLV 8 PROTEXIS (WHITE)

## (undated) DEVICE — TUBING CODMAN INTEGRATED BIPOLAR CORD & TUBING SET FLYING LEADS

## (undated) DEVICE — BASIN SET DOUBLE

## (undated) DEVICE — WOUND IRR SURGIPHOR

## (undated) DEVICE — ZIMMER BLADE DERMATONE

## (undated) DEVICE — SUT SILK 2-0 18" FS

## (undated) DEVICE — GOWN LG

## (undated) DEVICE — DRAIN PENROSE .25" X 12" SILICONE

## (undated) DEVICE — CANNULA ANT CHMBR 27GX22MM

## (undated) DEVICE — PREP BETADINE KIT

## (undated) DEVICE — DRAPE SPLIT SHEET 77" X 120"

## (undated) DEVICE — DRSG TELFA 3 X 8

## (undated) DEVICE — LABELS BLANK W PEN

## (undated) DEVICE — ELCTR BOVIE TIP NEEDLE INSULATED 2.8" EDGE

## (undated) DEVICE — SUT VICRYL 3-0 27" SH UNDYED

## (undated) DEVICE — DRAPE MAGNETIC INSTRUMENT MEDIUM

## (undated) DEVICE — LIJ-ORAL SURGERY SCISSOR TRAY: Type: DURABLE MEDICAL EQUIPMENT

## (undated) DEVICE — SUT PROLENE 5-0 36" C-1

## (undated) DEVICE — LIJ/LIA-ESU VALLEYLAB FORCE TRIAD T2D28932EX: Type: DURABLE MEDICAL EQUIPMENT

## (undated) DEVICE — ONETRAC LIGHTED RETRACTOR 90 X 22MM DISP

## (undated) DEVICE — TOURNIQUET CUFF 24" DUAL PORT SINGLE BLADDER LUER LOCK  (BLACK)

## (undated) DEVICE — SUT SILK 2-0 18" TIES

## (undated) DEVICE — SOL IRR POUR H2O 500ML

## (undated) DEVICE — LONE STAR RETRACTOR RING 12MM BLUNT DISP

## (undated) DEVICE — TOURNIQUET CUFF 24" DUAL PORT DUAL BLADDER W PLC (BLACK)

## (undated) DEVICE — FEEDING TUBE NG 12FR 36"

## (undated) DEVICE — ELCTR BOVIE TIP BLADE INSULATED 2.75" EDGE

## (undated) DEVICE — DRAIN BLAKE 10FR ROUND

## (undated) DEVICE — SOL INJ NS 0.9% 1000ML

## (undated) DEVICE — PREP CHLORAPREP HI-LITE ORANGE 26ML

## (undated) DEVICE — DRAPE FLUID WARMER 44 X 66"

## (undated) DEVICE — SUT VICRYL 3-0 27" RB-1 UNDYED

## (undated) DEVICE — DRSG XEROFORM 5 X 9"

## (undated) DEVICE — BIPOLAR FORCEP STRYKER STANDARD 8" X 1MM (YELLOW)

## (undated) DEVICE — DVC ASCOPE 4 RHINOLARYNGO SLIM DISP

## (undated) DEVICE — DRAIN RESERVOIR FOR JACKSON PRATT 100CC CARDINAL

## (undated) DEVICE — LIJ-ZIMMER MESHGRAFTER: Type: DURABLE MEDICAL EQUIPMENT

## (undated) DEVICE — SUT CHROMIC 4-0 27" RB-1

## (undated) DEVICE — VAGINAL PACKING 2"

## (undated) DEVICE — FOLEY TRAY 16FR 5CC LF UMETER CLOSED

## (undated) DEVICE — SOL ANTI FOG

## (undated) DEVICE — SPEAR SURG WECKCEL

## (undated) DEVICE — BIPOLAR FORCEP KIRWAN JEWELERS STR 4" X 0.4MM W 12FT CORD (GREEN)

## (undated) DEVICE — TOURNIQUET ESMARK 6"